# Patient Record
Sex: FEMALE | Race: BLACK OR AFRICAN AMERICAN | NOT HISPANIC OR LATINO | Employment: FULL TIME | URBAN - METROPOLITAN AREA
[De-identification: names, ages, dates, MRNs, and addresses within clinical notes are randomized per-mention and may not be internally consistent; named-entity substitution may affect disease eponyms.]

---

## 2017-02-22 DIAGNOSIS — Z00.00 ROUTINE GENERAL MEDICAL EXAMINATION AT A HEALTH CARE FACILITY: ICD-10-CM

## 2017-02-22 DIAGNOSIS — G43.909 MIGRAINE WITHOUT STATUS MIGRAINOSUS, NOT INTRACTABLE, UNSPECIFIED MIGRAINE TYPE: ICD-10-CM

## 2017-02-22 DIAGNOSIS — N94.6 MENSTRUAL CRAMPS: ICD-10-CM

## 2017-02-23 RX ORDER — NAPROXEN SODIUM 550 MG/1
TABLET ORAL
Qty: 30 TABLET | Refills: 1 | Status: SHIPPED | OUTPATIENT
Start: 2017-02-23 | End: 2017-06-30 | Stop reason: SDUPTHER

## 2017-06-30 DIAGNOSIS — Z00.00 ROUTINE GENERAL MEDICAL EXAMINATION AT A HEALTH CARE FACILITY: ICD-10-CM

## 2017-06-30 DIAGNOSIS — N94.6 MENSTRUAL CRAMPS: ICD-10-CM

## 2017-06-30 DIAGNOSIS — G43.909 MIGRAINE WITHOUT STATUS MIGRAINOSUS, NOT INTRACTABLE, UNSPECIFIED MIGRAINE TYPE: ICD-10-CM

## 2017-06-30 RX ORDER — NAPROXEN SODIUM 550 MG/1
TABLET ORAL
Qty: 30 TABLET | Refills: 1 | Status: SHIPPED | OUTPATIENT
Start: 2017-06-30 | End: 2018-02-15

## 2017-12-02 DIAGNOSIS — G43.909 MIGRAINE WITHOUT STATUS MIGRAINOSUS, NOT INTRACTABLE, UNSPECIFIED MIGRAINE TYPE: ICD-10-CM

## 2017-12-02 DIAGNOSIS — N94.6 MENSTRUAL CRAMPS: ICD-10-CM

## 2017-12-02 DIAGNOSIS — Z00.00 ROUTINE GENERAL MEDICAL EXAMINATION AT A HEALTH CARE FACILITY: ICD-10-CM

## 2017-12-04 RX ORDER — RIZATRIPTAN BENZOATE 10 MG/1
TABLET ORAL
Qty: 10 TABLET | Refills: 3 | OUTPATIENT
Start: 2017-12-04

## 2018-02-12 ENCOUNTER — OFFICE VISIT (OUTPATIENT)
Dept: INTERNAL MEDICINE | Facility: CLINIC | Age: 28
End: 2018-02-12
Payer: COMMERCIAL

## 2018-02-12 VITALS
OXYGEN SATURATION: 97 % | HEART RATE: 72 BPM | HEIGHT: 66 IN | BODY MASS INDEX: 24.13 KG/M2 | WEIGHT: 150.13 LBS | SYSTOLIC BLOOD PRESSURE: 110 MMHG | DIASTOLIC BLOOD PRESSURE: 86 MMHG

## 2018-02-12 DIAGNOSIS — G43.909 MIGRAINE WITHOUT STATUS MIGRAINOSUS, NOT INTRACTABLE, UNSPECIFIED MIGRAINE TYPE: Primary | ICD-10-CM

## 2018-02-12 DIAGNOSIS — L70.9 ACNE, UNSPECIFIED ACNE TYPE: ICD-10-CM

## 2018-02-12 DIAGNOSIS — F43.9 STRESS: ICD-10-CM

## 2018-02-12 PROCEDURE — 3008F BODY MASS INDEX DOCD: CPT | Mod: S$GLB,,, | Performed by: INTERNAL MEDICINE

## 2018-02-12 PROCEDURE — 99214 OFFICE O/P EST MOD 30 MIN: CPT | Mod: S$GLB,,, | Performed by: INTERNAL MEDICINE

## 2018-02-12 PROCEDURE — 99999 PR PBB SHADOW E&M-EST. PATIENT-LVL IV: CPT | Mod: PBBFAC,,, | Performed by: INTERNAL MEDICINE

## 2018-02-12 RX ORDER — NAPROXEN SODIUM 550 MG/1
TABLET ORAL
Qty: 60 TABLET | Refills: 1 | Status: SHIPPED | OUTPATIENT
Start: 2018-02-12 | End: 2018-04-02 | Stop reason: SDUPTHER

## 2018-02-12 RX ORDER — RIZATRIPTAN BENZOATE 10 MG/1
TABLET ORAL
Qty: 10 TABLET | Refills: 4 | Status: SHIPPED | OUTPATIENT
Start: 2018-02-12 | End: 2018-04-02 | Stop reason: SDUPTHER

## 2018-02-12 RX ORDER — TOPIRAMATE 25 MG/1
25 TABLET ORAL NIGHTLY
Qty: 30 TABLET | Refills: 1 | Status: SHIPPED | OUTPATIENT
Start: 2018-02-12 | End: 2018-04-02 | Stop reason: SDUPTHER

## 2018-02-12 NOTE — PATIENT INSTRUCTIONS
Topiramate tablets  What is this medicine?  TOPIRAMATE (toe PYRE a mate) is used to treat seizures in adults or children with epilepsy. It is also used for the prevention of migraine headaches.  How should I use this medicine?  Take this medicine by mouth with a glass of water. Follow the directions on the prescription label. Do not crush or chew. You may take this medicine with meals. Take your medicine at regular intervals. Do not take it more often than directed.  Talk to your pediatrician regarding the use of this medicine in children. Special care may be needed. While this drug may be prescribed for children as young as 2 years of age for selected conditions, precautions do apply.  What side effects may I notice from receiving this medicine?  Side effects that you should report to your doctor or health care professional as soon as possible:  · allergic reactions like skin rash, itching or hives, swelling of the face, lips, or tongue  · decreased sweating and/or rise in body temperature  · depression  · difficulty breathing, fast or irregular breathing patterns  · difficulty speaking  · difficulty walking or controlling muscle movements  · hearing impairment  · redness, blistering, peeling or loosening of the skin, including inside the mouth  · tingling, pain or numbness in the hands or feet  · unusual bleeding or bruising  · unusually weak or tired  · worsening of mood, thoughts or actions of suicide or dying  Side effects that usually do not require medical attention (report to your doctor or health care professional if they continue or are bothersome):  · altered taste  · back pain, joint or muscle aches and pains  · diarrhea, or constipation  · headache  · loss of appetite  · nausea  · stomach upset, indigestion  · tremors  What may interact with this medicine?  Do not take this medicine with any of the following medications:  · probenecid  This medicine may also interact with the following  medications:  · acetazolamide  · alcohol  · amitriptyline  · aspirin and aspirin-like medicines  · birth control pills  · certain medicines for depression  · certain medicines for seizures  · certain medicines that treat or prevent blood clots like warfarin, enoxaparin, dalteparin, apixaban, dabigatran, and rivaroxaban  · digoxin  · hydrochlorothiazide  · lithium  · medicines for pain, sleep, or muscle relaxation  · metformin  · methazolamide  · NSAIDS, medicines for pain and inflammation, like ibuprofen or naproxen  · pioglitazone  · risperidone  What if I miss a dose?  If you miss a dose, take it as soon as you can. If your next dose is to be taken in less than 6 hours, then do not take the missed dose. Take the next dose at your regular time. Do not take double or extra doses.  Where should I keep my medicine?  Keep out of the reach of children.  Store at room temperature between 15 and 30 degrees C (59 and 86 degrees F) in a tightly closed container. Protect from moisture. Throw away any unused medicine after the expiration date.  What should I tell my health care provider before I take this medicine?  They need to know if you have any of these conditions:  · bleeding disorders  · cirrhosis of the liver or liver disease  · diarrhea  · glaucoma  · kidney stones or kidney disease  · low blood counts, like low white cell, platelet, or red cell counts  · lung disease like asthma, obstructive pulmonary disease, emphysema  · metabolic acidosis  · on a ketogenic diet  · schedule for surgery or a procedure  · suicidal thoughts, plans, or attempt; a previous suicide attempt by you or a family member  · an unusual or allergic reaction to topiramate, other medicines, foods, dyes, or preservatives  · pregnant or trying to get pregnant  · breast-feeding  What should I watch for while using this medicine?  Visit your doctor or health care professional for regular checks on your progress. Do not stop taking this medicine  suddenly. This increases the risk of seizures if you are using this medicine to control epilepsy. Wear a medical identification bracelet or chain to say you have epilepsy or seizures, and carry a card that lists all your medicines.  This medicine can decrease sweating and increase your body temperature. Watch for signs of  sweating or fever, especially in children. Avoid extreme heat, hot baths, and saunas. Be careful about exercising, especially in hot weather. Contact your health care provider right away if you notice a fever or decrease in sweating.  You should drink plenty of fluids while taking this medicine. If you have had kidney stones in the past, this will help to reduce your chances of forming kidney stones.  If you have stomach pain, with nausea or vomiting and yellowing of your eyes or skin, call your doctor immediately.  You may get drowsy, dizzy, or have blurred vision. Do not drive, use machinery, or do anything that needs mental alertness until you know how this medicine affects you. To reduce dizziness, do not sit or stand up quickly, especially if you are an older patient. Alcohol can increase drowsiness and dizziness. Avoid alcoholic drinks.  If you notice blurred vision, eye pain, or other eye problems, seek medical attention at once for an eye exam.  The use of this medicine may increase the chance of suicidal thoughts or actions. Pay special attention to how you are responding while on this medicine. Any worsening of mood, or thoughts of suicide or dying should be reported to your health care professional right away.  This medicine may increase the chance of developing metabolic acidosis. If left untreated, this can cause kidney stones, bone disease, or slowed growth in children. Symptoms include breathing fast, fatigue, loss of appetite, irregular heartbeat, or loss of consciousness. Call your doctor immediately if you experience any of these side effects. Also, tell your doctor about  any surgery you plan on having while taking this medicine since this may increase your risk for metabolic acidosis.  Birth control pills may not work properly while you are taking this medicine. Talk to your doctor about using an extra method of birth control.  Women who become pregnant while using this medicine may enroll in the [place] Antiepileptic Drug Pregnancy Registry by calling 1-868.201.7108. This registry collects information about the safety of antiepileptic drug use during pregnancy.  NOTE:This sheet is a summary. It may not cover all possible information. If you have questions about this medicine, talk to your doctor, pharmacist, or health care provider. Copyright© 2017 [employer]      Use over the counter benzoyl peroxide for treatment of acne. Start at low dose such as 1 or 2% and then increase as needed.

## 2018-02-12 NOTE — PROGRESS NOTES
"Subjective:       Patient ID: Rosalina Saavedra is a 28 y.o. female.    Chief Complaint: Headache    HPI Mrs. Saavedra is a 28-year-old female with migraines who presents with a chief complaint headache.  She is a former patient of Dr. Trujillo.  She states that she was diagnosed with migraines in childhood.  She has previously been treated with naproxen and Maxalt.  She is now out of Maxalt.  She states that when she had both of these medications they would "mostly work".  They made her headaches more tolerable but did not make headaches dissipate.  She states that her headaches are triggered by stress.  Recently she has noted that she has longer stretches with headache and increased pain as opposed to when she had these headaches in childhood.  Her headaches are located in both the back and front of the head.  She describes them as sharp in character.  When they're at their worst, they are 10 out of 10 in severity.  Currently her headache is 3 out of 10 in severity.  She states that the light makes her headaches worse.  Headaches are associated with nausea and sometimes blurry vision.  She has never been treated with a maintenance for prophylactic-type medicine for migraine.    Patient also complains today of acne and would like a referral to a dermatologist.  She has had some acne which has led to scarring.  She is using over-the-counter acne creams with no relief.    Patient would also like to discuss stress management.  She both works and goes to school and feels that she has been stressed recently.    Review of Systems   Constitutional: Positive for fever (101 fever this monday-wednesday).   HENT: Positive for congestion.    Eyes: Positive for photophobia and visual disturbance.   Respiratory: Positive for cough. Negative for wheezing.    Cardiovascular: Negative for chest pain.   Gastrointestinal: Positive for nausea.   Genitourinary: Negative for dysuria and hematuria.   Neurological: Positive for " headaches.       Objective:      Physical Exam   Constitutional: She is oriented to person, place, and time. She appears well-developed and well-nourished. No distress.   HENT:   Head: Normocephalic and atraumatic.   Right Ear: External ear normal.   Left Ear: External ear normal.   Eyes: Conjunctivae and EOM are normal. Right eye exhibits no discharge. Left eye exhibits no discharge. No scleral icterus.   Cardiovascular: Normal rate, regular rhythm, normal heart sounds and intact distal pulses.  Exam reveals no gallop and no friction rub.    No murmur heard.  Pulmonary/Chest: Effort normal and breath sounds normal. No respiratory distress. She has no wheezes. She has no rales.   Neurological: She is alert and oriented to person, place, and time. She displays normal reflexes. No cranial nerve deficit. She exhibits normal muscle tone. Coordination normal.   Skin: Skin is warm and dry. She is not diaphoretic.   Psychiatric: She has a normal mood and affect. Her behavior is normal. Judgment and thought content normal.   Vitals reviewed.      Assessment:       1. Migraine without status migrainosus, not intractable, unspecified migraine type    2. Acne, unspecified acne type    3. Stress        Plan:     #1 migraine  Starting Topamax at lowest dose today.  She will take Topamax 25 mg every afternoon.  Can titrate up if needed.  Counseled on side effects such as sedation.  Continue naproxen and Maxalt for acute headaches.  Stressed the importance of hydration.  RTC one month    #2 acne  I have placed a dermatology referral as the patient has requested.  I have also instructed the patient to begin treating herself with over-the-counter benzoyl peroxide.  I advised her to find creams that contain benzoyl peroxide at a low dose such as 1 or 2%.  She can slowly increase to benzoyl peroxide as high as 10% over-the-counter if this is providing her with relief of her acne.    #3 stress  Stress was mentioned at the end of her  visit today.  As we did not have much time to discuss this she will return in 1 month, and we will follow-up on this.  In the meantime I advised her to exercise and talk to someone regarding her stress.  This someone could be a friend, close family member, or a counselor.    RTC one month for follow up of migraines and stress

## 2018-02-15 ENCOUNTER — INITIAL CONSULT (OUTPATIENT)
Dept: DERMATOLOGY | Facility: CLINIC | Age: 28
End: 2018-02-15
Payer: COMMERCIAL

## 2018-02-15 DIAGNOSIS — L70.0 ACNE VULGARIS: Primary | ICD-10-CM

## 2018-02-15 DIAGNOSIS — L21.9 SEBORRHEIC DERMATITIS: ICD-10-CM

## 2018-02-15 PROCEDURE — 99999 PR PBB SHADOW E&M-EST. PATIENT-LVL III: CPT | Mod: PBBFAC,,, | Performed by: NURSE PRACTITIONER

## 2018-02-15 PROCEDURE — 99202 OFFICE O/P NEW SF 15 MIN: CPT | Mod: S$GLB,,, | Performed by: NURSE PRACTITIONER

## 2018-02-15 PROCEDURE — 3008F BODY MASS INDEX DOCD: CPT | Mod: S$GLB,,, | Performed by: NURSE PRACTITIONER

## 2018-02-15 RX ORDER — FLUOCINONIDE TOPICAL SOLUTION USP, 0.05% 0.5 MG/ML
SOLUTION TOPICAL
Qty: 60 ML | Refills: 3 | Status: SHIPPED | OUTPATIENT
Start: 2018-02-15 | End: 2019-04-01

## 2018-02-15 RX ORDER — ADAPALENE AND BENZOYL PEROXIDE 3; 25 MG/G; MG/G
GEL TOPICAL
Qty: 45 G | Refills: 3 | Status: SHIPPED | OUTPATIENT
Start: 2018-02-15 | End: 2021-02-01

## 2018-02-15 RX ORDER — KETOCONAZOLE 20 MG/ML
SHAMPOO, SUSPENSION TOPICAL
Qty: 120 ML | Refills: 5 | Status: SHIPPED | OUTPATIENT
Start: 2018-02-15 | End: 2021-02-01

## 2018-02-15 NOTE — PROGRESS NOTES
Subjective:       Patient ID:  Rosalina Saavedra is a 28 y.o. female who presents for   Chief Complaint   Patient presents with    Acne    Hair/Scalp Problem     This is a new patient here for evaluation of acne and scalp issues      Acne  - Initial  Affected locations: face, back and chest  Duration: 10 years  Signs / symptoms: tender  Timing: constant  Aggravated by: stress  Treatments tried: Was by dermatologist in the past and treated with topical prescription -unable to recall name. Washing face BID with Dial.   Improvement on treatment: mild    Hair/Scalp Problem  - Initial  Affected locations: scalp  Duration: 4 years  Signs / symptoms: itching and scaling  Timing: constant  Aggravated by: nothing  Treatments tried: Was treated with medicated shampoo in p(ast which helped- doesnt remember name.        Review of Systems   HENT: Negative for nosebleeds and headaches ( Hx of migraines).    Gastrointestinal: Negative for diarrhea and Sensitivity to oral antibiotics.   Genitourinary: Negative for irregular periods ( Not on OCP. Not sexually active).   Musculoskeletal: Negative for arthralgias.   Skin: Positive for activity-related sunscreen use. Negative for daily sunscreen use and recent sunburn.   Neurological: Negative for headaches ( Hx of migraines).   Psychiatric/Behavioral: Negative for depressed mood ( Hx of depression. Was seen in the past by psychiatrist. ).        Objective:    Physical Exam   Constitutional: She appears well-developed and well-nourished. No distress.   Neurological: She is alert and oriented to person, place, and time. She is not disoriented.   Psychiatric: She has a normal mood and affect.   Skin:   Areas Examined (abnormalities noted in diagram):   Scalp / Hair Palpated and Inspected  Head / Face Inspection Performed  Neck Inspection Performed  Chest / Axilla Inspection Performed  Back Inspection Performed                   Diagram Legend     Erythematous scaling  macule/papule c/w actinic keratosis       Vascular papule c/w angioma      Pigmented verrucoid papule/plaque c/w seborrheic keratosis      Yellow umbilicated papule c/w sebaceous hyperplasia      Irregularly shaped tan macule c/w lentigo     1-2 mm smooth white papules consistent with Milia      Movable subcutaneous cyst with punctum c/w epidermal inclusion cyst      Subcutaneous movable cyst c/w pilar cyst      Firm pink to brown papule c/w dermatofibroma      Pedunculated fleshy papule(s) c/w skin tag(s)      Evenly pigmented macule c/w junctional nevus     Mildly variegated pigmented, slightly irregular-bordered macule c/w mildly atypical nevus      Flesh colored to evenly pigmented papule c/w intradermal nevus       Pink pearly papule/plaque c/w basal cell carcinoma      Erythematous hyperkeratotic cursted plaque c/w SCC      Surgical scar with no sign of skin cancer recurrence      Open and closed comedones      Inflammatory papules and pustules      Verrucoid papule consistent consistent with wart     Erythematous eczematous patches and plaques     Dystrophic onycholytic nail with subungual debris c/w onychomycosis     Umbilicated papule    Erythematous-base heme-crusted tan verrucoid plaque consistent with inflamed seborrheic keratosis     Erythematous Silvery Scaling Plaque c/w Psoriasis     See annotation      Assessment / Plan:        Acne vulgaris  -     EPIDUO FORTE 0.3-2.5 % GlwP; AAA face qhs  Dispense: 45 g; Refill: 3  Recommend starting with pea size amount every other night with moisturizer until can tolerate every night.     Cont washing face BID- Recommend cetaphil oil free foam wash or cerave gentle cleanser         Discussed importance of not picking at lesions        Recommend Moisturizing qhs- Cerave PM        Encouraged sunscreen application every morning         Discussed with patient to avoid all face washes containing BP.     Seborrheic dermatitis  -     ketoconazole (NIZORAL) 2 % shampoo;  Wash hair with medicated shampoo at least 2x/week - let sit on scalp at least 5 minutes prior to rinsing  Dispense: 120 mL; Refill: 5  -     fluocinonide (LIDEX) 0.05 % external solution; AAA scalp qday - bid prn pruritus  Dispense: 60 mL; Refill: 3             Follow-up in about 2 months (around 4/15/2018).

## 2018-02-15 NOTE — PATIENT INSTRUCTIONS

## 2018-02-15 NOTE — LETTER
February 15, 2018      Jaki Murphy MD  [address] [facility] [address]           [facility]  [address]  [place] [address]  Phone: [phone]  Fax: [phone]          Patient: Rosalina Saavedra   MR Number: [id number]   YOB: 1990   Date of Visit: 2/15/2018       Dear Dr. Jaki Murphy:    Thank you for referring Rosalina Saavedra to me for evaluation. Attached you will find relevant portions of my assessment and plan of care.    If you have questions, please do not hesitate to call me. I look forward to following Rosalina Saavedra along with you.    Sincerely,    Britt Mitchell, TAMRA    Enclosure  CC:  No Recipients    If you would like to receive this communication electronically, please contact [email or web address] or [phone] to request more information on [facility] access.    For providers and/or their staff who would like to refer a patient to [facility], please contact us through our one-stop-shop provider referral line, [facility], at [phone].    If you feel you have received this communication in error or would no longer like to receive these types of communications, please e-mail [email or web address]

## 2018-03-01 ENCOUNTER — TELEPHONE (OUTPATIENT)
Dept: INTERNAL MEDICINE | Facility: CLINIC | Age: 28
End: 2018-03-01

## 2018-04-02 ENCOUNTER — OFFICE VISIT (OUTPATIENT)
Dept: INTERNAL MEDICINE | Facility: CLINIC | Age: 28
End: 2018-04-02
Payer: COMMERCIAL

## 2018-04-02 VITALS
HEIGHT: 66 IN | DIASTOLIC BLOOD PRESSURE: 64 MMHG | HEART RATE: 78 BPM | SYSTOLIC BLOOD PRESSURE: 128 MMHG | WEIGHT: 142.88 LBS | OXYGEN SATURATION: 98 % | BODY MASS INDEX: 22.96 KG/M2

## 2018-04-02 DIAGNOSIS — J06.9 UPPER RESPIRATORY TRACT INFECTION, UNSPECIFIED TYPE: ICD-10-CM

## 2018-04-02 DIAGNOSIS — F32.A DEPRESSION, UNSPECIFIED DEPRESSION TYPE: ICD-10-CM

## 2018-04-02 DIAGNOSIS — F41.9 ANXIETY: ICD-10-CM

## 2018-04-02 DIAGNOSIS — G43.909 MIGRAINE WITHOUT STATUS MIGRAINOSUS, NOT INTRACTABLE, UNSPECIFIED MIGRAINE TYPE: ICD-10-CM

## 2018-04-02 PROCEDURE — 99999 PR PBB SHADOW E&M-EST. PATIENT-LVL III: CPT | Mod: PBBFAC,,, | Performed by: INTERNAL MEDICINE

## 2018-04-02 PROCEDURE — 99214 OFFICE O/P EST MOD 30 MIN: CPT | Mod: S$GLB,,, | Performed by: INTERNAL MEDICINE

## 2018-04-02 RX ORDER — RIZATRIPTAN BENZOATE 10 MG/1
TABLET ORAL
Qty: 10 TABLET | Refills: 4 | Status: SHIPPED | OUTPATIENT
Start: 2018-04-02 | End: 2018-10-09 | Stop reason: SDUPTHER

## 2018-04-02 RX ORDER — NAPROXEN SODIUM 550 MG/1
TABLET ORAL
Qty: 60 TABLET | Refills: 1 | Status: SHIPPED | OUTPATIENT
Start: 2018-04-02 | End: 2018-10-09 | Stop reason: SDUPTHER

## 2018-04-02 RX ORDER — TOPIRAMATE 25 MG/1
25 TABLET ORAL NIGHTLY
Qty: 90 TABLET | Refills: 2 | Status: SHIPPED | OUTPATIENT
Start: 2018-04-02 | End: 2018-10-09

## 2018-04-02 RX ORDER — FLUTICASONE PROPIONATE 50 MCG
1 SPRAY, SUSPENSION (ML) NASAL DAILY
Qty: 16 G | Refills: 1 | Status: SHIPPED | OUTPATIENT
Start: 2018-04-02 | End: 2019-04-01

## 2018-04-02 NOTE — PROGRESS NOTES
Subjective:       Patient ID: Rosalina Saavedra is a 28 y.o. female.    Chief Complaint: Follow-up (one month f/u )    HPI Mrs. Saavedra is a 28-year-old female with migraine headaches who presents for follow-up of migraine headaches.  At our last visit she was started on Topamax 25 mg daily for migraine headache prevention.  Since that time, she states that her headaches are much improved.  She now has a headache maybe once or twice per week.  When they do occur, she takes naproxen or Maxalt and this will completely relieve her headache.  On occasion her headaches will be as severe as 7 out of 10 in severity.  But on average they are about 5 out of 10 in severity.  If she does not take medication to stop her headache, they would last for the entire day.  She is happy with the Topamax medication and would like to continue it for headache prevention at this time.  She denies any sedation with the Topamax.    She admits to feelings of anxiety and depression.  She denies any suicidal ideation.  She has been treated for anxiety and depression in the past with both medication and talk therapy.  She feels that the talk therapy was the most beneficial.  She feels that the medication caused side effects which then led her to taking more medication to get rid of the side effects.  She is not interested in starting any medication for treatment at this time.  She has an appointment to meet with a psychologist for talk therapy on May 1.    Finally she complains of cold symptoms today.  She is having symptoms such as nasal congestion, postnasal drip, sore throat, cough.    Review of Systems   HENT: Positive for congestion, postnasal drip and sore throat.    Respiratory: Positive for cough.    Neurological: Positive for headaches (improved).       Objective:      Physical Exam   Constitutional: She is oriented to person, place, and time. She appears well-developed and well-nourished. No distress.   HENT:   Head:  Normocephalic and atraumatic.   Cardiovascular: Normal rate, regular rhythm, normal heart sounds and intact distal pulses.  Exam reveals no gallop and no friction rub.    No murmur heard.  Pulmonary/Chest: Effort normal and breath sounds normal. No respiratory distress. She has no wheezes. She has no rales.   Neurological: She is alert and oriented to person, place, and time.   Skin: Skin is warm and dry. She is not diaphoretic.   Psychiatric: She has a normal mood and affect. Her behavior is normal. Judgment and thought content normal.   Vitals reviewed.      Assessment:       1. Migraine without status migrainosus, not intractable, unspecified migraine type    2. Upper respiratory tract infection, unspecified type    3. Anxiety    4. Depression, unspecified depression type        Plan:   #1 migraine headaches    stable, well controlled.  Continue current medications.  Discussed with patient that if she would like to stop Topamax in the future, it is recommended that we wean this medication down and then discontinue it.  Patient is aware of teratogenic effects associated with Topamax.  She will let me know if she should plan to get pregnant in the future.  At our next visit in 6 months, she will let me know if she would like to continue Topamax, or if we should work on weaning it down and then discontinuing it.    #2 URI  Prescribing Flonase nasal spray for treatment of nasal congestion, postnasal drip, and resultant cough.    #3 anxiety   patient scored a 9 on the JESUS 7 indicating mild anxiety.  She is not interested in starting medications for treatment at this time.  She will however let me know if she changes her mind.  She has talk therapy scheduled with a psychologist on May 1    #4 depression   patient scored a 10 on the pH Q9 indicating moderate depression. Denied suicidal ideation  She is not interested in starting medications for treatment at this time.  She will however let me know if she changes her  mind.  She has talk therapy scheduled with a psychologist on May 1    RTC 6 months and PRN

## 2018-04-02 NOTE — PATIENT INSTRUCTIONS
Topiramate tablets  What is this medicine?  TOPIRAMATE (toe PYRE a mate) is used to treat seizures in adults or children with epilepsy. It is also used for the prevention of migraine headaches.  How should I use this medicine?  Take this medicine by mouth with a glass of water. Follow the directions on the prescription label. Do not crush or chew. You may take this medicine with meals. Take your medicine at regular intervals. Do not take it more often than directed.  Talk to your pediatrician regarding the use of this medicine in children. Special care may be needed. While this drug may be prescribed for children as young as 2 years of age for selected conditions, precautions do apply.  What side effects may I notice from receiving this medicine?  Side effects that you should report to your doctor or health care professional as soon as possible:  · allergic reactions like skin rash, itching or hives, swelling of the face, lips, or tongue  · decreased sweating and/or rise in body temperature  · depression  · difficulty breathing, fast or irregular breathing patterns  · difficulty speaking  · difficulty walking or controlling muscle movements  · hearing impairment  · redness, blistering, peeling or loosening of the skin, including inside the mouth  · tingling, pain or numbness in the hands or feet  · unusual bleeding or bruising  · unusually weak or tired  · worsening of mood, thoughts or actions of suicide or dying  Side effects that usually do not require medical attention (report to your doctor or health care professional if they continue or are bothersome):  · altered taste  · back pain, joint or muscle aches and pains  · diarrhea, or constipation  · headache  · loss of appetite  · nausea  · stomach upset, indigestion  · tremors  What may interact with this medicine?  Do not take this medicine with any of the following medications:  · probenecid  This medicine may also interact with the following  medications:  · acetazolamide  · alcohol  · amitriptyline  · aspirin and aspirin-like medicines  · birth control pills  · certain medicines for depression  · certain medicines for seizures  · certain medicines that treat or prevent blood clots like warfarin, enoxaparin, dalteparin, apixaban, dabigatran, and rivaroxaban  · digoxin  · hydrochlorothiazide  · lithium  · medicines for pain, sleep, or muscle relaxation  · metformin  · methazolamide  · NSAIDS, medicines for pain and inflammation, like ibuprofen or naproxen  · pioglitazone  · risperidone  What if I miss a dose?  If you miss a dose, take it as soon as you can. If your next dose is to be taken in less than 6 hours, then do not take the missed dose. Take the next dose at your regular time. Do not take double or extra doses.  Where should I keep my medicine?  Keep out of the reach of children.  Store at room temperature between 15 and 30 degrees C (59 and 86 degrees F) in a tightly closed container. Protect from moisture. Throw away any unused medicine after the expiration date.  What should I tell my health care provider before I take this medicine?  They need to know if you have any of these conditions:  · bleeding disorders  · cirrhosis of the liver or liver disease  · diarrhea  · glaucoma  · kidney stones or kidney disease  · low blood counts, like low white cell, platelet, or red cell counts  · lung disease like asthma, obstructive pulmonary disease, emphysema  · metabolic acidosis  · on a ketogenic diet  · schedule for surgery or a procedure  · suicidal thoughts, plans, or attempt; a previous suicide attempt by you or a family member  · an unusual or allergic reaction to topiramate, other medicines, foods, dyes, or preservatives  · pregnant or trying to get pregnant  · breast-feeding  What should I watch for while using this medicine?  Visit your doctor or health care professional for regular checks on your progress. Do not stop taking this medicine  suddenly. This increases the risk of seizures if you are using this medicine to control epilepsy. Wear a medical identification bracelet or chain to say you have epilepsy or seizures, and carry a card that lists all your medicines.  This medicine can decrease sweating and increase your body temperature. Watch for signs of  sweating or fever, especially in children. Avoid extreme heat, hot baths, and saunas. Be careful about exercising, especially in hot weather. Contact your health care provider right away if you notice a fever or decrease in sweating.  You should drink plenty of fluids while taking this medicine. If you have had kidney stones in the past, this will help to reduce your chances of forming kidney stones.  If you have stomach pain, with nausea or vomiting and yellowing of your eyes or skin, call your doctor immediately.  You may get drowsy, dizzy, or have blurred vision. Do not drive, use machinery, or do anything that needs mental alertness until you know how this medicine affects you. To reduce dizziness, do not sit or stand up quickly, especially if you are an older patient. Alcohol can increase drowsiness and dizziness. Avoid alcoholic drinks.  If you notice blurred vision, eye pain, or other eye problems, seek medical attention at once for an eye exam.  The use of this medicine may increase the chance of suicidal thoughts or actions. Pay special attention to how you are responding while on this medicine. Any worsening of mood, or thoughts of suicide or dying should be reported to your health care professional right away.  This medicine may increase the chance of developing metabolic acidosis. If left untreated, this can cause kidney stones, bone disease, or slowed growth in children. Symptoms include breathing fast, fatigue, loss of appetite, irregular heartbeat, or loss of consciousness. Call your doctor immediately if you experience any of these side effects. Also, tell your doctor about  any surgery you plan on having while taking this medicine since this may increase your risk for metabolic acidosis.  Birth control pills may not work properly while you are taking this medicine. Talk to your doctor about using an extra method of birth control.  Women who become pregnant while using this medicine may enroll in the [place] Antiepileptic Drug Pregnancy Registry by calling 1-749.940.2061. This registry collects information about the safety of antiepileptic drug use during pregnancy.  NOTE:This sheet is a summary. It may not cover all possible information. If you have questions about this medicine, talk to your doctor, pharmacist, or health care provider. Copyright© 2017 [employer]        Naproxen and naproxen sodium oral immediate-release tablets  What is this medicine?  NAPROXEN (na PROX en) is a non-steroidal anti-inflammatory drug (NSAID). It is used to reduce swelling and to treat pain. This medicine may be used for dental pain, headache, or painful monthly periods. It is also used for painful joint and muscular problems such as arthritis, tendinitis, bursitis, and gout.  How should I use this medicine?  Take this medicine by mouth with a glass of water. Follow the directions on the prescription label. Take it with food if your stomach gets upset. Try to not lie down for at least 10 minutes after you take it. Take your medicine at regular intervals. Do not take your medicine more often than directed. Long-term, continuous use may increase the risk of heart attack or stroke.  A special MedGuide will be given to you by the pharmacist with each prescription and refill. Be sure to read this information carefully each time.  Talk to your pediatrician regarding the use of this medicine in children. Special care may be needed.  What side effects may I notice from receiving this medicine?  Side effects that you should report to your doctor or health care professional as soon as possible:  · black  or bloody stools, blood in the urine or vomit  · blurred vision  · chest pain  · difficulty breathing or wheezing  · nausea or vomiting  · severe stomach pain  · skin rash, skin redness, blistering or peeling skin, hives, or itching  · slurred speech or weakness on one side of the body  · swelling of eyelids, throat, lips  · unexplained weight gain or swelling  · unusually weak or tired  · yellowing of eyes or skin  Side effects that usually do not require medical attention (report to your doctor or health care professional if they continue or are bothersome):  · constipation  · headache  · heartburn  What may interact with this medicine?  · alcohol  · aspirin  · cidofovir  · diuretics  · lithium  · methotrexate  · other drugs for inflammation like ketorolac or prednisone  · pemetrexed  · probenecid  · warfarin  What if I miss a dose?  If you miss a dose, take it as soon as you can. If it is almost time for your next dose, take only that dose. Do not take double or extra doses.  Where should I keep my medicine?  Keep out of the reach of children.  Store at room temperature between 15 and 30 degrees C (59 and 86 degrees F). Keep container tightly closed. Throw away any unused medicine after the expiration date.  What should I tell my health care provider before I take this medicine?  They need to know if you have any of these conditions:  · asthma  · cigarette smoker  · drink more than 3 alcohol containing drinks a day  · heart disease or circulation problems such as heart failure or leg edema (fluid retention)  · high blood pressure  · kidney disease  · liver disease  · stomach bleeding or ulcers  · an unusual or allergic reaction to naproxen, aspirin, other NSAIDs, other medicines, foods, dyes, or preservatives  · pregnant or trying to get pregnant  · breast-feeding  What should I watch for while using this medicine?  Tell your doctor or health care professional if your pain does not get better. Talk to your doctor  before taking another medicine for pain. Do not treat yourself.  This medicine does not prevent heart attack or stroke. In fact, this medicine may increase the chance of a heart attack or stroke. The chance may increase with longer use of this medicine and in people who have heart disease. If you take aspirin to prevent heart attack or stroke, talk with your doctor or health care professional.  Do not take other medicines that contain aspirin, ibuprofen, or naproxen with this medicine. Side effects such as stomach upset, nausea, or ulcers may be more likely to occur. Many medicines available without a prescription should not be taken with this medicine.  This medicine can cause ulcers and bleeding in the stomach and intestines at any time during treatment. Do not smoke cigarettes or drink alcohol. These increase irritation to your stomach and can make it more susceptible to damage from this medicine. Ulcers and bleeding can happen without warning symptoms and can cause death.  You may get drowsy or dizzy. Do not drive, use machinery, or do anything that needs mental alertness until you know how this medicine affects you. Do not stand or sit up quickly, especially if you are an older patient. This reduces the risk of dizzy or fainting spells.  This medicine can cause you to bleed more easily. Try to avoid damage to your teeth and gums when you brush or floss your teeth.  NOTE:This sheet is a summary. It may not cover all possible information. If you have questions about this medicine, talk to your doctor, pharmacist, or health care provider. Copyright© 2017 [employer]        Rizatriptan tablets  What is this medicine?  RIZATRIPTAN (rye za TRIP tan) is used to treat migraines with or without aura. An aura is a strange feeling or visual disturbance that warns you of an attack. It is not used to prevent migraines.  How should I use this medicine?  This medicine is taken by mouth with a glass of water. Follow the  directions on the prescription label. This medicine is taken at the first symptoms of a migraine. It is not for everyday use. If your migraine headache returns after one dose, you can take another dose as directed. You must leave at least 2 hours between doses, and do not take more than 30 mg total in 24 hours. If there is no improvement at all after the first dose, do not take a second dose without talking to your doctor or health care professional. Do not take your medicine more often than directed.  Talk to your pediatrician regarding the use of this medicine in children. While this drug may be prescribed for children as young as 6 years for selected conditions, precautions do apply.  What side effects may I notice from receiving this medicine?  Side effects that you should report to your doctor or health care professional as soon as possible:  · allergic reactions like skin rash, itching or hives, swelling of the face, lips, or tongue  · fast, slow, or irregular heart beat  · increased or decreased blood pressure  · seizures  · severe stomach pain and cramping, bloody diarrhea  · signs and symptoms of a blood clot such as breathing problems; changes in vision; chest pain; severe, sudden headache; pain, swelling, warmth in the leg; trouble speaking; sudden numbness or weakness of the face, arm or leg  · tingling, pain, or numbness in the face, hands, or feet  Side effects that usually do not require medical attention (report to your doctor or health care professional if they continue or are bothersome):  · drowsiness  · dry mouth  · feeling warm, flushing, or redness of the face  · headache  · muscle cramps, pain  · nausea, vomiting  · unusually weak or tired  What may interact with this medicine?  Do not take this medicine with any of the following medicines:  · amphetamine, dextroamphetamine or cocaine  · dihydroergotamine, ergotamine, ergoloid mesylates, methysergide, or ergot-type medication - do not take  within 24 hours of taking rizatriptan  · feverfew  · MAOIs like Carbex, Eldepryl, Marplan, Nardil, and Parnate - do not take rizatriptan within 2 weeks of stopping MAOI therapy.  · other migraine medicines like almotriptan, eletriptan, naratriptan, sumatriptan, zolmitriptan - do not take within 24 hours of taking rizatriptan  · tryptophan  This medicine may also interact with the following medications:  · medicines for mental depression, anxiety or mood problems  · propranolol  What if I miss a dose?  This does not apply; this medicine is not for regular use.  Where should I keep my medicine?  Keep out of the reach of children.  Store at room temperature between 15 and 30 degrees C (59 and 86 degrees F). Keep container tightly closed. Throw away any unused medicine after the expiration date.  What should I tell my health care provider before I take this medicine?  They need to know if you have any of these conditions:  · bowel disease or colitis  · diabetes  · family history of heart disease  · fast or irregular heart beat  · heart or blood vessel disease, angina (chest pain), or previous heart attack  · high blood pressure  · high cholesterol  · history of stroke, transient ischemic attacks (TIAs or mini-strokes), or intracranial bleeding  · kidney or liver disease  · overweight  · poor circulation  · postmenopausal or surgical removal of uterus and ovaries  · Raynaud's disease  · seizure disorder  · an unusual or allergic reaction to rizatriptan, other medicines, foods, dyes, or preservatives  · pregnant or trying to get pregnant  · breast-feeding  What should I watch for while using this medicine?  Only take this medicine for a migraine headache. Take it if you get warning symptoms or at the start of a migraine attack. It is not for regular use to prevent migraine attacks.  You may get drowsy or dizzy. Do not drive, use machinery, or do anything that needs mental alertness until you know how this medicine affects  you. To reduce dizzy or fainting spells, do not sit or stand up quickly, especially if you are an older patient. Alcohol can increase drowsiness, dizziness and flushing. Avoid alcoholic drinks.  Smoking cigarettes may increase the risk of heart-related side effects from using this medicine.  If you take migraine medicines for 10 or more days a month, your migraines may get worse. Keep a diary of headache days and medicine use. Contact your healthcare professional if your migraine attacks occur more frequently.  NOTE:This sheet is a summary. It may not cover all possible information. If you have questions about this medicine, talk to your doctor, pharmacist, or health care provider. Copyright© 2017 [employer]

## 2018-04-10 DIAGNOSIS — G43.909 MIGRAINE WITHOUT STATUS MIGRAINOSUS, NOT INTRACTABLE, UNSPECIFIED MIGRAINE TYPE: ICD-10-CM

## 2018-04-10 RX ORDER — TOPIRAMATE 25 MG/1
25 TABLET ORAL NIGHTLY
Qty: 30 TABLET | Refills: 5 | Status: SHIPPED | OUTPATIENT
Start: 2018-04-10 | End: 2018-04-17 | Stop reason: SDUPTHER

## 2018-04-17 ENCOUNTER — OFFICE VISIT (OUTPATIENT)
Dept: DERMATOLOGY | Facility: CLINIC | Age: 28
End: 2018-04-17
Payer: COMMERCIAL

## 2018-04-17 DIAGNOSIS — L21.9 SEBORRHEIC DERMATITIS: ICD-10-CM

## 2018-04-17 DIAGNOSIS — L70.0 ACNE VULGARIS: Primary | ICD-10-CM

## 2018-04-17 PROCEDURE — 99213 OFFICE O/P EST LOW 20 MIN: CPT | Mod: S$GLB,,, | Performed by: NURSE PRACTITIONER

## 2018-04-17 PROCEDURE — 99999 PR PBB SHADOW E&M-EST. PATIENT-LVL III: CPT | Mod: PBBFAC,,, | Performed by: NURSE PRACTITIONER

## 2018-04-17 RX ORDER — KETOCONAZOLE 2 G/100G
AEROSOL, FOAM TOPICAL
Qty: 100 G | Refills: 3 | Status: SHIPPED | OUTPATIENT
Start: 2018-04-17 | End: 2019-04-01

## 2018-04-17 NOTE — PROGRESS NOTES
"  Subjective:       Patient ID:  Rosalina Saavedra is a 28 y.o. female who presents for   Chief Complaint   Patient presents with    Acne     follow up    Hair/Scalp Problem     follow up     Acne  - Follow-up  Symptom course: improving (Last seen 2/15/18)  Currently using: Epi Duo Forte qod, washing face BID with cetaphil or cerave.  Affected locations: face  Signs / symptoms: asymptomatic    Seborrheic Dermatitis  - Follow-up  Symptom course: improving (Last seen 2/15/18)  Currently using: Keto shampoo 1x/wk- unable to use more 2/2 "i can not wash my hair more then once a week because it will mess it up".  Also using Lidex soln BID prn itching. Reports only having to use it several days out of the month.  Affected locations: scalp  Signs / symptoms: itching and scaling        Review of Systems   HENT: Negative for nosebleeds and headaches ( Hx of migraines).    Gastrointestinal: Negative for diarrhea and Sensitivity to oral antibiotics.   Genitourinary: Negative for irregular periods ( Not on OCP. ).   Musculoskeletal: Negative for arthralgias.   Skin: Positive for itching ( Mild to scalp), dry skin ( scalp) and activity-related sunscreen use. Negative for daily sunscreen use and recent sunburn.   Neurological: Negative for headaches ( Hx of migraines).   Psychiatric/Behavioral: Negative for depressed mood ( Hx of depression. Was seen in the past by psychiatrist. ).        Objective:    Physical Exam   Constitutional: She appears well-developed and well-nourished. No distress.   Neurological: She is alert and oriented to person, place, and time. She is not disoriented.   Psychiatric: She has a normal mood and affect.   Skin:   Areas Examined (abnormalities noted in diagram):   Scalp / Hair Palpated and Inspected  Head / Face Inspection Performed  Neck Inspection Performed  Chest / Axilla Inspection Performed  Back Inspection Performed                   Diagram Legend     Erythematous scaling macule/papule " c/w actinic keratosis       Vascular papule c/w angioma      Pigmented verrucoid papule/plaque c/w seborrheic keratosis      Yellow umbilicated papule c/w sebaceous hyperplasia      Irregularly shaped tan macule c/w lentigo     1-2 mm smooth white papules consistent with Milia      Movable subcutaneous cyst with punctum c/w epidermal inclusion cyst      Subcutaneous movable cyst c/w pilar cyst      Firm pink to brown papule c/w dermatofibroma      Pedunculated fleshy papule(s) c/w skin tag(s)      Evenly pigmented macule c/w junctional nevus     Mildly variegated pigmented, slightly irregular-bordered macule c/w mildly atypical nevus      Flesh colored to evenly pigmented papule c/w intradermal nevus       Pink pearly papule/plaque c/w basal cell carcinoma      Erythematous hyperkeratotic cursted plaque c/w SCC      Surgical scar with no sign of skin cancer recurrence      Open and closed comedones      Inflammatory papules and pustules      Verrucoid papule consistent consistent with wart     Erythematous eczematous patches and plaques     Dystrophic onycholytic nail with subungual debris c/w onychomycosis     Umbilicated papule    Erythematous-base heme-crusted tan verrucoid plaque consistent with inflamed seborrheic keratosis     Erythematous Silvery Scaling Plaque c/w Psoriasis     See annotation      Assessment / Plan:        Acne vulgaris  Cont Epiduo forte - increase to qhs  Cont washing face BID- once run out of cerave face wash, begin [place] green tea scrub and alternate with cetaphil oil control face wash  Cont Moisturizing daily  Cont daily sun protection     To her knowledge, patient is not currently pregnant. Patient understands that she must discontinue all acne medications if she becomes pregnant.    Seborrheic dermatitis  -     ketoconazole (EXTINA) 2 % Foam; AAA scalp BID x 1 month  Dispense: 100 g; Refill: 3  Cont Lidex soln BID PRN itching, flaking  Cont Keto shampoo once a week.             Follow-up in about 4 weeks (around 5/15/2018).

## 2018-05-01 ENCOUNTER — OFFICE VISIT (OUTPATIENT)
Dept: PSYCHIATRY | Facility: CLINIC | Age: 28
End: 2018-05-01
Payer: COMMERCIAL

## 2018-05-01 VITALS
BODY MASS INDEX: 22.69 KG/M2 | DIASTOLIC BLOOD PRESSURE: 68 MMHG | WEIGHT: 141.19 LBS | HEIGHT: 66 IN | HEART RATE: 75 BPM | SYSTOLIC BLOOD PRESSURE: 110 MMHG

## 2018-05-01 DIAGNOSIS — F33.41 RECURRENT MAJOR DEPRESSIVE DISORDER, IN PARTIAL REMISSION: Primary | ICD-10-CM

## 2018-05-01 DIAGNOSIS — F41.9 ANXIETY: ICD-10-CM

## 2018-05-01 DIAGNOSIS — F40.10 SOCIAL ANXIETY DISORDER: ICD-10-CM

## 2018-05-01 PROCEDURE — 99999 PR PBB SHADOW E&M-EST. PATIENT-LVL III: CPT | Mod: PBBFAC,,, | Performed by: STUDENT IN AN ORGANIZED HEALTH CARE EDUCATION/TRAINING PROGRAM

## 2018-05-01 PROCEDURE — 90792 PSYCH DIAG EVAL W/MED SRVCS: CPT | Mod: S$GLB,,, | Performed by: PSYCHIATRY & NEUROLOGY

## 2018-05-01 RX ORDER — FLUOXETINE HYDROCHLORIDE 20 MG/1
20 CAPSULE ORAL DAILY
Qty: 30 CAPSULE | Refills: 1 | Status: SHIPPED | OUTPATIENT
Start: 2018-05-01 | End: 2018-05-30 | Stop reason: SDUPTHER
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
## 2018-05-02 PROBLEM — F41.9 ANXIETY: Status: ACTIVE | Noted: 2018-05-02

## 2018-05-02 PROBLEM — F40.10 SOCIAL ANXIETY DISORDER: Status: ACTIVE | Noted: 2018-05-02

## 2018-05-02 PROBLEM — F33.41 RECURRENT MAJOR DEPRESSIVE DISORDER, IN PARTIAL REMISSION: Status: ACTIVE | Noted: 2018-05-02

## 2018-05-08 ENCOUNTER — PATIENT MESSAGE (OUTPATIENT)
Dept: DERMATOLOGY | Facility: CLINIC | Age: 28
End: 2018-05-08

## 2018-05-22 ENCOUNTER — TELEPHONE (OUTPATIENT)
Dept: DERMATOLOGY | Facility: CLINIC | Age: 28
End: 2018-05-22

## 2018-05-22 NOTE — TELEPHONE ENCOUNTER
----- Message from Jaspal Singh MA sent at 5/22/2018  3:16 PM CDT -----  Contact: Pt  Pt  Called and would like to reschedule her appt .    Pt can be reached at [phone].      Thanks

## 2018-05-30 ENCOUNTER — OFFICE VISIT (OUTPATIENT)
Dept: PSYCHIATRY | Facility: CLINIC | Age: 28
End: 2018-05-30
Payer: COMMERCIAL

## 2018-05-30 VITALS
HEIGHT: 66 IN | BODY MASS INDEX: 21.79 KG/M2 | DIASTOLIC BLOOD PRESSURE: 69 MMHG | HEART RATE: 79 BPM | SYSTOLIC BLOOD PRESSURE: 110 MMHG | WEIGHT: 135.56 LBS

## 2018-05-30 DIAGNOSIS — F40.10 SOCIAL ANXIETY DISORDER: ICD-10-CM

## 2018-05-30 DIAGNOSIS — F33.41 RECURRENT MAJOR DEPRESSIVE DISORDER, IN PARTIAL REMISSION: Primary | ICD-10-CM

## 2018-05-30 DIAGNOSIS — F41.9 ANXIETY: ICD-10-CM

## 2018-05-30 PROCEDURE — 99213 OFFICE O/P EST LOW 20 MIN: CPT | Mod: S$GLB,,, | Performed by: STUDENT IN AN ORGANIZED HEALTH CARE EDUCATION/TRAINING PROGRAM

## 2018-05-30 PROCEDURE — 99999 PR PBB SHADOW E&M-EST. PATIENT-LVL III: CPT | Mod: PBBFAC,,, | Performed by: STUDENT IN AN ORGANIZED HEALTH CARE EDUCATION/TRAINING PROGRAM

## 2018-05-30 PROCEDURE — 3008F BODY MASS INDEX DOCD: CPT | Mod: CPTII,S$GLB,, | Performed by: STUDENT IN AN ORGANIZED HEALTH CARE EDUCATION/TRAINING PROGRAM

## 2018-05-30 RX ORDER — FLUOXETINE HYDROCHLORIDE 20 MG/1
40 CAPSULE ORAL DAILY
Qty: 60 CAPSULE | Refills: 3 | Status: SHIPPED | OUTPATIENT
Start: 2018-05-30 | End: 2019-03-01

## 2018-05-30 NOTE — PROGRESS NOTES
"Outpatient Psychiatry Follow-Up Visit (MD/NP)    5/30/2018    Clinical Status of Patient:  Outpatient (Ambulatory)    Chief Complaint:  Rosalina Saavedra is a 28 y.o. female who presents today for follow-up of depression and anxiety.  Met with patient.      Pt was seen for initial evaluation ~1 mo ago (5/1/18).  At that time, we discussed possibly starting prozac 20mg daily (although she wasn't sure if she wanted to start an antidepressant) and using benadryl or melatonin PRN insomnia.  Also discussed getting established with a therapist.    Current Meds:  Prozac 20mg daily for depression/anxiety    Interval History and Content of Current Session:  Interim Events/Subjective Report/Content of Current Session:  TODAY,   Pt reported she began taking prozac (was unclear if she'd want to start med 1 mo ago), has been taking 20mg daily without any issues.  No AEs.  Thinks she's feeling slightly better on the medication.  Was on med a week or so before gradually feeling some improvement.  Noted her mood is a little better (7/10 w/10 best) and her sleep naturally improved a little w/the prozac from 2-3 hrs/night to 5-6 hrs tonight.  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  Also, she's not had any severe depression episodes since last appt.  Likes that she's feeling a little better, but still "not great focus."  Again, denied sxs of ADHD and thinks her focus issues are related to the depression/anxiety.  She rated her current anxiety as 6/10 (w/10 highest), which is about the same as last month.  However, she feels since starting the prozac she's had less episodes of severe anxiety (less 9-10/10).  Also feeling a little less hopeless.  Her appetite is lower with the prozac (discussed this is a common AEs).  Pt in agreement w/plan to increase prozac to 40mg daily w/goal of getting anxiety to 2-3/10.  Full psych ROS below, no " SI/HI/AVH.    Completed school year, so no longer working.  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  Not taking statistics until next spring, can't fit in her schedule this summer.        Review of Systems     Psychiatric Review Of Systems - Is patient experiencing or having changes in:  sleep: Better, was 2-3 hrs, but with prozac went to 5-6 hrs, now out of work getting 7-8 hrs/night; has not taken benadryl or melatonin  Mood:  Better, 7/10 (w/10 best ever); no depression episodes since last appt, still anxiety  appetite: yes, low on prozac  weight: yes, a little down  energy/anergy: fine  interest/pleasure/anhedonia: no change  somatic symptoms: better, less headaches, thinks still has teeth grinding but hard to know  anxiety/panic: yes, 6/10 (w/10 highest ever); but less episodes of severe anxiety  guilty/hopelessness: no, better  concentration: yes, still an issue  S.I.B.s/risky behavior: no  Irritability: no  Racing thoughts: better  Impulsive behaviors: no  Paranoia:no  AVH:no  SI/HI:  No    Past Medical, Family and Social History: The patient's past medical, family and social history have been reviewed and updated as appropriate within the electronic medical record - see encounter notes.    Single, no children, living with parents currently.  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  Hx of tx for depression/anxiety in college when she was going through injury stressors in basketball.  No hx of abuse.  No drugs/ETOH/cigs.  Fam hx + for Mom and maternal gmo - depression.    Home Meds: topamax 25mg daily for migraine (just started this past month), maxalt PRN migraine, flonase (1 time thing), zofran (doesn't take, was for nausea w/migraines)    Compliance: yes    Side effects: None    Risk Parameters:  Patient reports no suicidal ideation  Patient  "reports no homicidal ideation  Patient reports no self-injurious behavior  Patient reports no violent behavior    Exam (detailed: at least 9 elements; comprehensive: all 15 elements)   Constitutional  Vitals:  Most recent vital signs, dated greater than 90 days prior to this appointment, were reviewed.   Vitals:    05/30/18 0840   BP: 110/69   Pulse: 79   Weight: 61.5 kg (135 lb 9.3 oz)   Height: 5' 6" (1.676 m)        General:  unremarkable, age appropriate, well nourished     Musculoskeletal  Muscle Strength/Tone:  no tremor, no tic   Gait & Station:  non-ataxic     Psychiatric  Speech:  no latency; no press   Mood & Affect:  better  congruent and appropriate   Thought Process:  normal and logical   Associations:  intact   Thought Content:  normal, no suicidality, no homicidality, delusions, or paranoia   Insight:  intact   Judgement: behavior is adequate to circumstances   Orientation:  grossly intact   Memory: intact for content of interview   Language: grossly intact   Attention Span & Concentration:  able to focus   Fund of Knowledge:  intact and appropriate to age and level of education     Assessment and Diagnosis   Status/Progress: Based on the examination today, the patient's problem(s) is/are resolving.  New problems have not been presented today.   Co-morbidities, Diagnostic uncertainty and Lack of compliance are not complicating management of the primary condition.  There are no active rule-out diagnoses for this patient at this time.     General Impression:       ICD-10-CM ICD-9-CM   1. Recurrent major depressive disorder, in partial remission F33.41 296.35   2. Social anxiety disorder F40.10 300.23   3. Anxiety F41.9 300.00       Intervention/Counseling/Treatment Plan     Meds:  - Increase Prozac 20mg -> 40mg daily  - Can use benadryl or melatonin PRN insomnia    Therapy:  - Looking into therapy options on [email or web address].  Said she called to schedule here and they aren't accepting new " patients.      No plans of pregnancy.      Risks, benefits, side effects and alternative treatments discussed with patient. Patient agrees with the current plan as documented.  Encouraged Patient to keep future appointments.  Take medications as prescribed and abstain from substance abuse.  Pt to present to ED for thoughts to harm herself or others      Return to Clinic: 2-3 months or earlier if needed      Discussed resident transition.          Marivel Garcia MD  Psychiatry PGY-3  [phone]

## 2018-10-09 ENCOUNTER — OFFICE VISIT (OUTPATIENT)
Dept: INTERNAL MEDICINE | Facility: CLINIC | Age: 28
End: 2018-10-09
Payer: COMMERCIAL

## 2018-10-09 VITALS
SYSTOLIC BLOOD PRESSURE: 118 MMHG | TEMPERATURE: 98 F | OXYGEN SATURATION: 99 % | HEART RATE: 67 BPM | WEIGHT: 135.56 LBS | BODY MASS INDEX: 21.79 KG/M2 | DIASTOLIC BLOOD PRESSURE: 64 MMHG | HEIGHT: 66 IN

## 2018-10-09 DIAGNOSIS — G43.109 MIGRAINE WITH AURA AND WITHOUT STATUS MIGRAINOSUS, NOT INTRACTABLE: Primary | ICD-10-CM

## 2018-10-09 PROCEDURE — 3008F BODY MASS INDEX DOCD: CPT | Mod: CPTII,S$GLB,, | Performed by: INTERNAL MEDICINE

## 2018-10-09 PROCEDURE — 99213 OFFICE O/P EST LOW 20 MIN: CPT | Mod: S$GLB,,, | Performed by: INTERNAL MEDICINE

## 2018-10-09 PROCEDURE — 99999 PR PBB SHADOW E&M-EST. PATIENT-LVL III: CPT | Mod: PBBFAC,,, | Performed by: INTERNAL MEDICINE

## 2018-10-09 RX ORDER — RIZATRIPTAN BENZOATE 10 MG/1
TABLET ORAL
Qty: 10 TABLET | Refills: 4 | Status: SHIPPED | OUTPATIENT
Start: 2018-10-09 | End: 2019-12-02 | Stop reason: SDUPTHER

## 2018-10-09 RX ORDER — TOPIRAMATE 50 MG/1
50 TABLET, FILM COATED ORAL NIGHTLY
Qty: 30 TABLET | Refills: 4 | Status: SHIPPED | OUTPATIENT
Start: 2018-10-09 | End: 2018-12-31

## 2018-10-09 RX ORDER — NAPROXEN SODIUM 550 MG/1
TABLET ORAL
Qty: 60 TABLET | Refills: 1 | Status: SHIPPED | OUTPATIENT
Start: 2018-10-09 | End: 2019-11-06 | Stop reason: SDUPTHER

## 2018-10-09 NOTE — PROGRESS NOTES
Subjective:       Patient ID: Rosalina Saavedra is a 28 y.o. female.    Chief Complaint: Follow-up    HPI Mrs. Saavedra is a 28 year old female who presents for follow-up of migraine headaches.  Patient states that she still occasionally has headaches, although they are up much less frequent now that she has been taking Topamax.  She has a headache about once every 2 weeks.  When she experiences a headache, she is usually out for the rest of the day.  She does note that the naproxen and Maxalt are helpful in alleviating her pain.  When she is having headache, she has some sensitivity to light and sound.  She also has associated nausea.  In addition, she will see us spots during the beginning of the headache.    Review of Systems   Gastrointestinal: Positive for nausea.   Neurological: Positive for headaches.       Objective:      Physical Exam   Constitutional: She is oriented to person, place, and time. She appears well-developed and well-nourished. No distress.   HENT:   Head: Normocephalic and atraumatic.   Eyes: Conjunctivae and EOM are normal.   Neurological: She is alert and oriented to person, place, and time.   Skin: Skin is warm and dry. She is not diaphoretic.   Psychiatric: She has a normal mood and affect. Her behavior is normal. Judgment and thought content normal.   Vitals reviewed.      Assessment:       1. Migraine with aura and without status migrainosus, not intractable        Plan:     1. Migraine with aura and without status migrainosus  Increasing patient's dose of Topamax to 50 mg daily in attempt to completely alleviate headaches.  If the patient experiences any side effects from this increase, she will let me know, and then we will reduce her dose back to 25 mg daily  Continue Maxalt and naproxen as needed for acute pain relief    Return to clinic in 2 months for follow-up of migraines.

## 2018-11-02 ENCOUNTER — OFFICE VISIT (OUTPATIENT)
Dept: URGENT CARE | Facility: CLINIC | Age: 28
End: 2018-11-02
Payer: COMMERCIAL

## 2018-11-02 VITALS
DIASTOLIC BLOOD PRESSURE: 72 MMHG | HEART RATE: 71 BPM | RESPIRATION RATE: 18 BRPM | OXYGEN SATURATION: 98 % | TEMPERATURE: 98 F | HEIGHT: 66 IN | WEIGHT: 130 LBS | SYSTOLIC BLOOD PRESSURE: 110 MMHG | BODY MASS INDEX: 20.89 KG/M2

## 2018-11-02 DIAGNOSIS — R05.9 COUGH: ICD-10-CM

## 2018-11-02 DIAGNOSIS — J32.9 SINUSITIS, UNSPECIFIED CHRONICITY, UNSPECIFIED LOCATION: Primary | ICD-10-CM

## 2018-11-02 PROCEDURE — 3008F BODY MASS INDEX DOCD: CPT | Mod: CPTII,S$GLB,, | Performed by: NURSE PRACTITIONER

## 2018-11-02 PROCEDURE — 99214 OFFICE O/P EST MOD 30 MIN: CPT | Mod: 25,S$GLB,, | Performed by: NURSE PRACTITIONER

## 2018-11-02 PROCEDURE — 96372 THER/PROPH/DIAG INJ SC/IM: CPT | Mod: S$GLB,,, | Performed by: NURSE PRACTITIONER

## 2018-11-02 RX ORDER — DEXAMETHASONE SODIUM PHOSPHATE 100 MG/10ML
6 INJECTION INTRAMUSCULAR; INTRAVENOUS ONCE
Status: COMPLETED | OUTPATIENT
Start: 2018-11-02 | End: 2018-11-02

## 2018-11-02 RX ORDER — AZITHROMYCIN 250 MG/1
TABLET, FILM COATED ORAL
Qty: 6 TABLET | Refills: 0 | Status: SHIPPED | OUTPATIENT
Start: 2018-11-02 | End: 2018-11-02 | Stop reason: SDUPTHER

## 2018-11-02 RX ORDER — BENZONATATE 100 MG/1
100 CAPSULE ORAL EVERY 6 HOURS PRN
Qty: 30 CAPSULE | Refills: 1 | Status: SHIPPED | OUTPATIENT
Start: 2018-11-02 | End: 2018-12-31

## 2018-11-02 RX ORDER — AZITHROMYCIN 250 MG/1
TABLET, FILM COATED ORAL
Qty: 6 TABLET | Refills: 0 | Status: SHIPPED | OUTPATIENT
Start: 2018-11-02 | End: 2018-12-31

## 2018-11-02 RX ORDER — BENZONATATE 100 MG/1
100 CAPSULE ORAL EVERY 6 HOURS PRN
Qty: 30 CAPSULE | Refills: 1 | Status: SHIPPED | OUTPATIENT
Start: 2018-11-02 | End: 2018-11-02 | Stop reason: SDUPTHER

## 2018-11-02 RX ADMIN — DEXAMETHASONE SODIUM PHOSPHATE 6 MG: 100 INJECTION INTRAMUSCULAR; INTRAVENOUS at 05:11

## 2018-11-02 NOTE — PATIENT INSTRUCTIONS
"Please follow up with your Primary care provider within 2-5 days if your signs and symptoms have not resolved or worsen.  The usual course of cold symptoms are 10-14 days.     If your condition worsens or fails to improve we recommend that you receive another evaluation at the emergency room immediately or contact your primary medical clinic to discuss your concerns.     You must understand that you have received an Urgent Care treatment only and that you may be released before all of your medical problems are known or treated.   You, the patient, will arrange for follow up care as instructed.     Tylenol or Ibuprofen can also be used as directed for pain/fever unless you have an allergy to them or medical condition such as stomach ulcers, kidney or liver disease or blood thinners etc for which you should not be taking these type of medications.     Take over the counter cough medication as directed as needed for cough.  You should avoid medications with pseudoephedrine or phenylephrine (any medication with "D") if you have high blood pressure as this can cause an elevation in your blood pressure. Instead consider Corcidin HBP as needed to prevent an elevated blood pressure.     Natural remedies of symptoms (as needed) include humidification, saline nasal sprays, and/or steamy showers.  Increase fluids, warm tea with honey, cough drops as needed.  You may also use salt water gargles for sore throat.    IF you received a steroid shot today - As discussed, this can elevate your blood pressure, elevate your blood sugar, water weight gain, nervous energy, redness to the face and dimpling of the skin at the injection site.     You have been given an antibiotic to treat your condition today.  Please complete the antibiotic as directed on the bottle.     As with any antibiotics, use probiotics and/or high culture yogurt about 2 hours apart from the antibiotic and about 1 week after the antibiotic to replace the gut garfield " lost with antibiotic use.      If you are female and on BCP use additional methods to prevent pregnancy while on antibiotics and for one cycle after.         Sinusitis (Antibiotic Treatment)    The sinuses are air-filled spaces within the bones of the face. They connect to the inside of the nose. Sinusitis is an inflammation of the tissue lining the sinus cavity. Sinus inflammation can occur during a cold. It can also be due to allergies to pollens and other particles in the air. Sinusitis can cause symptoms of sinus congestion and fullness. A sinus infection causes fever, headache and facial pain. There is often green or yellow drainage from the nose or into the back of the throat (post-nasal drip). You have been given antibiotics to treat this condition.  Home care:  · Take the full course of antibiotics as instructed. Do not stop taking them, even if you feel better.  · Drink plenty of water, hot tea, and other liquids. This may help thin mucus. It also may promote sinus drainage.  · Heat may help soothe painful areas of the face. Use a towel soaked in hot water. Or,  the shower and direct the hot spray onto your face. Using a vaporizer along with a menthol rub at night may also help.   · An expectorant containing guaifenesin may help thin the mucus and promote drainage from the sinuses.  · Over-the-counter decongestants may be used unless a similar medicine was prescribed. Nasal sprays work the fastest. Use one that contains phenylephrine or oxymetazoline. First blow the nose gently. Then use the spray. Do not use these medicines more often than directed on the label or symptoms may get worse. You may also use tablets containing pseudoephedrine. Avoid products that combine ingredients, because side effects may be increased. Read labels. You can also ask the pharmacist for help. (NOTE: Persons with high blood pressure should not use decongestants. They can raise blood  pressure.)  · Over-the-counter antihistamines may help if allergies contributed to your sinusitis.    · Do not use nasal rinses or irrigation during an acute sinus infection, unless told to by your health care provider. Rinsing may spread the infection to other sinuses.  · Use acetaminophen or ibuprofen to control pain, unless another pain medicine was prescribed. (If you have chronic liver or kidney disease or ever had a stomach ulcer, talk with your doctor before using these medicines. Aspirin should never be used in anyone under 18 years of age who is ill with a fever. It may cause severe liver damage.)  · Don't smoke. This can worsen symptoms.  Follow-up care  Follow up with your healthcare provider or our staff if you are not improving within the next week.  When to seek medical advice  Call your healthcare provider if any of these occur:  · Facial pain or headache becoming more severe  · Stiff neck  · Unusual drowsiness or confusion  · Swelling of the forehead or eyelids  · Vision problems, including blurred or double vision  · Fever of 100.4ºF (38ºC) or higher, or as directed by your healthcare provider  · Seizure  · Breathing problems  · Symptoms not resolving within 10 days  Date Last Reviewed: 4/13/2015  © 9071-5853 [employer]. [address] [place] [address], [place] [address]. All rights reserved. This information is not intended as a substitute for professional medical care. Always follow your healthcare professional's instructions.

## 2018-11-02 NOTE — PROGRESS NOTES
"Subjective:       Patient ID: Rosalina Saavedra is a 28 y.o. female.    Vitals:  height is 5' 6" (1.676 m) and weight is 59 kg (130 lb). Her oral temperature is 98.2 °F (36.8 °C). Her blood pressure is 110/72 and her pulse is 71. Her respiration is 18 and oxygen saturation is 98%.     Chief Complaint: Cough and Sore Throat    Pt c/o cough, sore throat, and headache , ~ 2days      Cough   This is a new problem. The current episode started in the past 7 days. The problem has been gradually worsening. The cough is non-productive. Associated symptoms include headaches (x2days) and a sore throat. Pertinent negatives include no eye redness. The symptoms are aggravated by other. She has tried nothing for the symptoms.   Sore Throat    Associated symptoms include headaches (x2days). Pertinent negatives include no congestion, coughing (x1wk) or hoarse voice.     Review of Systems   Constitution: Negative for malaise/fatigue.   HENT: Positive for sore throat. Negative for congestion and hoarse voice.    Eyes: Negative for discharge and redness.   Cardiovascular: Negative for dyspnea on exertion and leg swelling.   Respiratory: Negative for cough (x1wk).    Neurological: Positive for headaches (x2days).       Objective:      Physical Exam   Constitutional: She is oriented to person, place, and time. She appears well-developed and well-nourished. She is cooperative.  Non-toxic appearance. She does not appear ill. No distress.   HENT:   Head: Normocephalic and atraumatic.   Right Ear: Hearing, tympanic membrane, external ear and ear canal normal.   Left Ear: Hearing, tympanic membrane, external ear and ear canal normal.   Nose: Mucosal edema and rhinorrhea present. No nasal deformity. No epistaxis. Right sinus exhibits maxillary sinus tenderness. Right sinus exhibits no frontal sinus tenderness. Left sinus exhibits maxillary sinus tenderness. Left sinus exhibits no frontal sinus tenderness.   Mouth/Throat: Uvula is " midline, oropharynx is clear and moist and mucous membranes are normal. No trismus in the jaw. Normal dentition. No uvula swelling. No posterior oropharyngeal erythema.   Eyes: Conjunctivae and lids are normal. No scleral icterus.   Sclera clear bilat   Neck: Trachea normal, full passive range of motion without pain and phonation normal. Neck supple.   Cardiovascular: Normal rate, regular rhythm, normal heart sounds, intact distal pulses and normal pulses.   Pulmonary/Chest: Effort normal and breath sounds normal. No respiratory distress.   Abdominal: Soft. Normal appearance and bowel sounds are normal. She exhibits no distension. There is no tenderness.   Musculoskeletal: Normal range of motion. She exhibits no edema or deformity.   Neurological: She is alert and oriented to person, place, and time. She exhibits normal muscle tone. Coordination normal.   Skin: Skin is warm, dry and intact. She is not diaphoretic. No pallor.   Psychiatric: She has a normal mood and affect. Her speech is normal and behavior is normal. Judgment and thought content normal. Cognition and memory are normal.   Nursing note and vitals reviewed.      Assessment:       1. Sinusitis, unspecified chronicity, unspecified location    2. Cough        Plan:         Sinusitis, unspecified chronicity, unspecified location  -     Discontinue: azithromycin (ZITHROMAX Z-MIK) 250 MG tablet; Take 2 tablets (500 mg) on  Day 1,  followed by 1 tablet (250 mg) once daily on Days 2 through 5.  Dispense: 6 tablet; Refill: 0  -     dexamethasone injection 6 mg  -     azithromycin (ZITHROMAX Z-MIK) 250 MG tablet; Take 2 tablets (500 mg) on  Day 1,  followed by 1 tablet (250 mg) once daily on Days 2 through 5.  Dispense: 6 tablet; Refill: 0    Cough  -     Discontinue: benzonatate (TESSALON PERLES) 100 MG capsule; Take 1 capsule (100 mg total) by mouth every 6 (six) hours as needed for Cough.  Dispense: 30 capsule; Refill: 1  -     benzonatate (TESSALON PERLES)  "100 MG capsule; Take 1 capsule (100 mg total) by mouth every 6 (six) hours as needed for Cough.  Dispense: 30 capsule; Refill: 1      Patient Instructions   Please follow up with your Primary care provider within 2-5 days if your signs and symptoms have not resolved or worsen.  The usual course of cold symptoms are 10-14 days.     If your condition worsens or fails to improve we recommend that you receive another evaluation at the emergency room immediately or contact your primary medical clinic to discuss your concerns.     You must understand that you have received an Urgent Care treatment only and that you may be released before all of your medical problems are known or treated.   You, the patient, will arrange for follow up care as instructed.     Tylenol or Ibuprofen can also be used as directed for pain/fever unless you have an allergy to them or medical condition such as stomach ulcers, kidney or liver disease or blood thinners etc for which you should not be taking these type of medications.     Take over the counter cough medication as directed as needed for cough.  You should avoid medications with pseudoephedrine or phenylephrine (any medication with "D") if you have high blood pressure as this can cause an elevation in your blood pressure. Instead consider Corcidin HBP as needed to prevent an elevated blood pressure.     Natural remedies of symptoms (as needed) include humidification, saline nasal sprays, and/or steamy showers.  Increase fluids, warm tea with honey, cough drops as needed.  You may also use salt water gargles for sore throat.    IF you received a steroid shot today - As discussed, this can elevate your blood pressure, elevate your blood sugar, water weight gain, nervous energy, redness to the face and dimpling of the skin at the injection site.     You have been given an antibiotic to treat your condition today.  Please complete the antibiotic as directed on the bottle.     As with any " antibiotics, use probiotics and/or high culture yogurt about 2 hours apart from the antibiotic and about 1 week after the antibiotic to replace the gut garfield lost with antibiotic use.      If you are female and on BCP use additional methods to prevent pregnancy while on antibiotics and for one cycle after.         Sinusitis (Antibiotic Treatment)    The sinuses are air-filled spaces within the bones of the face. They connect to the inside of the nose. Sinusitis is an inflammation of the tissue lining the sinus cavity. Sinus inflammation can occur during a cold. It can also be due to allergies to pollens and other particles in the air. Sinusitis can cause symptoms of sinus congestion and fullness. A sinus infection causes fever, headache and facial pain. There is often green or yellow drainage from the nose or into the back of the throat (post-nasal drip). You have been given antibiotics to treat this condition.  Home care:  · Take the full course of antibiotics as instructed. Do not stop taking them, even if you feel better.  · Drink plenty of water, hot tea, and other liquids. This may help thin mucus. It also may promote sinus drainage.  · Heat may help soothe painful areas of the face. Use a towel soaked in hot water. Or,  the shower and direct the hot spray onto your face. Using a vaporizer along with a menthol rub at night may also help.   · An expectorant containing guaifenesin may help thin the mucus and promote drainage from the sinuses.  · Over-the-counter decongestants may be used unless a similar medicine was prescribed. Nasal sprays work the fastest. Use one that contains phenylephrine or oxymetazoline. First blow the nose gently. Then use the spray. Do not use these medicines more often than directed on the label or symptoms may get worse. You may also use tablets containing pseudoephedrine. Avoid products that combine ingredients, because side effects may be increased. Read labels. You can also  ask the pharmacist for help. (NOTE: Persons with high blood pressure should not use decongestants. They can raise blood pressure.)  · Over-the-counter antihistamines may help if allergies contributed to your sinusitis.    · Do not use nasal rinses or irrigation during an acute sinus infection, unless told to by your health care provider. Rinsing may spread the infection to other sinuses.  · Use acetaminophen or ibuprofen to control pain, unless another pain medicine was prescribed. (If you have chronic liver or kidney disease or ever had a stomach ulcer, talk with your doctor before using these medicines. Aspirin should never be used in anyone under 18 years of age who is ill with a fever. It may cause severe liver damage.)  · Don't smoke. This can worsen symptoms.  Follow-up care  Follow up with your healthcare provider or our staff if you are not improving within the next week.  When to seek medical advice  Call your healthcare provider if any of these occur:  · Facial pain or headache becoming more severe  · Stiff neck  · Unusual drowsiness or confusion  · Swelling of the forehead or eyelids  · Vision problems, including blurred or double vision  · Fever of 100.4ºF (38ºC) or higher, or as directed by your healthcare provider  · Seizure  · Breathing problems  · Symptoms not resolving within 10 days  Date Last Reviewed: 4/13/2015  © 6529-7191 [employer]. [address] [place] [address], [place] [address]. All rights reserved. This information is not intended as a substitute for professional medical care. Always follow your healthcare professional's instructions.

## 2018-11-05 ENCOUNTER — TELEPHONE (OUTPATIENT)
Dept: URGENT CARE | Facility: CLINIC | Age: 28
End: 2018-11-05

## 2018-12-31 ENCOUNTER — OFFICE VISIT (OUTPATIENT)
Dept: INTERNAL MEDICINE | Facility: CLINIC | Age: 28
End: 2018-12-31
Payer: COMMERCIAL

## 2018-12-31 ENCOUNTER — LAB VISIT (OUTPATIENT)
Dept: LAB | Facility: HOSPITAL | Age: 28
End: 2018-12-31
Attending: INTERNAL MEDICINE
Payer: COMMERCIAL

## 2018-12-31 VITALS
DIASTOLIC BLOOD PRESSURE: 74 MMHG | OXYGEN SATURATION: 97 % | HEIGHT: 66 IN | BODY MASS INDEX: 22.85 KG/M2 | HEART RATE: 60 BPM | WEIGHT: 142.19 LBS | SYSTOLIC BLOOD PRESSURE: 108 MMHG

## 2018-12-31 DIAGNOSIS — G43.009 MIGRAINE WITHOUT AURA AND WITHOUT STATUS MIGRAINOSUS, NOT INTRACTABLE: ICD-10-CM

## 2018-12-31 DIAGNOSIS — G43.009 MIGRAINE WITHOUT AURA AND WITHOUT STATUS MIGRAINOSUS, NOT INTRACTABLE: Primary | ICD-10-CM

## 2018-12-31 LAB
ALBUMIN SERPL BCP-MCNC: 3.7 G/DL
ALP SERPL-CCNC: 72 U/L
ALT SERPL W/O P-5'-P-CCNC: 10 U/L
ANION GAP SERPL CALC-SCNC: 7 MMOL/L
AST SERPL-CCNC: 15 U/L
BASOPHILS # BLD AUTO: 0.06 K/UL
BASOPHILS NFR BLD: 1.3 %
BILIRUB SERPL-MCNC: 0.7 MG/DL
BUN SERPL-MCNC: 9 MG/DL
CALCIUM SERPL-MCNC: 9.3 MG/DL
CHLORIDE SERPL-SCNC: 111 MMOL/L
CO2 SERPL-SCNC: 21 MMOL/L
CREAT SERPL-MCNC: 0.8 MG/DL
DIFFERENTIAL METHOD: ABNORMAL
EOSINOPHIL # BLD AUTO: 0.1 K/UL
EOSINOPHIL NFR BLD: 1.7 %
ERYTHROCYTE [DISTWIDTH] IN BLOOD BY AUTOMATED COUNT: 12.8 %
EST. GFR  (AFRICAN AMERICAN): >60 ML/MIN/1.73 M^2
EST. GFR  (NON AFRICAN AMERICAN): >60 ML/MIN/1.73 M^2
GLUCOSE SERPL-MCNC: 93 MG/DL
HCT VFR BLD AUTO: 40.4 %
HGB BLD-MCNC: 13 G/DL
IMM GRANULOCYTES # BLD AUTO: 0 K/UL
IMM GRANULOCYTES NFR BLD AUTO: 0 %
LYMPHOCYTES # BLD AUTO: 2.7 K/UL
LYMPHOCYTES NFR BLD: 55.6 %
MCH RBC QN AUTO: 30.4 PG
MCHC RBC AUTO-ENTMCNC: 32.2 G/DL
MCV RBC AUTO: 94 FL
MONOCYTES # BLD AUTO: 0.5 K/UL
MONOCYTES NFR BLD: 11.1 %
NEUTROPHILS # BLD AUTO: 1.5 K/UL
NEUTROPHILS NFR BLD: 30.3 %
NRBC BLD-RTO: 0 /100 WBC
PLATELET # BLD AUTO: 311 K/UL
PMV BLD AUTO: 10.2 FL
POTASSIUM SERPL-SCNC: 4.2 MMOL/L
PROT SERPL-MCNC: 7.3 G/DL
RBC # BLD AUTO: 4.28 M/UL
SODIUM SERPL-SCNC: 139 MMOL/L
WBC # BLD AUTO: 4.77 K/UL

## 2018-12-31 PROCEDURE — 36415 COLL VENOUS BLD VENIPUNCTURE: CPT | Mod: PO

## 2018-12-31 PROCEDURE — 99214 OFFICE O/P EST MOD 30 MIN: CPT | Mod: S$GLB,,, | Performed by: INTERNAL MEDICINE

## 2018-12-31 PROCEDURE — 80053 COMPREHEN METABOLIC PANEL: CPT

## 2018-12-31 PROCEDURE — 85025 COMPLETE CBC W/AUTO DIFF WBC: CPT

## 2018-12-31 PROCEDURE — 3008F BODY MASS INDEX DOCD: CPT | Mod: CPTII,S$GLB,, | Performed by: INTERNAL MEDICINE

## 2018-12-31 PROCEDURE — 99999 PR PBB SHADOW E&M-EST. PATIENT-LVL III: CPT | Mod: PBBFAC,,, | Performed by: INTERNAL MEDICINE

## 2018-12-31 RX ORDER — TOPIRAMATE 50 MG/1
50 TABLET, FILM COATED ORAL 2 TIMES DAILY
Qty: 60 TABLET | Refills: 5 | Status: SHIPPED | OUTPATIENT
Start: 2018-12-31 | End: 2019-12-31

## 2018-12-31 NOTE — PROGRESS NOTES
Subjective:       Patient ID: Rosalina Saavedra is a 28 y.o. female.    Chief Complaint: Headache    HPI Mrs. Saavedra is a 28-year-old female with migraine headaches who presents for chief complaint of migraine headaches.  Patient states that she had been having daily persistent headaches for years.  She had a normal MRI in 2011.  She has seen Neurology in the past.  She has not seen Neurology in a few years.  Before starting Topamax, she had severe headaches approximately every other day.  After starting Topamax, she did not have headaches very often.  She would have headaches maybe once or twice weekly.  However last week she had 4 days in a row with headache.  The headaches lasted the whole day and were constant.  Headaches were not associated with any nausea, vomiting, or aura.  However patient states that headaches were extremely painful and associated with decreased appetite, photophobia, and phonophobia.  She reports that these headaches are different from her prior ones in that they lasted so long.  Maxalt did help some to relieve her headaches. She is having very mild pain now. At their worst, headaches are 10/10 in severity.     Review of Systems   Constitutional: Positive for appetite change.   Eyes: Positive for photophobia.   Gastrointestinal: Negative for nausea and vomiting.   Neurological: Positive for headaches.       Objective:      Physical Exam   Constitutional: She is oriented to person, place, and time. She appears well-developed and well-nourished. No distress.   HENT:   Head: Normocephalic and atraumatic.   Eyes: Conjunctivae and EOM are normal. Pupils are equal, round, and reactive to light. Right eye exhibits no discharge. Left eye exhibits no discharge. No scleral icterus.   Neurological: She is alert and oriented to person, place, and time. She displays normal reflexes. No cranial nerve deficit. She exhibits normal muscle tone. Coordination normal.   Skin: Skin is warm and dry.  She is not diaphoretic.   Psychiatric: She has a normal mood and affect. Her behavior is normal. Judgment and thought content normal.   Vitals reviewed.      Assessment:       1. Migraine without aura and without status migrainosus, not intractable        Plan:     1.  Migraine without aura  Normal neuro exam  CMP, CBC today  Increasing Topamax to 50 mg p.o. b.i.d.  Continue Maxalt and naproxen for breakthrough headache  Referral placed to Neurology    RTC 6 months and PRN

## 2019-01-09 ENCOUNTER — TELEPHONE (OUTPATIENT)
Dept: NEUROLOGY | Facility: CLINIC | Age: 29
End: 2019-01-09

## 2019-01-09 NOTE — TELEPHONE ENCOUNTER
I called patient to reschedule her appointment with Dr. Armenta for 01/11/2019 at 4:00. There was no answer and I left a message for a return call.

## 2019-01-10 ENCOUNTER — TELEPHONE (OUTPATIENT)
Dept: NEUROLOGY | Facility: CLINIC | Age: 29
End: 2019-01-10

## 2019-01-10 NOTE — TELEPHONE ENCOUNTER
I called patient again to reschedule her appointment with Dr. Armenta, He will be out. There was no answer and I left another message for a return call

## 2019-01-10 NOTE — TELEPHONE ENCOUNTER
I called patient again to reschedule appointment with Dr. Armenta, There was no answer again and I left a message for a return call.

## 2019-01-10 NOTE — TELEPHONE ENCOUNTER
I called patient again to try and reschedule her appointment with Dr. Armenta being he will not be in the office 01/11/2019 afternoon. There was no answer again and I left a message for a return call.

## 2019-01-11 ENCOUNTER — TELEPHONE (OUTPATIENT)
Dept: NEUROLOGY | Facility: CLINIC | Age: 29
End: 2019-01-11

## 2019-01-11 NOTE — TELEPHONE ENCOUNTER
I called patient again to try and reschedule her appointment with Dr. Armenta for this afternoon. There was no answer and I left a message for a return call

## 2019-01-23 ENCOUNTER — TELEPHONE (OUTPATIENT)
Dept: PAIN MEDICINE | Facility: CLINIC | Age: 29
End: 2019-01-23

## 2019-01-23 NOTE — TELEPHONE ENCOUNTER
I called patient to reschedule her appointment with Dr. Armenta for 02/04/209. There was no answer and I left a message to return call.

## 2019-01-25 ENCOUNTER — TELEPHONE (OUTPATIENT)
Dept: NEUROLOGY | Facility: CLINIC | Age: 29
End: 2019-01-25

## 2019-01-25 NOTE — TELEPHONE ENCOUNTER
Called patient to reschedule her appt. With Dr. Armenta, There was no answer and I left a message for a return call

## 2019-01-30 ENCOUNTER — TELEPHONE (OUTPATIENT)
Dept: NEUROLOGY | Facility: CLINIC | Age: 29
End: 2019-01-30

## 2019-03-01 ENCOUNTER — OFFICE VISIT (OUTPATIENT)
Dept: PSYCHIATRY | Facility: CLINIC | Age: 29
End: 2019-03-01
Payer: COMMERCIAL

## 2019-03-01 VITALS
DIASTOLIC BLOOD PRESSURE: 59 MMHG | SYSTOLIC BLOOD PRESSURE: 112 MMHG | WEIGHT: 145.75 LBS | BODY MASS INDEX: 23.42 KG/M2 | HEIGHT: 66 IN | HEART RATE: 85 BPM

## 2019-03-01 DIAGNOSIS — F90.0 ATTENTION DEFICIT HYPERACTIVITY DISORDER (ADHD), PREDOMINANTLY INATTENTIVE TYPE: ICD-10-CM

## 2019-03-01 DIAGNOSIS — F33.41 RECURRENT MAJOR DEPRESSIVE DISORDER, IN PARTIAL REMISSION: Primary | ICD-10-CM

## 2019-03-01 PROCEDURE — 3008F BODY MASS INDEX DOCD: CPT | Mod: CPTII,S$GLB,, | Performed by: PSYCHIATRY & NEUROLOGY

## 2019-03-01 PROCEDURE — 99999 PR PBB SHADOW E&M-EST. PATIENT-LVL II: CPT | Mod: PBBFAC,,, | Performed by: PSYCHIATRY & NEUROLOGY

## 2019-03-01 PROCEDURE — 3008F PR BODY MASS INDEX (BMI) DOCUMENTED: ICD-10-PCS | Mod: CPTII,S$GLB,, | Performed by: PSYCHIATRY & NEUROLOGY

## 2019-03-01 PROCEDURE — 99214 OFFICE O/P EST MOD 30 MIN: CPT | Mod: S$GLB,,, | Performed by: PSYCHIATRY & NEUROLOGY

## 2019-03-01 PROCEDURE — 99999 PR PBB SHADOW E&M-EST. PATIENT-LVL II: ICD-10-PCS | Mod: PBBFAC,,, | Performed by: PSYCHIATRY & NEUROLOGY

## 2019-03-01 PROCEDURE — 99214 PR OFFICE/OUTPT VISIT, EST, LEVL IV, 30-39 MIN: ICD-10-PCS | Mod: S$GLB,,, | Performed by: PSYCHIATRY & NEUROLOGY

## 2019-03-01 RX ORDER — LISDEXAMFETAMINE DIMESYLATE CAPSULES 20 MG/1
20 CAPSULE ORAL EVERY MORNING
Qty: 30 CAPSULE | Refills: 0 | Status: SHIPPED | OUTPATIENT
Start: 2019-03-01 | End: 2019-10-07

## 2019-03-01 RX ORDER — FLUOXETINE HYDROCHLORIDE 40 MG/1
40 CAPSULE ORAL DAILY
Qty: 30 CAPSULE | Refills: 5 | Status: SHIPPED | OUTPATIENT
Start: 2019-03-01 | End: 2020-12-22

## 2019-03-01 NOTE — PROGRESS NOTES
Outpatient Psychiatry Follow-Up Visit (MD/NP)    3/1/2019    Clinical Status of Patient:  Outpatient (Ambulatory)    Chief Complaint:  Rosalina Saavedra is a 29 y.o. female who presents today for follow-up of depression and anxiety.  Met with patient.      Interval History and Content of Current Session:  Interim Events/Subjective Report/Content of Current Session: Patient reports that she first started seeing mental health professionals when she was in 4th grade. She does not recall who made the diagnosis but she was getting headaches frequently at school. When she turned 19 she was in college at [employer] and she started seeing a psychologist for therapy. She states that at that time she was having issues with her knee and also under a lot of stress with her . She had a lot of other life stressors at this time and she needed treatment. She was seeing the therapist twice weekly for an hour. She did this for two months and then stopped treatment. She felt like she had gotten better with the treatment and she was able to cope better. She recalls being placed on medication for insomnia, anxiety, and depression. She may have been on Lexapro and Cymbalta but she cannot recall. She recalls having a depressed mood, poor appetite or increased appetite, difficulty sleeping, poor energy, hopelessness, guilt, and some passive thoughts of not wanting to live. She denies any episodes of panic attacks. She states that she has always been a worrier. She has had issues with social anxiety and also worrying about how well she does in school or her job.     She denies any episodes of lisa or psychosis. She states that she did well in school and she never had any behavior issues. She always had good grades. She would have chores to do at home that she would not do either because she did not want to do it or because she forgot.     She reports that she started seeing Dr. Garcia in May 2018 because she was  "having trouble focusing and feeling depressed. She was working at [employer] and she was in a very stressful environment. She was not eating and sleeping. She was feeling worthless, her energy was low, and she was having thoughts about not living. Her anxiety was very high because there were a group of parents that were harassing her and she eventually she had to switch her job. She was started initially started on Prozac 20 mg PO daily and then had the dose increased to 40 mg PO daily. She states that she was very apprehensive about starting the medication. When she got the dose to 40 she started feeling better. She noticed that she was better focused in school and she was also sleeping better at night.     She recently started working at [employer] in 2019. She states that things have been much better at her new job. She states that school has been difficult because she is worried about her grades. She received a "C" last semester and if she gets another "C" she could be removed from the program. She states that she has been doing well so far this semester but she has some assignments that she has not completed yet. She states that she has not been doing her work because she feels overwhelmed and has trouble focusing. She recently started taking Topirimate in 2018 for mirgraine headaches. She has not noticed any changes in her focus since then. Her grandmother also  in October and that also may have affected her focus. She has been eating and sleeping well. She is sleeping roughly 5 hours a night of broken sleep. She denies any physical complaints at this time. She denies any SI/HI/AVH.     Psychotherapy:  · Target symptoms: lack of focus  · Why chosen therapy is appropriate versus another modality: relevant to diagnosis  · Outcome monitoring methods: self-report  · Therapeutic intervention type: supportive psychotherapy  · Topics discussed/themes: work stress, " "symptom recognition  · The patient's response to the intervention is accepting. The patient's progress toward treatment goals is good.   · Duration of intervention: 12 minutes.    Review of Systems   · PSYCHIATRIC: Pertinant items are noted in the narrative.    Past Medical, Family and Social History: The patient's past medical, family and social history have been reviewed and updated as appropriate within the electronic medical record - see encounter notes.    Compliance: yes    Side effects: None    Risk Parameters:  Patient reports no suicidal ideation  Patient reports no homicidal ideation  Patient reports no self-injurious behavior  Patient reports no violent behavior    Exam (detailed: at least 9 elements; comprehensive: all 15 elements)   Constitutional  Vitals:  Most recent vital signs, dated less than 90 days prior to this appointment, were reviewed.   Vitals:    03/01/19 1513   BP: (!) 112/59   Pulse: 85   Weight: 66.1 kg (145 lb 11.6 oz)   Height: 5' 6" (1.676 m)        General:  unremarkable, age appropriate     Musculoskeletal  Muscle Strength/Tone:  no spasicity, no rigidity   Gait & Station:  non-ataxic     Psychiatric  Speech:  no latency; no press   Mood & Affect:  depressed  congruent and appropriate   Thought Process:  normal and logical   Associations:  intact   Thought Content:  normal, no suicidality, no homicidality, delusions, or paranoia   Insight:  intact   Judgement: behavior is adequate to circumstances   Orientation:  grossly intact   Memory: intact for content of interview   Language: grossly intact   Attention Span & Concentration:  distracted   Fund of Knowledge:  intact and appropriate to age and level of education     Assessment and Diagnosis   Status/Progress: Based on the examination today, the patient's problem(s) is/are inadequately controlled.  New problems have been presented today.   Co-morbidities, Diagnostic uncertainty and Lack of compliance are complicating management of " the primary condition.      General Impression:      ICD-10-CM ICD-9-CM   1. Recurrent major depressive disorder, in partial remission F33.41 296.35   2. Attention deficit hyperactivity disorder (ADHD), predominantly inattentive type F90.0 314.00       Intervention/Counseling/Treatment Plan   · Medication Management: Continue current medications.   · Continue Prozac 40 mg PO daily  · Initiate Vyvanse 20 mg PO daily   Labs: reviewed most recent  The treatment plan and follow up plan were reviewed with the patient.  Discussed with patient informed consent, risks vs. benefits, alternative treatments, side effect profile and the inherent unpredictability of individual responses to these treatments. The patient expresses understanding of the above and displays the capacity to agree with this current plan and had no other questions.  Encouraged Patient to keep future appointments.   Take medications as prescribed and abstain from substance abuse.   In the event of an emergency patient was advised to go to the emergency room.      Return to Clinic: 1 month

## 2019-04-01 ENCOUNTER — OFFICE VISIT (OUTPATIENT)
Dept: NEUROLOGY | Facility: CLINIC | Age: 29
End: 2019-04-01
Payer: COMMERCIAL

## 2019-04-01 VITALS
HEIGHT: 66 IN | DIASTOLIC BLOOD PRESSURE: 73 MMHG | WEIGHT: 137.13 LBS | HEART RATE: 74 BPM | BODY MASS INDEX: 22.04 KG/M2 | SYSTOLIC BLOOD PRESSURE: 115 MMHG

## 2019-04-01 DIAGNOSIS — F32.A ANXIETY AND DEPRESSION: ICD-10-CM

## 2019-04-01 DIAGNOSIS — R51.9 CHRONIC NONINTRACTABLE HEADACHE, UNSPECIFIED HEADACHE TYPE: Primary | ICD-10-CM

## 2019-04-01 DIAGNOSIS — F41.9 ANXIETY AND DEPRESSION: ICD-10-CM

## 2019-04-01 DIAGNOSIS — F90.0 ATTENTION DEFICIT HYPERACTIVITY DISORDER (ADHD), PREDOMINANTLY INATTENTIVE TYPE: ICD-10-CM

## 2019-04-01 DIAGNOSIS — G89.29 CHRONIC NONINTRACTABLE HEADACHE, UNSPECIFIED HEADACHE TYPE: Primary | ICD-10-CM

## 2019-04-01 PROCEDURE — 99999 PR PBB SHADOW E&M-EST. PATIENT-LVL III: CPT | Mod: PBBFAC,,, | Performed by: PSYCHIATRY & NEUROLOGY

## 2019-04-01 PROCEDURE — 99203 PR OFFICE/OUTPT VISIT, NEW, LEVL III, 30-44 MIN: ICD-10-PCS | Mod: S$GLB,,, | Performed by: PSYCHIATRY & NEUROLOGY

## 2019-04-01 PROCEDURE — 99203 OFFICE O/P NEW LOW 30 MIN: CPT | Mod: S$GLB,,, | Performed by: PSYCHIATRY & NEUROLOGY

## 2019-04-01 PROCEDURE — 99999 PR PBB SHADOW E&M-EST. PATIENT-LVL III: ICD-10-PCS | Mod: PBBFAC,,, | Performed by: PSYCHIATRY & NEUROLOGY

## 2019-04-01 PROCEDURE — 3008F PR BODY MASS INDEX (BMI) DOCUMENTED: ICD-10-PCS | Mod: CPTII,S$GLB,, | Performed by: PSYCHIATRY & NEUROLOGY

## 2019-04-01 PROCEDURE — 3008F BODY MASS INDEX DOCD: CPT | Mod: CPTII,S$GLB,, | Performed by: PSYCHIATRY & NEUROLOGY

## 2019-04-01 RX ORDER — RIBOFLAVIN (VITAMIN B2) 100 MG
100 TABLET ORAL 2 TIMES DAILY
Qty: 30 TABLET | Refills: 3 | Status: SHIPPED | OUTPATIENT
Start: 2019-04-01 | End: 2019-10-07

## 2019-04-01 RX ORDER — LANOLIN ALCOHOL/MO/W.PET/CERES
400 CREAM (GRAM) TOPICAL DAILY
Qty: 30 TABLET | Refills: 3 | Status: SHIPPED | OUTPATIENT
Start: 2019-04-01 | End: 2019-10-07

## 2019-04-01 NOTE — LETTER
April 2, 2019      Jaki Murphy MD  [address] [facility]  [place] [address]           [facility]  [address] [place] [address]  [place] [address]  Phone: [phone]  Fax: [phone]          Patient: Rosalina Saavedra   MR Number: [id number]   YOB: 1990   Date of Visit: 4/1/2019       Dear Dr. Jaki Murphy:    Thank you for referring Rosalina Saavedra to me for evaluation. Attached you will find relevant portions of my assessment and plan of care.    If you have questions, please do not hesitate to call me. I look forward to following Rosalina Saavedra along with you.    Sincerely,    Bob Armenta MD    Enclosure  CC:  No Recipients    If you would like to receive this communication electronically, please contact [email or web address] or [phone] to request more information on [facility] access.    For providers and/or their staff who would like to refer a patient to [facility], please contact us through our one-stop-shop provider referral line, [facility], at [phone].    If you feel you have received this communication in error or would no longer like to receive these types of communications, please e-mail [email or web address]

## 2019-04-02 NOTE — PROGRESS NOTES
Neurology Clinic Visit  Primary Care Provider: Jaki Murphy  Referring Provider: Jaki Murphy  Date of Visit:   04/01/2019  Reason for Visit:  Headaches     chief complaint:   Chief Complaint   Patient presents with    Migraine       History of present illness  Rosalina Saavedra is a 29 y.o. right handed female I have been asked to consult for evaluation of headaches.  The patient reports headaches for since she was [age] years old.  She reports the headaches are sometimes bifrontal other times unilateral but can also involve her entire head.  There is associated nausea, rare vomiting, light sensitivity and noise sensitivity.  Headaches last from 7-8 hours to 2 days.  They occur 2-3 days out of the week.  She is currently taking Topamax which was recently increased to 50 mg b.i.d. in December 2018.  Since starting Topamax she has noted a decrease in her headaches that were previously daily.  She has noticed trouble with focus but she does carry a diagnosis of ADHD.  She has recently started back on her Vyvanse by psychiatry.  She has not notice worsening focus since starting topamax. She also reports a history of anxiety and depression for which she sees Psychiatry.  She reports she sleeps 2-4 hours per night.  She drinks 1.5  16 oz bottles of water per day.  She takes Maxalt p.r.n. for abortive therapy which helps.  She also reports she has been feeling fatigued lately.    Patient Active Problem List    Diagnosis Date Noted    Anxiety 05/02/2018    Social anxiety disorder 05/02/2018    Recurrent major depressive disorder, in partial remission 05/02/2018    Migraine headache 09/03/2013    Menstrual cramps 09/03/2013     Past Medical History:   Diagnosis Date    Anxiety     Depression     Migraine      Past Surgical History:   Procedure Laterality Date    KNEE ARTHROSCOPY      TIBIA FRACTURE SURGERY       Family History   Problem Relation Age of Onset    Diabetes Mother     Diabetes Maternal  Grandmother     Hypertension Maternal Grandmother          Current Outpatient Medications   Medication Sig    EPIDUO FORTE 0.3-2.5 % GlwP AAA face qhs    ketoconazole (NIZORAL) 2 % shampoo Wash hair with medicated shampoo at least 2x/week - let sit on scalp at least 5 minutes prior to rinsing    lisdexamfetamine (VYVANSE) 20 MG capsule Take 1 capsule (20 mg total) by mouth every morning.    naproxen sodium (ANAPROX) 550 MG tablet TAKE 1 TABLET TWICE A DAY AS NEEDED FOR MIGRAINE HEADACHE    rizatriptan (MAXALT) 10 MG tablet TAKE 1 TABLET BY MOUTH AS NEEDED FOR MIGRAINE. MAY REPEAT IN 2 HOURS. MAX 3 TABLETS PER DAY    topiramate (TOPAMAX) 50 MG tablet Take 1 tablet (50 mg total) by mouth 2 (two) times daily.    FLUoxetine 40 MG capsule Take 1 capsule (40 mg total) by mouth once daily.    magnesium oxide (MAG-OX) 400 mg (241.3 mg magnesium) tablet Take 1 tablet (400 mg total) by mouth once daily.    riboflavin, vitamin B2, (VITAMIN B-2) 100 mg Tab tablet Take 1 tablet (100 mg total) by mouth 2 (two) times daily.     No current facility-administered medications for this visit.        Review of patient's allergies indicates:   Allergen Reactions    Codeine Itching    Morphine Itching     Social History     Socioeconomic History    Marital status: Single     Spouse name: Not on file    Number of children: Not on file    Years of education: Not on file    Highest education level: Not on file   Occupational History    Not on file   Social Needs    Financial resource strain: Not on file    Food insecurity:     Worry: Not on file     Inability: Not on file    Transportation needs:     Medical: Not on file     Non-medical: Not on file   Tobacco Use    Smoking status: Never Smoker    Smokeless tobacco: Never Used   Substance and Sexual Activity    Alcohol use: No    Drug use: No    Sexual activity: Never   Lifestyle    Physical activity:     Days per week: Not on file     Minutes per session: Not on  file    Stress: Not on file   Relationships    Social connections:     Talks on phone: Not on file     Gets together: Not on file     Attends Yazidi service: Not on file     Active member of club or organization: Not on file     Attends meetings of clubs or organizations: Not on file     Relationship status: Not on file    Intimate partner violence:     Fear of current or ex partner: Not on file     Emotionally abused: Not on file     Physically abused: Not on file     Forced sexual activity: Not on file   Other Topics Concern    Are you pregnant or think you may be? No    Breast-feeding No    Patient feels they ought to cut down on drinking/drug use Not Asked    Patient annoyed by others criticizing their drinking/drug use Not Asked    Patient has felt bad or guilty about drinking/drug use Not Asked    Patient has had a drink/used drugs as an eye opener in the AM Not Asked   Social History Narrative    Patient was born and raised in [place]. She was raised by her biological parents. She has one older sister. She denies any early trauma. She has been Bachelors degree from [employer] in [employer]. She has her Masters in Secondary Education and English from [employer]. She is currently working on her PhD in curriculum instruction at [employer] in [place]. 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 She is currently single and does not have any children. She is living with her parents in [facility]. She denies any legal history. She does not use tobacco or alcohol. She denies any illicit drug use. She denies any rehab or detox history for substance abuse.        Review of Systems    Constitutional: negative  Eyes: negative  Ears, nose, mouth, throat, and face: negative  Respiratory: negative  Cardiovascular: negative  Gastrointestinal: negative  Genitourinary:negative  Integument/breast: negative  Hematologic/lymphatic: negative  Musculoskeletal:negative  Neurological:  "negative  Behavioral/Psych: negative  Endocrine: negative  Allergic/Immunologic: negative  Objective:  Vital signs in last 24 hours:    Vitals:    04/01/19 1635   BP: 115/73   Pulse: 74   Weight: 62.2 kg (137 lb 2 oz)   Height: 5' 6" (1.676 m)     General: no acute distress, well nourished, well-groomed  CVS: RRR, no murmur, gallops or rubs  Respiratory: Clear to ausculation  Extremities: no edema    Neurological Examination:    HIGHER INTEGRATIVE FUNCTIONS:  -Normal attention span and concentration; immediately responds to questions and commands  -Oriented to time, place and person  -Recent and remote memory intact  -Language normal (no aphasia or dysarthria)  -Normal fund of knowledge    CN:  -PERRLA, visual fields full, unable to visualize optic discs due to small pupils on fundus exam  -EOMI with normal saccades and smooth pursuit  -Facial sensation intact bilaterally  -Facial strength/movement intact bilaterally  -Hearing intact to voice  -Palate elevates symmetrically  -Normal shoulder shrug and head turn  -Tongue protrudes midline    MOTOR: (left/right graded 1-5)  -UE: 5/5 deltoids; 5/5 biceps, triceps; 5/5 wrist flexors, extensors; 5/5 interosseous; 5/5   -LEs: 5/5 hip flexion, extension; 5/5 knee flexion, extension; 5/5 ankle flexion, extension  -Tone: normal  -No pronator drift, no orbiting    SENSORY:  -Light touch, temperature sensation intact bilaterally    REFLEXES:  -2+ upper and lower bilaterally  -Flexor plantar reflex bilaterally  -No clonus    COORDINATION:  -FNF, HKS intact bilaterally    GAIT:  -Normal casual gait       Assessment/Plan:  1. Chronic headaches with features suggestive of migraines; improve since starting topamax.    We discussed increasing Topamax or trying another preventative medication but she is hesitant at the time.  She prefers to try a medication that would be less sedating. We will try magnesium oxide and riboflavin.   Continue maxalt for abortive therapy.  We " discussed the importance of adequate sleep and hydration.  Continue topamax 50mg bid    2. ADHD, per psychiatry  - on vyvase    3. Anxiety and depression, per psychiatry  - on prozac    Plan:    She will follow-up in 6-8 weeks for a follow-up      I discussed the assessment and plan with the patient and answered the questions that she had.      Patient note was created using Dragon Dictation.  Any errors in syntax or even information may not have been identified and edited on initial review prior to signing this note.

## 2019-04-05 ENCOUNTER — OFFICE VISIT (OUTPATIENT)
Dept: PSYCHIATRY | Facility: CLINIC | Age: 29
End: 2019-04-05
Payer: COMMERCIAL

## 2019-04-05 VITALS
SYSTOLIC BLOOD PRESSURE: 108 MMHG | WEIGHT: 139.88 LBS | HEIGHT: 66 IN | DIASTOLIC BLOOD PRESSURE: 57 MMHG | HEART RATE: 76 BPM | BODY MASS INDEX: 22.48 KG/M2

## 2019-04-05 DIAGNOSIS — F33.41 RECURRENT MAJOR DEPRESSIVE DISORDER, IN PARTIAL REMISSION: ICD-10-CM

## 2019-04-05 DIAGNOSIS — F90.0 ATTENTION DEFICIT HYPERACTIVITY DISORDER (ADHD), PREDOMINANTLY INATTENTIVE TYPE: Primary | ICD-10-CM

## 2019-04-05 DIAGNOSIS — F40.10 SOCIAL ANXIETY DISORDER: ICD-10-CM

## 2019-04-05 PROCEDURE — 99211 PR OFFICE/OUTPT VISIT, EST, LEVL I: ICD-10-PCS | Mod: S$GLB,,, | Performed by: PSYCHIATRY & NEUROLOGY

## 2019-04-05 PROCEDURE — 99211 OFF/OP EST MAY X REQ PHY/QHP: CPT | Mod: S$GLB,,, | Performed by: PSYCHIATRY & NEUROLOGY

## 2019-04-05 PROCEDURE — 99999 PR PBB SHADOW E&M-EST. PATIENT-LVL II: CPT | Mod: PBBFAC,,, | Performed by: PSYCHIATRY & NEUROLOGY

## 2019-04-05 PROCEDURE — 99999 PR PBB SHADOW E&M-EST. PATIENT-LVL II: ICD-10-PCS | Mod: PBBFAC,,, | Performed by: PSYCHIATRY & NEUROLOGY

## 2019-04-05 RX ORDER — METHYLPHENIDATE HYDROCHLORIDE 10 MG/1
10 TABLET ORAL 2 TIMES DAILY WITH MEALS
Qty: 60 TABLET | Refills: 0 | Status: SHIPPED | OUTPATIENT
Start: 2019-04-05 | End: 2020-12-22

## 2019-04-05 NOTE — PROGRESS NOTES
Outpatient Psychiatry Follow-Up Visit (MD/NP)    4/5/2019    Clinical Status of Patient:  Outpatient (Ambulatory)    Chief Complaint:  Rosalina Saavedra is a 29 y.o. female who presents today for follow-up of depression and attention problems.  Met with patient.      Interval History and Content of Current Session:  Interim Events/Subjective Report/Content of Current Session: Patient seen in clinic this afternoon. She states that she did not think that the Vyvanse was helpful and the medication made her tired. She continues to have issues with focusing on her school work. She has had a few assignments that she had to turn in recently and one of them she had to ask the professor for an extension on because of how exhausted she was. She states that she has been doing fine from a mood standpoint. She feels like her parents don't understand where she is coming from, from a mental health standpoint. She states that her appetite has been good and she has been sleeping more but not as much as she would like. She is currently sleeping 4-5 hours a night which is up from 3-4 hours. She reports headaches and nausea at times that are not related to her medications. She has been compliant with Prozac but not with Vyvanse. She denies any side effects from her Prozac. Her main concern at this time is trying to get her school work done and she is worried that she is going to get kicked out of her program.      Psychotherapy:  · Target symptoms: depression, distractability  · Why chosen therapy is appropriate versus another modality: relevant to diagnosis, patient responds to this modality, evidence based practice  · Outcome monitoring methods: self-report  · Therapeutic intervention type: supportive psychotherapy  · Topics discussed/themes: work stress  · The patient's response to the intervention is guarded. The patient's progress toward treatment goals is good.   · Duration of intervention: 10 minutes.    Review of Systems  "  · PSYCHIATRIC: Pertinant items are noted in the narrative.    Past Medical, Family and Social History: The patient's past medical, family and social history have been reviewed and updated as appropriate within the electronic medical record - see encounter notes.    Compliance: yes    Side effects: Fatigue    Risk Parameters:  Patient reports no suicidal ideation  Patient reports no homicidal ideation  Patient reports no self-injurious behavior  Patient reports no violent behavior    Exam (detailed: at least 9 elements; comprehensive: all 15 elements)   Constitutional  Vitals:  Most recent vital signs, dated less than 90 days prior to this appointment, were reviewed.   Vitals:    04/05/19 1555   BP: (!) 108/57   Pulse: 76   Weight: 63.5 kg (139 lb 14.1 oz)   Height: 5' 6" (1.676 m)        General:  unremarkable, age appropriate     Musculoskeletal  Muscle Strength/Tone:  no spasicity, no rigidity   Gait & Station:  non-ataxic     Psychiatric  Speech:  no latency; no press   Mood & Affect:  depressed  congruent and appropriate   Thought Process:  normal and logical   Associations:  intact   Thought Content:  normal, no suicidality, no homicidality, delusions, or paranoia   Insight:  intact   Judgement: behavior is adequate to circumstances   Orientation:  grossly intact   Memory: intact for content of interview   Language: grossly intact   Attention Span & Concentration:  able to focus   Fund of Knowledge:  intact and appropriate to age and level of education     Assessment and Diagnosis   Status/Progress: Based on the examination today, the patient's problem(s) is/are adequately but not ideally controlled.  New problems have not been presented today.   Co-morbidities, Diagnostic uncertainty and Lack of compliance are not complicating management of the primary condition.  There are no active rule-out diagnoses for this patient at this time.     General Impression:       ICD-10-CM ICD-9-CM   1. Attention deficit " hyperactivity disorder (ADHD), predominantly inattentive type F90.0 314.00   2. Recurrent major depressive disorder, in partial remission F33.41 296.35   3. Social anxiety disorder F40.10 300.23       Intervention/Counseling/Treatment Plan   · Medication Management: Continue current medications.   · Continue Prozac 40 mg PO daily  · Discontinue Vyvanse 20 mg PO daily  · Initiate Ritalin 10 mg PO BID   · Labs: reviewed most recent  · The treatment plan and follow up plan were reviewed with the patient.  · Discussed with patient informed consent, risks vs. benefits, alternative treatments, side effect profile and the inherent unpredictability of individual responses to these treatments. The patient expresses understanding of the above and displays the capacity to agree with this current plan and had no other questions.  · Encouraged Patient to keep future appointments.   · Take medications as prescribed and abstain from substance abuse.   · In the event of an emergency patient was advised to go to the emergency room.        Return to Clinic: 1 month

## 2019-05-29 NOTE — PROGRESS NOTES
This encounter was reviewed by me and case was discussed with the resident physician. I agree with the assessment and  treatment plan as stated.    Stella Boyce MD

## 2019-10-07 ENCOUNTER — HOSPITAL ENCOUNTER (EMERGENCY)
Facility: HOSPITAL | Age: 29
Discharge: HOME | End: 2019-10-07
Attending: EMERGENCY MEDICINE
Payer: COMMERCIAL

## 2019-10-07 VITALS
HEART RATE: 64 BPM | TEMPERATURE: 99 F | BODY MASS INDEX: 22.5 KG/M2 | OXYGEN SATURATION: 100 % | SYSTOLIC BLOOD PRESSURE: 111 MMHG | HEIGHT: 66 IN | DIASTOLIC BLOOD PRESSURE: 55 MMHG | RESPIRATION RATE: 18 BRPM | WEIGHT: 140 LBS

## 2019-10-07 DIAGNOSIS — G43.809 OTHER MIGRAINE WITHOUT STATUS MIGRAINOSUS, NOT INTRACTABLE: Primary | ICD-10-CM

## 2019-10-07 LAB
B-HCG UR QL: NEGATIVE
CTP QC/QA: YES

## 2019-10-07 PROCEDURE — 99284 EMERGENCY DEPT VISIT MOD MDM: CPT | Mod: 25

## 2019-10-07 PROCEDURE — 96374 THER/PROPH/DIAG INJ IV PUSH: CPT

## 2019-10-07 PROCEDURE — 81025 URINE PREGNANCY TEST: CPT | Performed by: EMERGENCY MEDICINE

## 2019-10-07 PROCEDURE — 63600175 PHARM REV CODE 636 W HCPCS: Performed by: EMERGENCY MEDICINE

## 2019-10-07 PROCEDURE — 96372 THER/PROPH/DIAG INJ SC/IM: CPT | Mod: 59

## 2019-10-07 PROCEDURE — 96375 TX/PRO/DX INJ NEW DRUG ADDON: CPT

## 2019-10-07 RX ORDER — PROCHLORPERAZINE EDISYLATE 5 MG/ML
10 INJECTION INTRAMUSCULAR; INTRAVENOUS
Status: COMPLETED | OUTPATIENT
Start: 2019-10-07 | End: 2019-10-07

## 2019-10-07 RX ORDER — DIPHENHYDRAMINE HYDROCHLORIDE 50 MG/ML
25 INJECTION INTRAMUSCULAR; INTRAVENOUS
Status: COMPLETED | OUTPATIENT
Start: 2019-10-07 | End: 2019-10-07

## 2019-10-07 RX ORDER — SUMATRIPTAN 6 MG/.5ML
6 INJECTION, SOLUTION SUBCUTANEOUS
Status: COMPLETED | OUTPATIENT
Start: 2019-10-07 | End: 2019-10-07

## 2019-10-07 RX ORDER — KETOROLAC TROMETHAMINE 30 MG/ML
15 INJECTION, SOLUTION INTRAMUSCULAR; INTRAVENOUS
Status: COMPLETED | OUTPATIENT
Start: 2019-10-07 | End: 2019-10-07

## 2019-10-07 RX ADMIN — DIPHENHYDRAMINE HYDROCHLORIDE 25 MG: 50 INJECTION, SOLUTION INTRAMUSCULAR; INTRAVENOUS at 10:10

## 2019-10-07 RX ADMIN — PROCHLORPERAZINE EDISYLATE 10 MG: 5 INJECTION INTRAMUSCULAR; INTRAVENOUS at 10:10

## 2019-10-07 RX ADMIN — KETOROLAC TROMETHAMINE 15 MG: 30 INJECTION, SOLUTION INTRAMUSCULAR at 10:10

## 2019-10-07 RX ADMIN — SUMATRIPTAN 6 MG: 6 INJECTION, SOLUTION SUBCUTANEOUS at 08:10

## 2019-10-08 NOTE — ED NOTES
APPEARANCE: Alert, oriented and in no acute distress.  CARDIAC: Normal rate and rhythm, no murmur heard.   PERIPHERAL VASCULAR: peripheral pulses present. Normal cap refill. No edema. Warm to touch.    RESPIRATORY:Normal rate and effort, breath sounds clear bilaterally throughout chest. Respirations are equal and unlabored no obvious signs of distress.  GASTRO: soft, bowel sounds normal, no tenderness, no abdominal distention.  MUSC: Full ROM. No bony tenderness or soft tissue tenderness. No obvious deformity.  SKIN: Skin is warm and dry, normal skin turgor, mucous membranes moist.  NEURO: 5/5 strength major flexors/extensors bilaterally. Sensory intact to light touch bilaterally. [place] coma scale: eyes open spontaneously-4, oriented & converses-5, obeys commands-6. No neurological abnormalities.   MENTAL STATUS: awake, alert and aware of environment.  EYE: PERRL, both eyes: pupils brisk and reactive to light. Normal size.  ENT: EARS: no obvious drainage. NOSE: no active bleeding.   Present to ED with a Migraine since yesterday report had one last week. Report this one is the worse one ever.

## 2019-10-08 NOTE — ED PROVIDER NOTES
Encounter Date: 10/7/2019    SCRIBE #1 NOTE: I, Lionel Guerrero, am scribing for, and in the presence of,  . I have scribed the entire note.     I, Dr. Merle Diaz MD, personally performed the services described in this documentation. All medical record entries made by the scribe were at my direction and in my presence.  I have reviewed the chart and agree that the record reflects my personal performance and is accurate and complete. Merle Diaz MD.    History     Chief Complaint   Patient presents with    Headache     c/o headache since yesterday afternoon, severe since 4 am. + N/V, photophobia. States has h/o headaches but not this bad. No fever reported. Presents awake, alert, oriented. Gait steady.      CHIEF COMPLAINT: Patient presents with: Headache      HISTORY OF PRESENT ILLNESS: Rosalina Saavedra who is a 29 y.o. with a known history of migraines who presents to the emergency department today with complaint of a headache described as constant pressure and stabbing for 1 day. She took 2 doses of Maxalt 10mg and 2 Topamax 50mg without relief. She has established care with a Neurologist. She denies injury or trauma. She denies appetite change or an increase of caffeine. She denies other drug intake. She denies other complaints at this time including weakness/numbness, fever, chills, cough, vomiting, or other complaints.     Headache history:    New-onset   no  Neurological findings   no  Sudden onset or worst and onset   no  Fever immunocompromise no  Elderly   no  Chronic and progressive   no  Jaw claudication, muscle aches, temporal artery tenderness no  Symptoms present in others at home  no  Chronic headache medication withdrawal   no  Pregnancy or recent postpartum   no  Clotting disorder no  Trauma   no  Ocular pain   no  Recent cervical manipulation with facial pain   no  Dizziness   no      ALLERGIES REVIEWED  MEDICATIONS REVIEWED  PMH/PSH/SOC/FH REVIEWED     The history is provided by  the patient.    Nursing/Ancillary staff note reviewed.      The history is provided by the patient.     Review of patient's allergies indicates:   Allergen Reactions    Codeine Itching    Morphine Itching     Past Medical History:   Diagnosis Date    Anxiety     Depression     Migraine     Migraine headache      Past Surgical History:   Procedure Laterality Date    KNEE ARTHROSCOPY      TIBIA FRACTURE SURGERY       Family History   Problem Relation Age of Onset    Diabetes Mother     Diabetes Maternal Grandmother     Hypertension Maternal Grandmother      Social History     Tobacco Use    Smoking status: Never Smoker    Smokeless tobacco: Never Used   Substance Use Topics    Alcohol use: No    Drug use: No     Review of Systems   Constitutional: Negative for activity change, appetite change, chills, diaphoresis and fever.   HENT: Negative for congestion, drooling, ear pain, mouth sores, rhinorrhea, sinus pain, sore throat and trouble swallowing.    Eyes: Negative for pain and discharge.   Respiratory: Negative for cough, chest tightness, shortness of breath, wheezing and stridor.    Cardiovascular: Negative for chest pain, palpitations and leg swelling.   Gastrointestinal: Positive for nausea. Negative for abdominal distention, abdominal pain, blood in stool, constipation, diarrhea and vomiting.   Genitourinary: Negative for difficulty urinating, dysuria, flank pain, frequency, hematuria and urgency.   Musculoskeletal: Negative for arthralgias, back pain and myalgias.   Skin: Negative for pallor, rash and wound.   Neurological: Positive for headaches. Negative for dizziness, syncope, weakness, light-headedness and numbness.   All other systems reviewed and are negative.      Physical Exam     Initial Vitals [10/07/19 1821]   BP Pulse Resp Temp SpO2   111/71 69 16 98.1 °F (36.7 °C) 98 %      MAP       --         Physical Exam    Nursing note and vitals reviewed.  Constitutional: She appears  well-developed and well-nourished.   HENT:   Head: Normocephalic and atraumatic.   Right Ear: External ear normal.   Left Ear: External ear normal.   Nose: Nose normal.   Mouth/Throat: Oropharynx is clear and moist.   Eyes: Conjunctivae and EOM are normal. Pupils are equal, round, and reactive to light. No scleral icterus.   Neck: Normal range of motion. Neck supple. No JVD present.   Cardiovascular: Normal rate, regular rhythm, normal heart sounds and intact distal pulses. Exam reveals no gallop and no friction rub.    No murmur heard.  Pulmonary/Chest: Breath sounds normal. No stridor. No respiratory distress. She has no wheezes. She exhibits no tenderness.   Abdominal: Soft. Bowel sounds are normal. She exhibits no distension and no mass. There is no tenderness. There is no rebound and no guarding.   Musculoskeletal: Normal range of motion. She exhibits no edema or tenderness.   Back is nontender to palpation.    Neurological: She is alert and oriented to person, place, and time. She has normal strength. No cranial nerve deficit.   Skin: Skin is warm and dry. Capillary refill takes less than 2 seconds. No rash noted. No pallor.   Psychiatric: She has a normal mood and affect. Thought content normal.         ED Course   Procedures  Labs Reviewed   POCT URINE PREGNANCY          Imaging Results    None          Medical Decision Making:   History:   Old Medical Records: I decided to obtain old medical records.  Initial Assessment:   Rosalina Saavedra presents to the ED today with headache. Pt has a history of migraines. No red flags on history or exam. No indication of need for imaging studies at this time. I will treat their pain and reassess.     Differential Diagnosis:   Migraine headache, cluster headache, tension headache eye strain, and infectious causes such as meningitis, pharyngitis and sinusitis, other dangerous causes such as subarachnoid hemorrhage, tumor    ED Management:  2140 Pt has no relief with  sumatriptan, will give toradol, compazine and benadryl   2240 patient is feeling much improved following Toradol, Compazine and Benadryl.  She reports feeling back to her baseline and ready to go home.    Rosalina Saavedra presents to the ED today with c/o headache. The pt has no red flags on HPI or PE. The symptoms have improved here in the ED. There is no need for hospitalization at this time. I believe the patient has a migraine headache based upon the history and physical exam and pt course in the ED.  The patient's headaches are similar to their prior headaches for which they have been diagnosed as migraines in the past.  They have no focal weakness, numbness, meningismus, other focal neurologic deficit, history of trauma, fevers, elevated blood pressure to suggest meningitis, subarachnoid hemorrhage, TIA, stroke, mass, or hypertensive urgency.  I will treat the patient supportively. Pt to call for follow up with PCP or referred physician in 2-3 days. Pt is to return to the ED immediately for any new or worsening symptoms, including but not limited to: fever, chills, worsening pain, nausea/vomiting, weakness/fatigue, or for any other concerns.  Pt had time for ask questions to which they were addressed. Pt expressed understanding.  After taking into careful account the historical factors and physical exam findings of the patient's presentation today, in conjunction with the empirical and objective data obtained on ED workup, no acute emergent medical condition requiring admission has been identified. The patient appears to be low risk for an emergent medical condition and I feel it is safe and appropriate at this time for the patient to be discharged to follow-up as detailed in their discharge instructions for reevaluation and possible continued outpatient workup and management. I have discussed the specifics of the workup with the patient and the patient has verbalized understanding of the details of the  workup, the diagnosis, the treatment plan, and the need for outpatient follow-up.  Although the patient has no emergent etiology today this does not preclude the development of an emergent condition so in addition, I have advised the patient that they can return to the ED and/or activate EMS at any time with worsening of their symptoms, change of their symptoms, or with any other medical complaint.  The patient remained comfortable and stable during their visit in the ED.  Discharge and follow-up instructions discussed with the patient who expressed understanding and willingness to comply with my recommendations.                            ED Course as of Oct 08 2130   Mon Oct 07, 2019   9632 Patient's headache has improved, ready to go home    [KM]      ED Course User Index  [KM] Lionel Guerrero     Clinical Impression:       ICD-10-CM ICD-9-CM   1. Other migraine without status migrainosus, not intractable G43.809 346.80           Disposition:   Disposition: Discharged  Condition: Stable                        Merle Diaz MD  10/08/19 2130

## 2019-10-08 NOTE — ED TRIAGE NOTES
Present to ED with complaint of a Migraine since yesterday. Report had one last week, but this is the worse one ever.

## 2019-10-09 ENCOUNTER — TELEPHONE (OUTPATIENT)
Dept: INTERNAL MEDICINE | Facility: CLINIC | Age: 29
End: 2019-10-09

## 2019-10-09 NOTE — TELEPHONE ENCOUNTER
----- Message from Beth Dallas sent at 10/9/2019 12:53 PM CDT -----  Contact: KANIKA SÁNCHEZ [[id number]]  [phone]    Patient was treated in the ER on 10/7 for a Cluster Migraine and advised to see her PCP. She is scheduled for an appointment in November, but wants to be seen sooner.

## 2019-10-10 ENCOUNTER — TELEPHONE (OUTPATIENT)
Dept: NEUROLOGY | Facility: CLINIC | Age: 29
End: 2019-10-10

## 2019-10-10 NOTE — TELEPHONE ENCOUNTER
----- Message from Bambi Figueroa sent at 10/9/2019  7:11 PM CDT -----  Contact: Pt   Pt is requesting a call back in regards to rescheduling her appt to a sooner date for severe migraines.       Pt can be contacted at [phone]

## 2019-10-15 ENCOUNTER — PATIENT OUTREACH (OUTPATIENT)
Dept: ADMINISTRATIVE | Facility: OTHER | Age: 29
End: 2019-10-15

## 2019-10-16 ENCOUNTER — OFFICE VISIT (OUTPATIENT)
Dept: NEUROLOGY | Facility: CLINIC | Age: 29
End: 2019-10-16
Payer: COMMERCIAL

## 2019-10-16 VITALS
HEART RATE: 66 BPM | DIASTOLIC BLOOD PRESSURE: 74 MMHG | HEIGHT: 66 IN | SYSTOLIC BLOOD PRESSURE: 109 MMHG | WEIGHT: 155 LBS | BODY MASS INDEX: 24.91 KG/M2

## 2019-10-16 DIAGNOSIS — F33.41 RECURRENT MAJOR DEPRESSIVE DISORDER, IN PARTIAL REMISSION: ICD-10-CM

## 2019-10-16 DIAGNOSIS — F41.9 ANXIETY: ICD-10-CM

## 2019-10-16 PROCEDURE — 3008F BODY MASS INDEX DOCD: CPT | Mod: CPTII,S$GLB,, | Performed by: PHYSICIAN ASSISTANT

## 2019-10-16 PROCEDURE — 99999 PR PBB SHADOW E&M-EST. PATIENT-LVL III: ICD-10-PCS | Mod: PBBFAC,,, | Performed by: PHYSICIAN ASSISTANT

## 2019-10-16 PROCEDURE — 99214 PR OFFICE/OUTPT VISIT, EST, LEVL IV, 30-39 MIN: ICD-10-PCS | Mod: S$GLB,,, | Performed by: PHYSICIAN ASSISTANT

## 2019-10-16 PROCEDURE — 99214 OFFICE O/P EST MOD 30 MIN: CPT | Mod: S$GLB,,, | Performed by: PHYSICIAN ASSISTANT

## 2019-10-16 PROCEDURE — 3008F PR BODY MASS INDEX (BMI) DOCUMENTED: ICD-10-PCS | Mod: CPTII,S$GLB,, | Performed by: PHYSICIAN ASSISTANT

## 2019-10-16 PROCEDURE — 99999 PR PBB SHADOW E&M-EST. PATIENT-LVL III: CPT | Mod: PBBFAC,,, | Performed by: PHYSICIAN ASSISTANT

## 2019-10-16 RX ORDER — TOPIRAMATE 25 MG/1
75 TABLET ORAL DAILY
Qty: 90 TABLET | Refills: 6 | Status: SHIPPED | OUTPATIENT
Start: 2019-11-01 | End: 2020-01-17

## 2019-10-16 RX ORDER — TOPIRAMATE 25 MG/1
25 TABLET ORAL NIGHTLY
Qty: 30 TABLET | Refills: 0 | Status: SHIPPED | OUTPATIENT
Start: 2019-10-16 | End: 2020-01-17

## 2019-10-16 RX ORDER — PROMETHAZINE HYDROCHLORIDE 12.5 MG/1
12.5 TABLET ORAL 4 TIMES DAILY
Qty: 20 TABLET | Refills: 6 | Status: SHIPPED | OUTPATIENT
Start: 2019-10-16 | End: 2020-01-17

## 2019-10-16 NOTE — PROGRESS NOTES
New Patient     SUBJECTIVE:  Patient ID: Rosalina Saavedra   MRN: [id number]  Referred By: Aaareferral Self  Chief Complaint: Headache      History of Present Illness:   29 y.o. female with a PMHx of migraine, anxiety, depression, who presents to clinic alone for evaluation of headaches.   PMHx negative for TBI, Meningitis, Aneurysms, Smoking, Kidney Stones, asthma, GI bleed, osteoporosis, CAD/MI, CVA/TIA, DM, infection, fever, cancer, pregnancy   Family Hx negative for aneurysms, brain tumors. Positive for mother and maternal grandmother with migraines.     Patient seen in ED on 10/7 for her HA. Was given toradol, compazine, and benadryl which she found mild relief. Went to Urgent Care 10/11, was given 3 injections patient unsure of what. Was given script for fioricet.     Headache History:  Onset - 12yo  Previous Hx of HA - saw a neurologist previously many years ago, unsure of name, unsure of meds tried, were occurring once every 3 weeks, have gradually increased and over the last year, now over a majority of every month.   Location/Radiation - L > R unilateral retroorbital, frontal, occipital  Quality - pressure, stabbing  Duration - 2 days - 9 days  Intensity (range) - 2-10/10  Frequency - 25/30 ha days per month, 0/30 are debilitating  Triggers - stress, chocolate cake, cereal bar  Aggravating Factors - sound, light, smell  Alleviating Factors - laying down, closing her eyes  Recent Changes - more stress  Prodrome/Aura - no  HA today? - 1/10  Status Migrainosus history - yes   Time of day of most headaches- anytime   Sleep - no snoring, doesn't wake feeling refreshed, resolution of headache with sleep    Associated symptoms with the headache: photophobia, phonophobia, hyperosmia, nausea, worsened with exertion, dizziness, fatigue    Treatments Tried   topamax (taken x 1 year) - doesn't help  maxalt (taken x 6 years) - somewhat helps  Fluoxetine  fioricet  Compazine  naproxen    Social History  Alcohol -  "denies  Smoke - denies  Recreational Drug Use- denies    Note from ED 10/7/19:  Rosalina Saavedra who is a 29 y.o. with a known history of migraines who presents to the emergency department today with complaint of a headache described as constant pressure and stabbing for 1 day. She took 2 doses of Maxalt 10mg and 2 Topamax 50mg without relief. She has established care with a Neurologist. She denies injury or trauma. She denies appetite change or an increase of caffeine. She denies other drug intake. She denies other complaints at this time including weakness/numbness, fever, chills, cough, vomiting, or other complaints.     Current Medications:    naproxen sodium (ANAPROX) 550 MG tablet, TAKE 1 TABLET TWICE A DAY AS NEEDED FOR MIGRAINE HEADACHE, Disp: 60 tablet, Rfl: 1    rizatriptan (MAXALT) 10 MG tablet, TAKE 1 TABLET BY MOUTH AS NEEDED FOR MIGRAINE. MAY REPEAT IN 2 HOURS. MAX 3 TABLETS PER DAY, Disp: 10 tablet, Rfl: 4    topiramate (TOPAMAX) 50 MG tablet, Take 1 tablet (50 mg total) by mouth 2 (two) times daily., Disp: 60 tablet, Rfl: 5    EPIDUO FORTE 0.3-2.5 % GlwP, AAA face qhs, Disp: 45 g, Rfl: 3    FLUoxetine 40 MG capsule, Take 1 capsule (40 mg total) by mouth once daily., Disp: 30 capsule, Rfl: 5    ketoconazole (NIZORAL) 2 % shampoo, Wash hair with medicated shampoo at least 2x/week - let sit on scalp at least 5 minutes prior to rinsing, Disp: 120 mL, Rfl: 5    methylphenidate HCl (RITALIN) 10 MG tablet, Take 1 tablet (10 mg total) by mouth 2 (two) times daily with meals., Disp: 60 tablet, Rfl: 0    Review of Systems - as per HPI, otherwise a balanced 10 systems review is negative.    OBJECTIVE:  Vitals:  /74   Pulse 66   Ht 5' 6" (1.676 m)   Wt 70.3 kg (154 lb 15.7 oz)   LMP 09/30/2019   BMI 25.01 kg/m²     Physical Exam   Constitutional: she appears well-developed and well-nourished. she is well groomed. NAD  HENT:    Head: Normocephalic and atraumatic, oral and nasal mucosa intact.  " Frontalis was NTTP, temporalis was NTTP   Eyes: Conjunctivae and EOM are normal. Pupils are equal, round, and reactive to light   Neck: Neck supple. Occiput TTP on Left and trapezius NTTP traps tight  Musculoskeletal: Normal range of motion. No joint stiffness. No vertebral point tenderness.  Skin: Skin is warm and dry.  Psychiatric: Normal mood and affect.     Neuro exam:    Mental status:  The patient is alert and oriented to person, place and time.  Language is intact and fluent  Remote and recent memory are intact  Normal attention and concentration  Mood is stable    Cranial Nerves:  Pupils are equal and reactive to light.    Extraocular movements are intact and without nystagmus.    Visual fields are full to confrontation testing.   Facial movement is symmetric.  Facial sensation is intact.    Hearing is intact to finger rub   Uvula in midline. Tongue in midline without fasciculation.   FROM of neck in all (6) directions without pain  Shoulder shrug symmetrical.    Coordination:     Finger to nose - normal and symmetric bilaterally   Heel to shin test - normal and symmetric bilaterally     Motor:  Normal muscle bulk and symmetry. No fasciculations were noted.   Tremor not apparent   Pronator drift not apparent.    strength was strong and symmetric  Finger extension strength was strong and symmetric  RUE:appropriate against gravity and medium force as tested 5/5  LUE: appropriate against gravity and medium force as tested 5/5  RLE:appropriate against gravity and medium force as tested 5/5              LLE: appropriate against gravity and medium force as tested 5/5    Reflexes:  Right Brachioradialis 2+  Left Brachioradialis 2+  Right Biceps 2+  Left Biceps 2+  Right Patellar2+  Left Patellar 2+                          Asher was negative    Sensory:  RUE  intact light touch  LUE intact light touch  RLE intact light touch  LLE intact light touch    Gait:   Romberg - negative  Normal gait  Tandem, Heel, and  Toe Walk - able to perform without difficulty    Review of Data:   Notes from ED reviewed   Labs:  Admission on 10/07/2019, Discharged on 10/07/2019   Component Date Value Ref Range Status    POC Preg Test, Ur 10/07/2019 Negative  Negative Final     Acceptable 10/07/2019 Yes   Final     Imaging:  Results for orders placed or performed during the hospital encounter of 04/01/11   MRI Brain W WO Contrast    Narrative    DATE OF EXAM: Apr 1 2011      KMR   0007  -  MRI BRAIN W AND WO CONTRAST:     [id number]     CLINICAL HISTORY:   NEW DAILY PERSISTANT HEADACHES     ICD 9 CODE(S):   ()     CPT 4 CODE(S)/MODIFIER(S):   ()     RESULTS:  Procedure: MRI the brain with and without contrast.     Technique: Sagittal and axial T1, axial T2, axial FLAIR, axial gradient,   axial diffusion, and axial, sagittal, and coronal postcontrast T1 images   of the whole brain.        Clinical Indication:  21-year-old female with daily new persistent   headaches     Comparison: None     Findings: The brain  parenchyma is normal in signal and contour. There   are no abnormal areas of parenchymal signal or enhancement. There are no   areas are restricted diffusion to suggest acute infarction. The   ventricles are normal in size and configuration without evidence for   hydrocephalus. There are no abnormal areas of the gradient susceptibility   to suggest parenchymal hemorrhage. No abnormal intra or extra axial fluid   collections. The major intracranial flow-voids are present.  The major intracranial T2 flow-voids are present.           IMPRESSION:   Normal MRI brain.           : HELEN  Transcribe Date/Time: Apr 1 2011  1:31P  Dictated by : NOEMI BELLO DO  Report reviewed by:   Read On: Apr 1 2011  1:29P     Images were reviewed, findings were verified and document was   electronically  SIGNED BY: NOEMI BELLO DO On: Apr 1 2011  1:31P        Note: I have independently reviewed any/all imaging/labs/tests and  agree with the report (s) as documented.  Any discrepancies will be as noted/demarcated by free text.  DULCE CANELA 10/16/2019    ASSESSMENT:  1. Chronic migraine    2. Anxiety    3. Recurrent major depressive disorder, in partial remission          PLAN:  - Discussed symptoms appear to be consistent with chronic migraines, discussed treatment options and patient agreed with the following plan:  - preventative - increase topamax to 75 mg (put in 25mg to take with her 50mg bc she just picked up the 50)(put in for 75mg tabs starting next month)  - abortive - naproxen and maxalt  - nausea - phenergan  - anxiety and depression - continue fluoxetine, mgmt per pcp  - risks, benefits, and potential side effects of topamax discussed   - alternative treatment options offered   - importance of healthy diet, regular exercise and sleep hygiene in the treatment of headaches    - Start tracking headaches via Migraine [employer] ronald on phone   - RTC in 6 weeks     Orders Placed This Encounter    promethazine (PHENERGAN) 12.5 MG Tab    topiramate (TOPAMAX) 25 MG tablet    topiramate (TOPAMAX) 25 MG tablet       I have discussed realistic goals of care with patient at length as well as medication options, and need for lifestyle adjustment. I have explained that treatment will take time. We have agreed that the goal will be to reduce frequency/intensity/quantity of HA, not to be completely HA free. I have explained my non narcotic policy regarding headache treatment.    Patient agreeable to work on lifestyle adjustments.    Discussed potential for teratogenicity with treatment, patient understands if her family planning status should change she will contact office immediately and we will safely adjust medications as needed.     Questions and concerns were sought and answered to the patient's stated verbal satisfaction.  The patient verbalizes understanding and agreement with the above stated treatment plan.     CC: Jaki L Swindler,  JONATAN BurgosC  [facility]  [phone]    Dr. Dorsey was available during today's encounter.

## 2019-10-16 NOTE — PATIENT INSTRUCTIONS
Supplements for Migraine:  1. Magnesium Oxide - 400mg by mouth daily  2. Riboflavin (Vitamin B2) - 400mg by mouth daily  3. Coenzyme Q10 - 200mg tablet by mouth daily    - Please download Migraine [employer] ronald on phone and begin tracking your headaches

## 2019-11-06 DIAGNOSIS — G43.109 MIGRAINE WITH AURA AND WITHOUT STATUS MIGRAINOSUS, NOT INTRACTABLE: ICD-10-CM

## 2019-11-06 RX ORDER — NAPROXEN SODIUM 550 MG/1
TABLET ORAL
Qty: 60 TABLET | Refills: 1 | Status: SHIPPED | OUTPATIENT
Start: 2019-11-06 | End: 2020-01-17

## 2019-11-10 RX ORDER — BUTALBITAL, ACETAMINOPHEN AND CAFFEINE 300; 40; 50 MG/1; MG/1; MG/1
CAPSULE ORAL
Qty: 9 CAPSULE | Refills: 0 | Status: SHIPPED | OUTPATIENT
Start: 2019-11-10 | End: 2021-01-11

## 2019-12-02 ENCOUNTER — PATIENT MESSAGE (OUTPATIENT)
Dept: INTERNAL MEDICINE | Facility: CLINIC | Age: 29
End: 2019-12-02

## 2019-12-02 DIAGNOSIS — G43.109 MIGRAINE WITH AURA AND WITHOUT STATUS MIGRAINOSUS, NOT INTRACTABLE: ICD-10-CM

## 2019-12-02 RX ORDER — RIZATRIPTAN BENZOATE 10 MG/1
TABLET ORAL
Qty: 9 TABLET | Refills: 5 | Status: SHIPPED | OUTPATIENT
Start: 2019-12-02 | End: 2020-01-17

## 2020-01-09 DIAGNOSIS — G43.009 MIGRAINE WITHOUT AURA AND WITHOUT STATUS MIGRAINOSUS, NOT INTRACTABLE: ICD-10-CM

## 2020-01-09 RX ORDER — TOPIRAMATE 50 MG/1
TABLET, FILM COATED ORAL
Qty: 60 TABLET | Refills: 5 | OUTPATIENT
Start: 2020-01-09

## 2020-01-16 NOTE — PROGRESS NOTES
Established Patient   SUBJECTIVE:  Patient ID: Rosalina Saavedra   Chief Complaint: Migraine    History of Present Illness:  Rosalina Saavedra is a 30 y.o. female with a PMHx of migraine, anxiety, depression who presents to clinic with mother for follow-up of headaches.     Recommendations made at last Office Visit on 10/16/19:  - Discussed symptoms appear to be consistent with chronic migraines, discussed treatment options and patient agreed with the following plan:  - preventative - increase topamax to 75 mg (put in 25mg to take with her 50mg bc she just picked up the 50)(put in for 75mg tabs starting next month)  - abortive - naproxen and maxalt  - nausea - phenergan  - anxiety and depression - continue fluoxetine, mgmt per pcp  - risks, benefits, and potential side effects of topamax discussed   - alternative treatment options offered   - importance of healthy diet, regular exercise and sleep hygiene in the treatment of headaches    - Start tracking headaches via Migraine Abdifatah ronald on phone   - RTC in 6 weeks     01/17/2020 - Interval History:  Last visit pt presented w/ migraines, increased her topamax to 75mg, started naproxen, maxalt, and phenergan.   Today, she reports an initial improvement however in the last 2 weeks she has experienced worseneing in the frequency. She has been stressed more due to upcoming dissertation. She is having Ohara's 3-4/7 days in the last 2 weeks, prior to that they were 1-2 times per week. Severity is 2-5/10. Duration is 3 hours to 9 hours. When she has a HA, she takes maxalt and naproxen which would resolve the HA, only had to repeat the maxalt occasionally. Phenergan helps greatly with the nausea. She reports she is taking 100mg of topamax.   Mother has many questions regarding patient's diagnosis and treatment plan, she is very concerned about her daughter's headaches and that she is suffering from so much pain. All questions were answered to verbal satisfaction of patient and  "mother. In agreement with plan.   Plan: will increase topamax to 125mg daily. Continue naproxen and maxalt. And phenergan. F/u 3 mo.      Treatments Tried:  topamax (taken x 1 year) - doesn't help  maxalt (taken x 6 years) - somewhat helps  Fluoxetine  fioricet  Compazine  naproxen    Current Medications:    Current Outpatient Medications:     butalbital-acetaminophen-caff -40 mg Cap, TAKE 1 CAPSULE 3 TIMES A DAY AS NEEDED, Disp: 9 capsule, Rfl: 0    methylphenidate HCl (RITALIN) 10 MG tablet, Take 1 tablet (10 mg total) by mouth 2 (two) times daily with meals., Disp: 60 tablet, Rfl: 0    naproxen sodium (ANAPROX) 550 MG tablet, Take 1 tablet (550 mg total) by mouth 2 (two) times daily as needed (for headache)., Disp: 60 tablet, Rfl: 3    promethazine (PHENERGAN) 12.5 MG Tab, Take 1 tablet (12.5 mg total) by mouth 4 (four) times daily., Disp: 20 tablet, Rfl: 6    rizatriptan (MAXALT) 10 MG tablet, TAKE 1 TABLET BY MOUTH AS NEEDED FOR MIGRAINE. MAY REPEAT IN 2 HOURS. 3 TABLETS MAX PER DAY, Disp: 9 tablet, Rfl: 5    EPIDUO FORTE 0.3-2.5 % GlwP, AAA face qhs (Patient not taking: Reported on 1/17/2020), Disp: 45 g, Rfl: 3    FLUoxetine 40 MG capsule, Take 1 capsule (40 mg total) by mouth once daily., Disp: 30 capsule, Rfl: 5    ketoconazole (NIZORAL) 2 % shampoo, Wash hair with medicated shampoo at least 2x/week - let sit on scalp at least 5 minutes prior to rinsing (Patient not taking: Reported on 1/17/2020), Disp: 120 mL, Rfl: 5    topiramate (TOPAMAX) 100 MG tablet, Take 1 tablet (100 mg total) by mouth once daily., Disp: 30 tablet, Rfl: 3    topiramate (TOPAMAX) 25 MG tablet, Take 1 tablet (25 mg total) by mouth once daily., Disp: 30 tablet, Rfl: 3    Review of Systems - as per HPI, otherwise a balanced 10 systems review is negative.    OBJECTIVE:  Vitals:  /67   Pulse (!) 59   Ht 5' 6" (1.676 m)   Wt 59 kg (130 lb)   BMI 20.98 kg/m²      Physical Exam:  Constitutional: she appears " well-developed and well-nourished. she is well groomed. NAD   HENT:    Head: Normocephalic and atraumatic  Eyes: Conjunctivae and EOM are normal  Musculoskeletal: Normal range of motion. No joint stiffness.   Skin: Skin is warm and dry.  Psychiatric: Mood and affect are normal    Neuro: Patient is alert and oriented to person, place, and time. Language is intact and fluent. Speech is clear and fluent. Recent and remote memory are intact.  Normal attention and concentration.  Facial movement is symmetric. Moves all 4 extremities against gravity. Gait and station normal.  Cranial Nerves II through XII without focal deficit.     Review of Data:   Notes from neuro reviewed   Labs:  No visits with results within 3 Month(s) from this visit.   Latest known visit with results is:   Admission on 10/07/2019, Discharged on 10/07/2019   Component Date Value Ref Range Status    POC Preg Test, Ur 10/07/2019 Negative  Negative Final     Acceptable 10/07/2019 Yes   Final     Imaging:  Results for orders placed or performed during the hospital encounter of 04/01/11   MRI Brain W WO Contrast    Narrative    DATE OF EXAM: Apr 1 2011      KMR   0007  -  MRI BRAIN W AND WO CONTRAST:     [id number]     CLINICAL HISTORY:   NEW DAILY PERSISTANT HEADACHES     ICD 9 CODE(S):   ()     CPT 4 CODE(S)/MODIFIER(S):   ()     RESULTS:  Procedure: MRI the brain with and without contrast.     Technique: Sagittal and axial T1, axial T2, axial FLAIR, axial gradient,   axial diffusion, and axial, sagittal, and coronal postcontrast T1 images   of the whole brain.        Clinical Indication:  21-year-old female with daily new persistent   headaches     Comparison: None     Findings: The brain  parenchyma is normal in signal and contour. There   are no abnormal areas of parenchymal signal or enhancement. There are no   areas are restricted diffusion to suggest acute infarction. The   ventricles are normal in size and configuration without  evidence for   hydrocephalus. There are no abnormal areas of the gradient susceptibility   to suggest parenchymal hemorrhage. No abnormal intra or extra axial fluid   collections. The major intracranial flow-voids are present.  The major intracranial T2 flow-voids are present.           IMPRESSION:   Normal MRI brain.           : HELEN  Transcribe Date/Time: Apr 1 2011  1:31P  Dictated by : NOEMI BELLO DO  Report reviewed by: Read On: Apr 1 2011  1:29P     Images were reviewed, findings were verified and document was   electronically  SIGNED BY: NOEMI BELLO DO On: Apr 1 2011  1:31P        Note: I have independently reviewed any/all imaging/labs/tests and agree with the report (s) as documented.  Any discrepancies will be as noted/demarcated by free text.  DULCE CANELA 1/17/2020    ASSESSMENT:  1. Chronic migraine    2. Migraine with aura and without status migrainosus, not intractable        PLAN:  - Discussed symptoms appear to be consistent with chronic migraines, discussed treatment options and patient agreed with the following plan:  - preventative - increase topamax 125mg  - abortive - continue naproxen and maxalt  - nausea - continue phenergan  - anxiety and depression - continue fluoxetine, mgmt per pcp  - Continue tracking headaches   - Discussed goals of therapy are to decrease the frequency, intensity, and duration of headaches  - RTC in 3 mo     Orders Placed This Encounter    topiramate (TOPAMAX) 100 MG tablet    topiramate (TOPAMAX) 25 MG tablet    rizatriptan (MAXALT) 10 MG tablet    promethazine (PHENERGAN) 12.5 MG Tab    naproxen sodium (ANAPROX) 550 MG tablet       Discussed potential for teratogenicity with treatment, patient understands if her family planning status should change she will contact office immediately and we will safely adjust medications as needed.     Questions and concerns were sought and answered to the patient's stated verbal satisfaction.  The patient  verbalizes understanding and agreement with the above stated treatment plan.     CC: MD Echo Bennett PA-C  [facility]   [phone]    Dr. Dorsey was available during today's encounter.

## 2020-01-17 ENCOUNTER — OFFICE VISIT (OUTPATIENT)
Dept: NEUROLOGY | Facility: CLINIC | Age: 30
End: 2020-01-17
Payer: COMMERCIAL

## 2020-01-17 VITALS
WEIGHT: 130 LBS | HEART RATE: 59 BPM | DIASTOLIC BLOOD PRESSURE: 67 MMHG | SYSTOLIC BLOOD PRESSURE: 103 MMHG | BODY MASS INDEX: 20.89 KG/M2 | HEIGHT: 66 IN

## 2020-01-17 DIAGNOSIS — G43.109 MIGRAINE WITH AURA AND WITHOUT STATUS MIGRAINOSUS, NOT INTRACTABLE: ICD-10-CM

## 2020-01-17 PROCEDURE — 99999 PR PBB SHADOW E&M-EST. PATIENT-LVL III: ICD-10-PCS | Mod: PBBFAC,,, | Performed by: PHYSICIAN ASSISTANT

## 2020-01-17 PROCEDURE — 99214 OFFICE O/P EST MOD 30 MIN: CPT | Mod: S$GLB,,, | Performed by: PHYSICIAN ASSISTANT

## 2020-01-17 PROCEDURE — 99214 PR OFFICE/OUTPT VISIT, EST, LEVL IV, 30-39 MIN: ICD-10-PCS | Mod: S$GLB,,, | Performed by: PHYSICIAN ASSISTANT

## 2020-01-17 PROCEDURE — 3008F BODY MASS INDEX DOCD: CPT | Mod: CPTII,S$GLB,, | Performed by: PHYSICIAN ASSISTANT

## 2020-01-17 PROCEDURE — 99999 PR PBB SHADOW E&M-EST. PATIENT-LVL III: CPT | Mod: PBBFAC,,, | Performed by: PHYSICIAN ASSISTANT

## 2020-01-17 PROCEDURE — 3008F PR BODY MASS INDEX (BMI) DOCUMENTED: ICD-10-PCS | Mod: CPTII,S$GLB,, | Performed by: PHYSICIAN ASSISTANT

## 2020-01-17 RX ORDER — TOPIRAMATE 25 MG/1
25 TABLET ORAL DAILY
Qty: 30 TABLET | Refills: 3 | Status: SHIPPED | OUTPATIENT
Start: 2020-01-17 | End: 2020-09-01

## 2020-01-17 RX ORDER — PROMETHAZINE HYDROCHLORIDE 12.5 MG/1
12.5 TABLET ORAL 4 TIMES DAILY
Qty: 20 TABLET | Refills: 6 | Status: SHIPPED | OUTPATIENT
Start: 2020-01-17 | End: 2021-01-11 | Stop reason: SDUPTHER

## 2020-01-17 RX ORDER — TOPIRAMATE 100 MG/1
100 TABLET, FILM COATED ORAL DAILY
Qty: 30 TABLET | Refills: 3 | Status: SHIPPED | OUTPATIENT
Start: 2020-01-17 | End: 2021-01-11

## 2020-01-17 RX ORDER — NAPROXEN SODIUM 550 MG/1
550 TABLET ORAL 2 TIMES DAILY PRN
Qty: 60 TABLET | Refills: 3 | Status: SHIPPED | OUTPATIENT
Start: 2020-01-17 | End: 2021-01-07

## 2020-01-17 RX ORDER — RIZATRIPTAN BENZOATE 10 MG/1
TABLET ORAL
Qty: 9 TABLET | Refills: 5 | Status: SHIPPED | OUTPATIENT
Start: 2020-01-17 | End: 2020-12-16

## 2020-06-02 ENCOUNTER — PATIENT OUTREACH (OUTPATIENT)
Dept: ADMINISTRATIVE | Facility: HOSPITAL | Age: 30
End: 2020-06-02

## 2020-07-07 ENCOUNTER — TELEPHONE (OUTPATIENT)
Dept: OBSTETRICS AND GYNECOLOGY | Facility: CLINIC | Age: 30
End: 2020-07-07

## 2020-07-07 NOTE — TELEPHONE ENCOUNTER
Called spoke to pt to let her know that dr mccullough will not be here today, and was wanting to reschedule her for a day and time that works for her. She said 7/15/20 @ 11am would work best for her. So she is scheduled for that day and time. She voiced understanding.

## 2020-10-05 ENCOUNTER — PATIENT MESSAGE (OUTPATIENT)
Dept: ADMINISTRATIVE | Facility: HOSPITAL | Age: 30
End: 2020-10-05

## 2020-12-22 ENCOUNTER — PATIENT MESSAGE (OUTPATIENT)
Dept: PSYCHIATRY | Facility: CLINIC | Age: 30
End: 2020-12-22

## 2020-12-22 ENCOUNTER — OFFICE VISIT (OUTPATIENT)
Dept: PSYCHIATRY | Facility: CLINIC | Age: 30
End: 2020-12-22
Payer: COMMERCIAL

## 2020-12-22 ENCOUNTER — PATIENT MESSAGE (OUTPATIENT)
Dept: INTERNAL MEDICINE | Facility: CLINIC | Age: 30
End: 2020-12-22

## 2020-12-22 DIAGNOSIS — F33.1 MODERATE EPISODE OF RECURRENT MAJOR DEPRESSIVE DISORDER: Primary | ICD-10-CM

## 2020-12-22 DIAGNOSIS — F41.1 GAD (GENERALIZED ANXIETY DISORDER): ICD-10-CM

## 2020-12-22 DIAGNOSIS — F40.10 SOCIAL ANXIETY DISORDER: ICD-10-CM

## 2020-12-22 PROCEDURE — 90792 PR PSYCHIATRIC DIAGNOSTIC EVALUATION W/MEDICAL SERVICES: ICD-10-PCS | Mod: 95,,, | Performed by: INTERNAL MEDICINE

## 2020-12-22 PROCEDURE — 90792 PSYCH DIAG EVAL W/MED SRVCS: CPT | Mod: 95,,, | Performed by: INTERNAL MEDICINE

## 2020-12-22 RX ORDER — FLUOXETINE HYDROCHLORIDE 20 MG/1
20 CAPSULE ORAL DAILY
Qty: 30 CAPSULE | Refills: 3 | Status: SHIPPED | OUTPATIENT
Start: 2020-12-22 | End: 2021-01-11 | Stop reason: SDUPTHER

## 2020-12-22 NOTE — PROGRESS NOTES
OUTPATIENT PSYCHIATRY INITIAL VISIT    ENCOUNTER DATE:  12/22/2020  SITE:  [facility]  REFFERAL SOURCE:  Self, Aaareferral  LENGTH OF SESSION:  35 minutes    The patient location is:  [place]  The chief complaint leading to consultation is:  Anxiety, depression    Visit type:  audiovisual    Face to Face time with patient:  35 minutes  45 minutes of total time spent on the encounter, which includes face to face time and non-face to face time preparing to see the patient (eg, review of tests), Obtaining and/or reviewing separately obtained history, Documenting clinical information in the electronic or other health record, Independently interpreting results (not separately reported) and communicating results to the patient/family/caregiver, or Care coordination (not separately reported).     Each patient to whom he or she provides medical services by telemedicine is:  (1) informed of the relationship between the physician and patient and the respective role of any other health care provider with respect to management of the patient; and (2) notified that he or she may decline to receive medical services by telemedicine and may withdraw from such care at any time.    CHIEF COMPLAINT:   Depression and Anxiety      HISTORY OF PRESENTING ILLNESS:  Rosalina Saavedra is a 30 y.o. female with history of MDD, JESUS, and Social Anxiety Disorder who presents for initial assessment.    History as told by patient:  Says that just recently she has been frustrated with her job and overwhelmed with school.  Previously has been on medication but has not been on it for years.  Prefers not to take medication but has gotten to the point where she feels she needs it.  Prescribed Ritalin but never took it.  Took a medication for a few years in college and in grad school for awhile.  Once in professional life, got off of it.  Saw a psychiatrist.  Feels exhausted, tired, will go to sleep at 7pm and wake up at  7am.  Hard to focus on work.  Working on dissertation right now, just finished some coursework.  Wasn't sleeping much during that time.  When depressed, does not feel like doing anything and feels really down.  Has not had SI - last was a long time ago when under school and work pressure.  Anxiety is heart racing, racing thoughts, dreading going to work, dreading getting up and being around the people who cause those feelings.  Colleagues at work can cause these feelings.  Almost a fear of failure.  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  Worrier  And mind goes to wort case scenarios.  May have had panic attacks in the past - overwhelming, hard to breath.  Denies OCD symptoms.  Reports social anxiety.  Uncomfortable talking to other people.  Sometimes worries about what people think about her (usually not strangers).  Recently sleeping a lot, other times can't sleep.  No history of trauma.  No symptoms of lisa in the past.  First struggled with depression or anxiety in 4-5th grade.  Does not remember when she started medication - maybe high school, definitely college.  Triggers are social, expectations of herself.  High expectations for herself education wise.  Does not have any friends.  Has not always been this way.  Had friends in college but moved colleges so lost those friends.  Went to [place], then [place], then [employer].  Grad school in [place].  Would like to have some friends.  1-2 people.  1 is opportunity and other is interests with other people don't always have in common.  Was doing [employer] until COVID.  Avoids situations because her anxiety.  Does not like talking on the phone.      Medication side effects:  No  Medication compliance:  Yes    PSYCHIATRIC REVIEW OF SYSTEMS:  Trouble with sleep:  Hypersomnia currently  Appetite changes:  Denies  Weight changes:  Denies  Lack of energy:  Yes  Anhedonia:   Yes  Somatic symptoms:  Denies  Libido:  Not discussed  Anxiety/panic:  Yes  Guilty/hopeless:  Denies  Self-injurious behavior/risky behavior:  Denies  Any drugs:  Denies  Alcohol:  Denies    MEDICAL REVIEW OF SYSTEMS:  Complete review of systems performed covering Constitutional, Eyes, ENT/Mouth, Cardiovascular, Respiratory, Gastrointestinal, Genitourinary, Musculoskeletal, Skin, Neurologic, Endocrine, and Allergy/Immune.  All systems negative except for that discussed in HPI.    PAST PSYCHIATRIC HISTORY:  Previous Psychiatric Diagnoses:  Depression, anxiety  Previous Psychiatric Hospitalizations:  Denies   Previous SI/HI:  Yes, maybe 4 years ago, no plan  Previous Suicide Attempts:  Denies   Previous Medication Trials:  Ritalin for concentration, Prozac (helpful), Lexapro (?)  Psychiatric Care (current & past):  Yes  History of Psychotherapy:  Yes, a long time ago (helpful)  History of Violence:  Denies    SUBSTANCE ABUSE HISTORY:  Tobacco:  Denies  Alcohol:  Denies  Illicit Substances:  Denies    MEDICAL HISTORY:  Past Medical History:   Diagnosis Date    Anxiety     Depression     Migraine     Migraine headache        NEUROLOGIC HISTORY:  Seizures:  Denies   Head trauma:  Concussion    SOCIAL HISTORY:  History of Physical/Sexual Abuse:  Denies  Education:  Masters in teaching, getting JOEY in curriculum instruction    Employment:  [employer] in [employer] [place] [employer]  Relationship Status/Sexual Orientation:  Denies   Children:  Denies   Mosque:  Yes  Access to Gun:  Denies   Legal History:  Denies    FAMILY HISTORY:  Psychiatric:  Mother and maternal grandmother with depression (mother was on Zoloft at one point)       H/o suicide:  Denies    MEDICATIONS:    Current Outpatient Medications:     butalbital-acetaminophen-caff -40 mg Cap, TAKE 1 CAPSULE 3 TIMES A DAY AS NEEDED, Disp: 9 capsule, Rfl: 0    EPIDUO FORTE 0.3-2.5 % GlwP, AAA face qhs (Patient not taking: Reported on 1/17/2020), Disp: 45 g, Rfl:  "3    FLUoxetine 20 MG capsule, Take 1 capsule (20 mg total) by mouth once daily., Disp: 30 capsule, Rfl: 3    ketoconazole (NIZORAL) 2 % shampoo, Wash hair with medicated shampoo at least 2x/week - let sit on scalp at least 5 minutes prior to rinsing (Patient not taking: Reported on 1/17/2020), Disp: 120 mL, Rfl: 5    naproxen sodium (ANAPROX) 550 MG tablet, Take 1 tablet (550 mg total) by mouth 2 (two) times daily as needed (for headache)., Disp: 60 tablet, Rfl: 3    promethazine (PHENERGAN) 12.5 MG Tab, Take 1 tablet (12.5 mg total) by mouth 4 (four) times daily., Disp: 20 tablet, Rfl: 6    rizatriptan (MAXALT) 10 MG tablet, TAKE 1 TABLET BY MOUTH AS NEEDED FOR MIGRAINE. MAY REPEAT IN 2 HOURS. 3 TABLETS MAX PER DAY, Disp: 9 tablet, Rfl: 0    topiramate (TOPAMAX) 100 MG tablet, Take 1 tablet (100 mg total) by mouth once daily., Disp: 30 tablet, Rfl: 3    topiramate (TOPAMAX) 25 MG tablet, TAKE 1 TABLET BY MOUTH EVERY DAY, Disp: 30 tablet, Rfl: 3    ALLERGIES:  Review of patient's allergies indicates:   Allergen Reactions    Codeine Itching    Morphine Itching       PSYCHIATRIC EXAM:  There were no vitals filed for this visit.  Appearance:  Well groomed, appearing healthy and of stated age  Behavior:  Cooperative, pleasant, no psychomotor agitation or retardation  Speech:  Normal rate, rhythm, prosody, and volume  Mood:  "Not great"  Affect:  Constricted  Thought Process:  Linear, logical, goal directed  Thought Content:  Negative for suicidal ideation, homicidal ideation, delusions or hallucinations.  Associations:  Intact  Memory:  Grossly Intact  Level of Consciousness/Orientation:  Grossly intact  Fund of Knowledge:  Good  Attention:  Good  Language:  Fluent, able to name abstract and concrete objects  Insight:  Good  Judgment:  Intact  Psychomotor signs:  No involuntary movements or tremor  Gait:  Normal    RELEVANT LABS/STUDIES:  Lab Results   Component Value Date    WBC 4.77 12/31/2018    HGB 13.0 " 12/31/2018    HCT 40.4 12/31/2018    MCV 94 12/31/2018     12/31/2018     BMP  Lab Results   Component Value Date     12/31/2018    K 4.2 12/31/2018     (H) 12/31/2018    CO2 21 (L) 12/31/2018    BUN 9 12/31/2018    CREATININE 0.8 12/31/2018    CALCIUM 9.3 12/31/2018    ANIONGAP 7 (L) 12/31/2018    ESTGFRAFRICA >60.0 12/31/2018    EGFRNONAA >60.0 12/31/2018     Lab Results   Component Value Date    ALT 10 12/31/2018    AST 15 12/31/2018    ALKPHOS 72 12/31/2018    BILITOT 0.7 12/31/2018     Lab Results   Component Value Date    TSH 1.303 09/03/2013     No results found for: LABA1C, HGBA1C    IMPRESSION:    Rosalina Saavedra is a 30 y.o. female with history of MDD, JESUS, and Social Anxiety Disorder who presents for initial assessment.    Status/Progress:  Based on the examination today, the patient's problem(s) is/are inadequately controlled.  New problems have been presented today.    Risk Parameters:  Patient reports no suicidal ideation  Patient reports no homicidal ideation  Patient reports no self-injurious behavior  Patient reports no violent behavior    DIAGNOSES:    ICD-10-CM ICD-9-CM   1. Moderate episode of recurrent major depressive disorder  F33.1 296.32   2. JESUS (generalized anxiety disorder)  F41.1 300.02   3. Social anxiety disorder  F40.10 300.23     PLAN:  · Will restart Prozac 20mg daily since it was helpful in the past.  Low threshold to increase to 40mg daily (Psychiatry notes suggest greatest benefit from this dose).    · Discussed with patient informed consent, risks versus benefits, alternative treatments, side effect profile and the inherent unpredictability of individual responses to these treatments.  The patient expresses understanding of the above and displays the capacity to agree with this current plan.  · She is interested in starting individual psychotherapy.  She will call to schedule.    RETURN TO CLINIC:  Follow up in about 4 weeks (around 1/19/2021).

## 2021-01-07 ENCOUNTER — HOSPITAL ENCOUNTER (OUTPATIENT)
Dept: RADIOLOGY | Facility: HOSPITAL | Age: 31
Discharge: HOME | End: 2021-01-07
Attending: INTERNAL MEDICINE
Payer: COMMERCIAL

## 2021-01-07 ENCOUNTER — OFFICE VISIT (OUTPATIENT)
Dept: INTERNAL MEDICINE | Facility: CLINIC | Age: 31
End: 2021-01-07
Payer: COMMERCIAL

## 2021-01-07 VITALS
SYSTOLIC BLOOD PRESSURE: 120 MMHG | HEART RATE: 72 BPM | BODY MASS INDEX: 25.58 KG/M2 | TEMPERATURE: 97 F | DIASTOLIC BLOOD PRESSURE: 88 MMHG | WEIGHT: 158.5 LBS | OXYGEN SATURATION: 99 %

## 2021-01-07 DIAGNOSIS — S39.012A BACK STRAIN, INITIAL ENCOUNTER: Primary | ICD-10-CM

## 2021-01-07 DIAGNOSIS — M54.9 DORSALGIA, UNSPECIFIED: ICD-10-CM

## 2021-01-07 PROCEDURE — 1125F AMNT PAIN NOTED PAIN PRSNT: CPT | Mod: S$GLB,,, | Performed by: INTERNAL MEDICINE

## 2021-01-07 PROCEDURE — 99999 PR PBB SHADOW E&M-EST. PATIENT-LVL IV: CPT | Mod: PBBFAC,,, | Performed by: INTERNAL MEDICINE

## 2021-01-07 PROCEDURE — 99214 OFFICE O/P EST MOD 30 MIN: CPT | Mod: S$GLB,,, | Performed by: INTERNAL MEDICINE

## 2021-01-07 PROCEDURE — 72040 X-RAY EXAM NECK SPINE 2-3 VW: CPT | Mod: 26,,, | Performed by: RADIOLOGY

## 2021-01-07 PROCEDURE — 72070 X-RAY EXAM THORAC SPINE 2VWS: CPT | Mod: 26,,, | Performed by: RADIOLOGY

## 2021-01-07 PROCEDURE — 72040 XR CERVICAL SPINE AP LATERAL: ICD-10-PCS | Mod: 26,,, | Performed by: RADIOLOGY

## 2021-01-07 PROCEDURE — 72100 X-RAY EXAM L-S SPINE 2/3 VWS: CPT | Mod: 26,,, | Performed by: RADIOLOGY

## 2021-01-07 PROCEDURE — 72100 X-RAY EXAM L-S SPINE 2/3 VWS: CPT | Mod: TC,FY,PO

## 2021-01-07 PROCEDURE — 3008F BODY MASS INDEX DOCD: CPT | Mod: CPTII,S$GLB,, | Performed by: INTERNAL MEDICINE

## 2021-01-07 PROCEDURE — 72040 X-RAY EXAM NECK SPINE 2-3 VW: CPT | Mod: TC,FY,PO

## 2021-01-07 PROCEDURE — 72100 XR LUMBAR SPINE AP AND LATERAL: ICD-10-PCS | Mod: 26,,, | Performed by: RADIOLOGY

## 2021-01-07 PROCEDURE — 72070 XR THORACIC SPINE AP LATERAL: ICD-10-PCS | Mod: 26,,, | Performed by: RADIOLOGY

## 2021-01-07 PROCEDURE — 99999 PR PBB SHADOW E&M-EST. PATIENT-LVL IV: ICD-10-PCS | Mod: PBBFAC,,, | Performed by: INTERNAL MEDICINE

## 2021-01-07 PROCEDURE — 1125F PR PAIN SEVERITY QUANTIFIED, PAIN PRESENT: ICD-10-PCS | Mod: S$GLB,,, | Performed by: INTERNAL MEDICINE

## 2021-01-07 PROCEDURE — 72070 X-RAY EXAM THORAC SPINE 2VWS: CPT | Mod: TC,FY,PO

## 2021-01-07 PROCEDURE — 99214 PR OFFICE/OUTPT VISIT, EST, LEVL IV, 30-39 MIN: ICD-10-PCS | Mod: S$GLB,,, | Performed by: INTERNAL MEDICINE

## 2021-01-07 PROCEDURE — 3008F PR BODY MASS INDEX (BMI) DOCUMENTED: ICD-10-PCS | Mod: CPTII,S$GLB,, | Performed by: INTERNAL MEDICINE

## 2021-01-07 RX ORDER — MELOXICAM 15 MG/1
15 TABLET ORAL DAILY PRN
Qty: 30 TABLET | Refills: 0 | Status: SHIPPED | OUTPATIENT
Start: 2021-01-07 | End: 2021-05-31 | Stop reason: SDUPTHER

## 2021-01-11 ENCOUNTER — OFFICE VISIT (OUTPATIENT)
Dept: NEUROLOGY | Facility: CLINIC | Age: 31
End: 2021-01-11
Payer: COMMERCIAL

## 2021-01-11 DIAGNOSIS — F33.1 MODERATE EPISODE OF RECURRENT MAJOR DEPRESSIVE DISORDER: ICD-10-CM

## 2021-01-11 DIAGNOSIS — G43.109 MIGRAINE WITH AURA AND WITHOUT STATUS MIGRAINOSUS, NOT INTRACTABLE: ICD-10-CM

## 2021-01-11 DIAGNOSIS — F41.1 GAD (GENERALIZED ANXIETY DISORDER): ICD-10-CM

## 2021-01-11 PROCEDURE — 99214 PR OFFICE/OUTPT VISIT, EST, LEVL IV, 30-39 MIN: ICD-10-PCS | Mod: 95,,, | Performed by: PHYSICIAN ASSISTANT

## 2021-01-11 PROCEDURE — 99214 OFFICE O/P EST MOD 30 MIN: CPT | Mod: 95,,, | Performed by: PHYSICIAN ASSISTANT

## 2021-01-11 RX ORDER — PROMETHAZINE HYDROCHLORIDE 12.5 MG/1
12.5 TABLET ORAL 4 TIMES DAILY
Qty: 20 TABLET | Refills: 3 | Status: SHIPPED | OUTPATIENT
Start: 2021-01-11 | End: 2021-10-11 | Stop reason: SDUPTHER

## 2021-01-11 RX ORDER — TOPIRAMATE 50 MG/1
50 TABLET, FILM COATED ORAL DAILY
Qty: 30 TABLET | Refills: 2 | Status: SHIPPED | OUTPATIENT
Start: 2021-01-11 | End: 2021-01-11

## 2021-01-11 RX ORDER — FLUOXETINE HYDROCHLORIDE 40 MG/1
40 CAPSULE ORAL DAILY
Qty: 30 CAPSULE | Refills: 3 | Status: SHIPPED | OUTPATIENT
Start: 2021-01-11 | End: 2021-03-08 | Stop reason: SDUPTHER

## 2021-01-11 RX ORDER — TOPIRAMATE 50 MG/1
150 TABLET, FILM COATED ORAL DAILY
Qty: 90 TABLET | Refills: 2 | Status: SHIPPED | OUTPATIENT
Start: 2021-01-11 | End: 2021-04-22

## 2021-01-11 RX ORDER — RIZATRIPTAN BENZOATE 10 MG/1
TABLET ORAL
Qty: 9 TABLET | Refills: 3 | Status: SHIPPED | OUTPATIENT
Start: 2021-01-11 | End: 2021-06-07 | Stop reason: SDUPTHER

## 2021-01-27 PROBLEM — Z78.9 IMPAIRED MOBILITY AND ACTIVITIES OF DAILY LIVING: Status: ACTIVE | Noted: 2021-01-27

## 2021-01-27 PROBLEM — Z74.09 IMPAIRED MOBILITY AND ACTIVITIES OF DAILY LIVING: Status: ACTIVE | Noted: 2021-01-27

## 2021-02-01 ENCOUNTER — OFFICE VISIT (OUTPATIENT)
Dept: PSYCHIATRY | Facility: CLINIC | Age: 31
End: 2021-02-01
Payer: COMMERCIAL

## 2021-02-01 DIAGNOSIS — F40.10 SOCIAL ANXIETY DISORDER: ICD-10-CM

## 2021-02-01 DIAGNOSIS — F41.1 GAD (GENERALIZED ANXIETY DISORDER): ICD-10-CM

## 2021-02-01 DIAGNOSIS — G47.10 HYPERSOMNIA: ICD-10-CM

## 2021-02-01 DIAGNOSIS — F33.1 MODERATE EPISODE OF RECURRENT MAJOR DEPRESSIVE DISORDER: Primary | ICD-10-CM

## 2021-02-01 PROBLEM — F33.41 RECURRENT MAJOR DEPRESSIVE DISORDER, IN PARTIAL REMISSION: Status: RESOLVED | Noted: 2018-05-02 | Resolved: 2021-02-01

## 2021-02-01 PROCEDURE — 99214 PR OFFICE/OUTPT VISIT, EST, LEVL IV, 30-39 MIN: ICD-10-PCS | Mod: 95,,, | Performed by: INTERNAL MEDICINE

## 2021-02-01 PROCEDURE — 99214 OFFICE O/P EST MOD 30 MIN: CPT | Mod: 95,,, | Performed by: INTERNAL MEDICINE

## 2021-02-01 RX ORDER — BUPROPION HYDROCHLORIDE 150 MG/1
150 TABLET ORAL DAILY
Qty: 30 TABLET | Refills: 3 | Status: SHIPPED | OUTPATIENT
Start: 2021-02-01 | End: 2021-03-16

## 2021-02-22 ENCOUNTER — PATIENT MESSAGE (OUTPATIENT)
Dept: NEUROLOGY | Facility: CLINIC | Age: 31
End: 2021-02-22

## 2021-02-22 ENCOUNTER — OFFICE VISIT (OUTPATIENT)
Dept: NEUROLOGY | Facility: CLINIC | Age: 31
End: 2021-02-22
Payer: COMMERCIAL

## 2021-02-22 DIAGNOSIS — G43.109 MIGRAINE WITH AURA AND WITHOUT STATUS MIGRAINOSUS, NOT INTRACTABLE: Primary | ICD-10-CM

## 2021-02-22 PROCEDURE — 99214 OFFICE O/P EST MOD 30 MIN: CPT | Mod: 95,,, | Performed by: PHYSICIAN ASSISTANT

## 2021-02-22 PROCEDURE — 99214 PR OFFICE/OUTPT VISIT, EST, LEVL IV, 30-39 MIN: ICD-10-PCS | Mod: 95,,, | Performed by: PHYSICIAN ASSISTANT

## 2021-02-26 ENCOUNTER — IMMUNIZATION (OUTPATIENT)
Dept: FAMILY MEDICINE | Facility: CLINIC | Age: 31
End: 2021-02-26
Payer: COMMERCIAL

## 2021-02-26 DIAGNOSIS — Z23 NEED FOR VACCINATION: Primary | ICD-10-CM

## 2021-02-26 PROCEDURE — 91300 COVID-19, MRNA, LNP-S, PF, 30 MCG/0.3 ML DOSE VACCINE: CPT | Mod: PBBFAC | Performed by: FAMILY MEDICINE

## 2021-03-06 ENCOUNTER — PATIENT MESSAGE (OUTPATIENT)
Dept: NEUROLOGY | Facility: CLINIC | Age: 31
End: 2021-03-06

## 2021-03-08 ENCOUNTER — OFFICE VISIT (OUTPATIENT)
Dept: PSYCHIATRY | Facility: CLINIC | Age: 31
End: 2021-03-08
Payer: COMMERCIAL

## 2021-03-08 DIAGNOSIS — F33.1 MODERATE EPISODE OF RECURRENT MAJOR DEPRESSIVE DISORDER: Primary | ICD-10-CM

## 2021-03-08 DIAGNOSIS — F41.1 GAD (GENERALIZED ANXIETY DISORDER): ICD-10-CM

## 2021-03-08 DIAGNOSIS — R53.83 FATIGUE, UNSPECIFIED TYPE: ICD-10-CM

## 2021-03-08 PROCEDURE — 99214 OFFICE O/P EST MOD 30 MIN: CPT | Mod: 95,,, | Performed by: INTERNAL MEDICINE

## 2021-03-08 PROCEDURE — 99214 PR OFFICE/OUTPT VISIT, EST, LEVL IV, 30-39 MIN: ICD-10-PCS | Mod: 95,,, | Performed by: INTERNAL MEDICINE

## 2021-03-08 RX ORDER — FLUOXETINE HYDROCHLORIDE 40 MG/1
40 CAPSULE ORAL DAILY
Qty: 30 CAPSULE | Refills: 3 | Status: SHIPPED | OUTPATIENT
Start: 2021-03-08 | End: 2021-05-27 | Stop reason: SDUPTHER

## 2021-03-08 RX ORDER — FLUOXETINE HYDROCHLORIDE 20 MG/1
20 CAPSULE ORAL DAILY
Qty: 30 CAPSULE | Refills: 3 | Status: SHIPPED | OUTPATIENT
Start: 2021-03-08 | End: 2021-05-27

## 2021-03-09 ENCOUNTER — OFFICE VISIT (OUTPATIENT)
Dept: PSYCHIATRY | Facility: CLINIC | Age: 31
End: 2021-03-09
Payer: COMMERCIAL

## 2021-03-09 DIAGNOSIS — F41.1 GAD (GENERALIZED ANXIETY DISORDER): Primary | ICD-10-CM

## 2021-03-09 DIAGNOSIS — F33.1 MDD (MAJOR DEPRESSIVE DISORDER), RECURRENT EPISODE, MODERATE: ICD-10-CM

## 2021-03-09 PROCEDURE — 90791 PSYCH DIAGNOSTIC EVALUATION: CPT | Mod: S$GLB,,, | Performed by: PSYCHOLOGIST

## 2021-03-09 PROCEDURE — 90791 PR PSYCHIATRIC DIAGNOSTIC EVALUATION: ICD-10-PCS | Mod: S$GLB,,, | Performed by: PSYCHOLOGIST

## 2021-03-19 ENCOUNTER — IMMUNIZATION (OUTPATIENT)
Dept: FAMILY MEDICINE | Facility: CLINIC | Age: 31
End: 2021-03-19
Payer: COMMERCIAL

## 2021-03-19 DIAGNOSIS — Z23 NEED FOR VACCINATION: Primary | ICD-10-CM

## 2021-03-19 PROCEDURE — 0002A COVID-19, MRNA, LNP-S, PF, 30 MCG/0.3 ML DOSE VACCINE: CPT | Mod: PBBFAC | Performed by: FAMILY MEDICINE

## 2021-03-19 PROCEDURE — 91300 COVID-19, MRNA, LNP-S, PF, 30 MCG/0.3 ML DOSE VACCINE: CPT | Mod: PBBFAC | Performed by: FAMILY MEDICINE

## 2021-03-26 ENCOUNTER — LAB VISIT (OUTPATIENT)
Dept: LAB | Facility: HOSPITAL | Age: 31
End: 2021-03-26
Attending: INTERNAL MEDICINE
Payer: COMMERCIAL

## 2021-03-26 DIAGNOSIS — R53.83 FATIGUE, UNSPECIFIED TYPE: ICD-10-CM

## 2021-03-26 DIAGNOSIS — F41.1 GAD (GENERALIZED ANXIETY DISORDER): ICD-10-CM

## 2021-03-26 LAB
BASOPHILS # BLD AUTO: 0.06 K/UL (ref 0–0.2)
BASOPHILS NFR BLD: 1.1 % (ref 0–1.9)
DIFFERENTIAL METHOD: ABNORMAL
EOSINOPHIL # BLD AUTO: 0.1 K/UL (ref 0–0.5)
EOSINOPHIL NFR BLD: 1.6 % (ref 0–8)
ERYTHROCYTE [DISTWIDTH] IN BLOOD BY AUTOMATED COUNT: 14 % (ref 11.5–14.5)
ESTIMATED AVG GLUCOSE: 97 MG/DL (ref 68–131)
HBA1C MFR BLD: 5 % (ref 4–5.6)
HCT VFR BLD AUTO: 33.8 % (ref 37–48.5)
HGB BLD-MCNC: 11.1 G/DL (ref 12–16)
IMM GRANULOCYTES # BLD AUTO: 0.01 K/UL (ref 0–0.04)
IMM GRANULOCYTES NFR BLD AUTO: 0.2 % (ref 0–0.5)
LYMPHOCYTES # BLD AUTO: 2.3 K/UL (ref 1–4.8)
LYMPHOCYTES NFR BLD: 41.3 % (ref 18–48)
MCH RBC QN AUTO: 30.2 PG (ref 27–31)
MCHC RBC AUTO-ENTMCNC: 32.8 G/DL (ref 32–36)
MCV RBC AUTO: 92 FL (ref 82–98)
MONOCYTES # BLD AUTO: 0.6 K/UL (ref 0.3–1)
MONOCYTES NFR BLD: 9.9 % (ref 4–15)
NEUTROPHILS # BLD AUTO: 2.5 K/UL (ref 1.8–7.7)
NEUTROPHILS NFR BLD: 45.9 % (ref 38–73)
NRBC BLD-RTO: 0 /100 WBC
PLATELET # BLD AUTO: 302 K/UL (ref 150–350)
PMV BLD AUTO: 9.9 FL (ref 9.2–12.9)
RBC # BLD AUTO: 3.67 M/UL (ref 4–5.4)
WBC # BLD AUTO: 5.54 K/UL (ref 3.9–12.7)

## 2021-03-26 PROCEDURE — 82607 VITAMIN B-12: CPT | Performed by: INTERNAL MEDICINE

## 2021-03-26 PROCEDURE — 85025 COMPLETE CBC W/AUTO DIFF WBC: CPT | Performed by: INTERNAL MEDICINE

## 2021-03-26 PROCEDURE — 80053 COMPREHEN METABOLIC PANEL: CPT | Performed by: INTERNAL MEDICINE

## 2021-03-26 PROCEDURE — 84443 ASSAY THYROID STIM HORMONE: CPT | Performed by: INTERNAL MEDICINE

## 2021-03-26 PROCEDURE — 83036 HEMOGLOBIN GLYCOSYLATED A1C: CPT | Performed by: INTERNAL MEDICINE

## 2021-03-26 PROCEDURE — 36415 COLL VENOUS BLD VENIPUNCTURE: CPT | Mod: PO | Performed by: INTERNAL MEDICINE

## 2021-03-27 LAB
ALBUMIN SERPL BCP-MCNC: 3.7 G/DL (ref 3.5–5.2)
ALP SERPL-CCNC: 55 U/L (ref 55–135)
ALT SERPL W/O P-5'-P-CCNC: 14 U/L (ref 10–44)
ANION GAP SERPL CALC-SCNC: 6 MMOL/L (ref 8–16)
AST SERPL-CCNC: 17 U/L (ref 10–40)
BILIRUB SERPL-MCNC: 0.4 MG/DL (ref 0.1–1)
BUN SERPL-MCNC: 16 MG/DL (ref 6–20)
CALCIUM SERPL-MCNC: 8.9 MG/DL (ref 8.7–10.5)
CHLORIDE SERPL-SCNC: 115 MMOL/L (ref 95–110)
CO2 SERPL-SCNC: 19 MMOL/L (ref 23–29)
CREAT SERPL-MCNC: 0.8 MG/DL (ref 0.5–1.4)
EST. GFR  (AFRICAN AMERICAN): >60 ML/MIN/1.73 M^2
EST. GFR  (NON AFRICAN AMERICAN): >60 ML/MIN/1.73 M^2
GLUCOSE SERPL-MCNC: 79 MG/DL (ref 70–110)
POTASSIUM SERPL-SCNC: 4.1 MMOL/L (ref 3.5–5.1)
PROT SERPL-MCNC: 7 G/DL (ref 6–8.4)
SODIUM SERPL-SCNC: 140 MMOL/L (ref 136–145)
TSH SERPL DL<=0.005 MIU/L-ACNC: 0.52 UIU/ML (ref 0.4–4)
VIT B12 SERPL-MCNC: 404 PG/ML (ref 210–950)

## 2021-04-05 ENCOUNTER — PATIENT MESSAGE (OUTPATIENT)
Dept: ADMINISTRATIVE | Facility: HOSPITAL | Age: 31
End: 2021-04-05

## 2021-04-08 ENCOUNTER — OFFICE VISIT (OUTPATIENT)
Dept: PSYCHIATRY | Facility: CLINIC | Age: 31
End: 2021-04-08
Payer: COMMERCIAL

## 2021-04-08 DIAGNOSIS — F33.1 MDD (MAJOR DEPRESSIVE DISORDER), RECURRENT EPISODE, MODERATE: ICD-10-CM

## 2021-04-08 DIAGNOSIS — F41.1 GAD (GENERALIZED ANXIETY DISORDER): Primary | ICD-10-CM

## 2021-04-08 PROCEDURE — 90834 PSYTX W PT 45 MINUTES: CPT | Mod: 95,,, | Performed by: PSYCHOLOGIST

## 2021-04-08 PROCEDURE — 90834 PR PSYCHOTHERAPY W/PATIENT, 45 MIN: ICD-10-PCS | Mod: 95,,, | Performed by: PSYCHOLOGIST

## 2021-04-09 ENCOUNTER — OFFICE VISIT (OUTPATIENT)
Dept: PSYCHIATRY | Facility: CLINIC | Age: 31
End: 2021-04-09
Payer: COMMERCIAL

## 2021-04-09 DIAGNOSIS — F41.1 GAD (GENERALIZED ANXIETY DISORDER): Primary | ICD-10-CM

## 2021-04-09 DIAGNOSIS — R53.83 FATIGUE, UNSPECIFIED TYPE: ICD-10-CM

## 2021-04-09 DIAGNOSIS — F33.0 MILD EPISODE OF RECURRENT MAJOR DEPRESSIVE DISORDER: ICD-10-CM

## 2021-04-09 DIAGNOSIS — G47.10 HYPERSOMNIA: ICD-10-CM

## 2021-04-09 DIAGNOSIS — F40.10 SOCIAL ANXIETY DISORDER: ICD-10-CM

## 2021-04-09 PROCEDURE — 99214 PR OFFICE/OUTPT VISIT, EST, LEVL IV, 30-39 MIN: ICD-10-PCS | Mod: 95,,, | Performed by: INTERNAL MEDICINE

## 2021-04-09 PROCEDURE — 99214 OFFICE O/P EST MOD 30 MIN: CPT | Mod: 95,,, | Performed by: INTERNAL MEDICINE

## 2021-04-19 ENCOUNTER — TELEPHONE (OUTPATIENT)
Dept: NEUROLOGY | Facility: CLINIC | Age: 31
End: 2021-04-19

## 2021-04-19 PROBLEM — F33.0 MILD EPISODE OF RECURRENT MAJOR DEPRESSIVE DISORDER: Status: ACTIVE | Noted: 2020-12-22

## 2021-04-19 PROBLEM — R53.83 FATIGUE: Status: ACTIVE | Noted: 2021-04-19

## 2021-04-27 ENCOUNTER — OFFICE VISIT (OUTPATIENT)
Dept: PSYCHIATRY | Facility: CLINIC | Age: 31
End: 2021-04-27
Payer: COMMERCIAL

## 2021-04-27 DIAGNOSIS — F41.1 GAD (GENERALIZED ANXIETY DISORDER): Primary | ICD-10-CM

## 2021-04-27 DIAGNOSIS — F33.1 MDD (MAJOR DEPRESSIVE DISORDER), RECURRENT EPISODE, MODERATE: ICD-10-CM

## 2021-04-27 PROCEDURE — 90834 PSYTX W PT 45 MINUTES: CPT | Mod: S$GLB,,, | Performed by: PSYCHOLOGIST

## 2021-04-27 PROCEDURE — 90834 PR PSYCHOTHERAPY W/PATIENT, 45 MIN: ICD-10-PCS | Mod: S$GLB,,, | Performed by: PSYCHOLOGIST

## 2021-05-27 ENCOUNTER — OFFICE VISIT (OUTPATIENT)
Dept: PSYCHIATRY | Facility: CLINIC | Age: 31
End: 2021-05-27
Payer: COMMERCIAL

## 2021-05-27 DIAGNOSIS — F40.10 SOCIAL ANXIETY DISORDER: ICD-10-CM

## 2021-05-27 DIAGNOSIS — R53.83 FATIGUE, UNSPECIFIED TYPE: ICD-10-CM

## 2021-05-27 DIAGNOSIS — F33.0 MILD EPISODE OF RECURRENT MAJOR DEPRESSIVE DISORDER: Primary | ICD-10-CM

## 2021-05-27 DIAGNOSIS — F41.1 GAD (GENERALIZED ANXIETY DISORDER): ICD-10-CM

## 2021-05-27 PROCEDURE — 99214 PR OFFICE/OUTPT VISIT, EST, LEVL IV, 30-39 MIN: ICD-10-PCS | Mod: 95,,, | Performed by: INTERNAL MEDICINE

## 2021-05-27 PROCEDURE — 99214 OFFICE O/P EST MOD 30 MIN: CPT | Mod: 95,,, | Performed by: INTERNAL MEDICINE

## 2021-05-27 RX ORDER — FLUOXETINE HYDROCHLORIDE 40 MG/1
80 CAPSULE ORAL DAILY
Qty: 60 CAPSULE | Refills: 5 | Status: SHIPPED | OUTPATIENT
Start: 2021-05-27 | End: 2022-01-17

## 2021-05-31 ENCOUNTER — LAB VISIT (OUTPATIENT)
Dept: LAB | Facility: HOSPITAL | Age: 31
End: 2021-05-31
Attending: INTERNAL MEDICINE
Payer: COMMERCIAL

## 2021-05-31 ENCOUNTER — OFFICE VISIT (OUTPATIENT)
Dept: INTERNAL MEDICINE | Facility: CLINIC | Age: 31
End: 2021-05-31
Payer: COMMERCIAL

## 2021-05-31 VITALS
SYSTOLIC BLOOD PRESSURE: 102 MMHG | BODY MASS INDEX: 23.7 KG/M2 | OXYGEN SATURATION: 99 % | HEIGHT: 66 IN | HEART RATE: 71 BPM | DIASTOLIC BLOOD PRESSURE: 62 MMHG | WEIGHT: 147.5 LBS

## 2021-05-31 DIAGNOSIS — S39.012A BACK STRAIN, INITIAL ENCOUNTER: ICD-10-CM

## 2021-05-31 DIAGNOSIS — R53.83 FATIGUE, UNSPECIFIED TYPE: Primary | ICD-10-CM

## 2021-05-31 DIAGNOSIS — R53.83 FATIGUE, UNSPECIFIED TYPE: ICD-10-CM

## 2021-05-31 PROCEDURE — 99999 PR PBB SHADOW E&M-EST. PATIENT-LVL III: CPT | Mod: PBBFAC,,, | Performed by: INTERNAL MEDICINE

## 2021-05-31 PROCEDURE — 99214 OFFICE O/P EST MOD 30 MIN: CPT | Mod: S$GLB,,, | Performed by: INTERNAL MEDICINE

## 2021-05-31 PROCEDURE — 3008F PR BODY MASS INDEX (BMI) DOCUMENTED: ICD-10-PCS | Mod: CPTII,S$GLB,, | Performed by: INTERNAL MEDICINE

## 2021-05-31 PROCEDURE — 99999 PR PBB SHADOW E&M-EST. PATIENT-LVL III: ICD-10-PCS | Mod: PBBFAC,,, | Performed by: INTERNAL MEDICINE

## 2021-05-31 PROCEDURE — 1126F AMNT PAIN NOTED NONE PRSNT: CPT | Mod: S$GLB,,, | Performed by: INTERNAL MEDICINE

## 2021-05-31 PROCEDURE — 36415 COLL VENOUS BLD VENIPUNCTURE: CPT | Mod: PO | Performed by: INTERNAL MEDICINE

## 2021-05-31 PROCEDURE — 99214 PR OFFICE/OUTPT VISIT, EST, LEVL IV, 30-39 MIN: ICD-10-PCS | Mod: S$GLB,,, | Performed by: INTERNAL MEDICINE

## 2021-05-31 PROCEDURE — 1126F PR PAIN SEVERITY QUANTIFIED, NO PAIN PRESENT: ICD-10-PCS | Mod: S$GLB,,, | Performed by: INTERNAL MEDICINE

## 2021-05-31 PROCEDURE — 83540 ASSAY OF IRON: CPT | Performed by: INTERNAL MEDICINE

## 2021-05-31 PROCEDURE — 3008F BODY MASS INDEX DOCD: CPT | Mod: CPTII,S$GLB,, | Performed by: INTERNAL MEDICINE

## 2021-05-31 RX ORDER — MELOXICAM 15 MG/1
15 TABLET ORAL DAILY PRN
Qty: 30 TABLET | Refills: 1 | Status: SHIPPED | OUTPATIENT
Start: 2021-05-31 | End: 2021-10-11

## 2021-06-01 ENCOUNTER — OFFICE VISIT (OUTPATIENT)
Dept: PSYCHIATRY | Facility: CLINIC | Age: 31
End: 2021-06-01
Payer: COMMERCIAL

## 2021-06-01 DIAGNOSIS — F41.1 GAD (GENERALIZED ANXIETY DISORDER): Primary | ICD-10-CM

## 2021-06-01 DIAGNOSIS — F33.1 MDD (MAJOR DEPRESSIVE DISORDER), RECURRENT EPISODE, MODERATE: ICD-10-CM

## 2021-06-01 LAB
IRON SERPL-MCNC: 63 UG/DL (ref 30–160)
SATURATED IRON: 17 % (ref 20–50)
TOTAL IRON BINDING CAPACITY: 369 UG/DL (ref 250–450)
TRANSFERRIN SERPL-MCNC: 249 MG/DL (ref 200–375)

## 2021-06-01 PROCEDURE — 90834 PSYTX W PT 45 MINUTES: CPT | Mod: 95,,, | Performed by: PSYCHOLOGIST

## 2021-06-01 PROCEDURE — 90834 PR PSYCHOTHERAPY W/PATIENT, 45 MIN: ICD-10-PCS | Mod: 95,,, | Performed by: PSYCHOLOGIST

## 2021-06-07 ENCOUNTER — OFFICE VISIT (OUTPATIENT)
Dept: NEUROLOGY | Facility: CLINIC | Age: 31
End: 2021-06-07
Payer: COMMERCIAL

## 2021-06-07 ENCOUNTER — PATIENT MESSAGE (OUTPATIENT)
Dept: NEUROLOGY | Facility: CLINIC | Age: 31
End: 2021-06-07

## 2021-06-07 ENCOUNTER — TELEPHONE (OUTPATIENT)
Dept: NEUROLOGY | Facility: CLINIC | Age: 31
End: 2021-06-07

## 2021-06-07 DIAGNOSIS — G43.109 MIGRAINE WITH AURA AND WITHOUT STATUS MIGRAINOSUS, NOT INTRACTABLE: ICD-10-CM

## 2021-06-07 DIAGNOSIS — G43.109 MIGRAINE WITH AURA AND WITHOUT STATUS MIGRAINOSUS, NOT INTRACTABLE: Primary | ICD-10-CM

## 2021-06-07 PROCEDURE — 99215 PR OFFICE/OUTPT VISIT, EST, LEVL V, 40-54 MIN: ICD-10-PCS | Mod: 95,,, | Performed by: PHYSICIAN ASSISTANT

## 2021-06-07 PROCEDURE — 99215 OFFICE O/P EST HI 40 MIN: CPT | Mod: 95,,, | Performed by: PHYSICIAN ASSISTANT

## 2021-06-07 RX ORDER — TOPIRAMATE 100 MG/1
100 CAPSULE, EXTENDED RELEASE ORAL NIGHTLY
Qty: 30 CAPSULE | Refills: 2 | Status: SHIPPED | OUTPATIENT
Start: 2021-06-07 | End: 2021-08-25

## 2021-06-07 RX ORDER — RIZATRIPTAN BENZOATE 10 MG/1
TABLET ORAL
Qty: 9 TABLET | Refills: 5 | Status: SHIPPED | OUTPATIENT
Start: 2021-06-07 | End: 2021-10-11 | Stop reason: SDUPTHER

## 2021-06-14 ENCOUNTER — OFFICE VISIT (OUTPATIENT)
Dept: PSYCHIATRY | Facility: CLINIC | Age: 31
End: 2021-06-14
Payer: COMMERCIAL

## 2021-06-14 DIAGNOSIS — F41.1 GAD (GENERALIZED ANXIETY DISORDER): Primary | ICD-10-CM

## 2021-06-14 DIAGNOSIS — F33.1 MDD (MAJOR DEPRESSIVE DISORDER), RECURRENT EPISODE, MODERATE: ICD-10-CM

## 2021-06-14 PROCEDURE — 90834 PSYTX W PT 45 MINUTES: CPT | Mod: 95,,, | Performed by: PSYCHOLOGIST

## 2021-06-14 PROCEDURE — 90834 PR PSYCHOTHERAPY W/PATIENT, 45 MIN: ICD-10-PCS | Mod: 95,,, | Performed by: PSYCHOLOGIST

## 2021-06-29 ENCOUNTER — OFFICE VISIT (OUTPATIENT)
Dept: PSYCHIATRY | Facility: CLINIC | Age: 31
End: 2021-06-29
Payer: COMMERCIAL

## 2021-06-29 DIAGNOSIS — F33.0 MILD EPISODE OF RECURRENT MAJOR DEPRESSIVE DISORDER: Primary | ICD-10-CM

## 2021-06-29 DIAGNOSIS — G47.10 HYPERSOMNIA: ICD-10-CM

## 2021-06-29 DIAGNOSIS — R53.83 FATIGUE, UNSPECIFIED TYPE: ICD-10-CM

## 2021-06-29 DIAGNOSIS — F40.10 SOCIAL ANXIETY DISORDER: ICD-10-CM

## 2021-06-29 DIAGNOSIS — F41.1 GAD (GENERALIZED ANXIETY DISORDER): ICD-10-CM

## 2021-06-29 PROCEDURE — 99214 PR OFFICE/OUTPT VISIT, EST, LEVL IV, 30-39 MIN: ICD-10-PCS | Mod: 95,,, | Performed by: INTERNAL MEDICINE

## 2021-06-29 PROCEDURE — 99214 OFFICE O/P EST MOD 30 MIN: CPT | Mod: 95,,, | Performed by: INTERNAL MEDICINE

## 2021-06-30 RX ORDER — BUPROPION HYDROCHLORIDE 300 MG/1
300 TABLET ORAL DAILY
Qty: 30 TABLET | Refills: 5 | Status: SHIPPED | OUTPATIENT
Start: 2021-06-30 | End: 2021-12-15

## 2021-07-06 ENCOUNTER — PATIENT MESSAGE (OUTPATIENT)
Dept: ADMINISTRATIVE | Facility: HOSPITAL | Age: 31
End: 2021-07-06

## 2021-08-27 ENCOUNTER — CLINICAL SUPPORT (OUTPATIENT)
Dept: OTHER | Facility: CLINIC | Age: 31
End: 2021-08-27
Payer: COMMERCIAL

## 2021-08-27 DIAGNOSIS — Z00.8 ENCOUNTER FOR OTHER GENERAL EXAMINATION: ICD-10-CM

## 2021-08-28 VITALS — HEIGHT: 66 IN | BODY MASS INDEX: 23.81 KG/M2

## 2021-08-28 LAB
HDLC SERPL-MCNC: 57 MG/DL
POC CHOLESTEROL, LDL (DOCK): 177 MG/DL
POC CHOLESTEROL, TOTAL: 247 MG/DL
POC GLUCOSE, FASTING: 82 MG/DL (ref 60–110)
TRIGL SERPL-MCNC: 61 MG/DL

## 2021-09-03 ENCOUNTER — PATIENT MESSAGE (OUTPATIENT)
Dept: NEUROLOGY | Facility: CLINIC | Age: 31
End: 2021-09-03

## 2021-10-04 ENCOUNTER — PATIENT MESSAGE (OUTPATIENT)
Dept: ADMINISTRATIVE | Facility: HOSPITAL | Age: 31
End: 2021-10-04

## 2021-10-11 ENCOUNTER — OFFICE VISIT (OUTPATIENT)
Dept: FAMILY MEDICINE | Facility: CLINIC | Age: 31
End: 2021-10-11
Payer: COMMERCIAL

## 2021-10-11 ENCOUNTER — PATIENT OUTREACH (OUTPATIENT)
Dept: ADMINISTRATIVE | Facility: OTHER | Age: 31
End: 2021-10-11

## 2021-10-11 VITALS
WEIGHT: 162.69 LBS | OXYGEN SATURATION: 97 % | SYSTOLIC BLOOD PRESSURE: 106 MMHG | BODY MASS INDEX: 26.14 KG/M2 | DIASTOLIC BLOOD PRESSURE: 80 MMHG | HEIGHT: 66 IN | HEART RATE: 85 BPM

## 2021-10-11 DIAGNOSIS — G44.209 TENSION HEADACHE: ICD-10-CM

## 2021-10-11 DIAGNOSIS — G43.109 MIGRAINE WITH AURA AND WITHOUT STATUS MIGRAINOSUS, NOT INTRACTABLE: ICD-10-CM

## 2021-10-11 DIAGNOSIS — G43.501 PERSISTENT MIGRAINE AURA WITHOUT CEREBRAL INFARCTION AND WITH STATUS MIGRAINOSUS, NOT INTRACTABLE: Primary | ICD-10-CM

## 2021-10-11 PROCEDURE — 99213 PR OFFICE/OUTPT VISIT, EST, LEVL III, 20-29 MIN: ICD-10-PCS | Mod: 25,S$GLB,, | Performed by: FAMILY MEDICINE

## 2021-10-11 PROCEDURE — 3044F PR MOST RECENT HEMOGLOBIN A1C LEVEL <7.0%: ICD-10-PCS | Mod: CPTII,S$GLB,, | Performed by: FAMILY MEDICINE

## 2021-10-11 PROCEDURE — 3079F DIAST BP 80-89 MM HG: CPT | Mod: CPTII,S$GLB,, | Performed by: FAMILY MEDICINE

## 2021-10-11 PROCEDURE — 3079F PR MOST RECENT DIASTOLIC BLOOD PRESSURE 80-89 MM HG: ICD-10-PCS | Mod: CPTII,S$GLB,, | Performed by: FAMILY MEDICINE

## 2021-10-11 PROCEDURE — 99213 OFFICE O/P EST LOW 20 MIN: CPT | Mod: 25,S$GLB,, | Performed by: FAMILY MEDICINE

## 2021-10-11 PROCEDURE — 3074F PR MOST RECENT SYSTOLIC BLOOD PRESSURE < 130 MM HG: ICD-10-PCS | Mod: CPTII,S$GLB,, | Performed by: FAMILY MEDICINE

## 2021-10-11 PROCEDURE — 99999 PR PBB SHADOW E&M-EST. PATIENT-LVL III: CPT | Mod: PBBFAC,,, | Performed by: FAMILY MEDICINE

## 2021-10-11 PROCEDURE — 96372 THER/PROPH/DIAG INJ SC/IM: CPT | Mod: S$GLB,,, | Performed by: FAMILY MEDICINE

## 2021-10-11 PROCEDURE — 3008F PR BODY MASS INDEX (BMI) DOCUMENTED: ICD-10-PCS | Mod: CPTII,S$GLB,, | Performed by: FAMILY MEDICINE

## 2021-10-11 PROCEDURE — 3008F BODY MASS INDEX DOCD: CPT | Mod: CPTII,S$GLB,, | Performed by: FAMILY MEDICINE

## 2021-10-11 PROCEDURE — 3074F SYST BP LT 130 MM HG: CPT | Mod: CPTII,S$GLB,, | Performed by: FAMILY MEDICINE

## 2021-10-11 PROCEDURE — 99999 PR PBB SHADOW E&M-EST. PATIENT-LVL III: ICD-10-PCS | Mod: PBBFAC,,, | Performed by: FAMILY MEDICINE

## 2021-10-11 PROCEDURE — 3044F HG A1C LEVEL LT 7.0%: CPT | Mod: CPTII,S$GLB,, | Performed by: FAMILY MEDICINE

## 2021-10-11 PROCEDURE — 96372 PR INJECTION,THERAP/PROPH/DIAG2ST, IM OR SUBCUT: ICD-10-PCS | Mod: S$GLB,,, | Performed by: FAMILY MEDICINE

## 2021-10-11 RX ORDER — PROMETHAZINE HYDROCHLORIDE 12.5 MG/1
12.5 TABLET ORAL EVERY 6 HOURS PRN
Qty: 20 TABLET | Refills: 3 | Status: SHIPPED | OUTPATIENT
Start: 2021-10-11 | End: 2022-02-10

## 2021-10-11 RX ORDER — NAPROXEN SODIUM 550 MG/1
TABLET ORAL
COMMUNITY
Start: 2021-09-12 | End: 2021-10-11

## 2021-10-11 RX ORDER — KETOROLAC TROMETHAMINE 30 MG/ML
60 INJECTION, SOLUTION INTRAMUSCULAR; INTRAVENOUS
Status: COMPLETED | OUTPATIENT
Start: 2021-10-11 | End: 2021-10-11

## 2021-10-11 RX ORDER — RIZATRIPTAN BENZOATE 10 MG/1
TABLET ORAL
Qty: 9 TABLET | Refills: 5 | Status: SHIPPED | OUTPATIENT
Start: 2021-10-11 | End: 2021-11-24

## 2021-10-11 RX ORDER — TIZANIDINE 2 MG/1
4 TABLET ORAL EVERY 6 HOURS PRN
Qty: 40 TABLET | Refills: 0 | Status: SHIPPED | OUTPATIENT
Start: 2021-10-11 | End: 2021-10-21

## 2021-10-11 RX ORDER — BUTALBITAL, ACETAMINOPHEN AND CAFFEINE 50; 325; 40 MG/1; MG/1; MG/1
1 TABLET ORAL EVERY 6 HOURS PRN
Qty: 30 TABLET | Refills: 0 | Status: SHIPPED | OUTPATIENT
Start: 2021-10-11 | End: 2021-11-24

## 2021-10-11 RX ADMIN — KETOROLAC TROMETHAMINE 60 MG: 30 INJECTION, SOLUTION INTRAMUSCULAR; INTRAVENOUS at 01:10

## 2021-10-12 ENCOUNTER — HOSPITAL ENCOUNTER (EMERGENCY)
Facility: HOSPITAL | Age: 31
Discharge: HOME | End: 2021-10-12
Attending: EMERGENCY MEDICINE
Payer: COMMERCIAL

## 2021-10-12 ENCOUNTER — TELEPHONE (OUTPATIENT)
Dept: FAMILY MEDICINE | Facility: CLINIC | Age: 31
End: 2021-10-12

## 2021-10-12 VITALS
HEIGHT: 66 IN | OXYGEN SATURATION: 100 % | BODY MASS INDEX: 25.71 KG/M2 | TEMPERATURE: 98 F | RESPIRATION RATE: 20 BRPM | DIASTOLIC BLOOD PRESSURE: 58 MMHG | WEIGHT: 160 LBS | HEART RATE: 78 BPM | SYSTOLIC BLOOD PRESSURE: 103 MMHG

## 2021-10-12 DIAGNOSIS — G43.809 OTHER MIGRAINE WITHOUT STATUS MIGRAINOSUS, NOT INTRACTABLE: Primary | ICD-10-CM

## 2021-10-12 LAB
ALBUMIN SERPL BCP-MCNC: 4.1 G/DL (ref 3.5–5.2)
ALP SERPL-CCNC: 56 U/L (ref 55–135)
ALT SERPL W/O P-5'-P-CCNC: 17 U/L (ref 10–44)
ANION GAP SERPL CALC-SCNC: 9 MMOL/L (ref 8–16)
AST SERPL-CCNC: 20 U/L (ref 10–40)
B-HCG UR QL: NEGATIVE
BASOPHILS # BLD AUTO: 0.06 K/UL (ref 0–0.2)
BASOPHILS NFR BLD: 1.5 % (ref 0–1.9)
BILIRUB SERPL-MCNC: 0.3 MG/DL (ref 0.1–1)
BUN SERPL-MCNC: 18 MG/DL (ref 6–20)
CALCIUM SERPL-MCNC: 8.9 MG/DL (ref 8.7–10.5)
CHLORIDE SERPL-SCNC: 110 MMOL/L (ref 95–110)
CO2 SERPL-SCNC: 20 MMOL/L (ref 23–29)
CREAT SERPL-MCNC: 0.9 MG/DL (ref 0.5–1.4)
CTP QC/QA: YES
DIFFERENTIAL METHOD: ABNORMAL
EOSINOPHIL # BLD AUTO: 0.1 K/UL (ref 0–0.5)
EOSINOPHIL NFR BLD: 3.5 % (ref 0–8)
ERYTHROCYTE [DISTWIDTH] IN BLOOD BY AUTOMATED COUNT: 13 % (ref 11.5–14.5)
EST. GFR  (AFRICAN AMERICAN): >60 ML/MIN/1.73 M^2
EST. GFR  (NON AFRICAN AMERICAN): >60 ML/MIN/1.73 M^2
GLUCOSE SERPL-MCNC: 98 MG/DL (ref 70–110)
HCT VFR BLD AUTO: 36.5 % (ref 37–48.5)
HGB BLD-MCNC: 12.3 G/DL (ref 12–16)
IMM GRANULOCYTES # BLD AUTO: 0.01 K/UL (ref 0–0.04)
IMM GRANULOCYTES NFR BLD AUTO: 0.2 % (ref 0–0.5)
LYMPHOCYTES # BLD AUTO: 2 K/UL (ref 1–4.8)
LYMPHOCYTES NFR BLD: 49.5 % (ref 18–48)
MCH RBC QN AUTO: 31.4 PG (ref 27–31)
MCHC RBC AUTO-ENTMCNC: 33.7 G/DL (ref 32–36)
MCV RBC AUTO: 93 FL (ref 82–98)
MONOCYTES # BLD AUTO: 0.4 K/UL (ref 0.3–1)
MONOCYTES NFR BLD: 10.2 % (ref 4–15)
NEUTROPHILS # BLD AUTO: 1.4 K/UL (ref 1.8–7.7)
NEUTROPHILS NFR BLD: 35.1 % (ref 38–73)
NRBC BLD-RTO: 0 /100 WBC
PLATELET # BLD AUTO: 305 K/UL (ref 150–450)
PMV BLD AUTO: 9.2 FL (ref 9.2–12.9)
POCT GLUCOSE: 98 MG/DL (ref 70–110)
POTASSIUM SERPL-SCNC: 4.3 MMOL/L (ref 3.5–5.1)
PROT SERPL-MCNC: 7.2 G/DL (ref 6–8.4)
RBC # BLD AUTO: 3.92 M/UL (ref 4–5.4)
SODIUM SERPL-SCNC: 139 MMOL/L (ref 136–145)
WBC # BLD AUTO: 4.02 K/UL (ref 3.9–12.7)

## 2021-10-12 PROCEDURE — 80053 COMPREHEN METABOLIC PANEL: CPT | Performed by: NURSE PRACTITIONER

## 2021-10-12 PROCEDURE — 99285 EMERGENCY DEPT VISIT HI MDM: CPT | Mod: 25

## 2021-10-12 PROCEDURE — 81025 URINE PREGNANCY TEST: CPT | Performed by: NURSE PRACTITIONER

## 2021-10-12 PROCEDURE — 96376 TX/PRO/DX INJ SAME DRUG ADON: CPT

## 2021-10-12 PROCEDURE — 93005 ELECTROCARDIOGRAM TRACING: CPT

## 2021-10-12 PROCEDURE — 93010 EKG 12-LEAD: ICD-10-PCS | Mod: ,,, | Performed by: INTERNAL MEDICINE

## 2021-10-12 PROCEDURE — 63600175 PHARM REV CODE 636 W HCPCS: Performed by: EMERGENCY MEDICINE

## 2021-10-12 PROCEDURE — 93010 ELECTROCARDIOGRAM REPORT: CPT | Mod: ,,, | Performed by: INTERNAL MEDICINE

## 2021-10-12 PROCEDURE — 96365 THER/PROPH/DIAG IV INF INIT: CPT

## 2021-10-12 PROCEDURE — 82962 GLUCOSE BLOOD TEST: CPT

## 2021-10-12 PROCEDURE — 96366 THER/PROPH/DIAG IV INF ADDON: CPT

## 2021-10-12 PROCEDURE — 96361 HYDRATE IV INFUSION ADD-ON: CPT

## 2021-10-12 PROCEDURE — 25000003 PHARM REV CODE 250: Performed by: EMERGENCY MEDICINE

## 2021-10-12 PROCEDURE — 85025 COMPLETE CBC W/AUTO DIFF WBC: CPT | Performed by: NURSE PRACTITIONER

## 2021-10-12 PROCEDURE — 96375 TX/PRO/DX INJ NEW DRUG ADDON: CPT

## 2021-10-12 RX ORDER — HALOPERIDOL 5 MG/ML
2.5 INJECTION INTRAMUSCULAR
Status: COMPLETED | OUTPATIENT
Start: 2021-10-12 | End: 2021-10-12

## 2021-10-12 RX ORDER — PROCHLORPERAZINE EDISYLATE 5 MG/ML
5 INJECTION INTRAMUSCULAR; INTRAVENOUS ONCE
Status: COMPLETED | OUTPATIENT
Start: 2021-10-12 | End: 2021-10-12

## 2021-10-12 RX ORDER — MAGNESIUM SULFATE HEPTAHYDRATE 40 MG/ML
2 INJECTION, SOLUTION INTRAVENOUS
Status: COMPLETED | OUTPATIENT
Start: 2021-10-12 | End: 2021-10-12

## 2021-10-12 RX ORDER — DIPHENHYDRAMINE HYDROCHLORIDE 50 MG/ML
25 INJECTION INTRAMUSCULAR; INTRAVENOUS
Status: COMPLETED | OUTPATIENT
Start: 2021-10-12 | End: 2021-10-12

## 2021-10-12 RX ORDER — DIPHENHYDRAMINE HCL 25 MG
25 CAPSULE ORAL EVERY 6 HOURS PRN
Qty: 20 CAPSULE | Refills: 0 | Status: SHIPPED | OUTPATIENT
Start: 2021-10-12 | End: 2021-11-24

## 2021-10-12 RX ORDER — PROCHLORPERAZINE MALEATE 10 MG
10 TABLET ORAL EVERY 6 HOURS PRN
Qty: 15 TABLET | Refills: 0 | Status: SHIPPED | OUTPATIENT
Start: 2021-10-12 | End: 2021-11-24

## 2021-10-12 RX ORDER — KETOROLAC TROMETHAMINE 30 MG/ML
30 INJECTION, SOLUTION INTRAMUSCULAR; INTRAVENOUS
Status: COMPLETED | OUTPATIENT
Start: 2021-10-12 | End: 2021-10-12

## 2021-10-12 RX ORDER — METOCLOPRAMIDE HYDROCHLORIDE 5 MG/ML
10 INJECTION INTRAMUSCULAR; INTRAVENOUS
Status: COMPLETED | OUTPATIENT
Start: 2021-10-12 | End: 2021-10-12

## 2021-10-12 RX ADMIN — PROCHLORPERAZINE EDISYLATE 5 MG: 5 INJECTION INTRAMUSCULAR; INTRAVENOUS at 06:10

## 2021-10-12 RX ADMIN — DIPHENHYDRAMINE HYDROCHLORIDE 25 MG: 50 INJECTION, SOLUTION INTRAMUSCULAR; INTRAVENOUS at 02:10

## 2021-10-12 RX ADMIN — SODIUM CHLORIDE 1000 ML: 0.9 INJECTION, SOLUTION INTRAVENOUS at 02:10

## 2021-10-12 RX ADMIN — METOCLOPRAMIDE 10 MG: 5 INJECTION, SOLUTION INTRAMUSCULAR; INTRAVENOUS at 02:10

## 2021-10-12 RX ADMIN — MAGNESIUM SULFATE IN WATER 2 G: 40 INJECTION, SOLUTION INTRAVENOUS at 03:10

## 2021-10-12 RX ADMIN — KETOROLAC TROMETHAMINE 30 MG: 30 INJECTION, SOLUTION INTRAMUSCULAR at 02:10

## 2021-10-12 RX ADMIN — HALOPERIDOL LACTATE 2.5 MG: 5 INJECTION, SOLUTION INTRAMUSCULAR at 03:10

## 2021-10-12 RX ADMIN — DIPHENHYDRAMINE HYDROCHLORIDE 25 MG: 50 INJECTION INTRAMUSCULAR; INTRAVENOUS at 06:10

## 2021-10-15 ENCOUNTER — TELEPHONE (OUTPATIENT)
Dept: INTERNAL MEDICINE | Facility: CLINIC | Age: 31
End: 2021-10-15

## 2021-10-15 ENCOUNTER — OFFICE VISIT (OUTPATIENT)
Dept: NEUROLOGY | Facility: CLINIC | Age: 31
End: 2021-10-15
Payer: COMMERCIAL

## 2021-10-15 VITALS
WEIGHT: 162.81 LBS | HEIGHT: 66 IN | BODY MASS INDEX: 26.17 KG/M2 | SYSTOLIC BLOOD PRESSURE: 95 MMHG | HEART RATE: 75 BPM | DIASTOLIC BLOOD PRESSURE: 70 MMHG

## 2021-10-15 DIAGNOSIS — G43.809 OTHER MIGRAINE WITHOUT STATUS MIGRAINOSUS, NOT INTRACTABLE: ICD-10-CM

## 2021-10-15 DIAGNOSIS — G43.E09 CHRONIC MIGRAINE WITH AURA: Primary | ICD-10-CM

## 2021-10-15 DIAGNOSIS — G44.40 MEDICATION OVERUSE HEADACHE: ICD-10-CM

## 2021-10-15 PROCEDURE — 3078F DIAST BP <80 MM HG: CPT | Mod: CPTII,S$GLB,, | Performed by: PSYCHIATRY & NEUROLOGY

## 2021-10-15 PROCEDURE — 99999 PR PBB SHADOW E&M-EST. PATIENT-LVL III: ICD-10-PCS | Mod: PBBFAC,,, | Performed by: PSYCHIATRY & NEUROLOGY

## 2021-10-15 PROCEDURE — 1159F PR MEDICATION LIST DOCUMENTED IN MEDICAL RECORD: ICD-10-PCS | Mod: CPTII,S$GLB,, | Performed by: PSYCHIATRY & NEUROLOGY

## 2021-10-15 PROCEDURE — 3008F PR BODY MASS INDEX (BMI) DOCUMENTED: ICD-10-PCS | Mod: CPTII,S$GLB,, | Performed by: PSYCHIATRY & NEUROLOGY

## 2021-10-15 PROCEDURE — 99214 PR OFFICE/OUTPT VISIT, EST, LEVL IV, 30-39 MIN: ICD-10-PCS | Mod: S$GLB,,, | Performed by: PSYCHIATRY & NEUROLOGY

## 2021-10-15 PROCEDURE — 3044F HG A1C LEVEL LT 7.0%: CPT | Mod: CPTII,S$GLB,, | Performed by: PSYCHIATRY & NEUROLOGY

## 2021-10-15 PROCEDURE — 3078F PR MOST RECENT DIASTOLIC BLOOD PRESSURE < 80 MM HG: ICD-10-PCS | Mod: CPTII,S$GLB,, | Performed by: PSYCHIATRY & NEUROLOGY

## 2021-10-15 PROCEDURE — 3074F SYST BP LT 130 MM HG: CPT | Mod: CPTII,S$GLB,, | Performed by: PSYCHIATRY & NEUROLOGY

## 2021-10-15 PROCEDURE — 1160F RVW MEDS BY RX/DR IN RCRD: CPT | Mod: CPTII,S$GLB,, | Performed by: PSYCHIATRY & NEUROLOGY

## 2021-10-15 PROCEDURE — 1160F PR REVIEW ALL MEDS BY PRESCRIBER/CLIN PHARMACIST DOCUMENTED: ICD-10-PCS | Mod: CPTII,S$GLB,, | Performed by: PSYCHIATRY & NEUROLOGY

## 2021-10-15 PROCEDURE — 1159F MED LIST DOCD IN RCRD: CPT | Mod: CPTII,S$GLB,, | Performed by: PSYCHIATRY & NEUROLOGY

## 2021-10-15 PROCEDURE — 99214 OFFICE O/P EST MOD 30 MIN: CPT | Mod: S$GLB,,, | Performed by: PSYCHIATRY & NEUROLOGY

## 2021-10-15 PROCEDURE — 3008F BODY MASS INDEX DOCD: CPT | Mod: CPTII,S$GLB,, | Performed by: PSYCHIATRY & NEUROLOGY

## 2021-10-15 PROCEDURE — 3044F PR MOST RECENT HEMOGLOBIN A1C LEVEL <7.0%: ICD-10-PCS | Mod: CPTII,S$GLB,, | Performed by: PSYCHIATRY & NEUROLOGY

## 2021-10-15 PROCEDURE — 99999 PR PBB SHADOW E&M-EST. PATIENT-LVL III: CPT | Mod: PBBFAC,,, | Performed by: PSYCHIATRY & NEUROLOGY

## 2021-10-15 PROCEDURE — 3074F PR MOST RECENT SYSTOLIC BLOOD PRESSURE < 130 MM HG: ICD-10-PCS | Mod: CPTII,S$GLB,, | Performed by: PSYCHIATRY & NEUROLOGY

## 2021-10-15 RX ORDER — GABAPENTIN 300 MG/1
300 CAPSULE ORAL 3 TIMES DAILY
Qty: 90 CAPSULE | Refills: 5 | Status: SHIPPED | OUTPATIENT
Start: 2021-10-15 | End: 2021-12-28

## 2021-10-18 ENCOUNTER — OFFICE VISIT (OUTPATIENT)
Dept: NEUROLOGY | Facility: CLINIC | Age: 31
End: 2021-10-18
Payer: COMMERCIAL

## 2021-10-18 ENCOUNTER — PATIENT MESSAGE (OUTPATIENT)
Dept: NEUROLOGY | Facility: CLINIC | Age: 31
End: 2021-10-18

## 2021-10-18 DIAGNOSIS — G43.119 INTRACTABLE MIGRAINE WITH AURA WITHOUT STATUS MIGRAINOSUS: Primary | ICD-10-CM

## 2021-10-18 PROCEDURE — 3044F HG A1C LEVEL LT 7.0%: CPT | Mod: CPTII,95,, | Performed by: PHYSICIAN ASSISTANT

## 2021-10-18 PROCEDURE — 1159F PR MEDICATION LIST DOCUMENTED IN MEDICAL RECORD: ICD-10-PCS | Mod: CPTII,95,, | Performed by: PHYSICIAN ASSISTANT

## 2021-10-18 PROCEDURE — 99215 OFFICE O/P EST HI 40 MIN: CPT | Mod: 95,,, | Performed by: PHYSICIAN ASSISTANT

## 2021-10-18 PROCEDURE — 1159F MED LIST DOCD IN RCRD: CPT | Mod: CPTII,95,, | Performed by: PHYSICIAN ASSISTANT

## 2021-10-18 PROCEDURE — 1160F RVW MEDS BY RX/DR IN RCRD: CPT | Mod: CPTII,95,, | Performed by: PHYSICIAN ASSISTANT

## 2021-10-18 PROCEDURE — 1160F PR REVIEW ALL MEDS BY PRESCRIBER/CLIN PHARMACIST DOCUMENTED: ICD-10-PCS | Mod: CPTII,95,, | Performed by: PHYSICIAN ASSISTANT

## 2021-10-18 PROCEDURE — 99215 PR OFFICE/OUTPT VISIT, EST, LEVL V, 40-54 MIN: ICD-10-PCS | Mod: 95,,, | Performed by: PHYSICIAN ASSISTANT

## 2021-10-18 PROCEDURE — 3044F PR MOST RECENT HEMOGLOBIN A1C LEVEL <7.0%: ICD-10-PCS | Mod: CPTII,95,, | Performed by: PHYSICIAN ASSISTANT

## 2021-10-19 ENCOUNTER — OFFICE VISIT (OUTPATIENT)
Dept: FAMILY MEDICINE | Facility: CLINIC | Age: 31
End: 2021-10-19
Payer: COMMERCIAL

## 2021-10-19 VITALS
HEIGHT: 66 IN | DIASTOLIC BLOOD PRESSURE: 72 MMHG | OXYGEN SATURATION: 98 % | SYSTOLIC BLOOD PRESSURE: 112 MMHG | BODY MASS INDEX: 27 KG/M2 | WEIGHT: 168 LBS | HEART RATE: 96 BPM

## 2021-10-19 DIAGNOSIS — G43.E09 CHRONIC MIGRAINE WITH AURA: ICD-10-CM

## 2021-10-19 DIAGNOSIS — G44.40 MEDICATION OVERUSE HEADACHE: ICD-10-CM

## 2021-10-19 DIAGNOSIS — F33.1 MODERATE EPISODE OF RECURRENT MAJOR DEPRESSIVE DISORDER: ICD-10-CM

## 2021-10-19 DIAGNOSIS — Z76.89 ENCOUNTER TO ESTABLISH CARE: Primary | ICD-10-CM

## 2021-10-19 DIAGNOSIS — F45.8 BRUXISM (TEETH GRINDING): ICD-10-CM

## 2021-10-19 DIAGNOSIS — G89.29 CHRONIC UPPER BACK PAIN: ICD-10-CM

## 2021-10-19 DIAGNOSIS — M54.9 CHRONIC UPPER BACK PAIN: ICD-10-CM

## 2021-10-19 PROCEDURE — 99999 PR PBB SHADOW E&M-EST. PATIENT-LVL V: ICD-10-PCS | Mod: PBBFAC,,, | Performed by: FAMILY MEDICINE

## 2021-10-19 PROCEDURE — 3078F DIAST BP <80 MM HG: CPT | Mod: CPTII,S$GLB,, | Performed by: FAMILY MEDICINE

## 2021-10-19 PROCEDURE — 3074F PR MOST RECENT SYSTOLIC BLOOD PRESSURE < 130 MM HG: ICD-10-PCS | Mod: CPTII,S$GLB,, | Performed by: FAMILY MEDICINE

## 2021-10-19 PROCEDURE — 3008F PR BODY MASS INDEX (BMI) DOCUMENTED: ICD-10-PCS | Mod: CPTII,S$GLB,, | Performed by: FAMILY MEDICINE

## 2021-10-19 PROCEDURE — 3044F HG A1C LEVEL LT 7.0%: CPT | Mod: CPTII,S$GLB,, | Performed by: FAMILY MEDICINE

## 2021-10-19 PROCEDURE — 99214 PR OFFICE/OUTPT VISIT, EST, LEVL IV, 30-39 MIN: ICD-10-PCS | Mod: S$GLB,,, | Performed by: FAMILY MEDICINE

## 2021-10-19 PROCEDURE — 3044F PR MOST RECENT HEMOGLOBIN A1C LEVEL <7.0%: ICD-10-PCS | Mod: CPTII,S$GLB,, | Performed by: FAMILY MEDICINE

## 2021-10-19 PROCEDURE — 99999 PR PBB SHADOW E&M-EST. PATIENT-LVL V: CPT | Mod: PBBFAC,,, | Performed by: FAMILY MEDICINE

## 2021-10-19 PROCEDURE — 1159F MED LIST DOCD IN RCRD: CPT | Mod: CPTII,S$GLB,, | Performed by: FAMILY MEDICINE

## 2021-10-19 PROCEDURE — 3008F BODY MASS INDEX DOCD: CPT | Mod: CPTII,S$GLB,, | Performed by: FAMILY MEDICINE

## 2021-10-19 PROCEDURE — 3078F PR MOST RECENT DIASTOLIC BLOOD PRESSURE < 80 MM HG: ICD-10-PCS | Mod: CPTII,S$GLB,, | Performed by: FAMILY MEDICINE

## 2021-10-19 PROCEDURE — 3074F SYST BP LT 130 MM HG: CPT | Mod: CPTII,S$GLB,, | Performed by: FAMILY MEDICINE

## 2021-10-19 PROCEDURE — 1160F RVW MEDS BY RX/DR IN RCRD: CPT | Mod: CPTII,S$GLB,, | Performed by: FAMILY MEDICINE

## 2021-10-19 PROCEDURE — 99214 OFFICE O/P EST MOD 30 MIN: CPT | Mod: S$GLB,,, | Performed by: FAMILY MEDICINE

## 2021-10-19 PROCEDURE — 1159F PR MEDICATION LIST DOCUMENTED IN MEDICAL RECORD: ICD-10-PCS | Mod: CPTII,S$GLB,, | Performed by: FAMILY MEDICINE

## 2021-10-19 PROCEDURE — 1160F PR REVIEW ALL MEDS BY PRESCRIBER/CLIN PHARMACIST DOCUMENTED: ICD-10-PCS | Mod: CPTII,S$GLB,, | Performed by: FAMILY MEDICINE

## 2021-10-20 ENCOUNTER — PATIENT MESSAGE (OUTPATIENT)
Dept: FAMILY MEDICINE | Facility: CLINIC | Age: 31
End: 2021-10-20
Payer: COMMERCIAL

## 2021-10-21 ENCOUNTER — CLINICAL SUPPORT (OUTPATIENT)
Dept: REHABILITATION | Facility: HOSPITAL | Age: 31
End: 2021-10-21
Payer: COMMERCIAL

## 2021-10-21 ENCOUNTER — PATIENT MESSAGE (OUTPATIENT)
Dept: PSYCHIATRY | Facility: CLINIC | Age: 31
End: 2021-10-21

## 2021-10-21 DIAGNOSIS — M54.9 CHRONIC UPPER BACK PAIN: ICD-10-CM

## 2021-10-21 DIAGNOSIS — R29.898 DECREASED RANGE OF MOTION OF NECK: ICD-10-CM

## 2021-10-21 DIAGNOSIS — R29.898 WEAKNESS OF BOTH UPPER EXTREMITIES: ICD-10-CM

## 2021-10-21 DIAGNOSIS — G89.29 CHRONIC UPPER BACK PAIN: ICD-10-CM

## 2021-10-21 DIAGNOSIS — R29.3 POOR POSTURE: ICD-10-CM

## 2021-10-21 PROCEDURE — 97162 PT EVAL MOD COMPLEX 30 MIN: CPT | Mod: PO

## 2021-10-22 ENCOUNTER — PATIENT MESSAGE (OUTPATIENT)
Dept: NEUROLOGY | Facility: CLINIC | Age: 31
End: 2021-10-22
Payer: COMMERCIAL

## 2021-10-22 ENCOUNTER — TELEPHONE (OUTPATIENT)
Dept: NEUROLOGY | Facility: CLINIC | Age: 31
End: 2021-10-22

## 2021-10-22 DIAGNOSIS — R42 DIZZINESS AND GIDDINESS: ICD-10-CM

## 2021-10-22 DIAGNOSIS — R51.9 NONINTRACTABLE HEADACHE, UNSPECIFIED CHRONICITY PATTERN, UNSPECIFIED HEADACHE TYPE: ICD-10-CM

## 2021-10-23 ENCOUNTER — HOSPITAL ENCOUNTER (EMERGENCY)
Facility: HOSPITAL | Age: 31
Discharge: HOME | End: 2021-10-23
Attending: EMERGENCY MEDICINE
Payer: COMMERCIAL

## 2021-10-23 VITALS
BODY MASS INDEX: 27.32 KG/M2 | HEIGHT: 66 IN | TEMPERATURE: 99 F | HEART RATE: 68 BPM | OXYGEN SATURATION: 95 % | SYSTOLIC BLOOD PRESSURE: 122 MMHG | RESPIRATION RATE: 17 BRPM | WEIGHT: 170 LBS | DIASTOLIC BLOOD PRESSURE: 89 MMHG

## 2021-10-23 DIAGNOSIS — G43.101 MIGRAINE WITH AURA AND WITH STATUS MIGRAINOSUS, NOT INTRACTABLE: Primary | ICD-10-CM

## 2021-10-23 DIAGNOSIS — R40.20 LOSS OF CONSCIOUSNESS: ICD-10-CM

## 2021-10-23 LAB
ALBUMIN SERPL BCP-MCNC: 3.7 G/DL (ref 3.5–5.2)
ALP SERPL-CCNC: 60 U/L (ref 55–135)
ALT SERPL W/O P-5'-P-CCNC: 69 U/L (ref 10–44)
AMPHET+METHAMPHET UR QL: NEGATIVE
ANION GAP SERPL CALC-SCNC: 6 MMOL/L (ref 8–16)
AST SERPL-CCNC: 58 U/L (ref 10–40)
B-HCG UR QL: NEGATIVE
BARBITURATES UR QL SCN>200 NG/ML: NEGATIVE
BASOPHILS # BLD AUTO: 0.05 K/UL (ref 0–0.2)
BASOPHILS NFR BLD: 1 % (ref 0–1.9)
BENZODIAZ UR QL SCN>200 NG/ML: NEGATIVE
BILIRUB SERPL-MCNC: 0.2 MG/DL (ref 0.1–1)
BILIRUB UR QL STRIP: NEGATIVE
BUN SERPL-MCNC: 11 MG/DL (ref 6–20)
BZE UR QL SCN: NEGATIVE
CALCIUM SERPL-MCNC: 8.9 MG/DL (ref 8.7–10.5)
CANNABINOIDS UR QL SCN: NEGATIVE
CHLORIDE SERPL-SCNC: 112 MMOL/L (ref 95–110)
CK SERPL-CCNC: 63 U/L (ref 20–180)
CLARITY UR: CLEAR
CO2 SERPL-SCNC: 21 MMOL/L (ref 23–29)
COLOR UR: COLORLESS
CREAT SERPL-MCNC: 0.9 MG/DL (ref 0.5–1.4)
CREAT UR-MCNC: 58.1 MG/DL (ref 15–325)
CTP QC/QA: YES
DIFFERENTIAL METHOD: ABNORMAL
EOSINOPHIL # BLD AUTO: 0.3 K/UL (ref 0–0.5)
EOSINOPHIL NFR BLD: 5.3 % (ref 0–8)
ERYTHROCYTE [DISTWIDTH] IN BLOOD BY AUTOMATED COUNT: 13.2 % (ref 11.5–14.5)
EST. GFR  (AFRICAN AMERICAN): >60 ML/MIN/1.73 M^2
EST. GFR  (NON AFRICAN AMERICAN): >60 ML/MIN/1.73 M^2
GLUCOSE SERPL-MCNC: 97 MG/DL (ref 70–110)
GLUCOSE UR QL STRIP: NEGATIVE
HCT VFR BLD AUTO: 36.1 % (ref 37–48.5)
HGB BLD-MCNC: 11.8 G/DL (ref 12–16)
HGB UR QL STRIP: NEGATIVE
IMM GRANULOCYTES # BLD AUTO: 0.02 K/UL (ref 0–0.04)
IMM GRANULOCYTES NFR BLD AUTO: 0.4 % (ref 0–0.5)
KETONES UR QL STRIP: NEGATIVE
LEUKOCYTE ESTERASE UR QL STRIP: NEGATIVE
LYMPHOCYTES # BLD AUTO: 1.1 K/UL (ref 1–4.8)
LYMPHOCYTES NFR BLD: 22 % (ref 18–48)
MAGNESIUM SERPL-MCNC: 2 MG/DL (ref 1.6–2.6)
MCH RBC QN AUTO: 31.2 PG (ref 27–31)
MCHC RBC AUTO-ENTMCNC: 32.7 G/DL (ref 32–36)
MCV RBC AUTO: 96 FL (ref 82–98)
METHADONE UR QL SCN>300 NG/ML: NEGATIVE
MONOCYTES # BLD AUTO: 0.6 K/UL (ref 0.3–1)
MONOCYTES NFR BLD: 12.6 % (ref 4–15)
NEUTROPHILS # BLD AUTO: 3 K/UL (ref 1.8–7.7)
NEUTROPHILS NFR BLD: 58.7 % (ref 38–73)
NITRITE UR QL STRIP: NEGATIVE
NRBC BLD-RTO: 0 /100 WBC
OPIATES UR QL SCN: NEGATIVE
PCP UR QL SCN>25 NG/ML: NEGATIVE
PH UR STRIP: 5 [PH] (ref 5–8)
PLATELET # BLD AUTO: 255 K/UL (ref 150–450)
PMV BLD AUTO: 10.1 FL (ref 9.2–12.9)
POCT GLUCOSE: 111 MG/DL (ref 70–110)
POTASSIUM SERPL-SCNC: 4.3 MMOL/L (ref 3.5–5.1)
PROT SERPL-MCNC: 7.3 G/DL (ref 6–8.4)
PROT UR QL STRIP: NEGATIVE
RBC # BLD AUTO: 3.78 M/UL (ref 4–5.4)
SODIUM SERPL-SCNC: 139 MMOL/L (ref 136–145)
SP GR UR STRIP: 1.01 (ref 1–1.03)
TOXICOLOGY INFORMATION: NORMAL
TROPONIN I SERPL DL<=0.01 NG/ML-MCNC: <0.006 NG/ML (ref 0–0.03)
URN SPEC COLLECT METH UR: ABNORMAL
UROBILINOGEN UR STRIP-ACNC: NEGATIVE EU/DL
WBC # BLD AUTO: 5.09 K/UL (ref 3.9–12.7)

## 2021-10-23 PROCEDURE — 96374 THER/PROPH/DIAG INJ IV PUSH: CPT

## 2021-10-23 PROCEDURE — 93010 EKG 12-LEAD: ICD-10-PCS | Mod: ,,, | Performed by: INTERNAL MEDICINE

## 2021-10-23 PROCEDURE — 80053 COMPREHEN METABOLIC PANEL: CPT | Performed by: EMERGENCY MEDICINE

## 2021-10-23 PROCEDURE — 83735 ASSAY OF MAGNESIUM: CPT | Performed by: EMERGENCY MEDICINE

## 2021-10-23 PROCEDURE — 84484 ASSAY OF TROPONIN QUANT: CPT | Performed by: EMERGENCY MEDICINE

## 2021-10-23 PROCEDURE — 80307 DRUG TEST PRSMV CHEM ANLYZR: CPT | Performed by: EMERGENCY MEDICINE

## 2021-10-23 PROCEDURE — 82962 GLUCOSE BLOOD TEST: CPT

## 2021-10-23 PROCEDURE — 85025 COMPLETE CBC W/AUTO DIFF WBC: CPT | Performed by: EMERGENCY MEDICINE

## 2021-10-23 PROCEDURE — 99285 EMERGENCY DEPT VISIT HI MDM: CPT | Mod: 25

## 2021-10-23 PROCEDURE — 96375 TX/PRO/DX INJ NEW DRUG ADDON: CPT

## 2021-10-23 PROCEDURE — 81003 URINALYSIS AUTO W/O SCOPE: CPT | Mod: 59 | Performed by: EMERGENCY MEDICINE

## 2021-10-23 PROCEDURE — 93005 ELECTROCARDIOGRAM TRACING: CPT

## 2021-10-23 PROCEDURE — 93010 ELECTROCARDIOGRAM REPORT: CPT | Mod: ,,, | Performed by: INTERNAL MEDICINE

## 2021-10-23 PROCEDURE — 96361 HYDRATE IV INFUSION ADD-ON: CPT

## 2021-10-23 PROCEDURE — 81025 URINE PREGNANCY TEST: CPT | Performed by: EMERGENCY MEDICINE

## 2021-10-23 PROCEDURE — 63600175 PHARM REV CODE 636 W HCPCS: Performed by: EMERGENCY MEDICINE

## 2021-10-23 PROCEDURE — 25000003 PHARM REV CODE 250: Performed by: EMERGENCY MEDICINE

## 2021-10-23 PROCEDURE — P9612 CATHETERIZE FOR URINE SPEC: HCPCS

## 2021-10-23 PROCEDURE — 82550 ASSAY OF CK (CPK): CPT | Performed by: EMERGENCY MEDICINE

## 2021-10-23 RX ORDER — KETOROLAC TROMETHAMINE 30 MG/ML
15 INJECTION, SOLUTION INTRAMUSCULAR; INTRAVENOUS
Status: COMPLETED | OUTPATIENT
Start: 2021-10-23 | End: 2021-10-23

## 2021-10-23 RX ORDER — BUTALBITAL, ACETAMINOPHEN AND CAFFEINE 50; 325; 40 MG/1; MG/1; MG/1
1 TABLET ORAL
Status: COMPLETED | OUTPATIENT
Start: 2021-10-23 | End: 2021-10-23

## 2021-10-23 RX ORDER — DEXAMETHASONE SODIUM PHOSPHATE 4 MG/ML
8 INJECTION, SOLUTION INTRA-ARTICULAR; INTRALESIONAL; INTRAMUSCULAR; INTRAVENOUS; SOFT TISSUE
Status: COMPLETED | OUTPATIENT
Start: 2021-10-23 | End: 2021-10-23

## 2021-10-23 RX ORDER — HALOPERIDOL 5 MG/ML
5 INJECTION INTRAMUSCULAR
Status: COMPLETED | OUTPATIENT
Start: 2021-10-23 | End: 2021-10-23

## 2021-10-23 RX ORDER — KETOROLAC TROMETHAMINE 10 MG/1
10 TABLET, FILM COATED ORAL EVERY 6 HOURS
Qty: 20 TABLET | Refills: 0 | Status: SHIPPED | OUTPATIENT
Start: 2021-10-23 | End: 2021-10-28

## 2021-10-23 RX ADMIN — SODIUM CHLORIDE 1000 ML: 0.9 INJECTION, SOLUTION INTRAVENOUS at 01:10

## 2021-10-23 RX ADMIN — HALOPERIDOL LACTATE 5 MG: 5 INJECTION, SOLUTION INTRAMUSCULAR at 04:10

## 2021-10-23 RX ADMIN — BUTALBITAL, ACETAMINOPHEN, AND CAFFEINE 1 TABLET: 50; 325; 40 TABLET ORAL at 05:10

## 2021-10-23 RX ADMIN — KETOROLAC TROMETHAMINE 15 MG: 30 INJECTION, SOLUTION INTRAMUSCULAR at 04:10

## 2021-10-23 RX ADMIN — DEXAMETHASONE SODIUM PHOSPHATE 8 MG: 4 INJECTION, SOLUTION INTRA-ARTICULAR; INTRALESIONAL; INTRAMUSCULAR; INTRAVENOUS; SOFT TISSUE at 05:10

## 2021-10-25 ENCOUNTER — PATIENT MESSAGE (OUTPATIENT)
Dept: FAMILY MEDICINE | Facility: CLINIC | Age: 31
End: 2021-10-25
Payer: COMMERCIAL

## 2021-10-27 PROBLEM — R29.898 WEAKNESS OF BOTH UPPER EXTREMITIES: Status: ACTIVE | Noted: 2021-10-27

## 2021-10-27 PROBLEM — R29.898 DECREASED RANGE OF MOTION OF NECK: Status: ACTIVE | Noted: 2021-10-27

## 2021-10-27 PROBLEM — R29.3 POOR POSTURE: Status: ACTIVE | Noted: 2021-10-27

## 2021-10-28 ENCOUNTER — HOSPITAL ENCOUNTER (OUTPATIENT)
Dept: RADIOLOGY | Facility: HOSPITAL | Age: 31
Discharge: HOME | End: 2021-10-28
Attending: PHYSICIAN ASSISTANT
Payer: COMMERCIAL

## 2021-10-28 DIAGNOSIS — R42 DIZZINESS AND GIDDINESS: ICD-10-CM

## 2021-10-28 DIAGNOSIS — R51.9 NONINTRACTABLE HEADACHE, UNSPECIFIED CHRONICITY PATTERN, UNSPECIFIED HEADACHE TYPE: ICD-10-CM

## 2021-10-28 PROBLEM — G43.001 MIGRAINE WITHOUT AURA AND WITH STATUS MIGRAINOSUS, NOT INTRACTABLE: Status: ACTIVE | Noted: 2021-10-28

## 2021-10-28 PROCEDURE — 70551 MRI BRAIN STEM W/O DYE: CPT | Mod: TC

## 2021-10-28 PROCEDURE — 70551 MRI BRAIN WITHOUT CONTRAST: ICD-10-PCS | Mod: 26,,, | Performed by: RADIOLOGY

## 2021-10-28 PROCEDURE — 70551 MRI BRAIN STEM W/O DYE: CPT | Mod: 26,,, | Performed by: RADIOLOGY

## 2021-10-29 ENCOUNTER — PATIENT MESSAGE (OUTPATIENT)
Dept: NEUROLOGY | Facility: CLINIC | Age: 31
End: 2021-10-29
Payer: COMMERCIAL

## 2021-10-29 ENCOUNTER — IMMUNIZATION (OUTPATIENT)
Dept: FAMILY MEDICINE | Facility: CLINIC | Age: 31
End: 2021-10-29
Payer: COMMERCIAL

## 2021-10-29 ENCOUNTER — TELEPHONE (OUTPATIENT)
Dept: NEUROLOGY | Facility: CLINIC | Age: 31
End: 2021-10-29
Payer: COMMERCIAL

## 2021-10-29 DIAGNOSIS — Z23 NEED FOR VACCINATION: Primary | ICD-10-CM

## 2021-10-29 DIAGNOSIS — G43.111 INTRACTABLE MIGRAINE WITH AURA WITH STATUS MIGRAINOSUS: Primary | ICD-10-CM

## 2021-10-29 PROCEDURE — 0003A COVID-19, MRNA, LNP-S, PF, 30 MCG/0.3 ML DOSE VACCINE: CPT | Mod: PBBFAC | Performed by: INTERNAL MEDICINE

## 2021-10-29 PROCEDURE — 91300 COVID-19, MRNA, LNP-S, PF, 30 MCG/0.3 ML DOSE VACCINE: CPT | Mod: PBBFAC | Performed by: INTERNAL MEDICINE

## 2021-10-29 RX ORDER — PREDNISONE 20 MG/1
TABLET ORAL
Qty: 30 TABLET | Refills: 0 | Status: SHIPPED | OUTPATIENT
Start: 2021-10-29 | End: 2021-11-13

## 2021-11-01 ENCOUNTER — CLINICAL SUPPORT (OUTPATIENT)
Dept: REHABILITATION | Facility: HOSPITAL | Age: 31
End: 2021-11-01
Payer: COMMERCIAL

## 2021-11-01 DIAGNOSIS — R29.898 WEAKNESS OF BOTH UPPER EXTREMITIES: ICD-10-CM

## 2021-11-01 DIAGNOSIS — R29.898 DECREASED RANGE OF MOTION OF NECK: ICD-10-CM

## 2021-11-01 DIAGNOSIS — R29.3 POOR POSTURE: ICD-10-CM

## 2021-11-01 PROCEDURE — 97110 THERAPEUTIC EXERCISES: CPT | Mod: PO,CQ,96

## 2021-11-02 ENCOUNTER — TELEPHONE (OUTPATIENT)
Dept: PHARMACY | Facility: CLINIC | Age: 31
End: 2021-11-02
Payer: COMMERCIAL

## 2021-11-08 ENCOUNTER — OFFICE VISIT (OUTPATIENT)
Dept: PSYCHIATRY | Facility: CLINIC | Age: 31
End: 2021-11-08
Payer: COMMERCIAL

## 2021-11-08 ENCOUNTER — PATIENT MESSAGE (OUTPATIENT)
Dept: PSYCHIATRY | Facility: CLINIC | Age: 31
End: 2021-11-08
Payer: COMMERCIAL

## 2021-11-08 DIAGNOSIS — F33.0 MDD (MAJOR DEPRESSIVE DISORDER), RECURRENT EPISODE, MILD: ICD-10-CM

## 2021-11-08 DIAGNOSIS — G43.E09 CHRONIC MIGRAINE WITH AURA: Primary | ICD-10-CM

## 2021-11-08 DIAGNOSIS — F41.1 GAD (GENERALIZED ANXIETY DISORDER): ICD-10-CM

## 2021-11-08 DIAGNOSIS — F40.10 SOCIAL ANXIETY DISORDER: ICD-10-CM

## 2021-11-08 PROCEDURE — 1160F PR REVIEW ALL MEDS BY PRESCRIBER/CLIN PHARMACIST DOCUMENTED: ICD-10-PCS | Mod: CPTII,95,, | Performed by: INTERNAL MEDICINE

## 2021-11-08 PROCEDURE — 99214 OFFICE O/P EST MOD 30 MIN: CPT | Mod: 95,,, | Performed by: INTERNAL MEDICINE

## 2021-11-08 PROCEDURE — 1159F MED LIST DOCD IN RCRD: CPT | Mod: CPTII,95,, | Performed by: INTERNAL MEDICINE

## 2021-11-08 PROCEDURE — 99214 PR OFFICE/OUTPT VISIT, EST, LEVL IV, 30-39 MIN: ICD-10-PCS | Mod: 95,,, | Performed by: INTERNAL MEDICINE

## 2021-11-08 PROCEDURE — 3044F HG A1C LEVEL LT 7.0%: CPT | Mod: CPTII,95,, | Performed by: INTERNAL MEDICINE

## 2021-11-08 PROCEDURE — 3044F PR MOST RECENT HEMOGLOBIN A1C LEVEL <7.0%: ICD-10-PCS | Mod: CPTII,95,, | Performed by: INTERNAL MEDICINE

## 2021-11-08 PROCEDURE — 1159F PR MEDICATION LIST DOCUMENTED IN MEDICAL RECORD: ICD-10-PCS | Mod: CPTII,95,, | Performed by: INTERNAL MEDICINE

## 2021-11-08 PROCEDURE — 1160F RVW MEDS BY RX/DR IN RCRD: CPT | Mod: CPTII,95,, | Performed by: INTERNAL MEDICINE

## 2021-11-13 PROBLEM — F33.0 MDD (MAJOR DEPRESSIVE DISORDER), RECURRENT EPISODE, MILD: Status: ACTIVE | Noted: 2021-11-13

## 2021-11-13 PROBLEM — F33.1 MODERATE EPISODE OF RECURRENT MAJOR DEPRESSIVE DISORDER: Status: RESOLVED | Noted: 2020-12-22 | Resolved: 2021-11-13

## 2021-11-19 ENCOUNTER — PATIENT OUTREACH (OUTPATIENT)
Dept: ADMINISTRATIVE | Facility: OTHER | Age: 31
End: 2021-11-19
Payer: COMMERCIAL

## 2021-11-24 ENCOUNTER — HOSPITAL ENCOUNTER (OUTPATIENT)
Dept: RADIOLOGY | Facility: HOSPITAL | Age: 31
Discharge: HOME | End: 2021-11-24
Attending: PSYCHIATRY & NEUROLOGY
Payer: COMMERCIAL

## 2021-11-24 ENCOUNTER — OFFICE VISIT (OUTPATIENT)
Dept: PSYCHIATRY | Facility: CLINIC | Age: 31
End: 2021-11-24
Payer: COMMERCIAL

## 2021-11-24 DIAGNOSIS — F41.1 GAD (GENERALIZED ANXIETY DISORDER): ICD-10-CM

## 2021-11-24 DIAGNOSIS — H33.123: ICD-10-CM

## 2021-11-24 DIAGNOSIS — R40.4 TRANSIENT ALTERATION OF AWARENESS: ICD-10-CM

## 2021-11-24 DIAGNOSIS — R55 SYNCOPE AND COLLAPSE: ICD-10-CM

## 2021-11-24 DIAGNOSIS — H54.7 UNSPECIFIED VISUAL LOSS: ICD-10-CM

## 2021-11-24 DIAGNOSIS — R41.82 ALTERED MENTAL STATUS: ICD-10-CM

## 2021-11-24 DIAGNOSIS — R42 DIZZINESS AND GIDDINESS: ICD-10-CM

## 2021-11-24 DIAGNOSIS — H93.19 TINNITUS, UNSPECIFIED EAR: ICD-10-CM

## 2021-11-24 DIAGNOSIS — G43.919 INTRACTABLE MIGRAINE: ICD-10-CM

## 2021-11-24 DIAGNOSIS — G43.109 CLASSICAL MIGRAINE: ICD-10-CM

## 2021-11-24 DIAGNOSIS — R41.3 POOR MEMORY: ICD-10-CM

## 2021-11-24 DIAGNOSIS — G44.85 STABBING HEADACHE: ICD-10-CM

## 2021-11-24 DIAGNOSIS — G43.711 CHRONIC MIGRAINE WITHOUT AURA, WITH INTRACTABLE MIGRAINE, SO STATED, WITH STATUS MIGRAINOSUS: ICD-10-CM

## 2021-11-24 DIAGNOSIS — R41.3 MEMORY LOSS: ICD-10-CM

## 2021-11-24 DIAGNOSIS — F33.0 MDD (MAJOR DEPRESSIVE DISORDER), RECURRENT EPISODE, MILD: Primary | ICD-10-CM

## 2021-11-24 DIAGNOSIS — G43.001 MIGRAINE WITHOUT AURA WITH STATUS MIGRAINOSUS: ICD-10-CM

## 2021-11-24 DIAGNOSIS — G43.001 MIGRAINE WITHOUT AURA AND WITH STATUS MIGRAINOSUS, NOT INTRACTABLE: ICD-10-CM

## 2021-11-24 DIAGNOSIS — G25.3 MYOCLONUS: ICD-10-CM

## 2021-11-24 DIAGNOSIS — F40.10 SOCIAL ANXIETY DISORDER: ICD-10-CM

## 2021-11-24 PROCEDURE — 70544 MR ANGIOGRAPHY HEAD W/O DYE: CPT | Mod: TC

## 2021-11-24 PROCEDURE — 99214 OFFICE O/P EST MOD 30 MIN: CPT | Mod: 95,,, | Performed by: INTERNAL MEDICINE

## 2021-11-24 PROCEDURE — 70553 MRI BRAIN STEM W/O & W/DYE: CPT | Mod: TC

## 2021-11-24 PROCEDURE — 70544 MRA BRAIN WITHOUT CONTRAST: ICD-10-PCS | Mod: 26,59,, | Performed by: RADIOLOGY

## 2021-11-24 PROCEDURE — 25500020 PHARM REV CODE 255: Performed by: PSYCHIATRY & NEUROLOGY

## 2021-11-24 PROCEDURE — 70553 MRI BRAIN STEM W/O & W/DYE: CPT | Mod: 26,,, | Performed by: RADIOLOGY

## 2021-11-24 PROCEDURE — 70553 MRI BRAIN W WO CONTRAST: ICD-10-PCS | Mod: 26,,, | Performed by: RADIOLOGY

## 2021-11-24 PROCEDURE — 99214 PR OFFICE/OUTPT VISIT, EST, LEVL IV, 30-39 MIN: ICD-10-PCS | Mod: 95,,, | Performed by: INTERNAL MEDICINE

## 2021-11-24 PROCEDURE — A9585 GADOBUTROL INJECTION: HCPCS | Performed by: PSYCHIATRY & NEUROLOGY

## 2021-11-24 PROCEDURE — 70544 MR ANGIOGRAPHY HEAD W/O DYE: CPT | Mod: 26,59,, | Performed by: RADIOLOGY

## 2021-11-24 RX ORDER — MEMANTINE HYDROCHLORIDE 5 MG/1
10 TABLET ORAL EVERY MORNING
COMMUNITY
End: 2021-12-28

## 2021-11-24 RX ORDER — UBROGEPANT 100 MG/1
100 TABLET ORAL 2 TIMES DAILY PRN
COMMUNITY
Start: 2021-11-12 | End: 2022-02-11

## 2021-11-24 RX ORDER — GADOBUTROL 604.72 MG/ML
8 INJECTION INTRAVENOUS
Status: COMPLETED | OUTPATIENT
Start: 2021-11-24 | End: 2021-11-24

## 2021-11-24 RX ORDER — LAMOTRIGINE 25 MG/1
25 TABLET ORAL 2 TIMES DAILY
COMMUNITY
Start: 2021-11-09 | End: 2022-01-19

## 2021-11-24 RX ADMIN — GADOBUTROL 8 ML: 604.72 INJECTION INTRAVENOUS at 04:11

## 2021-12-08 ENCOUNTER — PATIENT MESSAGE (OUTPATIENT)
Dept: PSYCHIATRY | Facility: CLINIC | Age: 31
End: 2021-12-08
Payer: COMMERCIAL

## 2021-12-15 ENCOUNTER — OFFICE VISIT (OUTPATIENT)
Dept: PSYCHIATRY | Facility: CLINIC | Age: 31
End: 2021-12-15
Payer: COMMERCIAL

## 2021-12-15 DIAGNOSIS — F33.0 MDD (MAJOR DEPRESSIVE DISORDER), RECURRENT EPISODE, MILD: Primary | ICD-10-CM

## 2021-12-15 DIAGNOSIS — F41.1 GAD (GENERALIZED ANXIETY DISORDER): ICD-10-CM

## 2021-12-15 DIAGNOSIS — G43.E09 CHRONIC MIGRAINE WITH AURA: ICD-10-CM

## 2021-12-15 DIAGNOSIS — R56.9 SEIZURE: ICD-10-CM

## 2021-12-15 DIAGNOSIS — F40.10 SOCIAL ANXIETY DISORDER: ICD-10-CM

## 2021-12-15 PROCEDURE — 1159F MED LIST DOCD IN RCRD: CPT | Mod: CPTII,95,, | Performed by: INTERNAL MEDICINE

## 2021-12-15 PROCEDURE — 1160F RVW MEDS BY RX/DR IN RCRD: CPT | Mod: CPTII,95,, | Performed by: INTERNAL MEDICINE

## 2021-12-15 PROCEDURE — 3044F PR MOST RECENT HEMOGLOBIN A1C LEVEL <7.0%: ICD-10-PCS | Mod: CPTII,95,, | Performed by: INTERNAL MEDICINE

## 2021-12-15 PROCEDURE — 3044F HG A1C LEVEL LT 7.0%: CPT | Mod: CPTII,95,, | Performed by: INTERNAL MEDICINE

## 2021-12-15 PROCEDURE — 99214 OFFICE O/P EST MOD 30 MIN: CPT | Mod: 95,,, | Performed by: INTERNAL MEDICINE

## 2021-12-15 PROCEDURE — 99214 PR OFFICE/OUTPT VISIT, EST, LEVL IV, 30-39 MIN: ICD-10-PCS | Mod: 95,,, | Performed by: INTERNAL MEDICINE

## 2021-12-15 PROCEDURE — 1159F PR MEDICATION LIST DOCUMENTED IN MEDICAL RECORD: ICD-10-PCS | Mod: CPTII,95,, | Performed by: INTERNAL MEDICINE

## 2021-12-15 PROCEDURE — 1160F PR REVIEW ALL MEDS BY PRESCRIBER/CLIN PHARMACIST DOCUMENTED: ICD-10-PCS | Mod: CPTII,95,, | Performed by: INTERNAL MEDICINE

## 2021-12-21 ENCOUNTER — LAB VISIT (OUTPATIENT)
Dept: LAB | Facility: HOSPITAL | Age: 31
End: 2021-12-21
Attending: PSYCHIATRY & NEUROLOGY
Payer: COMMERCIAL

## 2021-12-21 DIAGNOSIS — H93.19 SUBJECTIVE TINNITUS: ICD-10-CM

## 2021-12-21 DIAGNOSIS — G43.919 INTRACTABLE MIGRAINE: ICD-10-CM

## 2021-12-21 DIAGNOSIS — R55 SYNCOPE AND COLLAPSE: ICD-10-CM

## 2021-12-21 DIAGNOSIS — G43.109 CLASSICAL MIGRAINE: ICD-10-CM

## 2021-12-21 DIAGNOSIS — G44.85 STABBING HEADACHE: ICD-10-CM

## 2021-12-21 DIAGNOSIS — G43.839 INTRACTABLE MENSTRUAL MIGRAINE: ICD-10-CM

## 2021-12-21 DIAGNOSIS — G43.711 CHRONIC MIGRAINE WITHOUT AURA, WITH INTRACTABLE MIGRAINE, SO STATED, WITH STATUS MIGRAINOSUS: Primary | ICD-10-CM

## 2021-12-21 DIAGNOSIS — F41.8 CASTRATION COMPLEX: ICD-10-CM

## 2021-12-21 DIAGNOSIS — G25.3 MYOCLONUS: ICD-10-CM

## 2021-12-21 DIAGNOSIS — R41.82 ALTERED MENTAL STATUS: ICD-10-CM

## 2021-12-21 DIAGNOSIS — R41.3 MEMORY LOSS: ICD-10-CM

## 2021-12-21 DIAGNOSIS — G40.209 COMPLEX PARTIAL SEIZURES WITH CONSCIOUSNESS IMPAIRED: ICD-10-CM

## 2021-12-21 DIAGNOSIS — R53.83 FATIGUE: ICD-10-CM

## 2021-12-21 DIAGNOSIS — H53.123 TRANSIENT VISUAL LOSS OF BOTH EYES: ICD-10-CM

## 2021-12-21 DIAGNOSIS — R40.4 TRANSIENT ALTERATION OF AWARENESS: ICD-10-CM

## 2021-12-21 DIAGNOSIS — H54.7 UNSPECIFIED VISUAL LOSS: ICD-10-CM

## 2021-12-21 DIAGNOSIS — G47.9 REPETITIVE INTRUSIONS OF SLEEP: ICD-10-CM

## 2021-12-21 LAB
ANION GAP SERPL CALC-SCNC: 6 MMOL/L (ref 8–16)
BUN SERPL-MCNC: 11 MG/DL (ref 6–20)
CALCIUM SERPL-MCNC: 9 MG/DL (ref 8.7–10.5)
CHLORIDE SERPL-SCNC: 113 MMOL/L (ref 95–110)
CK SERPL-CCNC: 59 U/L (ref 20–180)
CO2 SERPL-SCNC: 24 MMOL/L (ref 23–29)
CREAT SERPL-MCNC: 1 MG/DL (ref 0.5–1.4)
EST. GFR  (AFRICAN AMERICAN): >60 ML/MIN/1.73 M^2
EST. GFR  (NON AFRICAN AMERICAN): >60 ML/MIN/1.73 M^2
GLUCOSE SERPL-MCNC: 92 MG/DL (ref 70–110)
MAGNESIUM SERPL-MCNC: 2.1 MG/DL (ref 1.6–2.6)
POTASSIUM SERPL-SCNC: 3.8 MMOL/L (ref 3.5–5.1)
SODIUM SERPL-SCNC: 143 MMOL/L (ref 136–145)

## 2021-12-21 PROCEDURE — 36415 COLL VENOUS BLD VENIPUNCTURE: CPT | Performed by: PSYCHIATRY & NEUROLOGY

## 2021-12-21 PROCEDURE — 84146 ASSAY OF PROLACTIN: CPT | Performed by: PSYCHIATRY & NEUROLOGY

## 2021-12-21 PROCEDURE — 82550 ASSAY OF CK (CPK): CPT | Performed by: PSYCHIATRY & NEUROLOGY

## 2021-12-21 PROCEDURE — 86255 FLUORESCENT ANTIBODY SCREEN: CPT | Performed by: PSYCHIATRY & NEUROLOGY

## 2021-12-21 PROCEDURE — 80048 BASIC METABOLIC PNL TOTAL CA: CPT | Performed by: PSYCHIATRY & NEUROLOGY

## 2021-12-21 PROCEDURE — 83735 ASSAY OF MAGNESIUM: CPT | Performed by: PSYCHIATRY & NEUROLOGY

## 2021-12-22 LAB — PROLACTIN SERPL IA-MCNC: 19.9 NG/ML (ref 5.2–26.5)

## 2021-12-23 ENCOUNTER — PATIENT OUTREACH (OUTPATIENT)
Dept: ADMINISTRATIVE | Facility: OTHER | Age: 31
End: 2021-12-23
Payer: COMMERCIAL

## 2021-12-28 ENCOUNTER — OFFICE VISIT (OUTPATIENT)
Dept: OBSTETRICS AND GYNECOLOGY | Facility: CLINIC | Age: 31
End: 2021-12-28
Payer: COMMERCIAL

## 2021-12-28 VITALS
SYSTOLIC BLOOD PRESSURE: 122 MMHG | WEIGHT: 175.06 LBS | DIASTOLIC BLOOD PRESSURE: 76 MMHG | BODY MASS INDEX: 28.25 KG/M2

## 2021-12-28 DIAGNOSIS — Z12.4 SCREENING FOR CERVICAL CANCER: ICD-10-CM

## 2021-12-28 DIAGNOSIS — Z01.419 WELL WOMAN EXAM WITH ROUTINE GYNECOLOGICAL EXAM: Primary | ICD-10-CM

## 2021-12-28 DIAGNOSIS — N89.8 VAGINAL DISCHARGE: ICD-10-CM

## 2021-12-28 LAB
AMPA-R AB CBA, SERUM: NEGATIVE
AMPHIPHYSIN AB TITR SER: NEGATIVE TITER
CASPR2-IGG CBA: NEGATIVE
CV2 IGG TITR SER: NEGATIVE TITER
DPPX IGG SERPL QL IF: NEGATIVE
GABA-B-R AB CBA, SERUM: NEGATIVE
GAD65 AB SER-SCNC: 0 NMOL/L
GFAP IFA, SERUM: NEGATIVE
GLIAL NUC TYPE 1 AB TITR SER: NEGATIVE TITER
HU1 AB TITR SER: NEGATIVE TITER
HU2 AB TITR SER IF: NEGATIVE TITER
HU3 AB TITR SER: NEGATIVE TITER
IGLON5 IFA, S: NEGATIVE
IMMUNOLOGIST REVIEW: NORMAL
LGI1-IGG CBA: NEGATIVE
MGLUR1 AB IFA, SERUM: NEGATIVE
NIF IFA, S: NEGATIVE
NMDA-R AB CBA, SERUM: NEGATIVE
PAVAL REFLEX TEST ADDED: NORMAL
PCA-1 AB TITR SER: NEGATIVE TITER
PCA-2 AB TITR SER: NEGATIVE TITER
PCA-TR AB TITR SER: NEGATIVE TITER

## 2021-12-28 PROCEDURE — 99385 PR PREVENTIVE VISIT,NEW,18-39: ICD-10-PCS | Mod: S$GLB,,, | Performed by: OBSTETRICS & GYNECOLOGY

## 2021-12-28 PROCEDURE — 3044F PR MOST RECENT HEMOGLOBIN A1C LEVEL <7.0%: ICD-10-PCS | Mod: CPTII,S$GLB,, | Performed by: OBSTETRICS & GYNECOLOGY

## 2021-12-28 PROCEDURE — 1160F RVW MEDS BY RX/DR IN RCRD: CPT | Mod: CPTII,S$GLB,, | Performed by: OBSTETRICS & GYNECOLOGY

## 2021-12-28 PROCEDURE — 3074F SYST BP LT 130 MM HG: CPT | Mod: CPTII,S$GLB,, | Performed by: OBSTETRICS & GYNECOLOGY

## 2021-12-28 PROCEDURE — 1159F PR MEDICATION LIST DOCUMENTED IN MEDICAL RECORD: ICD-10-PCS | Mod: CPTII,S$GLB,, | Performed by: OBSTETRICS & GYNECOLOGY

## 2021-12-28 PROCEDURE — 3074F PR MOST RECENT SYSTOLIC BLOOD PRESSURE < 130 MM HG: ICD-10-PCS | Mod: CPTII,S$GLB,, | Performed by: OBSTETRICS & GYNECOLOGY

## 2021-12-28 PROCEDURE — 99385 PREV VISIT NEW AGE 18-39: CPT | Mod: S$GLB,,, | Performed by: OBSTETRICS & GYNECOLOGY

## 2021-12-28 PROCEDURE — 3078F PR MOST RECENT DIASTOLIC BLOOD PRESSURE < 80 MM HG: ICD-10-PCS | Mod: CPTII,S$GLB,, | Performed by: OBSTETRICS & GYNECOLOGY

## 2021-12-28 PROCEDURE — 3008F BODY MASS INDEX DOCD: CPT | Mod: CPTII,S$GLB,, | Performed by: OBSTETRICS & GYNECOLOGY

## 2021-12-28 PROCEDURE — 88175 CYTOPATH C/V AUTO FLUID REDO: CPT | Performed by: OBSTETRICS & GYNECOLOGY

## 2021-12-28 PROCEDURE — 87481 CANDIDA DNA AMP PROBE: CPT | Mod: 59 | Performed by: OBSTETRICS & GYNECOLOGY

## 2021-12-28 PROCEDURE — 3044F HG A1C LEVEL LT 7.0%: CPT | Mod: CPTII,S$GLB,, | Performed by: OBSTETRICS & GYNECOLOGY

## 2021-12-28 PROCEDURE — 99999 PR PBB SHADOW E&M-EST. PATIENT-LVL III: CPT | Mod: PBBFAC,,, | Performed by: OBSTETRICS & GYNECOLOGY

## 2021-12-28 PROCEDURE — 3078F DIAST BP <80 MM HG: CPT | Mod: CPTII,S$GLB,, | Performed by: OBSTETRICS & GYNECOLOGY

## 2021-12-28 PROCEDURE — 3008F PR BODY MASS INDEX (BMI) DOCUMENTED: ICD-10-PCS | Mod: CPTII,S$GLB,, | Performed by: OBSTETRICS & GYNECOLOGY

## 2021-12-28 PROCEDURE — 99999 PR PBB SHADOW E&M-EST. PATIENT-LVL III: ICD-10-PCS | Mod: PBBFAC,,, | Performed by: OBSTETRICS & GYNECOLOGY

## 2021-12-28 PROCEDURE — 1160F PR REVIEW ALL MEDS BY PRESCRIBER/CLIN PHARMACIST DOCUMENTED: ICD-10-PCS | Mod: CPTII,S$GLB,, | Performed by: OBSTETRICS & GYNECOLOGY

## 2021-12-28 PROCEDURE — 87624 HPV HI-RISK TYP POOLED RSLT: CPT | Performed by: OBSTETRICS & GYNECOLOGY

## 2021-12-28 PROCEDURE — 1159F MED LIST DOCD IN RCRD: CPT | Mod: CPTII,S$GLB,, | Performed by: OBSTETRICS & GYNECOLOGY

## 2021-12-28 RX ORDER — FLUCONAZOLE 150 MG/1
TABLET ORAL
Qty: 2 TABLET | Refills: 1 | Status: SHIPPED | OUTPATIENT
Start: 2021-12-28 | End: 2022-02-10

## 2021-12-29 ENCOUNTER — PATIENT MESSAGE (OUTPATIENT)
Dept: PSYCHIATRY | Facility: CLINIC | Age: 31
End: 2021-12-29
Payer: COMMERCIAL

## 2022-01-04 LAB
BACTERIAL VAGINOSIS DNA: NEGATIVE
CANDIDA GLABRATA DNA: NEGATIVE
CANDIDA KRUSEI DNA: NEGATIVE
CANDIDA RRNA VAG QL PROBE: NEGATIVE
FINAL PATHOLOGIC DIAGNOSIS: NORMAL
Lab: NORMAL
T VAGINALIS RRNA GENITAL QL PROBE: NEGATIVE

## 2022-01-12 ENCOUNTER — OFFICE VISIT (OUTPATIENT)
Dept: PSYCHIATRY | Facility: CLINIC | Age: 32
End: 2022-01-12
Payer: COMMERCIAL

## 2022-01-12 ENCOUNTER — PATIENT MESSAGE (OUTPATIENT)
Dept: PSYCHIATRY | Facility: CLINIC | Age: 32
End: 2022-01-12
Payer: COMMERCIAL

## 2022-01-12 DIAGNOSIS — F40.10 SOCIAL ANXIETY DISORDER: ICD-10-CM

## 2022-01-12 DIAGNOSIS — F32.2 MDD (MAJOR DEPRESSIVE DISORDER), SEVERE: ICD-10-CM

## 2022-01-12 DIAGNOSIS — G43.E09 CHRONIC MIGRAINE WITH AURA: ICD-10-CM

## 2022-01-12 DIAGNOSIS — F32.2 MDD (MAJOR DEPRESSIVE DISORDER), SEVERE: Primary | ICD-10-CM

## 2022-01-12 DIAGNOSIS — R56.9 SEIZURE-LIKE ACTIVITY: ICD-10-CM

## 2022-01-12 DIAGNOSIS — F41.1 GAD (GENERALIZED ANXIETY DISORDER): ICD-10-CM

## 2022-01-12 DIAGNOSIS — F41.1 GAD (GENERALIZED ANXIETY DISORDER): Primary | ICD-10-CM

## 2022-01-12 LAB
HPV HR 12 DNA SPEC QL NAA+PROBE: NEGATIVE
HPV16 AG SPEC QL: NEGATIVE
HPV18 DNA SPEC QL NAA+PROBE: NEGATIVE

## 2022-01-12 PROCEDURE — 99214 PR OFFICE/OUTPT VISIT, EST, LEVL IV, 30-39 MIN: ICD-10-PCS | Mod: 95,,, | Performed by: INTERNAL MEDICINE

## 2022-01-12 PROCEDURE — 1159F PR MEDICATION LIST DOCUMENTED IN MEDICAL RECORD: ICD-10-PCS | Mod: CPTII,95,, | Performed by: INTERNAL MEDICINE

## 2022-01-12 PROCEDURE — 1160F RVW MEDS BY RX/DR IN RCRD: CPT | Mod: CPTII,95,, | Performed by: INTERNAL MEDICINE

## 2022-01-12 PROCEDURE — 1160F PR REVIEW ALL MEDS BY PRESCRIBER/CLIN PHARMACIST DOCUMENTED: ICD-10-PCS | Mod: CPTII,95,, | Performed by: INTERNAL MEDICINE

## 2022-01-12 PROCEDURE — 90833 PSYTX W PT W E/M 30 MIN: CPT | Mod: 95,,, | Performed by: INTERNAL MEDICINE

## 2022-01-12 PROCEDURE — 90833 PR PSYCHOTHERAPY W/PATIENT W/E&M, 30 MIN (ADD ON): ICD-10-PCS | Mod: 95,,, | Performed by: INTERNAL MEDICINE

## 2022-01-12 PROCEDURE — 99214 OFFICE O/P EST MOD 30 MIN: CPT | Mod: 95,,, | Performed by: INTERNAL MEDICINE

## 2022-01-12 PROCEDURE — 1159F MED LIST DOCD IN RCRD: CPT | Mod: CPTII,95,, | Performed by: INTERNAL MEDICINE
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
## 2022-01-14 PROBLEM — R56.9 SEIZURE-LIKE ACTIVITY: Status: ACTIVE | Noted: 2022-01-14

## 2022-01-14 PROBLEM — F32.2 MDD (MAJOR DEPRESSIVE DISORDER), SEVERE: Status: ACTIVE | Noted: 2021-11-13

## 2022-01-17 RX ORDER — VENLAFAXINE HYDROCHLORIDE 37.5 MG/1
37.5 CAPSULE, EXTENDED RELEASE ORAL DAILY
Qty: 30 CAPSULE | Refills: 0 | Status: SHIPPED | OUTPATIENT
Start: 2022-01-17 | End: 2022-01-27 | Stop reason: SDUPTHER

## 2022-01-18 ENCOUNTER — PATIENT MESSAGE (OUTPATIENT)
Dept: PSYCHIATRY | Facility: CLINIC | Age: 32
End: 2022-01-18
Payer: COMMERCIAL

## 2022-01-19 ENCOUNTER — OFFICE VISIT (OUTPATIENT)
Dept: PSYCHIATRY | Facility: CLINIC | Age: 32
End: 2022-01-19
Payer: COMMERCIAL

## 2022-01-19 DIAGNOSIS — R56.9 SEIZURE-LIKE ACTIVITY: ICD-10-CM

## 2022-01-19 DIAGNOSIS — F40.10 SOCIAL ANXIETY DISORDER: ICD-10-CM

## 2022-01-19 DIAGNOSIS — G43.E09 CHRONIC MIGRAINE WITH AURA: ICD-10-CM

## 2022-01-19 DIAGNOSIS — F41.1 GAD (GENERALIZED ANXIETY DISORDER): ICD-10-CM

## 2022-01-19 DIAGNOSIS — F33.1 MDD (MAJOR DEPRESSIVE DISORDER), RECURRENT EPISODE, MODERATE: Primary | ICD-10-CM

## 2022-01-19 PROCEDURE — 90833 PSYTX W PT W E/M 30 MIN: CPT | Mod: 95,,, | Performed by: INTERNAL MEDICINE

## 2022-01-19 PROCEDURE — 99214 PR OFFICE/OUTPT VISIT, EST, LEVL IV, 30-39 MIN: ICD-10-PCS | Mod: 95,,, | Performed by: INTERNAL MEDICINE

## 2022-01-19 PROCEDURE — 1159F PR MEDICATION LIST DOCUMENTED IN MEDICAL RECORD: ICD-10-PCS | Mod: CPTII,95,, | Performed by: INTERNAL MEDICINE

## 2022-01-19 PROCEDURE — 1159F MED LIST DOCD IN RCRD: CPT | Mod: CPTII,95,, | Performed by: INTERNAL MEDICINE

## 2022-01-19 PROCEDURE — 99214 OFFICE O/P EST MOD 30 MIN: CPT | Mod: 95,,, | Performed by: INTERNAL MEDICINE

## 2022-01-19 PROCEDURE — 1160F PR REVIEW ALL MEDS BY PRESCRIBER/CLIN PHARMACIST DOCUMENTED: ICD-10-PCS | Mod: CPTII,95,, | Performed by: INTERNAL MEDICINE

## 2022-01-19 PROCEDURE — 1160F RVW MEDS BY RX/DR IN RCRD: CPT | Mod: CPTII,95,, | Performed by: INTERNAL MEDICINE

## 2022-01-19 PROCEDURE — 90833 PR PSYCHOTHERAPY W/PATIENT W/E&M, 30 MIN (ADD ON): ICD-10-PCS | Mod: 95,,, | Performed by: INTERNAL MEDICINE

## 2022-01-19 RX ORDER — LAMOTRIGINE 150 MG/1
225 TABLET ORAL 2 TIMES DAILY
COMMUNITY
End: 2022-02-23

## 2022-01-19 RX ORDER — LACOSAMIDE 150 MG/1
150 TABLET ORAL 2 TIMES DAILY
COMMUNITY
End: 2022-02-23

## 2022-01-19 NOTE — PROGRESS NOTES
OUTPATIENT PSYCHIATRY RETURN VISIT    ENCOUNTER DATE:  1/23/2022  SITE:  [facility], [facility]  LENGTH OF SESSION:  25 minutes    The patient location is:  [place]  The chief complaint leading to consultation is:  Mood    Visit type:  audiovisual    Face to Face time with patient:  25 minutes  35 minutes of total time spent on the encounter, which includes face to face time and non-face to face time preparing to see the patient (eg, review of tests), Obtaining and/or reviewing separately obtained history, Documenting clinical information in the electronic or other health record, Independently interpreting results (not separately reported) and communicating results to the patient/family/caregiver, or Care coordination (not separately reported).     Each patient to whom he or she provides medical services by telemedicine is:  (1) informed of the relationship between the physician and patient and the respective role of any other health care provider with respect to management of the patient; and (2) notified that he or she may decline to receive medical services by telemedicine and may withdraw from such care at any time.    CHIEF COMPLAINT:  Depression and Anxiety      HISTORY OF PRESENTING ILLNESS:  Rosalina Saavedra is a 32 y.o. female with history of MDD, JESUS, and Social Anxiety Disorder who presents for follow up appointment.      Plan at last appointment on 1/12/2022:  · Patient would like to transition Prozac to Effexor with the hope that this will help both mood and migraines.  Cross-taper provided to Effexor 150mg daily.    · Discussed recommendation for BMU and patient plans to call today.    · Spoke with patient's therapist who agrees with service dog as part of patient's treatment plan.  Will send patient letter of recommendation.  · Patient has had EEG for work up of possible seizures, results pending.   · Discussed with patient informed consent, risks versus benefits, alternative  treatments, side effect profile and the inherent unpredictability of individual responses to these treatments.  The patient expresses understanding of the above and displays the capacity to agree with this current plan.  · Continue individual psychotherapy with Apryl Carroll.    History as told by patient:  Says she has been eating a lot.  Has gained 50 pounds in the last 2-3 months.  Has not been as active.  Eating ice cream, pizza, hamburgers, fries.  Usually hungry when she eats except with sweets she will eat even when not hungry.  Weighs about 180, usually 135-140.  Off Prozac now and has been on Effexor for a few days.  No side effects.  Says mood has been ok - definitely better than it was.  Therapy with Apryl has been very helpful for her.  Looking forward to Daron and how he can help her.  Wrote a letter to her dad which she feels helped.  Parents have accepted the support animal.  More positive than negative.  Patient now planning to go with parents to see sister.  2/18 and 2/19.  Gets Daron 2/24.  Has not had any more suicidal thoughts.  Video EEG normal even during seizure-like episodes per patient, sees neurologist again 2/10.  Gets headaches 3-4 days out of the week, a few severe and a few terrible.  Feels family conflict contributes to mood the most.  Has had severe dreams - sometimes can't tell if they are real or a dream.  No common themes, but most of them negative.  Doesn't go to bed until 4-5am - sometimes gets up at 8am.  Other times sleeps until 12-1am.  Sometimes feels very anxious, hard to wind down.  Will watch TV, do things on her phone.  No support system outside of her family.  Has always been that way.    Psychotherapy:  · Target symptoms: depression, anxiety , family stress  · Why chosen therapy is appropriate versus another modality: relevant to diagnosis, patient responds to this modality  · Outcome monitoring methods: self-report, observation  · Therapeutic intervention type: supportive  psychotherapy  · Topics discussed/themes: relationships difficulties, stress related to medical comorbidities, building skills sets for symptom management, symptom recognition  · The patient's response to the intervention is accepting. The patient's progress toward treatment goals is fair.   · Duration of intervention: 17 minutes.    Medication side effects:  Denies  Medication compliance:  Yes    PSYCHIATRIC REVIEW OF SYSTEMS:  Trouble with sleep:  As above  Appetite changes:  Increased  Weight changes:  Gain  Lack of energy:  Yes but improving  Anhedonia:  Yes but improving  Somatic symptoms:  As above  Libido:  Not discussed  Anxiety/panic:  Yes as above  Guilty/hopeless:  Denies  Self-injurious behavior/risky behavior:  Denies  Any drugs:  Denies  Alcohol:  Denies     MEDICAL REVIEW OF SYSTEMS:  Complete review of systems performed covering Constitutional, Musculoskeletal, Neurologic.  All systems negative except for that covered in HPI.    PAST PSYCHIATRIC, MEDICAL, AND SOCIAL HISTORY REVIEWED  The patient's past medical, family and social history have been reviewed and updated as appropriate within the electronic medical record - see encounter notes.    MEDICATIONS:    Current Outpatient Medications:     fluconazole (DIFLUCAN) 150 MG Tab, Take one pill by mouth once and repeat dose in 3 days if symptoms persist, Disp: 2 tablet, Rfl: 1    galcanezumab-gnlm 120 mg/mL PnIj, Inject 120 mg into the skin every 28 days., Disp: 1 mL, Rfl: 6    galcanezumab-gnlm 120 mg/mL PnIj, Inject 240 mg into the skin every 28 days., Disp: 2 mL, Rfl: 0    lacosamide (VIMPAT) 150 mg Tab tablet, Take 150 mg by mouth 2 (two) times a day., Disp: , Rfl:     lamoTRIgine (LAMICTAL) 150 MG Tab, Take 225 mg by mouth 2 (two) times a day., Disp: , Rfl:     promethazine (PHENERGAN) 12.5 MG Tab, Take 1 tablet (12.5 mg total) by mouth every 6 (six) hours as needed (nausea)., Disp: 20 tablet, Rfl: 3    UBROGEPANT 100 mg tablet, Take 100 mg  "by mouth 2 (two) times daily as needed for Headaches., Disp: , Rfl:     venlafaxine (EFFEXOR-XR) 37.5 MG 24 hr capsule, Take 1 capsule (37.5 mg total) by mouth once daily., Disp: 30 capsule, Rfl: 0    ALLERGIES:  Review of patient's allergies indicates:   Allergen Reactions    Codeine Itching    Morphine Itching       PSYCHIATRIC EXAM:  There were no vitals filed for this visit.  Appearance:  Well groomed, appearing healthy and of stated age  Behavior:  Cooperative, pleasant, no psychomotor agitation or retardation  Speech:  Normal rate, rhythm, prosody, and volume  Mood:  "Ok"  Affect:  Constricted  Thought Process:  Linear, logical, goal directed  Thought Content:  Negative for suicidal ideation, homicidal ideation, delusions or hallucinations.  Associations:  Intact  Memory:  Grossly Intact  Level of Consciousness/Orientation:  Grossly intact  Fund of Knowledge:  Good  Attention:  Good  Language:  Fluent, able to name abstract and concrete objects  Insight:  Good  Judgment:  Intact  Psychomotor signs:  No abnormal movements of face  Gait:  Unable to assess via virtual visit    RELEVANT LABS/STUDIES:    Lab Results   Component Value Date    WBC 5.09 10/23/2021    HGB 11.8 (L) 10/23/2021    HCT 36.1 (L) 10/23/2021    MCV 96 10/23/2021     10/23/2021     BMP  Lab Results   Component Value Date     12/21/2021    K 3.8 12/21/2021     (H) 12/21/2021    CO2 24 12/21/2021    BUN 11 12/21/2021    CREATININE 1.0 12/21/2021    CALCIUM 9.0 12/21/2021    ANIONGAP 6 (L) 12/21/2021    ESTGFRAFRICA >60 12/21/2021    EGFRNONAA >60 12/21/2021     Lab Results   Component Value Date    ALT 69 (H) 10/23/2021    AST 58 (H) 10/23/2021    ALKPHOS 60 10/23/2021    BILITOT 0.2 10/23/2021     Lab Results   Component Value Date    TSH 0.524 03/26/2021     Lab Results   Component Value Date    HGBA1C 5.0 03/26/2021       IMPRESSION:    Rosalina Saavedra is a 32 y.o. female with history of MDD, JESUS, and Social Anxiety " Disorder who presents for follow up appointment.    Status/Progress:  Based on the examination today, the patient's problem(s) is/are inadequately controlled.  New problems have not been presented today.    Risk Parameters:  Patient reports no suicidal ideation  Patient reports no homicidal ideation  Patient reports no self-injurious behavior  Patient reports no violent behavior      DIAGNOSES:    ICD-10-CM ICD-9-CM   1. MDD (major depressive disorder), recurrent episode, moderate  F33.1 296.32   2. JESUS (generalized anxiety disorder)  F41.1 300.02   3. Social anxiety disorder  F40.10 300.23   4. Seizure-like activity  R56.9 780.39   5. Chronic migraine with aura  G43.109 346.00       PLAN:  · Slow increase of Effexor to 150mg daily as per titration schedule.    · Patient still has not yet heard back about BMU.  Will send message to BMU team.      · Discussed with patient informed consent, risks versus benefits, alternative treatments, side effect profile and the inherent unpredictability of individual responses to these treatments.  The patient expresses understanding of the above and displays the capacity to agree with this current plan.  · Continue individual psychotherapy with Apryl Carroll.    RETURN TO CLINIC:  Follow up in about 3 weeks (around 2/9/2022).

## 2022-01-23 PROBLEM — F33.1 MDD (MAJOR DEPRESSIVE DISORDER), RECURRENT EPISODE, MODERATE: Status: ACTIVE | Noted: 2021-11-13

## 2022-01-24 ENCOUNTER — PATIENT MESSAGE (OUTPATIENT)
Dept: PSYCHIATRY | Facility: CLINIC | Age: 32
End: 2022-01-24
Payer: COMMERCIAL

## 2022-01-25 ENCOUNTER — PATIENT MESSAGE (OUTPATIENT)
Dept: PSYCHIATRY | Facility: CLINIC | Age: 32
End: 2022-01-25
Payer: COMMERCIAL

## 2022-01-25 DIAGNOSIS — F51.05 INSOMNIA DUE TO OTHER MENTAL DISORDER: Primary | ICD-10-CM

## 2022-01-25 DIAGNOSIS — F99 INSOMNIA DUE TO OTHER MENTAL DISORDER: Primary | ICD-10-CM

## 2022-01-25 RX ORDER — TRAZODONE HYDROCHLORIDE 50 MG/1
50 TABLET ORAL NIGHTLY PRN
Qty: 30 TABLET | Refills: 0 | Status: SHIPPED | OUTPATIENT
Start: 2022-01-25 | End: 2022-02-10

## 2022-01-27 ENCOUNTER — PATIENT MESSAGE (OUTPATIENT)
Dept: PSYCHIATRY | Facility: CLINIC | Age: 32
End: 2022-01-27
Payer: COMMERCIAL

## 2022-01-27 DIAGNOSIS — F32.2 MDD (MAJOR DEPRESSIVE DISORDER), SEVERE: ICD-10-CM

## 2022-01-27 DIAGNOSIS — F41.1 GAD (GENERALIZED ANXIETY DISORDER): ICD-10-CM

## 2022-01-27 RX ORDER — VENLAFAXINE HYDROCHLORIDE 150 MG/1
150 CAPSULE, EXTENDED RELEASE ORAL DAILY
Qty: 30 CAPSULE | Refills: 11 | Status: SHIPPED | OUTPATIENT
Start: 2022-01-27 | End: 2022-02-15 | Stop reason: SDUPTHER

## 2022-02-09 ENCOUNTER — PATIENT OUTREACH (OUTPATIENT)
Dept: ADMINISTRATIVE | Facility: OTHER | Age: 32
End: 2022-02-09
Payer: COMMERCIAL

## 2022-02-09 NOTE — PROGRESS NOTES
Health Maintenance Due   Topic Date Due    Hepatitis C Screening  Never done    HIV Screening  Never done    Influenza Vaccine (1) 09/01/2021     Updates were requested from care everywhere.  Chart was reviewed for overdue Proactive [facility] Encounters (HERMELINDA) topics (CRS, Breast Cancer Screening, Eye exam)  Health Maintenance has been updated.  LINKS immunization registry triggered.  Immunizations were reconciled.

## 2022-02-10 ENCOUNTER — OFFICE VISIT (OUTPATIENT)
Dept: OBSTETRICS AND GYNECOLOGY | Facility: CLINIC | Age: 32
End: 2022-02-10
Payer: COMMERCIAL

## 2022-02-10 VITALS
BODY MASS INDEX: 29.32 KG/M2 | SYSTOLIC BLOOD PRESSURE: 110 MMHG | DIASTOLIC BLOOD PRESSURE: 66 MMHG | WEIGHT: 181.69 LBS

## 2022-02-10 DIAGNOSIS — N89.8 VAGINAL ODOR: Primary | ICD-10-CM

## 2022-02-10 DIAGNOSIS — R82.90 ABNORMAL URINE ODOR: ICD-10-CM

## 2022-02-10 PROCEDURE — 3074F PR MOST RECENT SYSTOLIC BLOOD PRESSURE < 130 MM HG: ICD-10-PCS | Mod: CPTII,S$GLB,, | Performed by: OBSTETRICS & GYNECOLOGY

## 2022-02-10 PROCEDURE — 99212 PR OFFICE/OUTPT VISIT, EST, LEVL II, 10-19 MIN: ICD-10-PCS | Mod: S$GLB,,, | Performed by: OBSTETRICS & GYNECOLOGY

## 2022-02-10 PROCEDURE — 3008F PR BODY MASS INDEX (BMI) DOCUMENTED: ICD-10-PCS | Mod: CPTII,S$GLB,, | Performed by: OBSTETRICS & GYNECOLOGY

## 2022-02-10 PROCEDURE — 99999 PR PBB SHADOW E&M-EST. PATIENT-LVL III: ICD-10-PCS | Mod: PBBFAC,,, | Performed by: OBSTETRICS & GYNECOLOGY

## 2022-02-10 PROCEDURE — 3078F PR MOST RECENT DIASTOLIC BLOOD PRESSURE < 80 MM HG: ICD-10-PCS | Mod: CPTII,S$GLB,, | Performed by: OBSTETRICS & GYNECOLOGY

## 2022-02-10 PROCEDURE — 1159F PR MEDICATION LIST DOCUMENTED IN MEDICAL RECORD: ICD-10-PCS | Mod: CPTII,S$GLB,, | Performed by: OBSTETRICS & GYNECOLOGY

## 2022-02-10 PROCEDURE — 3008F BODY MASS INDEX DOCD: CPT | Mod: CPTII,S$GLB,, | Performed by: OBSTETRICS & GYNECOLOGY

## 2022-02-10 PROCEDURE — 1160F PR REVIEW ALL MEDS BY PRESCRIBER/CLIN PHARMACIST DOCUMENTED: ICD-10-PCS | Mod: CPTII,S$GLB,, | Performed by: OBSTETRICS & GYNECOLOGY

## 2022-02-10 PROCEDURE — 1160F RVW MEDS BY RX/DR IN RCRD: CPT | Mod: CPTII,S$GLB,, | Performed by: OBSTETRICS & GYNECOLOGY

## 2022-02-10 PROCEDURE — 99212 OFFICE O/P EST SF 10 MIN: CPT | Mod: S$GLB,,, | Performed by: OBSTETRICS & GYNECOLOGY

## 2022-02-10 PROCEDURE — 87086 URINE CULTURE/COLONY COUNT: CPT | Performed by: OBSTETRICS & GYNECOLOGY

## 2022-02-10 PROCEDURE — 99999 PR PBB SHADOW E&M-EST. PATIENT-LVL III: CPT | Mod: PBBFAC,,, | Performed by: OBSTETRICS & GYNECOLOGY

## 2022-02-10 PROCEDURE — 3078F DIAST BP <80 MM HG: CPT | Mod: CPTII,S$GLB,, | Performed by: OBSTETRICS & GYNECOLOGY

## 2022-02-10 PROCEDURE — 87481 CANDIDA DNA AMP PROBE: CPT | Mod: 59 | Performed by: OBSTETRICS & GYNECOLOGY

## 2022-02-10 PROCEDURE — 3074F SYST BP LT 130 MM HG: CPT | Mod: CPTII,S$GLB,, | Performed by: OBSTETRICS & GYNECOLOGY

## 2022-02-10 PROCEDURE — 87801 DETECT AGNT MULT DNA AMPLI: CPT | Performed by: OBSTETRICS & GYNECOLOGY

## 2022-02-10 PROCEDURE — 1159F MED LIST DOCD IN RCRD: CPT | Mod: CPTII,S$GLB,, | Performed by: OBSTETRICS & GYNECOLOGY

## 2022-02-10 RX ORDER — METRONIDAZOLE 500 MG/1
500 TABLET ORAL EVERY 12 HOURS
Qty: 14 TABLET | Refills: 0 | Status: SHIPPED | OUTPATIENT
Start: 2022-02-10 | End: 2022-02-17

## 2022-02-10 NOTE — PROGRESS NOTES
GYNECOLOGY OFFICE NOTE    Reason for visit: vaginal and urine odor    HPI: Pt is a 32 y.o.  female  who presents for evaluation of vaginal odor and odor to urine x 1-2 weeks.     Past Medical History:   Diagnosis Date    Anxiety     Depression     Migraine     Migraine headache        Past Surgical History:   Procedure Laterality Date    KNEE ARTHROSCOPY      TIBIA FRACTURE SURGERY         Family History   Problem Relation Age of Onset    Diabetes Mother     Breast cancer Mother 61    Diabetes Maternal Grandmother     Hypertension Maternal Grandmother     Breast cancer Other 71        moms paternal grand mother       Social History     Tobacco Use    Smoking status: Never Smoker    Smokeless tobacco: Never Used   Substance Use Topics    Alcohol use: No    Drug use: No       OB History    Para Term  AB Living   0 0 0 0 0 0   SAB IAB Ectopic Multiple Live Births   0 0 0 0         GYN HISTORY  Age of Menarche:14  Age at first pregnancy:    Age at first live birth:   Age at Menopause:       Current Outpatient Medications   Medication Sig    galcanezumab-gnlm 120 mg/mL PnIj Inject 120 mg into the skin every 28 days.    lacosamide (VIMPAT) 150 mg Tab tablet Take 150 mg by mouth 2 (two) times a day.    lamoTRIgine (LAMICTAL) 150 MG Tab Take 225 mg by mouth 2 (two) times a day.    UBROGEPANT 100 mg tablet Take 100 mg by mouth 2 (two) times daily as needed for Headaches.    venlafaxine (EFFEXOR-XR) 150 MG Cp24 Take 1 capsule (150 mg total) by mouth once daily.    metroNIDAZOLE (FLAGYL) 500 MG tablet Take 1 tablet (500 mg total) by mouth every 12 (twelve) hours. Do not take with alcohol for 7 days     No current facility-administered medications for this visit.       Allergies: Codeine and Morphine     /66   Wt 82.4 kg (181 lb 10.5 oz)   LMP 2022 (Approximate)   BMI 29.32 kg/m²     ROS:  GENERAL: Denies fever or chills.   SKIN: Denies rash or lesions.   HEAD:  Denies head injury or headache.   CHEST: Denies chest pain or shortness of breath.   CARDIOVASCULAR: Denies palpitations or chest pain.   ABDOMEN: No constipation, diarrhea, nausea, vomiting or rectal bleeding.   URINARY: No dysuria, hematuria, or burning on urination.  REPRODUCTIVE: See HPI.   BREASTS: see HPI  NEUROLOGIC: Denies syncope or weakness.     Physical Exam:  GENERAL: alert, appears stated age and cooperative  NEUROLOGIC: orientated to person, place and time, normal mood and affect   CHEST: Normal respiratory effort  NECK: normal appearancenot examinedSKIN: no acne, hirsutism  BREAST EXAM: not examined  ABDOMEN: abdomen is soft without significant tenderness, masses  EXTERNAL GENITALIA:  normal general appearance  URETHRA: normal urethra, normal urethral meatus  VAGINA:  normal mucosa, no  lesions  CERVIX:  Normal      Diagnosis:  1. Vaginal odor    2. Abnormal urine odor        Plan:   1. F/u affirm- rx flagyl sent while awaiting results  2. F/u urine culture    Orders Placed This Encounter    Urine culture    Vaginosis Screen by DNA Probe    metroNIDAZOLE (FLAGYL) 500 MG tablet       Face to Face time with patient: 15 minutes of total time spent on the encounter, which includes face to face time and non-face to face time preparing to see the patient (eg, review of tests), Obtaining and/or reviewing separately obtained history, Documenting clinical information in the electronic or other health record, Independently interpreting results (not separately reported) and communicating results to the patient/family/caregiver, or Care coordination (not separately reported).         Nallely Browning MD  OB/GYN

## 2022-02-11 ENCOUNTER — OFFICE VISIT (OUTPATIENT)
Dept: PSYCHIATRY | Facility: CLINIC | Age: 32
End: 2022-02-11
Payer: COMMERCIAL

## 2022-02-11 DIAGNOSIS — F41.1 GAD (GENERALIZED ANXIETY DISORDER): ICD-10-CM

## 2022-02-11 DIAGNOSIS — F33.1 MDD (MAJOR DEPRESSIVE DISORDER), RECURRENT EPISODE, MODERATE: Primary | ICD-10-CM

## 2022-02-11 DIAGNOSIS — G43.E09 CHRONIC MIGRAINE WITH AURA: ICD-10-CM

## 2022-02-11 DIAGNOSIS — F40.10 SOCIAL ANXIETY DISORDER: ICD-10-CM

## 2022-02-11 DIAGNOSIS — F44.5 PSYCHOGENIC NONEPILEPTIC SEIZURE: ICD-10-CM

## 2022-02-11 LAB
BACTERIA UR CULT: NORMAL
BACTERIA UR CULT: NORMAL

## 2022-02-11 PROCEDURE — 1159F MED LIST DOCD IN RCRD: CPT | Mod: CPTII,95,, | Performed by: INTERNAL MEDICINE

## 2022-02-11 PROCEDURE — 1159F PR MEDICATION LIST DOCUMENTED IN MEDICAL RECORD: ICD-10-PCS | Mod: CPTII,95,, | Performed by: INTERNAL MEDICINE

## 2022-02-11 PROCEDURE — 1160F PR REVIEW ALL MEDS BY PRESCRIBER/CLIN PHARMACIST DOCUMENTED: ICD-10-PCS | Mod: CPTII,95,, | Performed by: INTERNAL MEDICINE

## 2022-02-11 PROCEDURE — 1160F RVW MEDS BY RX/DR IN RCRD: CPT | Mod: CPTII,95,, | Performed by: INTERNAL MEDICINE

## 2022-02-11 PROCEDURE — 99214 OFFICE O/P EST MOD 30 MIN: CPT | Mod: 95,,, | Performed by: INTERNAL MEDICINE

## 2022-02-11 PROCEDURE — 99214 PR OFFICE/OUTPT VISIT, EST, LEVL IV, 30-39 MIN: ICD-10-PCS | Mod: 95,,, | Performed by: INTERNAL MEDICINE

## 2022-02-11 NOTE — PROGRESS NOTES
OUTPATIENT PSYCHIATRY RETURN VISIT    ENCOUNTER DATE:  2/20/2022  SITE:  [facility], [facility]  LENGTH OF SESSION:  25 minutes    The patient location is:  [place]  The chief complaint leading to consultation is:  Mood    Visit type:  audiovisual    Face to Face time with patient:  25 minutes  35 minutes of total time spent on the encounter, which includes face to face time and non-face to face time preparing to see the patient (eg, review of tests), Obtaining and/or reviewing separately obtained history, Documenting clinical information in the electronic or other health record, Independently interpreting results (not separately reported) and communicating results to the patient/family/caregiver, or Care coordination (not separately reported).     Each patient to whom he or she provides medical services by telemedicine is:  (1) informed of the relationship between the physician and patient and the respective role of any other health care provider with respect to management of the patient; and (2) notified that he or she may decline to receive medical services by telemedicine and may withdraw from such care at any time.    CHIEF COMPLAINT:  Depression and Anxiety      HISTORY OF PRESENTING ILLNESS:  Rosalina Saavedra is a 32 y.o. female with history of MDD, JESUS, and Social Anxiety Disorder who presents for follow up appointment.      Plan at last appointment on 1/19/2022:  · Slow increase of Effexor to 150mg daily as per titration schedule.    · Patient still has not yet heard back about BMU.  Will send message to BMU team.      · Discussed with patient informed consent, risks versus benefits, alternative treatments, side effect profile and the inherent unpredictability of individual responses to these treatments.  The patient expresses understanding of the above and displays the capacity to agree with this current plan.  · Continue individual psychotherapy with Apryl Carroll.    2/8/22 portal  message:  Dear Dr. Worley,  I was going to tell you this at our appointment, but Apryl insisted I talk to you now. I'm still staying up between 12 hours to 24 hours. My hands are shaking so much that handwriting is illegible and often very often crooked. I'm seeing things a lot now. It usually happens more in places with not much light, but it can occasionally happen with light. I'm also having trouble recalling if something happened or not. I also can't  think of words or sentences when I'm in a conversation. It's like my mind goes blank. When I do actually  remember somerthing I have a hard time pronouncing it. Lately I have been very dizzy and uncoordinated. I have been tripping over things, running into them and falling.  Feels like really bad coordination.  Sometimes just dizziness, sometimes just bad coordination.      History as told by patient:  Says symptoms started about 2.5 weeks ago.  First noticed dizziness and tripping and falling.  Then not being able to remember anything - words or having a conversation with someone.  Spelling is really bad.  Handwriting is also terrible.  Saw Neurology yesterday - EEG showed no seizures.  She says Neurology is blaming dizziness and poor coordination on stress.  He increased dose of Vimpat about 3 weeks ago.  Headache medications stopped working.  Tripping is every day, falling every other day.  Says she is still seeing things too - either at night or other places where it is dark.  In parking lot, thought she saw a cat.  Sometimes its just blurriness.  Denies AH's.  Occasionally will hear loud banging.  Sometimes will be up for 24 hours and then will be extremely tired.  This has been going on for 2 weeks.  This happened twice.  Goes to bed 1-3am or even 5-6am.  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  Dad said he was tapping her and she didn't wake up - didn't try anything harder than tapping.   Getting total of about 12 hours of sleep per night.  Does some work around the house, sits down.  Then falls asleep again for 2 hour nap.  Takes Trazodone about 9-10pm but doesn't fall asleep.  Says it doesn't really work.  Watches TV.  Says her mood was good and felt Effexor was helping.  Mood a little worse now - stress, family stuff.  Opens up to mother.  Tried to open up to dad and he got defensive.  Takes Zanaflex 0-2 times per day - does not make her sleepy.  Does not help her headaches.  Takes for shoulders or back pain.  Some meds - Uvelvy, Cambia, and Trudessa - her Neurologist stopped.  Restarted Namenda again - started titration yesterday.       Medication side effects:  Denies  Medication compliance:  Yes    PSYCHIATRIC REVIEW OF SYSTEMS:  Trouble with sleep:  Increased as above  Appetite changes:  Increased  Weight changes:  Gain  Lack of energy:  Yes   Anhedonia:  Yes but improving  Somatic symptoms:  As above  Libido:  Not discussed  Anxiety/panic:  Yes mostly family stress  Guilty/hopeless:  Denies  Self-injurious behavior/risky behavior:  Denies  Any drugs:  Denies  Alcohol:  Denies     MEDICAL REVIEW OF SYSTEMS:  Complete review of systems performed covering Constitutional, Musculoskeletal, Neurologic.  All systems negative except for that covered in HPI.    PAST PSYCHIATRIC, MEDICAL, AND SOCIAL HISTORY REVIEWED  The patient's past medical, family and social history have been reviewed and updated as appropriate within the electronic medical record - see encounter notes.    MEDICATIONS:    Current Outpatient Medications:     eletriptan (RELPAX) 40 MG tablet, Take 1 tablet by mouth daily as needed for Headaches., Disp: , Rfl:     erenumab-aooe (AIMOVIG AUTOINJECTOR) 140 mg/mL autoinjector, Inject 140 mg into the skin every 30 days., Disp: , Rfl:     lacosamide (VIMPAT) 150 mg Tab tablet, Take 150 mg by mouth 2 (two) times a day., Disp: , Rfl:     lamoTRIgine (LAMICTAL) 150 MG Tab, Take 225 mg by  "mouth 2 (two) times a day., Disp: , Rfl:     methylPREDNISolone (MEDROL, MIK,) 4 mg tablet, Take 1 tablet by mouth once daily., Disp: , Rfl:     nitrofurantoin, macrocrystal-monohydrate, (MACROBID) 100 MG capsule, Take 1 capsule (100 mg total) by mouth 2 (two) times daily. for 7 days, Disp: 14 capsule, Rfl: 0    ondansetron (ZOFRAN-ODT) 4 MG TbDL, Take 1 tablet by mouth 3 (three) times daily as needed., Disp: , Rfl:     venlafaxine (EFFEXOR-XR) 150 MG Cp24, Take 1 capsule (150 mg total) by mouth once daily. Take with 75mg capsule to equal 225mg daily., Disp: 30 capsule, Rfl: 11    venlafaxine (EFFEXOR-XR) 75 MG 24 hr capsule, Take 1 capsule (75 mg total) by mouth once daily. Take with 150mg capsule to equal 225mg daily., Disp: 30 capsule, Rfl: 11    ALLERGIES:  Review of patient's allergies indicates:   Allergen Reactions    Codeine Itching    Morphine Itching       PSYCHIATRIC EXAM:  There were no vitals filed for this visit.  Appearance:  Well groomed, appearing healthy and of stated age  Behavior:  Cooperative, pleasant, no psychomotor agitation or retardation  Speech:  Normal rate, rhythm, prosody, and volume  Mood:  "It was good but now worse  Affect:  Constricted  Thought Process:  Linear, logical, goal directed  Thought Content:  Negative for suicidal ideation, homicidal ideation, delusions or hallucinations.  Associations:  Intact  Memory:  Grossly Intact  Level of Consciousness/Orientation:  Grossly intact  Fund of Knowledge:  Good  Attention:  Good  Language:  Fluent, able to name abstract and concrete objects  Insight:  Good  Judgment:  Intact  Psychomotor signs:  No abnormal movements of face  Gait:  Unable to assess via virtual visit    RELEVANT LABS/STUDIES:    Lab Results   Component Value Date    WBC 5.09 10/23/2021    HGB 11.8 (L) 10/23/2021    HCT 36.1 (L) 10/23/2021    MCV 96 10/23/2021     10/23/2021     BMP  Lab Results   Component Value Date     12/21/2021    K 3.8 " 12/21/2021     (H) 12/21/2021    CO2 24 12/21/2021    BUN 11 12/21/2021    CREATININE 1.0 12/21/2021    CALCIUM 9.0 12/21/2021    ANIONGAP 6 (L) 12/21/2021    ESTGFRAFRICA >60 12/21/2021    EGFRNONAA >60 12/21/2021     Lab Results   Component Value Date    ALT 69 (H) 10/23/2021    AST 58 (H) 10/23/2021    ALKPHOS 60 10/23/2021    BILITOT 0.2 10/23/2021     Lab Results   Component Value Date    TSH 0.524 03/26/2021     Lab Results   Component Value Date    HGBA1C 5.0 03/26/2021       IMPRESSION:    Rosalina Saavedra is a 32 y.o. female with history of MDD, JESUS, and Social Anxiety Disorder who presents for follow up appointment.    Status/Progress:  Based on the examination today, the patient's problem(s) is/are inadequately controlled.  New problems have not been presented today.    Risk Parameters:  Patient reports no suicidal ideation  Patient reports no homicidal ideation  Patient reports no self-injurious behavior  Patient reports no violent behavior      DIAGNOSES:    ICD-10-CM ICD-9-CM   1. MDD (major depressive disorder), recurrent episode, moderate  F33.1 296.32   2. JESUS (generalized anxiety disorder)  F41.1 300.02   3. Social anxiety disorder  F40.10 300.23   4. Psychogenic nonepileptic seizure  F44.5 300.11   5. Chronic migraine with aura  G43.109 346.00       PLAN:  · Discussed possible etiologies of her episodes of confusion, dizziness, and falling.  Neurologist believes this to be psychogenic.  I discussed my concern for metabolic causes versus medication side effects.  Encouraged her to schedule follow up appointment with PCP to rule out metabolic causes.  I will also call her Neurologist to discuss my concern for medication side effects.  Unclear if he has checked levels of her seizure medications.    · Continue Effexor 150mg daily for now.  Stop Trazodone since it isn't helping her fall asleep and she is currently sleeping too much.    · Patient states she [place]  [facility]  · Discussed with patient informed consent, risks versus benefits, alternative treatments, side effect profile and the inherent unpredictability of individual responses to these treatments.  The patient expresses understanding of the above and displays the capacity to agree with this current plan.  · Continue individual psychotherapy with Apryl Carroll.  I will speak with her again this week.  Encouraged patient to consider family therapy.    RETURN TO CLINIC:  Follow up in about 1 week (around 2/18/2022).

## 2022-02-15 ENCOUNTER — TELEPHONE (OUTPATIENT)
Dept: PSYCHIATRY | Facility: CLINIC | Age: 32
End: 2022-02-15
Payer: COMMERCIAL

## 2022-02-15 ENCOUNTER — OFFICE VISIT (OUTPATIENT)
Dept: PSYCHIATRY | Facility: CLINIC | Age: 32
End: 2022-02-15
Payer: COMMERCIAL

## 2022-02-15 DIAGNOSIS — F41.1 GAD (GENERALIZED ANXIETY DISORDER): ICD-10-CM

## 2022-02-15 DIAGNOSIS — F44.5 PSYCHOGENIC NONEPILEPTIC SEIZURE: ICD-10-CM

## 2022-02-15 DIAGNOSIS — F33.1 MDD (MAJOR DEPRESSIVE DISORDER), RECURRENT EPISODE, MODERATE: Primary | ICD-10-CM

## 2022-02-15 DIAGNOSIS — G43.E09 CHRONIC MIGRAINE WITH AURA: ICD-10-CM

## 2022-02-15 DIAGNOSIS — G47.10 HYPERSOMNIA: ICD-10-CM

## 2022-02-15 DIAGNOSIS — F40.10 SOCIAL ANXIETY DISORDER: ICD-10-CM

## 2022-02-15 LAB
BACTERIAL VAGINOSIS DNA: NEGATIVE
CANDIDA GLABRATA DNA: NEGATIVE
CANDIDA KRUSEI DNA: NEGATIVE
CANDIDA RRNA VAG QL PROBE: NEGATIVE
T VAGINALIS RRNA GENITAL QL PROBE: NEGATIVE

## 2022-02-15 PROCEDURE — 1160F PR REVIEW ALL MEDS BY PRESCRIBER/CLIN PHARMACIST DOCUMENTED: ICD-10-PCS | Mod: CPTII,95,, | Performed by: INTERNAL MEDICINE

## 2022-02-15 PROCEDURE — 1159F MED LIST DOCD IN RCRD: CPT | Mod: CPTII,95,, | Performed by: INTERNAL MEDICINE

## 2022-02-15 PROCEDURE — 99214 PR OFFICE/OUTPT VISIT, EST, LEVL IV, 30-39 MIN: ICD-10-PCS | Mod: 95,,, | Performed by: INTERNAL MEDICINE

## 2022-02-15 PROCEDURE — 99214 OFFICE O/P EST MOD 30 MIN: CPT | Mod: 95,,, | Performed by: INTERNAL MEDICINE

## 2022-02-15 PROCEDURE — 1159F PR MEDICATION LIST DOCUMENTED IN MEDICAL RECORD: ICD-10-PCS | Mod: CPTII,95,, | Performed by: INTERNAL MEDICINE

## 2022-02-15 PROCEDURE — 1160F RVW MEDS BY RX/DR IN RCRD: CPT | Mod: CPTII,95,, | Performed by: INTERNAL MEDICINE

## 2022-02-15 RX ORDER — ONDANSETRON 4 MG/1
1 TABLET, ORALLY DISINTEGRATING ORAL 3 TIMES DAILY PRN
COMMUNITY
End: 2022-02-23

## 2022-02-15 RX ORDER — VENLAFAXINE HYDROCHLORIDE 150 MG/1
150 CAPSULE, EXTENDED RELEASE ORAL DAILY
Qty: 30 CAPSULE | Refills: 11 | Status: SHIPPED | OUTPATIENT
Start: 2022-02-15 | End: 2023-03-19 | Stop reason: SDUPTHER

## 2022-02-15 RX ORDER — ELETRIPTAN HYDROBROMIDE 40 MG/1
1 TABLET, FILM COATED ORAL DAILY PRN
COMMUNITY
End: 2022-02-23

## 2022-02-15 RX ORDER — VENLAFAXINE HYDROCHLORIDE 75 MG/1
75 CAPSULE, EXTENDED RELEASE ORAL DAILY
Qty: 30 CAPSULE | Refills: 11 | Status: SHIPPED | OUTPATIENT
Start: 2022-02-15 | End: 2023-03-19 | Stop reason: SDUPTHER

## 2022-02-15 RX ORDER — METHYLPREDNISOLONE 4 MG/1
1 TABLET ORAL DAILY
COMMUNITY
End: 2022-02-23

## 2022-02-15 RX ORDER — ERENUMAB-AOOE 140 MG/ML
140 INJECTION, SOLUTION SUBCUTANEOUS
COMMUNITY
End: 2022-05-06 | Stop reason: SDUPTHER

## 2022-02-15 NOTE — PROGRESS NOTES
OUTPATIENT PSYCHIATRY RETURN VISIT    ENCOUNTER DATE:  2/20/2022  SITE:  [facility], [facility]  LENGTH OF SESSION:  23 minutes    The patient location is:  [place]  The chief complaint leading to consultation is:  Mood    Visit type:  audiovisual    Face to Face time with patient:  23 minutes  30 minutes of total time spent on the encounter, which includes face to face time and non-face to face time preparing to see the patient (eg, review of tests), Obtaining and/or reviewing separately obtained history, Documenting clinical information in the electronic or other health record, Independently interpreting results (not separately reported) and communicating results to the patient/family/caregiver, or Care coordination (not separately reported).     Each patient to whom he or she provides medical services by telemedicine is:  (1) informed of the relationship between the physician and patient and the respective role of any other health care provider with respect to management of the patient; and (2) notified that he or she may decline to receive medical services by telemedicine and may withdraw from such care at any time.    CHIEF COMPLAINT:  Depression and Anxiety      HISTORY OF PRESENTING ILLNESS:  Rosalina Saavedra is a 32 y.o. female with history of MDD, JESUS, and Social Anxiety Disorder who presents for follow up appointment.      Plan at last appointment on 2/11/2022:  · Slow increase of Effexor to 150mg daily as per titration schedule.    · Patient still has not yet heard back about BMU.  Will send message to BMU team.      · Discussed with patient informed consent, risks versus benefits, alternative treatments, side effect profile and the inherent unpredictability of individual responses to these treatments.  The patient expresses understanding of the above and displays the capacity to agree with this current plan.  · Continue individual psychotherapy with Apryl Carroll.    History as told  "by patient:  Says she is doing much worse.  Has had headache for 2 weeks.  Has not been able to get Relpax (eletriptan) from pharmacy due to needing PA.  On Medrol dose pack as well.  Still running into things, dizzy, and falling - a little better.  Was washing her hair, heard what sounded like back door opening or closing.  [place] mother say to get her a bottle of water but mother said she didn't say that.  Still having intense dreams that she can't tell if they are real or not - usually negative about her relationships with her family.  Saw a cat in a parking garage.  Denies hearing voices saying negative things.  Its only sounds.  If she sees things, usually "swirly stuff" - images she can't tell where it ends and begins.  Sleep the same.  Will stay up all night, sleep for a few hours, up, then sleep again.  Depression and anxiety slightly better.  More anxiety than depression.  Denies SI.    Medication side effects:  Denies  Medication compliance:  Yes    PSYCHIATRIC REVIEW OF SYSTEMS:  Trouble with sleep:  Hypersomnia  Appetite changes:  Increased  Weight changes:  Gain  Lack of energy:  Yes  Anhedonia:  Yes   Somatic symptoms:  As above  Libido:  Not discussed  Anxiety/panic:  Yes as above  Guilty/hopeless:  Denies  Self-injurious behavior/risky behavior:  Denies  Any drugs:  Denies  Alcohol:  Denies     MEDICAL REVIEW OF SYSTEMS:  Complete review of systems performed covering Constitutional, Musculoskeletal, Neurologic.  All systems negative except for that covered in HPI.    PAST PSYCHIATRIC, MEDICAL, AND SOCIAL HISTORY REVIEWED  The patient's past medical, family and social history have been reviewed and updated as appropriate within the electronic medical record - see encounter notes.    MEDICATIONS:    Current Outpatient Medications:     eletriptan (RELPAX) 40 MG tablet, Take 1 tablet by mouth daily as needed for Headaches., Disp: , Rfl:     erenumab-aooe (AIMOVIG AUTOINJECTOR) 140 mg/mL autoinjector, " "Inject 140 mg into the skin every 30 days., Disp: , Rfl:     lacosamide (VIMPAT) 150 mg Tab tablet, Take 150 mg by mouth 2 (two) times a day., Disp: , Rfl:     lamoTRIgine (LAMICTAL) 150 MG Tab, Take 225 mg by mouth 2 (two) times a day., Disp: , Rfl:     methylPREDNISolone (MEDROL, MIK,) 4 mg tablet, Take 1 tablet by mouth once daily., Disp: , Rfl:     nitrofurantoin, macrocrystal-monohydrate, (MACROBID) 100 MG capsule, Take 1 capsule (100 mg total) by mouth 2 (two) times daily. for 7 days, Disp: 14 capsule, Rfl: 0    ondansetron (ZOFRAN-ODT) 4 MG TbDL, Take 1 tablet by mouth 3 (three) times daily as needed., Disp: , Rfl:     venlafaxine (EFFEXOR-XR) 150 MG Cp24, Take 1 capsule (150 mg total) by mouth once daily. Take with 75mg capsule to equal 225mg daily., Disp: 30 capsule, Rfl: 11    venlafaxine (EFFEXOR-XR) 75 MG 24 hr capsule, Take 1 capsule (75 mg total) by mouth once daily. Take with 150mg capsule to equal 225mg daily., Disp: 30 capsule, Rfl: 11    ALLERGIES:  Review of patient's allergies indicates:   Allergen Reactions    Codeine Itching    Morphine Itching       PSYCHIATRIC EXAM:  There were no vitals filed for this visit.  Appearance:  Well groomed, appearing healthy and of stated age  Behavior:  Cooperative, pleasant, no psychomotor agitation or retardation  Speech:  Normal rate, rhythm, prosody, and volume  Mood:  "Worse"  Affect:  Constricted  Thought Process:  Linear, logical, goal directed  Thought Content:  Negative for suicidal ideation, homicidal ideation, delusions or hallucinations.  Associations:  Intact  Memory:  Grossly Intact  Level of Consciousness/Orientation:  Grossly intact  Fund of Knowledge:  Good  Attention:  Good  Language:  Fluent, able to name abstract and concrete objects  Insight:  Good  Judgment:  Intact  Psychomotor signs:  No abnormal movements of face  Gait:  Unable to assess via virtual visit    RELEVANT LABS/STUDIES:    Lab Results   Component Value Date    WBC " 5.09 10/23/2021    HGB 11.8 (L) 10/23/2021    HCT 36.1 (L) 10/23/2021    MCV 96 10/23/2021     10/23/2021     BMP  Lab Results   Component Value Date     12/21/2021    K 3.8 12/21/2021     (H) 12/21/2021    CO2 24 12/21/2021    BUN 11 12/21/2021    CREATININE 1.0 12/21/2021    CALCIUM 9.0 12/21/2021    ANIONGAP 6 (L) 12/21/2021    ESTGFRAFRICA >60 12/21/2021    EGFRNONAA >60 12/21/2021     Lab Results   Component Value Date    ALT 69 (H) 10/23/2021    AST 58 (H) 10/23/2021    ALKPHOS 60 10/23/2021    BILITOT 0.2 10/23/2021     Lab Results   Component Value Date    TSH 0.524 03/26/2021     Lab Results   Component Value Date    HGBA1C 5.0 03/26/2021       IMPRESSION:    Rosalina Saavedra is a 32 y.o. female with history of MDD, JESUS, and Social Anxiety Disorder who presents for follow up appointment.    Status/Progress:  Based on the examination today, the patient's problem(s) is/are inadequately controlled.  New problems have not been presented today.    Risk Parameters:  Patient reports no suicidal ideation  Patient reports no homicidal ideation  Patient reports no self-injurious behavior  Patient reports no violent behavior      DIAGNOSES:    ICD-10-CM ICD-9-CM   1. MDD (major depressive disorder), recurrent episode, moderate  F33.1 296.32   2. JESUS (generalized anxiety disorder)  F41.1 300.02   3. Social anxiety disorder  F40.10 300.23   4. Psychogenic nonepileptic seizure  F44.5 300.11   5. Hypersomnia  G47.10 780.54   6. Chronic migraine with aura  G43.109 346.00       PLAN:  · Spoke with Neurologist who will see her this week and get labs, including seizure medication levels.  Although recent EEG consistent with non-epileptic seizures, he reports she needs to stay on seizure medication in case she has both non-epileptic and epileptic seizures.   · Increase Effexor to 225mg daily to better target depression and anxiety.    · Consider addition of Abilify in the future if depression remains poorly  controlled.    · Discussed with patient informed consent, risks versus benefits, alternative treatments, side effect profile and the inherent unpredictability of individual responses to these treatments.  The patient expresses understanding of the above and displays the capacity to agree with this current plan.  · Continue individual psychotherapy with Apryl Carroll.    RETURN TO CLINIC:  Follow up in about 1 week (around 2/22/2022).

## 2022-02-15 NOTE — TELEPHONE ENCOUNTER
Spoke with patient's neurologist, Dr. Annalise Crain.  He reports 72 hour EEG was normal and so he does suspect nonepileptic seizures.  However, he said he can not rule out that she has both epileptic and non-epileptic seizures.  Therefore since seizures improved on current medications, he was planning to continue both Lamictal and Vimpat.  He is aware of patients recent symptoms (dizziness, falling, etc).  He plans to reach out to patient to schedule sooner appointment and also get labs, including Vimpat and Lamictal levels.  He also agrees with her seeing her PCP to rule out metabolic causes.

## 2022-02-16 ENCOUNTER — PATIENT MESSAGE (OUTPATIENT)
Dept: OBSTETRICS AND GYNECOLOGY | Facility: CLINIC | Age: 32
End: 2022-02-16
Payer: COMMERCIAL

## 2022-02-16 PROBLEM — F44.5 PSYCHOGENIC NONEPILEPTIC SEIZURE: Status: ACTIVE | Noted: 2022-01-14

## 2022-02-17 RX ORDER — NITROFURANTOIN 25; 75 MG/1; MG/1
100 CAPSULE ORAL 2 TIMES DAILY
Qty: 14 CAPSULE | Refills: 0 | Status: SHIPPED | OUTPATIENT
Start: 2022-02-17 | End: 2022-02-24

## 2022-02-17 NOTE — TELEPHONE ENCOUNTER
Lets try an antibiotic for a UTI and see if her symptoms improve. If not please let me know    Nallely Browning MD, FACOG  OB/GYN

## 2022-02-23 ENCOUNTER — OFFICE VISIT (OUTPATIENT)
Dept: PSYCHIATRY | Facility: CLINIC | Age: 32
End: 2022-02-23
Payer: COMMERCIAL

## 2022-02-23 DIAGNOSIS — G43.E09 CHRONIC MIGRAINE WITH AURA: ICD-10-CM

## 2022-02-23 DIAGNOSIS — F44.5 PSYCHOGENIC NONEPILEPTIC SEIZURE: ICD-10-CM

## 2022-02-23 DIAGNOSIS — F40.10 SOCIAL ANXIETY DISORDER: ICD-10-CM

## 2022-02-23 DIAGNOSIS — F41.1 GAD (GENERALIZED ANXIETY DISORDER): ICD-10-CM

## 2022-02-23 DIAGNOSIS — F33.1 MDD (MAJOR DEPRESSIVE DISORDER), RECURRENT EPISODE, MODERATE: Primary | ICD-10-CM

## 2022-02-23 PROCEDURE — 1160F RVW MEDS BY RX/DR IN RCRD: CPT | Mod: CPTII,95,, | Performed by: INTERNAL MEDICINE

## 2022-02-23 PROCEDURE — 1159F PR MEDICATION LIST DOCUMENTED IN MEDICAL RECORD: ICD-10-PCS | Mod: CPTII,95,, | Performed by: INTERNAL MEDICINE

## 2022-02-23 PROCEDURE — 99214 PR OFFICE/OUTPT VISIT, EST, LEVL IV, 30-39 MIN: ICD-10-PCS | Mod: 95,,, | Performed by: INTERNAL MEDICINE

## 2022-02-23 PROCEDURE — 1159F MED LIST DOCD IN RCRD: CPT | Mod: CPTII,95,, | Performed by: INTERNAL MEDICINE

## 2022-02-23 PROCEDURE — 1160F PR REVIEW ALL MEDS BY PRESCRIBER/CLIN PHARMACIST DOCUMENTED: ICD-10-PCS | Mod: CPTII,95,, | Performed by: INTERNAL MEDICINE

## 2022-02-23 PROCEDURE — 99214 OFFICE O/P EST MOD 30 MIN: CPT | Mod: 95,,, | Performed by: INTERNAL MEDICINE

## 2022-02-23 RX ORDER — ERGOCALCIFEROL 1.25 MG/1
50000 CAPSULE ORAL
COMMUNITY
End: 2022-07-12

## 2022-02-23 RX ORDER — LAMOTRIGINE 200 MG/1
200 TABLET ORAL 2 TIMES DAILY
COMMUNITY
End: 2022-05-06 | Stop reason: SDUPTHER

## 2022-02-23 RX ORDER — CEFUROXIME AXETIL 250 MG/1
6 TABLET ORAL DAILY PRN
COMMUNITY
End: 2022-05-06 | Stop reason: SDUPTHER

## 2022-02-23 NOTE — LETTER
2022    Rosalina Saavedra  [address]  [place] [address]             [facility] - Psych [address]  [address]  [facility] [address]  Phone: [phone]  Fax: [phone] To whom it may concern,    I am currently treating Rosalina Saavedra ( 1990) at [facility] for depression and anxiety.  I would recommend a service dog as part of her treatment plan.     If you have any questions or concerns, please don't hesitate to call.    Sincerely,        Stella Worley MD      no

## 2022-02-23 NOTE — PROGRESS NOTES
OUTPATIENT PSYCHIATRY RETURN VISIT    sENCOUNTER DATE:  2/27/2022  SITE:  [facility], [facility]  LENGTH OF SESSION:  20 minutes    The patient location is:  [place]  The chief complaint leading to consultation is:  Mood    Visit type:  audiovisual    Face to Face time with patient:  20 minutes  25 minutes of total time spent on the encounter, which includes face to face time and non-face to face time preparing to see the patient (eg, review of tests), Obtaining and/or reviewing separately obtained history, Documenting clinical information in the electronic or other health record, Independently interpreting results (not separately reported) and communicating results to the patient/family/caregiver, or Care coordination (not separately reported).     Each patient to whom he or she provides medical services by telemedicine is:  (1) informed of the relationship between the physician and patient and the respective role of any other health care provider with respect to management of the patient; and (2) notified that he or she may decline to receive medical services by telemedicine and may withdraw from such care at any time.    CHIEF COMPLAINT:  No chief complaint on file.      HISTORY OF PRESENTING ILLNESS:  Rosalina Saavedra is a 32 y.o. female with history of MDD, JESUS, and Social Anxiety Disorder who presents for follow up appointment.      Plan at last appointment on 2/11/2022:  · Slow increase of Effexor to 150mg daily as per titration schedule.    · Patient still has not yet heard back about BMU.  Will send message to BMU team.      · Discussed with patient informed consent, risks versus benefits, alternative treatments, side effect profile and the inherent unpredictability of individual responses to these treatments.  The patient expresses understanding of the above and displays the capacity to agree with this current plan.  · Continue individual psychotherapy with Apryl Carroll.    History as  "told by patient:  Can't tell yet if higher dose of Effexor is helping or not.  Definitely less depressed.  In general feels anxious all the time, also has triggers where heart rate goes up and is sweating.  Mention of her sister is a trigger.  Father fussing at her.  Parents not understanding what she is saying.  Even a sad story.  Not working currently.  Sleep schedule is off.  Today got up at 11:30am, went to sleep 5:30am.  Watches TV a lot.  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  Finished Chapter 1 but never got feedback from professor.  Hasn't reached out to him either.  Needs to start working on Chapter 2.  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  Enjoys the topic, but its the action of doing it and getting sources that triggers the anxiety.  Would like to go to sleep 12-1 and then wake up 11-noon.  Currently going to sleep 6-10am and then wakes up 1-2pm.  Getting Daron Saturday - she is excited.  Wants to get sleep schedule back on track for him.  Dad works all the time - [employer].  He is tired and takes it out on her.  "Why are you eating in the dark?"  "Why did you put so much food in your mouth?"  Neurologist wants to do Botox soon and a spinal tap.  Kike blood work but no results yet.  Believes she is having night terrors - negative and she can't get out of it.  Not falling or tripping as much.  But says she is twitching now and some small seizures - does not lose consciousness.  Says these have not been related to increasing Effexor.  Reducing Vimpat to once a day x 6 days (tomorrow last day) and then stop.  Has Zofran as needed but that doesn't work.  Relpax not helpful.  Interested in second opinion for headaches.  Denies SI recently.      Medication side effects:  Denies  Medication compliance:  Yes    PSYCHIATRIC REVIEW OF SYSTEMS:  Trouble with sleep:  As above  Appetite changes:  Increased  Weight changes:  " "Gain  Lack of energy:  Sometimes  Anhedonia:  Sometimes  Somatic symptoms:  As above  Libido:  Not discussed  Anxiety/panic:  Yes as above  Guilty/hopeless:  Denies  Self-injurious behavior/risky behavior:  Denies  Any drugs:  Denies  Alcohol:  Denies     MEDICAL REVIEW OF SYSTEMS:  Complete review of systems performed covering Constitutional, Musculoskeletal, Neurologic.  All systems negative except for that covered in HPI.    PAST PSYCHIATRIC, MEDICAL, AND SOCIAL HISTORY REVIEWED  The patient's past medical, family and social history have been reviewed and updated as appropriate within the electronic medical record - see encounter notes.    MEDICATIONS:    Current Outpatient Medications:     erenumab-aooe (AIMOVIG AUTOINJECTOR) 140 mg/mL autoinjector, Inject 140 mg into the skin every 30 days., Disp: , Rfl:     ergocalciferol (ERGOCALCIFEROL) 50,000 unit Cap, Take 50,000 Units by mouth every 30 days., Disp: , Rfl:     lamoTRIgine (LAMICTAL) 200 MG tablet, Take 200 mg by mouth 2 (two) times a day., Disp: , Rfl:     sumatriptan (IMITREX STATDOSE) 6 mg/0.5 mL kit, Inject 6 mg into the skin daily as needed for Headaches., Disp: , Rfl:     venlafaxine (EFFEXOR-XR) 150 MG Cp24, Take 1 capsule (150 mg total) by mouth once daily. Take with 75mg capsule to equal 225mg daily., Disp: 30 capsule, Rfl: 11    venlafaxine (EFFEXOR-XR) 75 MG 24 hr capsule, Take 1 capsule (75 mg total) by mouth once daily. Take with 150mg capsule to equal 225mg daily., Disp: 30 capsule, Rfl: 11    ALLERGIES:  Review of patient's allergies indicates:   Allergen Reactions    Codeine Itching    Morphine Itching       PSYCHIATRIC EXAM:  There were no vitals filed for this visit.  Appearance:  Well groomed, appearing healthy and of stated age  Behavior:  Cooperative, pleasant, no psychomotor agitation or retardation  Speech:  Normal rate, rhythm, prosody, and volume  Mood:  "Ok"  Affect:  Brighter than previous appointments, smiling at " times  Thought Process:  Linear, logical, goal directed  Thought Content:  Negative for suicidal ideation, homicidal ideation, delusions or hallucinations.  Associations:  Intact  Memory:  Grossly Intact  Level of Consciousness/Orientation:  Grossly intact  Fund of Knowledge:  Good  Attention:  Good  Language:  Fluent, able to name abstract and concrete objects  Insight:  Good  Judgment:  Intact  Psychomotor signs:  No abnormal movements of face  Gait:  Unable to assess via virtual visit    RELEVANT LABS/STUDIES:    Lab Results   Component Value Date    WBC 5.09 10/23/2021    HGB 11.8 (L) 10/23/2021    HCT 36.1 (L) 10/23/2021    MCV 96 10/23/2021     10/23/2021     BMP  Lab Results   Component Value Date     12/21/2021    K 3.8 12/21/2021     (H) 12/21/2021    CO2 24 12/21/2021    BUN 11 12/21/2021    CREATININE 1.0 12/21/2021    CALCIUM 9.0 12/21/2021    ANIONGAP 6 (L) 12/21/2021    ESTGFRAFRICA >60 12/21/2021    EGFRNONAA >60 12/21/2021     Lab Results   Component Value Date    ALT 69 (H) 10/23/2021    AST 58 (H) 10/23/2021    ALKPHOS 60 10/23/2021    BILITOT 0.2 10/23/2021     Lab Results   Component Value Date    TSH 0.524 03/26/2021     Lab Results   Component Value Date    HGBA1C 5.0 03/26/2021       IMPRESSION:    Rosalina Saavedra is a 32 y.o. female with history of MDD, JESUS, and Social Anxiety Disorder who presents for follow up appointment.    Status/Progress:  Based on the examination today, the patient's problem(s) is/are inadequately controlled.  New problems have not been presented today.    Risk Parameters:  Patient reports no suicidal ideation  Patient reports no homicidal ideation  Patient reports no self-injurious behavior  Patient reports no violent behavior      DIAGNOSES:    ICD-10-CM ICD-9-CM   1. MDD (major depressive disorder), recurrent episode, moderate  F33.1 296.32   2. JESUS (generalized anxiety disorder)  F41.1 300.02   3. Social anxiety disorder  F40.10 300.23   4.  Chronic migraine with aura  G43.109 346.00   5. Psychogenic nonepileptic seizure  F44.5 300.11       PLAN:  · Some improvement in affect today.  Will continue Effexor 225mg daily for now.  Consider addition of Abilify in the future if depression remains poorly controlled.    · Patient tapering and discontinuing some medications per Neurologist.  She also had recent labs drawn from Neurologist but results not back.  She is interested in second opinion for headaches - will send message to Dr. Jerez.    · Discussed with patient informed consent, risks versus benefits, alternative treatments, side effect profile and the inherent unpredictability of individual responses to these treatments.  The patient expresses understanding of the above and displays the capacity to agree with this current plan.  · Continue individual psychotherapy with Apryl Carroll.    RETURN TO CLINIC:  Follow up in about 2 weeks (around 3/9/2022).

## 2022-03-04 ENCOUNTER — OFFICE VISIT (OUTPATIENT)
Dept: PSYCHIATRY | Facility: CLINIC | Age: 32
End: 2022-03-04
Payer: COMMERCIAL

## 2022-03-04 DIAGNOSIS — G43.E09 CHRONIC MIGRAINE WITH AURA: ICD-10-CM

## 2022-03-04 DIAGNOSIS — F41.1 GAD (GENERALIZED ANXIETY DISORDER): ICD-10-CM

## 2022-03-04 DIAGNOSIS — F40.10 SOCIAL ANXIETY DISORDER: ICD-10-CM

## 2022-03-04 DIAGNOSIS — F33.0 MILD EPISODE OF RECURRENT MAJOR DEPRESSIVE DISORDER: Primary | ICD-10-CM

## 2022-03-04 PROCEDURE — 1160F PR REVIEW ALL MEDS BY PRESCRIBER/CLIN PHARMACIST DOCUMENTED: ICD-10-PCS | Mod: CPTII,95,, | Performed by: INTERNAL MEDICINE

## 2022-03-04 PROCEDURE — 1159F PR MEDICATION LIST DOCUMENTED IN MEDICAL RECORD: ICD-10-PCS | Mod: CPTII,95,, | Performed by: INTERNAL MEDICINE

## 2022-03-04 PROCEDURE — 99214 OFFICE O/P EST MOD 30 MIN: CPT | Mod: 95,,, | Performed by: INTERNAL MEDICINE

## 2022-03-04 PROCEDURE — 99214 PR OFFICE/OUTPT VISIT, EST, LEVL IV, 30-39 MIN: ICD-10-PCS | Mod: 95,,, | Performed by: INTERNAL MEDICINE

## 2022-03-04 PROCEDURE — 1160F RVW MEDS BY RX/DR IN RCRD: CPT | Mod: CPTII,95,, | Performed by: INTERNAL MEDICINE

## 2022-03-04 PROCEDURE — 1159F MED LIST DOCD IN RCRD: CPT | Mod: CPTII,95,, | Performed by: INTERNAL MEDICINE

## 2022-03-04 NOTE — PROGRESS NOTES
OUTPATIENT PSYCHIATRY RETURN VISIT    sENCOUNTER DATE:  3/13/2022  SITE:  [facility], [facility]  LENGTH OF SESSION:  25 minutes    The patient location is:  [place]  The chief complaint leading to consultation is:  Mood    Visit type:  audiovisual    Face to Face time with patient:  25 minutes  30 minutes of total time spent on the encounter, which includes face to face time and non-face to face time preparing to see the patient (eg, review of tests), Obtaining and/or reviewing separately obtained history, Documenting clinical information in the electronic or other health record, Independently interpreting results (not separately reported) and communicating results to the patient/family/caregiver, or Care coordination (not separately reported).     Each patient to whom he or she provides medical services by telemedicine is:  (1) informed of the relationship between the physician and patient and the respective role of any other health care provider with respect to management of the patient; and (2) notified that he or she may decline to receive medical services by telemedicine and may withdraw from such care at any time.    CHIEF COMPLAINT:  Mood      HISTORY OF PRESENTING ILLNESS:  Rosalina Saavedra is a 32 y.o. female with history of MDD, JESUS, and Social Anxiety Disorder who presents for follow up appointment.      Plan at last appointment on 2/23/2022:  · Some improvement in affect today.  Will continue Effexor 225mg daily for now.  Consider addition of Abilify in the future if depression remains poorly controlled.    · Patient tapering and discontinuing some medications per Neurologist.  She also had recent labs drawn from Neurologist but results not back.  She is interested in second opinion for headaches - will send message to Dr. Jerez.    · Discussed with patient informed consent, risks versus benefits, alternative treatments, side effect profile and the inherent unpredictability of  individual responses to these treatments.  The patient expresses understanding of the above and displays the capacity to agree with this current plan.  · Continue individual psychotherapy with Apryl Glen.    History as told by patient:  Headaches have gotten a lot worse.  9/10 most of the time.  Every day.  Balance and walking much better.  No dizziness.  Still has the night terrors - negative dreams, wakes up sweating, can't tell if they are real or not.  Kicked TV stand across the room.  Used to talk in her sleep a little.  Now talking in her sleep frequently.  Unsure if she is having seizures or not.  Had an episode today where her body tightened and then released - it hurt.  Awake the whole time.  This was unusual though.  Depression and anxiety are getting better though.  Has Daron which has been great.  Gets up early to let him use bathroom and feed breakfast - about 8-9am.  But then she takes a nap and he does too.  Plays with him and trains.  Then they go to bed about 10-11pm.  Was good to see her nephews.  Sees Neurologist on 3/7 and Botox on 3/10.  Has gained 50 pounds.  Twitching has improved.  Still has family stress she is addressing in therapy.  Wondering if she can drive again and plans to ask neurologist.      Medication side effects:  Denies  Medication compliance:  Yes    PSYCHIATRIC REVIEW OF SYSTEMS:  Trouble with sleep:  Improving  Appetite changes:  Increased  Weight changes:  Gain  Lack of energy:  Sometimes  Anhedonia:  Sometimes  Somatic symptoms:  As above  Libido:  Not discussed  Anxiety/panic:  Yes, mostly family stress  Guilty/hopeless:  Denies  Self-injurious behavior/risky behavior:  Denies  Any drugs:  Denies  Alcohol:  Denies     MEDICAL REVIEW OF SYSTEMS:  Complete review of systems performed covering Constitutional, Musculoskeletal, Neurologic.  All systems negative except for that covered in HPI.    PAST PSYCHIATRIC, MEDICAL, AND SOCIAL HISTORY REVIEWED  The patient's past  "medical, family and social history have been reviewed and updated as appropriate within the electronic medical record - see encounter notes.    MEDICATIONS:    Current Outpatient Medications:     erenumab-aooe (AIMOVIG AUTOINJECTOR) 140 mg/mL autoinjector, Inject 140 mg into the skin every 30 days., Disp: , Rfl:     ergocalciferol (ERGOCALCIFEROL) 50,000 unit Cap, Take 50,000 Units by mouth every 30 days., Disp: , Rfl:     lamoTRIgine (LAMICTAL) 200 MG tablet, Take 200 mg by mouth 2 (two) times a day., Disp: , Rfl:     sumatriptan (IMITREX STATDOSE) 6 mg/0.5 mL kit, Inject 6 mg into the skin daily as needed for Headaches., Disp: , Rfl:     venlafaxine (EFFEXOR-XR) 150 MG Cp24, Take 1 capsule (150 mg total) by mouth once daily. Take with 75mg capsule to equal 225mg daily., Disp: 30 capsule, Rfl: 11    venlafaxine (EFFEXOR-XR) 75 MG 24 hr capsule, Take 1 capsule (75 mg total) by mouth once daily. Take with 150mg capsule to equal 225mg daily., Disp: 30 capsule, Rfl: 11    ALLERGIES:  Review of patient's allergies indicates:   Allergen Reactions    Codeine Itching    Morphine Itching       PSYCHIATRIC EXAM:  There were no vitals filed for this visit.  Appearance:  Well groomed, appearing healthy and of stated age  Behavior:  Cooperative, pleasant, no psychomotor agitation or retardation  Speech:  Normal rate, rhythm, prosody, and volume  Mood:  "Ok"  Affect:  Euthymic  Thought Process:  Linear, logical, goal directed  Thought Content:  Negative for suicidal ideation, homicidal ideation, delusions or hallucinations.  Associations:  Intact  Memory:  Grossly Intact  Level of Consciousness/Orientation:  Grossly intact  Fund of Knowledge:  Good  Attention:  Good  Language:  Fluent, able to name abstract and concrete objects  Insight:  Good  Judgment:  Intact  Psychomotor signs:  No abnormal movements of face  Gait:  Unable to assess via virtual visit    RELEVANT LABS/STUDIES:    Lab Results   Component Value Date    " WBC 5.09 10/23/2021    HGB 11.8 (L) 10/23/2021    HCT 36.1 (L) 10/23/2021    MCV 96 10/23/2021     10/23/2021     BMP  Lab Results   Component Value Date     12/21/2021    K 3.8 12/21/2021     (H) 12/21/2021    CO2 24 12/21/2021    BUN 11 12/21/2021    CREATININE 1.0 12/21/2021    CALCIUM 9.0 12/21/2021    ANIONGAP 6 (L) 12/21/2021    ESTGFRAFRICA >60 12/21/2021    EGFRNONAA >60 12/21/2021     Lab Results   Component Value Date    ALT 69 (H) 10/23/2021    AST 58 (H) 10/23/2021    ALKPHOS 60 10/23/2021    BILITOT 0.2 10/23/2021     Lab Results   Component Value Date    TSH 0.524 03/26/2021     Lab Results   Component Value Date    HGBA1C 5.0 03/26/2021       IMPRESSION:    Rosalina Saavedra is a 32 y.o. female with history of MDD, JESUS, and Social Anxiety Disorder who presents for follow up appointment.    Status/Progress:  Based on the examination today, the patient's problem(s) is/are improving but still not well controlled.  New problems have not been presented today.    Risk Parameters:  Patient reports no suicidal ideation  Patient reports no homicidal ideation  Patient reports no self-injurious behavior  Patient reports no violent behavior        DIAGNOSES:    ICD-10-CM ICD-9-CM   1. Mild episode of recurrent major depressive disorder  F33.0 296.31   2. JESUS (generalized anxiety disorder)  F41.1 300.02   3. Social anxiety disorder  F40.10 300.23   4. Chronic migraine with aura  G43.109 346.00       PLAN:  · Mood is slowly improving.  Will continue Effexor 225mg daily for now.  Consider addition of Abilify in the future if depression remains poorly controlled.    · Will send message to Dr. Jerez to see if he can see this patient for headaches.    · Discussed with patient informed consent, risks versus benefits, alternative treatments, side effect profile and the inherent unpredictability of individual responses to these treatments.  The patient expresses understanding of the above and displays  the capacity to agree with this current plan.  · Continue individual psychotherapy with Apryl Carroll.    RETURN TO CLINIC:  Follow up in about 2 weeks (around 3/18/2022).

## 2022-03-13 PROBLEM — G47.10 HYPERSOMNIA: Status: RESOLVED | Noted: 2021-02-01 | Resolved: 2022-03-13

## 2022-03-18 ENCOUNTER — OFFICE VISIT (OUTPATIENT)
Dept: PSYCHIATRY | Facility: CLINIC | Age: 32
End: 2022-03-18
Payer: COMMERCIAL

## 2022-03-18 DIAGNOSIS — F41.1 GAD (GENERALIZED ANXIETY DISORDER): ICD-10-CM

## 2022-03-18 DIAGNOSIS — G47.00 INSOMNIA, UNSPECIFIED TYPE: ICD-10-CM

## 2022-03-18 DIAGNOSIS — R25.9 INVOLUNTARY MOVEMENTS: ICD-10-CM

## 2022-03-18 DIAGNOSIS — F44.5 PSYCHOGENIC NONEPILEPTIC SEIZURE: ICD-10-CM

## 2022-03-18 DIAGNOSIS — G43.001 MIGRAINE WITHOUT AURA AND WITH STATUS MIGRAINOSUS, NOT INTRACTABLE: ICD-10-CM

## 2022-03-18 DIAGNOSIS — F33.1 MODERATE EPISODE OF RECURRENT MAJOR DEPRESSIVE DISORDER: Primary | ICD-10-CM

## 2022-03-18 PROCEDURE — 1159F MED LIST DOCD IN RCRD: CPT | Mod: CPTII,95,, | Performed by: INTERNAL MEDICINE

## 2022-03-18 PROCEDURE — 1160F RVW MEDS BY RX/DR IN RCRD: CPT | Mod: CPTII,95,, | Performed by: INTERNAL MEDICINE

## 2022-03-18 PROCEDURE — 1159F PR MEDICATION LIST DOCUMENTED IN MEDICAL RECORD: ICD-10-PCS | Mod: CPTII,95,, | Performed by: INTERNAL MEDICINE

## 2022-03-18 PROCEDURE — 90833 PR PSYCHOTHERAPY W/PATIENT W/E&M, 30 MIN (ADD ON): ICD-10-PCS | Mod: 95,,, | Performed by: INTERNAL MEDICINE

## 2022-03-18 PROCEDURE — 99214 OFFICE O/P EST MOD 30 MIN: CPT | Mod: 95,,, | Performed by: INTERNAL MEDICINE

## 2022-03-18 PROCEDURE — 90833 PSYTX W PT W E/M 30 MIN: CPT | Mod: 95,,, | Performed by: INTERNAL MEDICINE

## 2022-03-18 PROCEDURE — 99214 PR OFFICE/OUTPT VISIT, EST, LEVL IV, 30-39 MIN: ICD-10-PCS | Mod: 95,,, | Performed by: INTERNAL MEDICINE

## 2022-03-18 PROCEDURE — 1160F PR REVIEW ALL MEDS BY PRESCRIBER/CLIN PHARMACIST DOCUMENTED: ICD-10-PCS | Mod: CPTII,95,, | Performed by: INTERNAL MEDICINE

## 2022-03-18 RX ORDER — OXCARBAZEPINE 150 MG/1
300 TABLET, FILM COATED ORAL 2 TIMES DAILY
COMMUNITY
End: 2022-07-29

## 2022-03-18 RX ORDER — CLONAZEPAM 0.5 MG/1
0.5 TABLET ORAL 2 TIMES DAILY
COMMUNITY
End: 2022-07-12

## 2022-03-18 RX ORDER — RIMEGEPANT SULFATE 75 MG/75MG
75 TABLET, ORALLY DISINTEGRATING ORAL EVERY OTHER DAY
COMMUNITY
End: 2022-07-12

## 2022-03-18 NOTE — PROGRESS NOTES
OUTPATIENT PSYCHIATRY RETURN VISIT    sENCOUNTER DATE:  3/20/2022  SITE:  [facility], [facility]  LENGTH OF SESSION:  30 minutes    The patient location is:  [place], not in healthcare facility  The chief complaint leading to consultation is:  Mood    Visit type:  audiovisual    Face to Face time with patient:  30 minutes  35 minutes of total time spent on the encounter, which includes face to face time and non-face to face time preparing to see the patient (eg, review of tests), Obtaining and/or reviewing separately obtained history, Documenting clinical information in the electronic or other health record, Independently interpreting results (not separately reported) and communicating results to the patient/family/caregiver, or Care coordination (not separately reported).     Each patient to whom he or she provides medical services by telemedicine is:  (1) informed of the relationship between the physician and patient and the respective role of any other health care provider with respect to management of the patient; and (2) notified that he or she may decline to receive medical services by telemedicine and may withdraw from such care at any time.    CHIEF COMPLAINT:  Anxiety, Depression, and Insomnia      HISTORY OF PRESENTING ILLNESS:  Rosalina Saavedra is a 32 y.o. female with history of MDD, JESUS, and Social Anxiety Disorder who presents for follow up appointment.      Plan at last appointment on 3/4/2022:  · Mood is slowly improving.  Will continue Effexor 225mg daily for now.  Consider addition of Abilify in the future if depression remains poorly controlled.    · Will send message to Dr. Jerez to see if he can see this patient for headaches.    · Discussed with patient informed consent, risks versus benefits, alternative treatments, side effect profile and the inherent unpredictability of individual responses to these treatments.  The patient expresses understanding of the above and displays  the capacity to agree with this current plan.  · Continue individual psychotherapy with Apryl Carroll.    History as told by patient:  Says she is ok.  Sleep has been off again.  Unsure what lead to this.  Staying up until 8am.  Sleeps 1-2 hours.  Gets a total of about 4 hours of sleep during the day.  Does think it could have happened when neurologist changed some medications.  Depression and anxiety unchanged since last appointment.  Continues to have anxiety.  Felt very anxious the other day - had Daron lay down on her for awhile and made her feel better.  Feels depression is coming back.  New medications started by her Neurologist last week (see list below).  Says Klonopin has not helped her anxiety.  Reports anxiety most of the day.  Feels heart racing, sweating sometimes, worried - but nothing she should worry about other than dissertation.  May be watching a show and feels heart rate increase.  Says she is tired all the time.  Does not want to do anything.  Deals with Daron because she has to - does enjoy this - but lately it feels more like chore.  Sometimes misses medication - maybe 2 out of 7 days.  Sometimes the 2 days are in a row.  Not taking Klonopin at consistent times.  Does not make her sleepy, does not feel anything taking it.  Not having seizures.  Reports random jerking of her limbs - she is completely aware this is happening.  Happens probably 6-8 times per day.  Happens more at night.  Can be arm, leg, head, anything.  Describes as a spastic jerk rather than tightening muscles.  Migraines are every day, much more severe 9/10, sometimes 12/10.  This will last all day.  One time had for whole week.  Neurologist decided not to do botox due to other neurologic symptoms.  Denies SI.  Taking break from therapy but planning to go back.      Medications from Neurologist (bold = started last week)  Aimovig monthly (previously on Emgality since October)  Sumatriptan injection as needed for headaches.     Lamictal 200mg BID  Nurtec every other day for 10 days   Trileptal 150mg BID for tremors  Klonopin 0.5mg BID    Psychotherapy:  · Target symptoms: recurrent depression, anxiety   · Why chosen therapy is appropriate versus another modality: relevant to diagnosis, patient responds to this modality  · Outcome monitoring methods: self-report, observation  · Therapeutic intervention type: supportive psychotherapy  · Topics discussed/themes: relationships difficulties, stress related to medical comorbidities, building skills sets for symptom management, symptom recognition  · The patient's response to the intervention is accepting. The patient's progress toward treatment goals is fair.   · Duration of intervention: 18 minutes.    Medication side effects:  Denies  Medication compliance:  Yes    PSYCHIATRIC REVIEW OF SYSTEMS:  Trouble with sleep:  Yes, as above  Appetite changes:  Denies  Weight changes:  Denies  Lack of energy:  Sometimes  Anhedonia:  Sometimes  Somatic symptoms:  As above  Libido:  Not discussed  Anxiety/panic:  Yes, mostly family stress  Guilty/hopeless:  Denies  Self-injurious behavior/risky behavior:  Denies  Any drugs:  Denies  Alcohol:  Denies     MEDICAL REVIEW OF SYSTEMS:  Complete review of systems performed covering Constitutional, Musculoskeletal, Neurologic.  All systems negative except for that covered in HPI.    PAST PSYCHIATRIC, MEDICAL, AND SOCIAL HISTORY REVIEWED  The patient's past medical, family and social history have been reviewed and updated as appropriate within the electronic medical record - see encounter notes.    MEDICATIONS:    Current Outpatient Medications:     clonazePAM (KLONOPIN) 0.5 MG tablet, Take 0.5 mg by mouth 2 (two) times a day., Disp: , Rfl:     erenumab-aooe (AIMOVIG AUTOINJECTOR) 140 mg/mL autoinjector, Inject 140 mg into the skin every 30 days., Disp: , Rfl:     ergocalciferol (ERGOCALCIFEROL) 50,000 unit Cap, Take 50,000 Units by mouth every 30 days.,  "Disp: , Rfl:     lamoTRIgine (LAMICTAL) 200 MG tablet, Take 200 mg by mouth 2 (two) times a day., Disp: , Rfl:     OXcarbazepine (TRILEPTAL) 150 MG Tab, Take 150 mg by mouth 2 (two) times a day., Disp: , Rfl:     rimegepant (NURTEC) 75 mg odt, Place 75 mg under the tongue every other day. For 10 days, Disp: , Rfl:     sumatriptan (IMITREX STATDOSE) 6 mg/0.5 mL kit, Inject 6 mg into the skin daily as needed for Headaches., Disp: , Rfl:     venlafaxine (EFFEXOR-XR) 150 MG Cp24, Take 1 capsule (150 mg total) by mouth once daily. Take with 75mg capsule to equal 225mg daily., Disp: 30 capsule, Rfl: 11    venlafaxine (EFFEXOR-XR) 75 MG 24 hr capsule, Take 1 capsule (75 mg total) by mouth once daily. Take with 150mg capsule to equal 225mg daily., Disp: 30 capsule, Rfl: 11    ALLERGIES:  Review of patient's allergies indicates:   Allergen Reactions    Codeine Itching    Morphine Itching       PSYCHIATRIC EXAM:  There were no vitals filed for this visit.  Appearance:  Well groomed, appearing healthy and of stated age  Behavior:  Cooperative, pleasant, no psychomotor agitation or retardation  Speech:  Normal rate, rhythm, prosody, and volume  Mood:  "About the same"  Affect:  Euthymic  Thought Process:  Linear, logical, goal directed  Thought Content:  Negative for suicidal ideation, homicidal ideation, delusions or hallucinations.  Associations:  Intact  Memory:  Grossly Intact  Level of Consciousness/Orientation:  Grossly intact  Fund of Knowledge:  Good  Attention:  Good  Language:  Fluent, able to name abstract and concrete objects  Insight:  Good  Judgment:  Intact  Psychomotor signs:  No abnormal movements of face  Gait:  Unable to assess via virtual visit    RELEVANT LABS/STUDIES:    Lab Results   Component Value Date    WBC 5.09 10/23/2021    HGB 11.8 (L) 10/23/2021    HCT 36.1 (L) 10/23/2021    MCV 96 10/23/2021     10/23/2021     BMP  Lab Results   Component Value Date     12/21/2021    K 3.8 " 12/21/2021     (H) 12/21/2021    CO2 24 12/21/2021    BUN 11 12/21/2021    CREATININE 1.0 12/21/2021    CALCIUM 9.0 12/21/2021    ANIONGAP 6 (L) 12/21/2021    ESTGFRAFRICA >60 12/21/2021    EGFRNONAA >60 12/21/2021     Lab Results   Component Value Date    ALT 69 (H) 10/23/2021    AST 58 (H) 10/23/2021    ALKPHOS 60 10/23/2021    BILITOT 0.2 10/23/2021     Lab Results   Component Value Date    TSH 0.524 03/26/2021     Lab Results   Component Value Date    HGBA1C 5.0 03/26/2021       IMPRESSION:    Rosalina Saavedra is a 32 y.o. female with history of MDD, JESUS, and Social Anxiety Disorder who presents for follow up appointment.    Status/Progress:  Based on the examination today, the patient's problem(s) is/are inadequately controlled.  New problems have not been presented today.    Risk Parameters:  Patient reports no suicidal ideation  Patient reports no homicidal ideation  Patient reports no self-injurious behavior  Patient reports no violent behavior      DIAGNOSES:    ICD-10-CM ICD-9-CM   1. Moderate episode of recurrent major depressive disorder  F33.1 296.32   2. JESUS (generalized anxiety disorder)  F41.1 300.02   3. Involuntary movements  R25.9 781.0   4. Insomnia, unspecified type  G47.00 780.52   5. Psychogenic nonepileptic seizure  F44.5 300.11   6. Migraine without aura and with status migrainosus, not intractable  G43.001 346.12       PLAN:  · Patient with numerous medication changes last week by Neurologist (see above).  She also has appointment with Dr. Stevens on Monday for second opinion.  Will continue Effexor 225mg daily for now.  Consider addition of Abilify in the future if depression remains poorly controlled.    · Discussed with patient informed consent, risks versus benefits, alternative treatments, side effect profile and the inherent unpredictability of individual responses to these treatments.  The patient expresses understanding of the above and displays the capacity to agree with this  current plan.  · Continue individual psychotherapy with Apryl Carroll.    RETURN TO CLINIC:  Follow up in about 2 weeks (around 4/1/2022).

## 2022-03-20 PROBLEM — R25.9 INVOLUNTARY MOVEMENTS: Status: ACTIVE | Noted: 2022-03-20

## 2022-03-21 ENCOUNTER — OFFICE VISIT (OUTPATIENT)
Dept: NEUROLOGY | Facility: CLINIC | Age: 32
End: 2022-03-21
Payer: COMMERCIAL

## 2022-03-21 VITALS
HEIGHT: 66 IN | DIASTOLIC BLOOD PRESSURE: 74 MMHG | WEIGHT: 183.31 LBS | BODY MASS INDEX: 29.46 KG/M2 | SYSTOLIC BLOOD PRESSURE: 97 MMHG | HEART RATE: 104 BPM

## 2022-03-21 DIAGNOSIS — R56.9 CONVULSIONS, UNSPECIFIED CONVULSION TYPE: Primary | ICD-10-CM

## 2022-03-21 DIAGNOSIS — F44.5 PSYCHOGENIC NONEPILEPTIC SEIZURE: ICD-10-CM

## 2022-03-21 DIAGNOSIS — R56.9 SEIZURE-LIKE ACTIVITY: ICD-10-CM

## 2022-03-21 PROCEDURE — 99215 OFFICE O/P EST HI 40 MIN: CPT | Mod: S$GLB,,, | Performed by: PSYCHIATRY & NEUROLOGY

## 2022-03-21 PROCEDURE — 3008F BODY MASS INDEX DOCD: CPT | Mod: CPTII,S$GLB,, | Performed by: PSYCHIATRY & NEUROLOGY

## 2022-03-21 PROCEDURE — 3078F DIAST BP <80 MM HG: CPT | Mod: CPTII,S$GLB,, | Performed by: PSYCHIATRY & NEUROLOGY

## 2022-03-21 PROCEDURE — 1159F PR MEDICATION LIST DOCUMENTED IN MEDICAL RECORD: ICD-10-PCS | Mod: CPTII,S$GLB,, | Performed by: PSYCHIATRY & NEUROLOGY

## 2022-03-21 PROCEDURE — 99215 PR OFFICE/OUTPT VISIT, EST, LEVL V, 40-54 MIN: ICD-10-PCS | Mod: S$GLB,,, | Performed by: PSYCHIATRY & NEUROLOGY

## 2022-03-21 PROCEDURE — 3078F PR MOST RECENT DIASTOLIC BLOOD PRESSURE < 80 MM HG: ICD-10-PCS | Mod: CPTII,S$GLB,, | Performed by: PSYCHIATRY & NEUROLOGY

## 2022-03-21 PROCEDURE — 99999 PR PBB SHADOW E&M-EST. PATIENT-LVL III: ICD-10-PCS | Mod: PBBFAC,,, | Performed by: PSYCHIATRY & NEUROLOGY

## 2022-03-21 PROCEDURE — 3074F SYST BP LT 130 MM HG: CPT | Mod: CPTII,S$GLB,, | Performed by: PSYCHIATRY & NEUROLOGY

## 2022-03-21 PROCEDURE — 99999 PR PBB SHADOW E&M-EST. PATIENT-LVL III: CPT | Mod: PBBFAC,,, | Performed by: PSYCHIATRY & NEUROLOGY

## 2022-03-21 PROCEDURE — 3008F PR BODY MASS INDEX (BMI) DOCUMENTED: ICD-10-PCS | Mod: CPTII,S$GLB,, | Performed by: PSYCHIATRY & NEUROLOGY

## 2022-03-21 PROCEDURE — 3074F PR MOST RECENT SYSTOLIC BLOOD PRESSURE < 130 MM HG: ICD-10-PCS | Mod: CPTII,S$GLB,, | Performed by: PSYCHIATRY & NEUROLOGY

## 2022-03-21 PROCEDURE — 1159F MED LIST DOCD IN RCRD: CPT | Mod: CPTII,S$GLB,, | Performed by: PSYCHIATRY & NEUROLOGY

## 2022-03-21 NOTE — PROGRESS NOTES
[facility]  Epilepsy Clinic Progress Note    [facility] - NEUROLOGY 7TH FL  [facility] [place]    Date: 3/21/22  Patient Name: Rosalina Saavedra   MRN: [id number]   PCP: Madhavi Basilio  Referring Provider: Jaspal Stevens MD    Assessment:   Rosalina Saavedra is a 32 y.o. female presenting for evaluation of reported seizure-like episodes.  Upon review of video today have high level of concern for psychogenic nonepileptic events.  Obtaining baseline routine EEG in anticipate likely EMU monitoring pending results if episode not captured during routine EEG.  Will taper patient off of AEDs while in EMU.  Suspect need for involvement of neuropsychiatry.  Plan:     Problem List Items Addressed This Visit        Psychiatric    Psychogenic nonepileptic seizure    Relevant Orders    EMU Monitoring    EEG,w/awake & asleep record      Other Visit Diagnoses     Convulsions, unspecified convulsion type    -  Primary    Relevant Orders    EMU Monitoring    EEG,w/awake & asleep record    Seizure-like activity        Relevant Orders    EMU Monitoring    EEG,w/awake & asleep record        The patient's AED regimen is being held/reduced/changed and/or the patient is undergoing provocative interventions in an effort to capture clinical events, making inpatient admission medically necessary for patient safety.  This patient is felt to be high risk due to the likelihood of seizures during this admission.     I completed education on seizure first aid and safety. I recommended seizure precautions with regards to avoiding unsupervised water recreational activity or bathing in tubs, climbing or working at heights, operation of heavy or dangerous machinery, caution around fire and sources of high heat, as well as any other activity which could put a patient at danger in case of a seizure.  I also reviewed the [place] DMV law and recommended that the patient not drive  for 6 months in the event of breakthrough  seizures.    As the patient is a female of childbearing age, I have counseled the patient on issues pertaining to pregnancy and epilepsy and recommended folic acid supplementation. I have reviewed and recommended the AAN guideline of folic acid supplementation prior to and during pregnancy.      I spent a total of 52 minutes preparing to see the patient, obtaining and reviewing history and results, performing a medically appropriate exam, counseling and educating the patient/family/caregiver, documenting clinical information, coordinating care, and ordering medications, tests, procedures, and referrals.    Jaspal Stevens MD  [facility]   Department of Neurology    Patient note was created using ANT Farm[employer] Dictation.  Any errors in syntax or even information may not have been identified and edited on initial review prior to signing this note.  Subjective:   HPI:   Ms. Rosalina Saavedra is a 32 y.o. female who presents with a chief complaint of reported seizures.  The patient presents today with her mother who contributes to the history.  Together they report that the patient experienced an abrupt onset of seizure-like episodes beginning in January 2022.  The patient endorses 3 event types including episodes of her vision going completely black while remaining aware of her surroundings, episodes of inability to control her body and unresponsiveness while remaining aware, and extremity jerking.  She has been followed by an outside neurologist, Dr. Crain, who has tried her on numerous seizure medications that have not control the episodes.  She has undergone routine and ambulatory EEGs which were normal per patient and did not capture typical events.  Her mother does believe that stress may be contributing to the episodes.      They provided video today of the episodes.  Episodes are characterized by the patient exhibiting robotic type movements and exhibiting her feet stuck to the floor before falling to the  "ground.  She lies on the ground shaking her arms and legs while crying before standing in a robotic fashion.  During the entire event, the patient makes a variety of cartoon-like noises such as wee oo", "hansel hansel hansel" and "beep boop."    Seizure Type: Suspected PNEE  Seizure Etiology:  Suspected PNEE  Current AEDs: Lamictal 150 mg BID    The patient is accompanied by family who contribute to the history. This patient has 3 types of seizure as described below. The patient reports having seizures for months The patient reports to have worse seizure control. The seizure frequency is variable. The last seizure was yesterday . The patient does not report side effects from seizure medication.     Seizure Type 1:   Seizure Description: everything goes "black in her vision", remains aware  Aura: No warning  Associated Symptoms:  none  Seizure Frequency: Up to 2x a day every 3-4 days  Last seizure: January 2022    Seizure Type 2:   Seizure Description: Screams, shakes all over, can't open mouth, eyes lock closed and can't open them, lasts 5-15 minutes, confused and tired  After, remains aware after  Aura: Burning in back of neck  Associated Symptoms:  none  Seizure Frequency: Daily, 6-7 times per day  Last seizure: Early Feb 2022    Seizure Type 3:   Seizure Description: Intermittent limb and head jerks  Aura: None  Associated Symptoms: none  Seizure Frequency: Every day  Last seizure: Today    Handedness: right  Seizure Onset Age: 32  Seizure/ Epilepsy Risk Factors: none  Birth/Developmental History: normal birth history and Normal developmental history  Seizure Triggers/ Provoking Features: stress  Previous Seizure Medications: brivaracetam (Briviact, BRV) , lacosamide (Vimpat, LCS), lamotrigine (Lamictal, LTG), oxcarbazepine (Trileptal OXC),, topiramate (Topamax, TPM) and clonazepam (Klonopin, CZP)  Recent Med Changes: None  Other Treatments: None  Prior Episodes of Status: None  Psychiatric/Behavioral Comorbitidies: " Anxiety and depression, social anxieyt  Surgical Candidacy: None    Prior Studies:  EEG : At [facility], routine and ambulatory- both normal per patient  vEEG/ EMU evaluation: Not done  MRI of brain: January 2022, Done at [facility]- normal per patient   AED levels:   Not done  CT/CTA Scan:   Not done  PET Scan:  Not done  Neuropsychological Evaluation:  Not done  DEXA Scan:  Not done  Other studies:  Not done    PAST MEDICAL HISTORY:  Past Medical History:   Diagnosis Date    Anxiety     Depression     Migraine     Migraine headache        PAST SURGICAL HISTORY:  Past Surgical History:   Procedure Laterality Date    KNEE ARTHROSCOPY      TIBIA FRACTURE SURGERY         CURRENT MEDS:  Current Outpatient Medications   Medication Sig Dispense Refill    clonazePAM (KLONOPIN) 0.5 MG tablet Take 0.5 mg by mouth 2 (two) times a day.      erenumab-aooe (AIMOVIG AUTOINJECTOR) 140 mg/mL autoinjector Inject 140 mg into the skin every 30 days.      lamoTRIgine (LAMICTAL) 200 MG tablet Take 200 mg by mouth 2 (two) times a day.      OXcarbazepine (TRILEPTAL) 150 MG Tab Take 150 mg by mouth 2 (two) times a day.      rimegepant (NURTEC) 75 mg odt Place 75 mg under the tongue every other day. For 10 days      sumatriptan (IMITREX STATDOSE) 6 mg/0.5 mL kit Inject 6 mg into the skin daily as needed for Headaches.      venlafaxine (EFFEXOR-XR) 150 MG Cp24 Take 1 capsule (150 mg total) by mouth once daily. Take with 75mg capsule to equal 225mg daily. 30 capsule 11    venlafaxine (EFFEXOR-XR) 75 MG 24 hr capsule Take 1 capsule (75 mg total) by mouth once daily. Take with 150mg capsule to equal 225mg daily. 30 capsule 11    ergocalciferol (ERGOCALCIFEROL) 50,000 unit Cap Take 50,000 Units by mouth every 30 days.       No current facility-administered medications for this visit.       ALLERGIES:  Review of patient's allergies indicates:   Allergen Reactions    Codeine Itching    Morphine Itching       FAMILY HISTORY:  Family History  "  Problem Relation Age of Onset    Diabetes Mother     Breast cancer Mother 61    Diabetes Maternal Grandmother     Hypertension Maternal Grandmother     Breast cancer Other 71        moms paternal grand mother       SOCIAL HISTORY:  Social History     Tobacco Use    Smoking status: Never Smoker    Smokeless tobacco: Never Used   Substance Use Topics    Alcohol use: No    Drug use: No       Review of Systems:  12 system review of systems is negative except for the symptoms mentioned in HPI.      Objective:     Vitals:    03/21/22 1316   BP: 97/74   Pulse: 104   Weight: 83.2 kg (183 lb 5 oz)   Height: 5' 6" (1.676 m)     General: NAD, well nourished   Eyes: no tearing, discharge, no erythema   ENT: moist mucous membranes of the oral cavity, nares patent    Neck: Supple, full range of motion  Cardiovascular: Warm and well perfused, pulses equal and symmetrical  Lungs: Normal work of breathing, normal chest wall excursions  Skin: No rash, lesions, or breakdown on exposed skin  Psychiatry: Mood and affect are appropriate   Abdomen: soft, non tender, non distended  Extremeties: No cyanosis, clubbing or edema.    Neurological   MENTAL STATUS: Alert and oriented to person, place, and time. Attention and concentration within normal limits. Speech without dysarthria, able to name and repeat without difficulty. Recent and remote memory within normal limits   CRANIAL NERVES: Visual fields intact. PERRL. EOMI. Facial sensation intact. Face symmetrical. Hearing grossly intact. Full shoulder shrug bilaterally. Tongue protrudes midline   SENSORY: Sensation is intact to light touch throughout.   MOTOR: Normal bulk and tone.  5/5 deltoid, biceps, triceps, interosseous, hand  bilaterally. 5/5 iliopsoas, knee extension/flexion, foot dorsi/plantarflexion bilaterally.    REFLEXES: Symmetric and 2+ throughout.   CEREBELLAR/COORDINATION/GAIT: Gait steady. Finger to nose intact. Normal rapid alternating movements.       "

## 2022-03-23 ENCOUNTER — PATIENT MESSAGE (OUTPATIENT)
Dept: PSYCHIATRY | Facility: CLINIC | Age: 32
End: 2022-03-23
Payer: COMMERCIAL

## 2022-03-28 ENCOUNTER — TELEPHONE (OUTPATIENT)
Dept: NEUROLOGY | Facility: CLINIC | Age: 32
End: 2022-03-28
Payer: COMMERCIAL

## 2022-03-28 DIAGNOSIS — F44.5 PSYCHOGENIC NONEPILEPTIC SEIZURE: ICD-10-CM

## 2022-03-28 DIAGNOSIS — R56.9 CONVULSION DISORDER: ICD-10-CM

## 2022-03-28 DIAGNOSIS — R56.9 CONVULSIONS, UNSPECIFIED CONVULSION TYPE: Primary | ICD-10-CM

## 2022-03-28 NOTE — TELEPHONE ENCOUNTER
Scheduled EMU 5/2/22, 1pm. Instructions thoroughly discussed, mailed via [employer] and sent in portal.

## 2022-03-29 ENCOUNTER — TELEPHONE (OUTPATIENT)
Dept: PODIATRY | Facility: CLINIC | Age: 32
End: 2022-03-29
Payer: COMMERCIAL

## 2022-03-29 NOTE — TELEPHONE ENCOUNTER
Spoke with MsLeesa Quentin inquiring if it would be possible for her to come in earlier for her appt. Per Ms Quentin she cannot, but was fine with being rescheduled for a later date and time. New appt date and time of 4/18/22 at 3:45 pm accepted. No other needs voiced.

## 2022-04-01 ENCOUNTER — HOSPITAL ENCOUNTER (OUTPATIENT)
Dept: PSYCHIATRY | Facility: HOSPITAL | Age: 32
Discharge: HOME | End: 2022-04-01
Attending: PSYCHIATRY & NEUROLOGY
Payer: COMMERCIAL

## 2022-04-01 DIAGNOSIS — F33.1 MDD (MAJOR DEPRESSIVE DISORDER), RECURRENT EPISODE, MODERATE: ICD-10-CM

## 2022-04-01 PROCEDURE — 90853 PR GROUP PSYCHOTHERAPY: ICD-10-PCS | Mod: ,,, | Performed by: PSYCHOLOGIST

## 2022-04-01 PROCEDURE — 90853 GROUP PSYCHOTHERAPY: CPT | Mod: ,,, | Performed by: PSYCHOLOGIST

## 2022-04-01 PROCEDURE — 90853 GROUP PSYCHOTHERAPY: CPT

## 2022-04-01 NOTE — PLAN OF CARE
04/01/22 1646   Activity/Group Therapy Checklist   Group Meditation/Relaxation  (YOGA)   Attendance Attended   Follows Direction Followed directions   Group Interactions/Observations Interacted appropriately;Alert;Sharing;Supportive   Affect/Mood Range Normal range   Affect/Mood Display Appropriate   Goal Progression Progressing     Patient's location: Home  Group therapy held virtually via Zoom.   Patient attended yoga group led by RAEGAN Solano. Pt was appropriate and followed instructions.
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
## 2022-04-01 NOTE — PROGRESS NOTES
Group Psychotherapy (PhD/LCSW)     The patient location is: [place], [place]  The chief complaint leading to consultation is: mood disorder  Visit type: audiovisual  Face to Face time with patient: 55 minutes of total time spent on the encounter, which includes face to face time and non-face to face time preparing to see the patient (eg, review of tests), Obtaining and/or reviewing separately obtained history, Documenting clinical information in the electronic or other health record, Independently interpreting results (not separately reported) and communicating results to the patient/family/caregiver, or Care coordination (not separately reported).   Each patient to whom he or she provides medical services by telemedicine is:  (1) informed of the relationship between the physician and patient and the respective role of any other health care provider with respect to management of the patient; and (2) notified that he or she may decline to receive medical services by telemedicine and may withdraw from such care at any time.     Notes: used Zoom     Clinical status of patient: Intensive Outpatient Program (IOP)     Date:04/01/2022        Group Focus: Psychodynamic Process Group     Length of service: 08856 - 55 minutes     Number of patients in attendance: 3     Referred by: [facility]     Target symptoms: Mood Disorder     Patient's response to treatment: Active Listening and Self-disclosure     Progress toward goals: Progressing adequately     Interval History: Patient introduced herself to the group.    Diagnosis:MDD      Plan: Continue [facility] program
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
## 2022-04-04 ENCOUNTER — HOSPITAL ENCOUNTER (OUTPATIENT)
Dept: PSYCHIATRY | Facility: HOSPITAL | Age: 32
Discharge: HOME | End: 2022-04-04
Attending: PSYCHIATRY & NEUROLOGY
Payer: COMMERCIAL

## 2022-04-04 DIAGNOSIS — F33.1 MDD (MAJOR DEPRESSIVE DISORDER), RECURRENT EPISODE, MODERATE: Primary | ICD-10-CM

## 2022-04-04 PROCEDURE — 90853 GROUP PSYCHOTHERAPY: CPT | Mod: ,,, | Performed by: PSYCHOLOGIST

## 2022-04-04 PROCEDURE — 90853 PR GROUP PSYCHOTHERAPY: ICD-10-PCS | Mod: ,,, | Performed by: PSYCHOLOGIST

## 2022-04-04 PROCEDURE — 90853 GROUP PSYCHOTHERAPY: CPT

## 2022-04-04 NOTE — PROGRESS NOTES
Group Psychotherapy (PhD/LCSW)    [place] [facility]     Clinical status of patient: Intensive Outpatient Program (IOP)     Date:04/04/2022     Group Focus: Psychodynamic Process Group     Length of service: 65477 - 55 minutes     Number of patients in attendance: 4     Referred by: [facility]     Target symptoms: Mood Disorder     Patient's response to treatment: Active Listening and Self-disclosure     Progress toward goals: Progressing adequately     Interval History: Patient discussed the need to have an assertive conversation with her advisor but she has been putting it off. Discussed how to break this goal down into more realistic and manageable steps.    Diagnosis:MDD      Plan: Continue OM program
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
## 2022-04-04 NOTE — PLAN OF CARE
04/04/22 1606   Activity/Group Therapy Checklist   Group Meditation/Relaxation   Attendance Attended   Follows Direction Followed directions   Group Interactions/Observations Interacted appropriately;Alert;Sharing;Supportive   Affect/Mood Range Normal range   Affect/Mood Display Appropriate   Goal Progression Progressing     Patient's location:   Group therapy held on site in conference room 456 with SW.    SW discussed with group negative thinking and how to combat those thoughts. SW used R.A.I.N technique to manage stressful thoughts and shared a short video on negative thinking. SW demonstrated box breath and relaxation.

## 2022-04-04 NOTE — PROGRESS NOTES
Group Psychotherapy (PhD/LCSW)    Site: [facility] ([place], [place])    Clinical status of patient: Intensive Outpatient Program (IOP)    Date: 4/4/2022    Group Focus: ACT Skills    Length of service: 50721 - 45-50 minutes    Number of patients in attendance: 8    [facility]    Target symptoms: Depression, Anxiety    Patient's response to treatment: Active Listening and Self-disclosure    Progress toward goals: Progressing adequately    Interval History: Session focus was Psychological Flexibility and the ACT Matrix. Patient populated the matrix with personalized experiences in the designated quadrants (toward, away, internal, and external). Patients were encouraged to use present moment awareness to practice noticing their toward and away moves to assist with making values-based actions.    Diagnosis: JESUS    Plan: Continue treatment on [facility]

## 2022-04-05 ENCOUNTER — HOSPITAL ENCOUNTER (OUTPATIENT)
Dept: PSYCHIATRY | Facility: HOSPITAL | Age: 32
Discharge: HOME | End: 2022-04-05
Attending: PSYCHIATRY & NEUROLOGY
Payer: COMMERCIAL

## 2022-04-05 DIAGNOSIS — F33.1 MDD (MAJOR DEPRESSIVE DISORDER), RECURRENT EPISODE, MODERATE: Primary | ICD-10-CM

## 2022-04-05 PROCEDURE — 90853 PR GROUP PSYCHOTHERAPY: ICD-10-PCS | Mod: ,,, | Performed by: PSYCHOLOGIST

## 2022-04-05 PROCEDURE — 99232 PR SUBSEQUENT HOSPITAL CARE,LEVL II: ICD-10-PCS | Mod: ,,, | Performed by: PSYCHIATRY & NEUROLOGY

## 2022-04-05 PROCEDURE — 90853 GROUP PSYCHOTHERAPY: CPT

## 2022-04-05 PROCEDURE — 90853 GROUP PSYCHOTHERAPY: CPT | Mod: ,,, | Performed by: PSYCHOLOGIST

## 2022-04-05 PROCEDURE — 99232 SBSQ HOSP IP/OBS MODERATE 35: CPT | Mod: ,,, | Performed by: PSYCHIATRY & NEUROLOGY

## 2022-04-05 NOTE — PLAN OF CARE
04/05/22 1108   Activity/Group Therapy Checklist   Group Other (Comments)  (Communication Skill)   Follows Direction Followed directions   Group Interactions/Observations Interacted appropriately;Sharing;Supportive   Affect/Mood Range Normal range   Affect/Mood Display Appropriate   Goal Progression Progressing       SW and group reviewed active listening skills, such as verbal and non-verbal communication, open vs closed ended questions, and reflections. Patient denied any questions or concerns at this time.

## 2022-04-05 NOTE — TREATMENT PLAN
"[facility]  BEHAVIORAL MEDICINE UNIT  INTERDISCIPLINARY TREATMENT PLAN  INTENSIVE OUTPATIENT PROGRAM    INTERDISCIPLINARY  TREATMENT TEAM:    Dipak Coleman M.D., Psychiatrist      Alma Gomez, Ph.D., Clinical Psychologist    Sacha Hudson LPN, Licensed Practical Nurse    Arin Pereira, [facility] [place],     CECILIO Cheng,       Resident: Ruba Slater DO     (Signatures scanned into record separately).      ESTIMATED LOS:  2 weeks      The patient has reviewed the treatment plan with staff and has signed the "Patient Responsibilities" form.    (Patient signature scanned into record separately).      TREATMENT PLAN    DIAGNOSIS:  MDD  JESUS  Likely PNES      Patient Education Needs/Barriers to Learning (i.e., Language, Reading, Comprehension): None        Support/Advocacy Services/Needs (i.e., Financial, Transportation, Medications): None         Community Resources (i.e., Alcoholics Anonymous, Al Anon): None     Patients Identified Goals:  1. How to deal with stress  2. Deal with negative people  3. Be happy  4. Be who I am     Coping Skills:  1. Deep Breathing  2. Sleeping  3. Eating    Strengths:  1. I'm nice   2. I'm a good teacher  3. I help people  4. I'm strong willed      Limitations:  1. Stress  2. Hyprocricy  3. Self-doubt  4. Pressure      Goals and Objectives:  1. Goal: Attend and participate in all groups   Objective measure: Progress notes indicating active listening,    self-disclosure, feedback   Time frame to reach goal: Each day    2. Goal: Medication management   Objective measure: Physician progress note indicating improved medication   status   Time frame to reach goal: By discharge    3. Goal: Reduce depression   Objective measure: Physician progress note indicating depression is improved   Time frame to reach goal: By discharge    4.  Goal: Reduce anxiety   Objective measure: Physician progress note indicating anxiety is improved   Time " frame to reach goal: By discharge    5. Goal: Develop stress coping skills   Objective measure: Patient self-report of improved coping   Time frame to reach goal: By discharge      Group Interventions:    Psychodynamic Group Psychotherapy - 1 hour, 5 times per week  Goals: 1. Utilize group empathy and support for problem solving; 2. Apply stress management, communication, and assertiveness skills to personal issues; 3. Discuss mental health symptoms and explore strategies for coping; 4. Discuss ways to change lifestyle to maintain better emotional health.    Communication Skills - 1 hour, 2 times per week  Goals: 1. Learn rules of effective communication; 2. Improve listening skills; 3. Practice clear communication.    Nutrition and Health - 1 hour, 1 time per week  Goals: 1. Explore impact that nutrition has on health; 2. Identify positive nutritional choices; 3. Explore how nutrition relates to stress tolerance.    Personal Growth - 1 hour, 2 times per week  Goals: 1. Discuss the development of self; 2. Create personal awareness and insight; 3. Explore a variety of psycho-educational techniques.    Promoting Healthy Lifestyles - 1 hour, 1 time per week  Goals: 1. Understand the Biopsychosocial Model of Health; 2. Develop insight into how mental health can impact other dimensions of health; 3. Develop appropriate health promotion strategies.    Rational Emotive Therapy (RET) - 1 hour, 1 time per week  Goals: 1. Learn an action-oriented psychotherapy called RET; 2. Focus on identifying, challenging, and replacing self-defeating thoughts and beliefs; 3. Explore how healthier thinking can lead to healthier emotional well-being.    Relationship Dynamics - 1 hour, 1 time per week  Goals: 1. Learn about factors that shape relationships; 2. Understand the central role of relationships in personal well-being; 3. Learn how to improve all relationships.    Relaxation Training - 1 hour, 3 times per week  Goals: 1. Learn about  and implement various techniques for releasing physical tension from the body.    Spirituality - 1 hour, 1 time per week  Goals: 1. Discuss and reflect on the process of seeking peace and comfort; 2. Identify healthy ways of exploring spirituality.    Stress Management - 1 hour, 4 times per week  Goals: 1. Identify types and levels of stress; 2. Identify and change maladaptive beliefs and behaviors; 3. Identify and practice techniques of stress management.

## 2022-04-05 NOTE — PROGRESS NOTES
Group Psychotherapy (PhD/LCSW)    Site: [facility] [place]     Clinical status of patient: Intensive Outpatient Program (IOP)     Date:04/05/2022     Group Focus: Psychodynamic Process Group     Length of service: 17797 - 55 minutes     Number of patients in attendance: 4     Referred by: [facility] Team     Target symptoms: Mood Disorder     Patient's response to treatment: Active Listening and Self-disclosure     Progress toward goals: Progressing adequately     Interval History: Patient shared she has been having some difficulty with training her new puppy and it is starting to make her parents fearful of the dog. She is worried that they will ask her to get rid of him due to his aggressiveness.    Diagnosis:MDD      Plan: Continue [facility] program

## 2022-04-05 NOTE — PROGRESS NOTES
"BEHAVIORAL MEDICAL UNIT PROGRESS NOTE      ENCOUNTER DATE:  2022 9:39 AM   SITE[place]  BM[facility] [place] [facility] [place]  ADMIT DATE:   Pt Name: Rosalina Saavedra   : 1990   MRN: [id number]      HISTORY OF PRESENTING ILLNESS:  Rosalina Saavedra is a 32 y.o. 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 Stevens, on Friday who was concerned that tapering seizure medications (klonopin, lamictal) could lead to withdrawal seizure but she is not opposed to med changes if necessary. Will leave medications where they are for now and continue to monitor depressionn.    Patient seen in person.  Reports had "okay" weekend - says can't remember everything she did but did train her a dog a bit. Only left house to walk her dog. Minimal improvement in mood and energy. Slept  3-4 hours last night, going to bed at 11pm, falling asleep at 3am and waking at 7am for program. Using phone and watching tv in room. States ruminative thoughts keep her awake at night. Appetite stable - no binging over weekend. Reports anxiety stable; no panic attacks.  Denies suicidal ideation, intent or plan.    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 Identified with other program members descriptions of  Depression such  As  Having difficulty getting out of bed.    Medication compliant; denies side effects. On Effexor 225mg for 6 weeks.      Risk Parameters:  Patient reports no suicidal ideation  Patient reports no homicidal ideation  Patient reports no self-injurious behavior  Patient reports no violent behavior    Current psych meds  effexor 225mg po daily for anxiety and depression  Prescribed by neuro  - lamictal 200mg po bid  - klonopin 0.5mg po bid  - sumatriptan IM  - aimovig IM    Current Substance use  Alcohol : None  Nicotine:  None  Illicit's: None  Other Rx controlled substances (Ex-opiates, " stimulants etc): None      PAST PSYCHIATRIC, MEDICAL, AND SOCIAL HISTORY REVIEWED  The patient's past medical, family and social history have been reviewed and updated as appropriate within the electronic medical record     MEDICAL REVIEW OF SYSTEMS  History obtained from the patient   General : NO chills or fever   Respiratory: NO cough, shortness of breath   Cardiovascular: NO chest pain, palpitations or racing heart   Gastrointestinal: NO nausea, vomiting, constipation or diarrhea   Neurological: NO confusion, dizziness, headaches or tremors   Psychiatric: please see HPI     ALL MEDICATIONS:    Current Outpatient Medications:     clonazePAM (KLONOPIN) 0.5 MG tablet, Take 0.5 mg by mouth 2 (two) times a day., Disp: , Rfl:     erenumab-aooe (AIMOVIG AUTOINJECTOR) 140 mg/mL autoinjector, Inject 140 mg into the skin every 30 days., Disp: , Rfl:     ergocalciferol (ERGOCALCIFEROL) 50,000 unit Cap, Take 50,000 Units by mouth every 30 days., Disp: , Rfl:     lamoTRIgine (LAMICTAL) 200 MG tablet, Take 200 mg by mouth 2 (two) times a day., Disp: , Rfl:     OXcarbazepine (TRILEPTAL) 150 MG Tab, Take 150 mg by mouth 2 (two) times a day., Disp: , Rfl:     rimegepant (NURTEC) 75 mg odt, Place 75 mg under the tongue every other day. For 10 days, Disp: , Rfl:     sumatriptan (IMITREX STATDOSE) 6 mg/0.5 mL kit, Inject 6 mg into the skin daily as needed for Headaches., Disp: , Rfl:     venlafaxine (EFFEXOR-XR) 150 MG Cp24, Take 1 capsule (150 mg total) by mouth once daily. Take with 75mg capsule to equal 225mg daily., Disp: 30 capsule, Rfl: 11    venlafaxine (EFFEXOR-XR) 75 MG 24 hr capsule, Take 1 capsule (75 mg total) by mouth once daily. Take with 150mg capsule to equal 225mg daily., Disp: 30 capsule, Rfl: 11    ALLERGIES:  Review of patient's allergies indicates:   Allergen Reactions    Codeine Itching    Morphine Itching       RELEVANT LABS/STUDIES:    Lab Results   Component Value Date    WBC 5.09  10/23/2021    HGB 11.8 (L) 10/23/2021    HCT 36.1 (L) 10/23/2021    MCV 96 10/23/2021     10/23/2021     BMP  Lab Results   Component Value Date     12/21/2021    K 3.8 12/21/2021     (H) 12/21/2021    CO2 24 12/21/2021    BUN 11 12/21/2021    CREATININE 1.0 12/21/2021    CALCIUM 9.0 12/21/2021    ANIONGAP 6 (L) 12/21/2021    ESTGFRAFRICA >60 12/21/2021    EGFRNONAA >60 12/21/2021     Lab Results   Component Value Date    ALT 69 (H) 10/23/2021    AST 58 (H) 10/23/2021    ALKPHOS 60 10/23/2021    BILITOT 0.2 10/23/2021     Lab Results   Component Value Date    TSH 0.524 03/26/2021     Lab Results   Component Value Date    HGBA1C 5.0 03/26/2021       VITALS  defer to nursing note    PHYSICAL EXAM  General: well developed, well nourished  Neurologic:   Gait: Normal   Psychomotor signs:  No involuntary movements or tremor  AIMS: none    PSYCHIATRIC EXAM:     Mental Status Exam:  Appearance: unremarkable, age appropriate;  Dressed in basketball shorts  Behavior/Cooperation: normal, cooperative  Speech: normal rate, normal pitch, normal volume, monotone  Language: uses words appropriately; NO aphasia or dysarthria  Mood: depressed  Affect constricted, mood congruent, slow to react  Thought Process: normal and logical  Thought Content: normal, no suicidality, no homicidality, delusions, or paranoia  Level of Consciousness: Alert and Oriented x3  Memory:  Intact  Attention/concentration: appropriate for age/education.   Fund of Knowledge: appears adequate  Insight:   Intact  Judgment:  Intact       IMPRESSION:    Rosalina Saavedra is a 32 y.o. 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  New problems have not been presented today.     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     2) Psych Med:  · Continue Effexor 225mg  Po daily  · Reach out to neurology medications at this time;  Patient  Reports being compliant      Discussed with patient informed consent, risks vs. benefits, alternative treatments, side effect profile and the inherent unpredictability of individual responses to these treatments. Answered any questions patient may have had. The patient expresses understanding of the above and displays the capacity to agree with this current plan     3) Other: Labs/medical problems/ collateral- none needed      Ruba Slater DO  [facility], PGY-4  [facility]

## 2022-04-06 ENCOUNTER — HOSPITAL ENCOUNTER (OUTPATIENT)
Dept: PSYCHIATRY | Facility: HOSPITAL | Age: 32
Discharge: HOME | End: 2022-04-06
Attending: PSYCHIATRY & NEUROLOGY
Payer: COMMERCIAL

## 2022-04-06 VITALS
HEART RATE: 90 BPM | TEMPERATURE: 98 F | SYSTOLIC BLOOD PRESSURE: 133 MMHG | DIASTOLIC BLOOD PRESSURE: 69 MMHG | RESPIRATION RATE: 18 BRPM

## 2022-04-06 DIAGNOSIS — F33.1 MDD (MAJOR DEPRESSIVE DISORDER), RECURRENT EPISODE, MODERATE: Primary | ICD-10-CM

## 2022-04-06 PROCEDURE — 90853 GROUP PSYCHOTHERAPY: CPT | Mod: ,,, | Performed by: PSYCHOLOGIST

## 2022-04-06 PROCEDURE — 90853 PR GROUP PSYCHOTHERAPY: ICD-10-PCS | Mod: 95,,, | Performed by: PSYCHOLOGIST

## 2022-04-06 PROCEDURE — 90853 GROUP PSYCHOTHERAPY: CPT

## 2022-04-06 PROCEDURE — 90853 GROUP PSYCHOTHERAPY: CPT | Mod: 95,,, | Performed by: PSYCHOLOGIST

## 2022-04-06 PROCEDURE — 90853 PR GROUP PSYCHOTHERAPY: ICD-10-PCS | Mod: ,,, | Performed by: PSYCHOLOGIST

## 2022-04-06 NOTE — PATIENT CARE CONFERENCE
"Socia worker received report that patient was unable to participate in group sessions today and struggled to stay awake. During 10 AM group patient was observed sleeping and nodding back and forth. During 11 AM group patient was asked to leave group and speak with SW regarding participation.     SW spoke with patient and the concerns. Patient expressed that she was exhausted and struggled to stay awake because she did not get much sleep and wetn to bed at 4 AM. At that time patient agreed to go home and get some rest and start fresh tomorrow. During lunch , patient returned to the office for her lunch voucher and informed SW's that she had taken a walk and gained energy and that she would complete remainder of groups today.    Patient attended 1 PM and half hour into group instructor led patient to office due to her falling asleep again. Patient expressed she was still exhausted. Sw discussed with patient that it would best and safest to end group 20 minutes early and walked patient to conference room to wait for her mother. Patient asked instructor if she could continue for last of group and instructor informed patient that she didn't feel comfortable and wanted to avoid all disturbances if possible. Patient verbalized understanding.    Following placement in the conference room patient came to office and verbalized she felt that it was "unfair and hypocritical" to be thrown out of the group because of the "problem she wanted to talk about".       RAEGAN and instructor were able to discuss with patient the concern and the disruption that her sleeping was causing. SW reassured patient that she was not being targeted and that the staff wanted to ensure that she was safe and received adequate rest so that she could participate and commit to the program successfully. Patient expressed her frustration and then said her mother was outside to pick her up and asked to leave. No additional concerns were addressed.   "

## 2022-04-06 NOTE — PLAN OF CARE
04/06/22 1106   Activity/Group Therapy Checklist   Group Other (Comments)  (Coping Skills)   Attendance Attended   Follows Direction Unable to follow directions   Group Interactions/Observations Drowsy;Fell Asleep

## 2022-04-06 NOTE — PATIENT CARE CONFERENCE
"Presenting Problem and History:    32 year old female with past history or MDD, JESUS, and social anxiety disorder who is admitted to BMU after a suicide attempt in January 2022.      Attempt was with unknown pill overdose. Took pills, did not tell anyone, "lost a day". States family saw her "readign off of a plate" but they did not take her to the ER bc they thought it was due to her seizures. Did not tell mom of attempt until a month later. No other attempts. Not hospitalized      For the past several months, Rosalina endorses feelings of depression including anhedonia, guilt and feelings of worthlessness, intermittent thoughts of suicide, decreased sleep, increased appetite, decreased energy, and decreased concentration. 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      In addition to depressive symptoms, Rosalina also endorses symptoms associated with social anxiety disorder, reporting she experiences profuse sweating, worry about saying the wrong thing, and fear of rejection when speaking to other individuals, especially when those individuals are similar to her age. She also endorses symptoms associated with panic attacks, including sweating, tachycardia, shortness of breath and anxiety. These appear suddenly without precipitating events approximately 1-2 times per week. They are usually random, but can be associated with other stressors in her life.      During the interview, Rosalina reports hallucinations that are mostly inconsistent with organic psychosis. Examples include seeing "spirals" or "being in a bubble" Upon  Clarification, majority of visual hallucinations occur during dreams. Does endorse hearing random sounds that is occasionally " "words or "negative statements" States these are not in her own voice or the voices of someone she knows.      Rosalina endorses episodes of seizures, which she describes as somewhat improving - used to occur daily, now are "twitches". Prior to this improvement from February when she describes her seizures as daily which involved lack of control over her body, screaming uncontrollably, and inability to open her eyes. (see neurology note 3/21/22). 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      Rosalina reports intermittent feelings of suicidality, but denies intent or plan to complete.  No active attempts other than the one in February 2022. She denies any symptoms associated with lisa. She denies any symptoms associated with PTSD.     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 Patient initiated program on 4/1. Patient has been doing okay in group. Patient participates but isn't s forthcoming as other patients. Patient I struggling with sleeping in groups and having a hard time adjusting. Team will continue to monitor patient's progress.     Plan of Care:    Patient will continue [facility] program .     Aftercare/Follow ups:  Med Management: F/up with Dr. Samuels 4/22 @ 3 PM  Psychotherapy: Outside follow up w/ provider         Staff present:    MD Alma Ruvalcaba, PhD  Ruba Slater DO, Resident  Morales Goyal, MS3  Sacha Hudson, MEGAN Payton, RSW  Gayatri Torre , MSW intern        "

## 2022-04-06 NOTE — PROGRESS NOTES
Group Psychotherapy (PhD/LCSW)    Site: [facility] [place]     Clinical status of patient: Intensive Outpatient Program (IOP)     Date:04/06/2022     Group Focus: Psychodynamic Process Group     Length of service: 96939 - 45 minutes     Number of patients in attendance: 5     Referred by: [facility]     Target symptoms: Mood Disorder     Patient's response to treatment: Active Listening and Self-disclosure     Progress toward goals: Progressing slowly     Interval History: Patient was unable to participate in group appropriately. She was observed dozing off and snoring multiple times during the first 30 minutes of group.  called her name and she did not respond.  walked over to patient and tapped her on the arm to wake her and asked her to step out of group. Patient was noted to fall asleep in two previous groups today as well. She was asked to go home for the remainder of the day to rest or to sit in the conference room to rest and wait for her ride, but she would not be allowed to return to group due to being unable to participate and her snoring being disruptive to other group members. Patient was frustrated with this, stating she wanted to talk about her sleep issues she is having and insisted she was paying attention. Provider, along with IOP SW reiterated that she must be able to actively participate in group and it would be best for her to end early today and regroup tomorrow. Patient then looked at her phone, stated her mother was here to pick her up, and left.    Diagnosis:MDD      Plan: Continue [facility] program

## 2022-04-06 NOTE — PROGRESS NOTES
Group Psychotherapy (PhD/LCSW)    Site: [facility] [place]     Clinical status of patient: Intensive Outpatient Program (IOP)     Date:04/06/2022     Group Focus: Distress Tolerance     Length of service: 84268 - 55 minutes     Number of patients in attendance: 8     Referred by: [facility]     Target symptoms: Mood Disorder     Patient's response to treatment: Self-disclosure on two occasions when prompted.  She spontaneously responded to a question appropriately on one occasion.  Otherwise, she was clearly lethargic and slept periodically throughout the group.     Progress toward goals: Progressing slowly     Interval History: Session focus was Distress Tolerance:  TIP skill.  Patients were encouraged to use temperature, intense exercise, paced breathing, or paired muscle relaxation to reduce intensity of distress.    Diagnosis: MDD      Plan: Continue [facility] program

## 2022-04-07 ENCOUNTER — HOSPITAL ENCOUNTER (OUTPATIENT)
Dept: PSYCHIATRY | Facility: HOSPITAL | Age: 32
Discharge: HOME | End: 2022-04-07
Attending: PSYCHIATRY & NEUROLOGY
Payer: COMMERCIAL

## 2022-04-07 DIAGNOSIS — F33.1 MDD (MAJOR DEPRESSIVE DISORDER), RECURRENT EPISODE, MODERATE: Primary | ICD-10-CM

## 2022-04-07 PROCEDURE — 90853 GROUP PSYCHOTHERAPY: CPT | Mod: ,,, | Performed by: PSYCHOLOGIST

## 2022-04-07 PROCEDURE — 99231 PR SUBSEQUENT HOSPITAL CARE,LEVL I: ICD-10-PCS | Mod: ,,, | Performed by: PSYCHIATRY & NEUROLOGY

## 2022-04-07 PROCEDURE — 90853 GROUP PSYCHOTHERAPY: CPT | Mod: 95,,, | Performed by: PSYCHOLOGIST

## 2022-04-07 PROCEDURE — 99231 SBSQ HOSP IP/OBS SF/LOW 25: CPT | Mod: ,,, | Performed by: PSYCHIATRY & NEUROLOGY

## 2022-04-07 PROCEDURE — 90853 GROUP PSYCHOTHERAPY: CPT

## 2022-04-07 PROCEDURE — 90853 PR GROUP PSYCHOTHERAPY: ICD-10-PCS | Mod: 95,,, | Performed by: PSYCHOLOGIST

## 2022-04-07 PROCEDURE — 90853 PR GROUP PSYCHOTHERAPY: ICD-10-PCS | Mod: ,,, | Performed by: PSYCHOLOGIST

## 2022-04-07 NOTE — PROGRESS NOTES
Group Psychotherapy (PhD/LCSW)    Site: [facility] ([place], [place])    Clinical status of patient: Intensive Outpatient Program (IOP)    Date: 4/5/2022    Group Focus: Interpersonal Effectiveness Skills    Length of service: 60859 - 45-50 minutes    Number of patients in attendance: 7    [facility]    Target symptoms: Depression, Anxiety    Patient's response to treatment: Active Listening and Self-disclosure    Progress toward goals: Progressing adequately    Interval History: Group focused on introduction to Interpersonal Effectiveness goals and teaching the LEXI skills for objective effectiveness.    Diagnosis: JESUS    Plan: Continue treatment on [facility]

## 2022-04-07 NOTE — MEDICAL/APP STUDENT
BEHAVIORAL MEDICAL UNIT PROGRESS NOTE      ENCOUNTER DATE:  2022 10:28 AM   SITE:  BMU unit, [facility] [place]  ADMIT DATE:   Pt Name: Rosalina Saavedra   : 1990   MRN: [id number]    HISTORY OF PRESENTING ILLNESS:  Rosalina Saavedra is a 32 y.o. 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     Today, patient was seen to discuss worsening somnolence that presented yesterday during group therapy. Yesterday, staff noticed patient falling asleep with snoring during multiple group sessions. Today, patient confirms falling asleep, but did not think it was as severe. Admits she could have been unaware of the severity. She admits having fatigue and low energy during the day. She reports trouble falling asleep last night, staying in bed for multiple hours before finally falling asleep. She reports not taking any of her medication today due to potential somnolence.      Objectively, the patient's affect is choudhury and less constricted today. She is able to stay awake during the conversation. Discussed sleep hygiene, including creating a night time routine to improve sleeping at night time. Discussed changing klonopin 1mg bid to 1mg qhs due to the daytime somonlence. Discussed the importance of consistently taking her lamotrigine as prescribed due to risk of Renzo-Francisco Syndrome. She was receptive and voiced understanding of points discussed.     Biankas thoughts are linear and logical. She is goal-oriented, reporting multiple questions she would like to discuss during group therapy.     Risk Parameters:  Patient reports no suicidal ideation  Patient reports no homicidal ideation  Patient reports no self-injurious behavior  Patient reports no violent behavior    Current psych meds  effexor 225mg po daily for anxiety and depression  Prescribed by neuro:   - lamictal 200mg po bid  - klonopin 0.5mg po bid  - sumatriptan IM  - aimovig IM  Medication  side effects:  Somnolence    Current Substance use  Alcohol : None  Nicotine:  None  Illicit's: None  Other Rx controlled substances (Ex-opiates, stimulants etc): None      PAST PSYCHIATRIC, MEDICAL, AND SOCIAL HISTORY REVIEWED  The patient's past medical, family and social history have been reviewed and updated as appropriate within the electronic medical record     MEDICAL REVIEW OF SYSTEMS  History obtained from the patient   General : NO chills or fever   Respiratory: NO cough, shortness of breath   Cardiovascular: NO chest pain, palpitations or racing heart   Gastrointestinal: NO nausea, vomiting, constipation or diarrhea   Neurological: NO confusion, dizziness, headaches or tremors   Psychiatric: please see HPI     ALL MEDICATIONS:    Current Outpatient Medications:     clonazePAM (KLONOPIN) 0.5 MG tablet, Take 0.5 mg by mouth 2 (two) times a day., Disp: , Rfl:     erenumab-aooe (AIMOVIG AUTOINJECTOR) 140 mg/mL autoinjector, Inject 140 mg into the skin every 30 days., Disp: , Rfl:     ergocalciferol (ERGOCALCIFEROL) 50,000 unit Cap, Take 50,000 Units by mouth every 30 days., Disp: , Rfl:     lamoTRIgine (LAMICTAL) 200 MG tablet, Take 200 mg by mouth 2 (two) times a day., Disp: , Rfl:     OXcarbazepine (TRILEPTAL) 150 MG Tab, Take 150 mg by mouth 2 (two) times a day., Disp: , Rfl:     rimegepant (NURTEC) 75 mg odt, Place 75 mg under the tongue every other day. For 10 days, Disp: , Rfl:     sumatriptan (IMITREX STATDOSE) 6 mg/0.5 mL kit, Inject 6 mg into the skin daily as needed for Headaches., Disp: , Rfl:     venlafaxine (EFFEXOR-XR) 150 MG Cp24, Take 1 capsule (150 mg total) by mouth once daily. Take with 75mg capsule to equal 225mg daily., Disp: 30 capsule, Rfl: 11    venlafaxine (EFFEXOR-XR) 75 MG 24 hr capsule, Take 1 capsule (75 mg total) by mouth once daily. Take with 150mg capsule to equal 225mg daily., Disp: 30 capsule, Rfl: 11    ALLERGIES:  Review of patient's allergies indicates:    Allergen Reactions    Codeine Itching    Morphine Itching       RELEVANT LABS/STUDIES:    Lab Results   Component Value Date    WBC 5.09 10/23/2021    HGB 11.8 (L) 10/23/2021    HCT 36.1 (L) 10/23/2021    MCV 96 10/23/2021     10/23/2021     BMP  Lab Results   Component Value Date     12/21/2021    K 3.8 12/21/2021     (H) 12/21/2021    CO2 24 12/21/2021    BUN 11 12/21/2021    CREATININE 1.0 12/21/2021    CALCIUM 9.0 12/21/2021    ANIONGAP 6 (L) 12/21/2021    ESTGFRAFRICA >60 12/21/2021    EGFRNONAA >60 12/21/2021     Lab Results   Component Value Date    ALT 69 (H) 10/23/2021    AST 58 (H) 10/23/2021    ALKPHOS 60 10/23/2021    BILITOT 0.2 10/23/2021     Lab Results   Component Value Date    TSH 0.524 03/26/2021     Lab Results   Component Value Date    HGBA1C 5.0 03/26/2021       VITALS  defer to nursing note    PHYSICAL EXAM  General: well developed, well nourished  Neurologic:   Gait: Normal   Psychomotor signs:  No involuntary movements or tremor  AIMS: none    PSYCHIATRIC EXAM:     Mental Status Exam:  Appearance: unremarkable, age appropriate  Behavior/Cooperation: normal, cooperative  Speech: normal tone, normal rate, normal pitch, normal volume  Language: uses words appropriately; NO aphasia or dysarthria  Mood: frustrated  Affect  Thought Process: normal and logical  Thought Content: normal, no suicidality, no homicidality, delusions, or paranoia  Level of Consciousness: Alert and Oriented x3  Memory:  Intact  Attention/concentration: appropriate for age/education.   Fund of Knowledge: appears adequate  Insight:  Impaired/  Limited/ Intact  Judgment: Impaired/  Limited/ Intact       IMPRESSION:    Rosalina Saavedra is a 32 y.o. 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     Status/Progress:  Based on the examination today, the patient's problem(s) is/are adequately but not ideally controlled.  New  problems have been presented today. Rosalina complains of daytime somnolence. Will change Klonopin 1mg bid to 1mg qhs.       DIAGNOSES:  No diagnosis found.    PLAN:    1) Continue BMU program with group and individual therapies.      2) Psych Med:  · Continue Effexor 225mg  Po daily  · Continue lamotrigine 200mg po bid  · Klonopin 1mg bid to 1mg qhs (due to daytime somnolence)       · Discussed with patient informed consent, risks vs. benefits, alternative treatments, side effect profile and the inherent unpredictability of individual responses to these treatments. Answered any questions patient may have had. The patient expresses understanding of the above and displays the capacity to agree with this current plan      3) Other: Labs/medical problems/ collateral- none needed        Ruba Slater,   [employer], PGY-4  [facility]

## 2022-04-07 NOTE — PROGRESS NOTES
Group Psychotherapy (PhD/LCSW)    Site: [facility] [place]     Clinical status of patient: Intensive Outpatient Program (IOP)     Date:04/07/2022     Group Focus: Emotion REgulation     Length of service: 80890 - 55 minutes     Number of patients in attendance: 8     Referred by: [facility]     Target symptoms: Mood Disorder     Patient's response to treatment: Self-disclosure on two occasions when prompted.  She spontaneously responded to a question appropriately on one occasion.  Otherwise, she was clearly lethargic and slept periodically throughout the group.     Progress toward goals: Progressing slowly     Interval History: Session focus was Emotion Regulation:  Understanding Emotions.  Patients were encouraged to understand what their emotions do for them (motivate them to action, communicate to themselves and others).      Diagnosis: MDD      Plan: Continue [facility] program

## 2022-04-08 ENCOUNTER — HOSPITAL ENCOUNTER (OUTPATIENT)
Dept: PSYCHIATRY | Facility: HOSPITAL | Age: 32
Discharge: HOME | End: 2022-04-08
Attending: PSYCHIATRY & NEUROLOGY
Payer: COMMERCIAL

## 2022-04-08 VITALS
SYSTOLIC BLOOD PRESSURE: 109 MMHG | HEART RATE: 95 BPM | DIASTOLIC BLOOD PRESSURE: 55 MMHG | RESPIRATION RATE: 18 BRPM | TEMPERATURE: 97 F

## 2022-04-08 DIAGNOSIS — F41.1 GAD (GENERALIZED ANXIETY DISORDER): ICD-10-CM

## 2022-04-08 DIAGNOSIS — G47.00 INSOMNIA, UNSPECIFIED TYPE: Primary | ICD-10-CM

## 2022-04-08 DIAGNOSIS — F33.1 MDD (MAJOR DEPRESSIVE DISORDER), RECURRENT EPISODE, MODERATE: ICD-10-CM

## 2022-04-08 PROCEDURE — 90853 GROUP PSYCHOTHERAPY: CPT

## 2022-04-08 PROCEDURE — 90853 PR GROUP PSYCHOTHERAPY: ICD-10-PCS | Mod: ,,, | Performed by: PSYCHOLOGIST

## 2022-04-08 PROCEDURE — 90853 GROUP PSYCHOTHERAPY: CPT | Mod: ,,, | Performed by: PSYCHOLOGIST

## 2022-04-08 NOTE — PLAN OF CARE
04/08/22 1447   Activity/Group Therapy Checklist   Group Meditation/Relaxation  (YOGA)   Attendance Attended   Follows Direction Followed directions   Group Interactions/Observations Interacted appropriately;Alert;Supportive;Sharing   Affect/Mood Range Normal range   Affect/Mood Display Appropriate   Goal Progression Progressing        Intern/  Gayatri led group and discussed yoga as a practice.  led restorative yoga practice and engaged in breathing exercises where members were invited to witness body sensations, breath, and thoughts coming and going without attaching onto them. Participants were appropriate and responsive in group.

## 2022-04-08 NOTE — PROGRESS NOTES
Group Psychotherapy (PhD/LCSW)    [place] [facility] [place]     Clinical status of patient: Intensive Outpatient Program (IOP)     Date:4/7/22     Group Focus: Psychodynamic Process Group     Length of service: 49961 - 45 minutes     Number of patients in attendance: 5     Referred by: [facility]     Target symptoms: Mood Disorder     Patient's response to treatment: Active Listening and Self-disclosure     Progress toward goals: Progressing slowly     Interval History: Patient shared she is aware that her sleep issues hindered her group participation yesterday. She asked for support from the group on ways she could improve her sleep. She discussed doing several things that are considered poor sleep hygiene at the moment and plans to start implementing change.    Diagnosis:MDD      Plan: Continue [facility] program

## 2022-04-08 NOTE — PROGRESS NOTES
BEHAVIORAL MEDICAL UNIT PROGRESS NOTE      ENCOUNTER DATE:  2022 10:28 AM   SITE:  BMU unit, [facility] [place] [place]  ADMIT DATE:   Pt Name: Rosalina Saavedra   : 1990   MRN: [id number]    HISTORY OF PRESENTING ILLNESS:  Rosalina Saavedra is a 32 y.o. 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     Today, patient was seen to discuss worsening somnolence that presented yesterday during group therapy. Yesterday, staff noticed patient falling asleep with snoring during multiple group sessions. Today, patient confirms falling asleep, but did not think it was as severe. Admits she could have been unaware of the severity. She admits having fatigue and low energy during the day. She reports trouble falling asleep last night, going to bed at 10am and staying in bed for multiple hours before finally falling asleep at 4am. She reports not taking any of her medication this morning today due to potential somnolence. Future oriented.     Objectively, the patient's affect is less constricted today and appropriately reactive. She is able to stay awake during the conversation. Discussed sleep hygiene, including creating a night time routine to improve sleeping at night time. Discussed changing klonopin 1mg bid to 1mg qhs due to the daytime somonlence. Discussed the importance of consistently taking her lamotrigine as prescribed due to risk of Renzo-Francisco Syndrome. She was receptive and voiced understanding of points discussed.       Risk Parameters:  Patient reports no suicidal ideation  Patient reports no homicidal ideation  Patient reports no self-injurious behavior  Patient reports no violent behavior    Current psych meds  effexor 225mg po daily for anxiety and depression  Prescribed by neuro:   - lamictal 200mg po bid  - klonopin 0.5mg po bid  - sumatriptan IM  - aimovig IM  Medication side effects:  Somnolence    Current Substance use  Alcohol :  None  Nicotine:  None  Illicit's: None  Other Rx controlled substances (Ex-opiates, stimulants etc): None      PAST PSYCHIATRIC, MEDICAL, AND SOCIAL HISTORY REVIEWED  The patient's past medical, family and social history have been reviewed and updated as appropriate within the electronic medical record     MEDICAL REVIEW OF SYSTEMS  History obtained from the patient   General : NO chills or fever   Respiratory: NO cough, shortness of breath   Cardiovascular: NO chest pain, palpitations or racing heart   Gastrointestinal: NO nausea, vomiting, constipation or diarrhea   Neurological: NO confusion, dizziness, headaches or tremors   Psychiatric: please see HPI     ALL MEDICATIONS:    Current Outpatient Medications:     clonazePAM (KLONOPIN) 0.5 MG tablet, Take 0.5 mg by mouth 2 (two) times a day., Disp: , Rfl:     erenumab-aooe (AIMOVIG AUTOINJECTOR) 140 mg/mL autoinjector, Inject 140 mg into the skin every 30 days., Disp: , Rfl:     ergocalciferol (ERGOCALCIFEROL) 50,000 unit Cap, Take 50,000 Units by mouth every 30 days., Disp: , Rfl:     lamoTRIgine (LAMICTAL) 200 MG tablet, Take 200 mg by mouth 2 (two) times a day., Disp: , Rfl:     OXcarbazepine (TRILEPTAL) 150 MG Tab, Take 150 mg by mouth 2 (two) times a day., Disp: , Rfl:     rimegepant (NURTEC) 75 mg odt, Place 75 mg under the tongue every other day. For 10 days, Disp: , Rfl:     sumatriptan (IMITREX STATDOSE) 6 mg/0.5 mL kit, Inject 6 mg into the skin daily as needed for Headaches., Disp: , Rfl:     venlafaxine (EFFEXOR-XR) 150 MG Cp24, Take 1 capsule (150 mg total) by mouth once daily. Take with 75mg capsule to equal 225mg daily., Disp: 30 capsule, Rfl: 11    venlafaxine (EFFEXOR-XR) 75 MG 24 hr capsule, Take 1 capsule (75 mg total) by mouth once daily. Take with 150mg capsule to equal 225mg daily., Disp: 30 capsule, Rfl: 11    ALLERGIES:  Review of patient's allergies indicates:   Allergen Reactions    Codeine Itching    Morphine Itching        RELEVANT LABS/STUDIES:    Lab Results   Component Value Date    WBC 5.09 10/23/2021    HGB 11.8 (L) 10/23/2021    HCT 36.1 (L) 10/23/2021    MCV 96 10/23/2021     10/23/2021     BMP  Lab Results   Component Value Date     12/21/2021    K 3.8 12/21/2021     (H) 12/21/2021    CO2 24 12/21/2021    BUN 11 12/21/2021    CREATININE 1.0 12/21/2021    CALCIUM 9.0 12/21/2021    ANIONGAP 6 (L) 12/21/2021    ESTGFRAFRICA >60 12/21/2021    EGFRNONAA >60 12/21/2021     Lab Results   Component Value Date    ALT 69 (H) 10/23/2021    AST 58 (H) 10/23/2021    ALKPHOS 60 10/23/2021    BILITOT 0.2 10/23/2021     Lab Results   Component Value Date    TSH 0.524 03/26/2021     Lab Results   Component Value Date    HGBA1C 5.0 03/26/2021       VITALS  defer to nursing note    PHYSICAL EXAM  General: well developed, well nourished  Neurologic:   Gait: Normal   Psychomotor signs:  No involuntary movements or tremor  AIMS: none    PSYCHIATRIC EXAM:     Mental Status Exam:  Appearance: unremarkable, age appropriate  Behavior/Cooperation: normal, cooperative  Speech: normal tone, normal rate, normal pitch, normal volume  Language: uses words appropriately; NO aphasia or dysarthria  Mood: frustrated  Affect  Thought Process: normal and logical  Thought Content: normal, no suicidality, no homicidality, delusions, or paranoia  Level of Consciousness: Alert and Oriented x3  Memory:  Intact  Attention/concentration: appropriate for age/education.   Fund of Knowledge: appears adequate  Insight:  Impaired/  Limited/ Intact  Judgment: Impaired/  Limited/ Intact       IMPRESSION:    Rosalina Saavedra is a 32 y.o. 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     Status/Progress:  Based on the examination today, the patient's problem(s) is/are adequately but not ideally controlled.  New problems have been presented today. Rosalina complains of  daytime somnolence. Will change Klonopin 1mg bid to 1mg qhs.       DIAGNOSES:    ICD-10-CM ICD-9-CM   1. MDD (major depressive disorder), recurrent episode, moderate  F33.1 296.32       PLAN:    1) Continue BMU program with group and individual therapies.      2) Psych Med:  · Continue Effexor 225mg  Po daily  · Continue lamotrigine 200mg po bid  · DECREASE Klonopin 1mg bid to 1mg qhs (due to daytime somnolence)       · Discussed with patient informed consent, risks vs. benefits, alternative treatments, side effect profile and the inherent unpredictability of individual responses to these treatments. Answered any questions patient may have had. The patient expresses understanding of the above and displays the capacity to agree with this current plan      3) Other: Labs/medical problems/ collateral- none needed        Ruba Slater, DO  [facility], PGY-4  [facility]

## 2022-04-08 NOTE — PLAN OF CARE
04/08/22 1448   Activity/Group Therapy Checklist   Group Other (Comments)  (Self-Care)   Attendance Attended   Follows Direction Followed directions   Group Interactions/Observations Interacted appropriately;Alert;Sharing;Supportive   Affect/Mood Range Normal range   Affect/Mood Display Appropriate   Goal Progression Progressing      discussed self care definition with group. SW asked patients to describe what self-care  meant to them and to share some self care practices. SW went over self-care/ mental health maintenance plan. SW discussed with patients spotting mental health risks and ways of preventing and dealing with problems.  SW had patients to list 2-3 activities/strategies in  each of the following columns: identifying triggers/stressors, warning signs, self care, coping strategies, and returning to therapy. Patients were cooperative and appropriate during group.

## 2022-04-08 NOTE — PROGRESS NOTES
Group Psychotherapy (PhD/LCSW)    Site: [facility] [place]     Clinical status of patient: Intensive Outpatient Program (IOP)     Date:4/7/22     Group Focus: Psychodynamic Process Group     Length of service: 31275 - 45 minutes     Number of patients in attendance: 5     Referred by: [facility]     Target symptoms: Mood Disorder     Patient's response to treatment: Active Listening and Self-disclosure     Progress toward goals: Progressing slowly     Interval History: Patient shared she feels like a prisoner in her home due to being unable to drive and having to ask for a ride anytime she wants to go somewhere. She discussed how her parents frequently disrespect her boundaries, but she feels like she cannot set any consequences due to needing to live there while she is out of work.     Diagnosis:MDD      Plan: Continue [facility] program

## 2022-04-09 ENCOUNTER — PATIENT OUTREACH (OUTPATIENT)
Dept: ADMINISTRATIVE | Facility: OTHER | Age: 32
End: 2022-04-09
Payer: COMMERCIAL

## 2022-04-11 ENCOUNTER — TELEPHONE (OUTPATIENT)
Dept: NEUROLOGY | Facility: CLINIC | Age: 32
End: 2022-04-11
Payer: COMMERCIAL

## 2022-04-11 ENCOUNTER — OFFICE VISIT (OUTPATIENT)
Dept: NEUROLOGY | Facility: CLINIC | Age: 32
End: 2022-04-11
Payer: COMMERCIAL

## 2022-04-11 VITALS
BODY MASS INDEX: 29.59 KG/M2 | DIASTOLIC BLOOD PRESSURE: 85 MMHG | HEIGHT: 66 IN | SYSTOLIC BLOOD PRESSURE: 121 MMHG | HEART RATE: 88 BPM

## 2022-04-11 DIAGNOSIS — G43.701 CHRONIC MIGRAINE W/O AURA, NOT INTRACTABLE, W STAT MIGR: Primary | ICD-10-CM

## 2022-04-11 DIAGNOSIS — G43.701 CHRONIC MIGRAINE W/O AURA, NOT INTRACTABLE, W STAT MIGR: ICD-10-CM

## 2022-04-11 DIAGNOSIS — G47.09 OTHER INSOMNIA: ICD-10-CM

## 2022-04-11 DIAGNOSIS — G43.809 OTHER MIGRAINE WITHOUT STATUS MIGRAINOSUS, NOT INTRACTABLE: Primary | ICD-10-CM

## 2022-04-11 PROCEDURE — 3074F SYST BP LT 130 MM HG: CPT | Mod: CPTII,S$GLB,, | Performed by: PSYCHIATRY & NEUROLOGY

## 2022-04-11 PROCEDURE — 99999 PR PBB SHADOW E&M-EST. PATIENT-LVL III: ICD-10-PCS | Mod: PBBFAC,,, | Performed by: PSYCHIATRY & NEUROLOGY

## 2022-04-11 PROCEDURE — 3008F BODY MASS INDEX DOCD: CPT | Mod: CPTII,S$GLB,, | Performed by: PSYCHIATRY & NEUROLOGY

## 2022-04-11 PROCEDURE — 3074F PR MOST RECENT SYSTOLIC BLOOD PRESSURE < 130 MM HG: ICD-10-PCS | Mod: CPTII,S$GLB,, | Performed by: PSYCHIATRY & NEUROLOGY

## 2022-04-11 PROCEDURE — 1159F PR MEDICATION LIST DOCUMENTED IN MEDICAL RECORD: ICD-10-PCS | Mod: CPTII,S$GLB,, | Performed by: PSYCHIATRY & NEUROLOGY

## 2022-04-11 PROCEDURE — 1159F MED LIST DOCD IN RCRD: CPT | Mod: CPTII,S$GLB,, | Performed by: PSYCHIATRY & NEUROLOGY

## 2022-04-11 PROCEDURE — 99999 PR PBB SHADOW E&M-EST. PATIENT-LVL III: CPT | Mod: PBBFAC,,, | Performed by: PSYCHIATRY & NEUROLOGY

## 2022-04-11 PROCEDURE — 3079F PR MOST RECENT DIASTOLIC BLOOD PRESSURE 80-89 MM HG: ICD-10-PCS | Mod: CPTII,S$GLB,, | Performed by: PSYCHIATRY & NEUROLOGY

## 2022-04-11 PROCEDURE — 3079F DIAST BP 80-89 MM HG: CPT | Mod: CPTII,S$GLB,, | Performed by: PSYCHIATRY & NEUROLOGY

## 2022-04-11 PROCEDURE — 99215 OFFICE O/P EST HI 40 MIN: CPT | Mod: S$GLB,,, | Performed by: PSYCHIATRY & NEUROLOGY

## 2022-04-11 PROCEDURE — 3008F PR BODY MASS INDEX (BMI) DOCUMENTED: ICD-10-PCS | Mod: CPTII,S$GLB,, | Performed by: PSYCHIATRY & NEUROLOGY

## 2022-04-11 PROCEDURE — 1160F PR REVIEW ALL MEDS BY PRESCRIBER/CLIN PHARMACIST DOCUMENTED: ICD-10-PCS | Mod: CPTII,S$GLB,, | Performed by: PSYCHIATRY & NEUROLOGY

## 2022-04-11 PROCEDURE — 1160F RVW MEDS BY RX/DR IN RCRD: CPT | Mod: CPTII,S$GLB,, | Performed by: PSYCHIATRY & NEUROLOGY

## 2022-04-11 PROCEDURE — 99215 PR OFFICE/OUTPT VISIT, EST, LEVL V, 40-54 MIN: ICD-10-PCS | Mod: S$GLB,,, | Performed by: PSYCHIATRY & NEUROLOGY

## 2022-04-11 RX ORDER — ESZOPICLONE 2 MG/1
2 TABLET, FILM COATED ORAL NIGHTLY PRN
Qty: 30 TABLET | Refills: 0 | Status: SHIPPED | OUTPATIENT
Start: 2022-04-11 | End: 2022-04-12 | Stop reason: SDUPTHER

## 2022-04-11 RX ORDER — ATOGEPANT 60 MG/1
60 TABLET ORAL DAILY
Qty: 30 TABLET | Refills: 6 | Status: SHIPPED | OUTPATIENT
Start: 2022-04-11 | End: 2022-07-12

## 2022-04-11 NOTE — TELEPHONE ENCOUNTER
----- Message from Geoff Dorsey MD sent at 4/11/2022  1:46 PM CDT -----  Regarding: BOTOX  Can we set her up for BOTOX fir chronic migraine. Thanks

## 2022-04-11 NOTE — PROGRESS NOTES
Subjective:       Patient ID: Rosalina Saavedra is a 32 y.o. female.    Chief Complaint: Headache  Pt of Dr. Stevens, Dr. Murphy   HPI   Headache history:   Age of onset -  5   Location - occipital, temples   Nature of pain -  Stabbing, aching   Prodrome - no   Aura -  No   Duration of headache - > 4 hrs   Time to peak intensity - n/a   Pain scale - range of intensity -  9-10/10  Frequency -  daily   Status Migrainosus history - yes   Time of day of most headaches- anytime     Associated symptoms with the headache:   Meningeal symptoms - photophobia, phonophobia, exercise intolerance +   Nausea/vomitting +   Nasal drainage   Visual blurriness   Pallor/flushing  Dizziness   Vertigo  Confusion  Impaired concentration +   Pain worsened with physical activity  +   Neck pain +     Cluster headache symptoms: none   Symptoms of increased intracranial pressure: none  Basilar migraine symptoms: none     Headache Triggers:  Stress, anxiety, emotional overload   H/o SI     Treatment history:  Resolution of headache with sleep - yes   Meds tried:    Failed:   prozac 40   effexor 150   Gabapentin 300 mg   mobic 15   namenda   nurtec   trokemdi   Diamox   imitrex shots  lamictal     10/21/21  Care Timeline    0000   EKG 12-LEAD   1228   Arrived   1308   Comprehensive metabolic panel      CBC auto differential    1321   POCT glucose   1340   CT Head Without Contrast   1341   POCT urine pregnancy   1400   EKG 12-lead   1422   metoclopramide HCl 10 mg   1424   ketorolac tromethamine 30 mg     diphenhydramine HCl 25 mg   1425   0.9 % sodium chloride 1000 mL   1546   haloperidol lactate 2.5 mg   1553   magnesium sulfate in water 2 g   1826   prochlorperazine edisylate 5 mg   1827   diphenhydramine HCl 25 mg   2027   Discharged       Headache risk factors:   H/o TBI  - concussion at age 16   H/o Meningitis  - no   F/h Aneurysms  - no    Headache burden:   Missing college days         Review of Systems   Constitutional: Negative.    HENT:  Negative.    Eyes: Negative.    Respiratory: Negative.    Cardiovascular: Negative.    Gastrointestinal: Negative.    Endocrine: Negative.    Genitourinary: Negative.    Musculoskeletal: Negative.    Integumentary:  Negative.   Allergic/Immunologic: Negative.    Neurological: Positive for headaches.   Hematological: Negative.    Psychiatric/Behavioral: Positive for dysphoric mood and sleep disturbance.         Objective:      Physical Exam  Constitutional:       Comments: overweight   HENT:      Head: Normocephalic and atraumatic.      Nose: Nose normal.      Mouth/Throat:      Mouth: Mucous membranes are moist.   Eyes:      Extraocular Movements: Extraocular movements intact.      Conjunctiva/sclera: Conjunctivae normal.      Pupils: Pupils are equal, round, and reactive to light.   Cardiovascular:      Rate and Rhythm: Normal rate and regular rhythm.      Pulses: Normal pulses.   Pulmonary:      Effort: Pulmonary effort is normal.      Breath sounds: Normal breath sounds.   Musculoskeletal:         General: Normal range of motion.      Cervical back: Normal range of motion.   Skin:     General: Skin is warm and dry.   Neurological:      General: No focal deficit present.      Mental Status: She is alert. Mental status is at baseline.      Cranial Nerves: No cranial nerve deficit.      Sensory: No sensory deficit.      Motor: No weakness.      Coordination: Coordination normal.   Psychiatric:         Behavior: Behavior normal.         Thought Content: Thought content normal.         Judgment: Judgment normal.      Comments: Flat affect         Assessment:       Problem List Items Addressed This Visit     Insomnia    Relevant Medications    eszopiclone (LUNESTA) 2 MG Tab    Other Relevant Orders    Ambulatory referral/consult to Sleep Disorders      Other Visit Diagnoses     Other migraine without status migrainosus, not intractable    -  Primary    Relevant Medications    atogepant (QULIPTA) 60 mg Tab    Other  Relevant Orders    Vitamin D    Vitamin B6    Vitamin B2    Vitamin B12    Vitamin B1    Celiac Disease Panel          Impression:   No major branch stenosis/occlusion at the [place].  No aneurysm.   No evidence of venous sinus thrombosis or venous sinus stenosis..     Electronically signed by: Armani Lopez  Date:                                            11/24/2021  Time:                                           16:24    Impression:   No major branch stenosis/occlusion at the [place].  No aneurysm.   No evidence of venous sinus thrombosis or venous sinus stenosis..     Electronically signed by: Armani Lopez  Date:                                            11/24/2021  Time:                                           16:24    Impression:   Normal pre and postcontrast MR imaging appearance of the brain.     Electronically signed by: Armani Lopez  Date:                                            11/24/2021  Time:                                           16:51  Plan:     Patient is in status migrainous. The etiology is multifactorial. Her headaches are  refractory to several medications. Her headaches are aggravated by untreated depression     1. Given long standing history of headaches with no change in character and no associated neurological symptoms, no reportable neurological symptoms in between episodes, and normal neuro exam today there is no indication for an MRI.   2. Will check vitamin B1, B2, B6, B12, Vitamin D,  TSH,  gluten enteropathy testing.  3. Given prescription for prophylactic medication - Qulipta 60 mg. I discussed side effects of the medications. We will start at a lower dose. I asked her to stop the medication if she notices serious adverse effects like psychosis and seek immediate medical attention at an ER.   Cont Lamictal  Stop Nurtec every other day    Pt a candidate for BOTOX for chr migraine   Breakthrough headache - nurtec   Multiple alternative treatment options were  offered to the patient  4. Patient education. Discussed prevention and treatment of migraine headaches. Discussed sleep hygiene, healthy diet, importance of minimizing caffeine intake to a maximum of 100-200 mg /day, importance of avoiding foods with MSG, Nutrasweet, and Aspartame.   Provided hand outs on this.   5. Refrain from over counter pain medications. Discussed medication overuse headache. Recommend also taking Magnesium 400 mg PO BID  6.cont with psychiatrist   referral to sleep clinic   Trial of Dianaa    Patient verbalized understanding to plan. I answered all her questions to her satisfaction.

## 2022-04-12 ENCOUNTER — TELEPHONE (OUTPATIENT)
Dept: NEUROLOGY | Facility: CLINIC | Age: 32
End: 2022-04-12
Payer: COMMERCIAL

## 2022-04-12 ENCOUNTER — HOSPITAL ENCOUNTER (OUTPATIENT)
Dept: PSYCHIATRY | Facility: HOSPITAL | Age: 32
Discharge: HOME | End: 2022-04-12
Attending: PSYCHIATRY & NEUROLOGY
Payer: COMMERCIAL

## 2022-04-12 ENCOUNTER — LAB VISIT (OUTPATIENT)
Dept: LAB | Facility: HOSPITAL | Age: 32
End: 2022-04-12
Attending: PSYCHIATRY & NEUROLOGY
Payer: COMMERCIAL

## 2022-04-12 DIAGNOSIS — F33.1 MDD (MAJOR DEPRESSIVE DISORDER), RECURRENT EPISODE, MODERATE: Primary | ICD-10-CM

## 2022-04-12 DIAGNOSIS — G47.09 OTHER INSOMNIA: ICD-10-CM

## 2022-04-12 DIAGNOSIS — G43.809 OTHER MIGRAINE WITHOUT STATUS MIGRAINOSUS, NOT INTRACTABLE: ICD-10-CM

## 2022-04-12 LAB — VIT B12 SERPL-MCNC: 770 PG/ML (ref 210–950)

## 2022-04-12 PROCEDURE — 84207 ASSAY OF VITAMIN B-6: CPT | Performed by: PSYCHIATRY & NEUROLOGY

## 2022-04-12 PROCEDURE — 90853 PR GROUP PSYCHOTHERAPY: ICD-10-PCS | Mod: ,,, | Performed by: PSYCHOLOGIST

## 2022-04-12 PROCEDURE — 90853 GROUP PSYCHOTHERAPY: CPT | Mod: ,,, | Performed by: PSYCHOLOGIST

## 2022-04-12 PROCEDURE — 83516 IMMUNOASSAY NONANTIBODY: CPT | Performed by: PSYCHIATRY & NEUROLOGY

## 2022-04-12 PROCEDURE — 82607 VITAMIN B-12: CPT | Performed by: PSYCHIATRY & NEUROLOGY

## 2022-04-12 PROCEDURE — 90853 GROUP PSYCHOTHERAPY: CPT

## 2022-04-12 PROCEDURE — 84425 ASSAY OF VITAMIN B-1: CPT | Performed by: PSYCHIATRY & NEUROLOGY

## 2022-04-12 PROCEDURE — 84252 ASSAY OF VITAMIN B-2: CPT | Performed by: PSYCHIATRY & NEUROLOGY

## 2022-04-12 RX ORDER — ESZOPICLONE 2 MG/1
2 TABLET, FILM COATED ORAL NIGHTLY PRN
Qty: 30 TABLET | Refills: 0 | Status: SHIPPED | OUTPATIENT
Start: 2022-04-12 | End: 2022-05-06 | Stop reason: SDUPTHER

## 2022-04-12 NOTE — TELEPHONE ENCOUNTER
----- Message from Marina Phelps sent at 4/12/2022  3:26 PM CDT -----  Regarding: Lab Orders  Good afternoon,      I just wanted to make your team aware that we are not able to perform the Vitamin D lab for          MRN [id number] (Rosalina Saavedra). This is a system wide lab issue.  If you have any questions or concerns, please reach out to Evelin Bush or Blaze Peña for Roger.        Thanks  VANCE Kevin Supervisor Roger

## 2022-04-12 NOTE — PLAN OF CARE
04/12/22 1127   Activity/Group Therapy Checklist   Group Other (Comments)  (Communication Skills)   Attendance Attended   Follows Direction Followed directions   Group Interactions/Observations Interacted appropriately;Sharing;Supportive   Affect/Mood Range Normal range   Affect/Mood Display Appropriate   Goal Progression Progressing      and group reviewed types of boundaries and how to establish healthy boundaries. Patient denied any questions or concerns at this time.

## 2022-04-12 NOTE — PROGRESS NOTES
Group Psychotherapy (PhD/LCSW)    Site: [facility] ([place], [place])    Clinical status of patient: Intensive Outpatient Program (IOP)    Date: 4/12/2022    Group Focus: Interpersonal Effectiveness Skills    Length of service: 24081 - 45-50 minutes    Number of patients in attendance: 12    [facility]    Target symptoms: Depression, Anxiety    Patient's response to treatment: Active Listening and Self-disclosure    Progress toward goals: Progressing adequately    Interval History: Group session focused on Interpersonal Effectiveness skills, including a review of LEXI (objective effectiveness) skills from last week and introduction to GIVE (relationship effectiveness) and FAST (self-respect effectiveness).    Diagnosis: JESUS    Plan: Continue treatment on [facility]

## 2022-04-13 ENCOUNTER — HOSPITAL ENCOUNTER (OUTPATIENT)
Dept: PSYCHIATRY | Facility: HOSPITAL | Age: 32
Discharge: HOME | End: 2022-04-13
Attending: PSYCHIATRY & NEUROLOGY
Payer: COMMERCIAL

## 2022-04-13 VITALS
HEART RATE: 100 BPM | SYSTOLIC BLOOD PRESSURE: 115 MMHG | TEMPERATURE: 97 F | DIASTOLIC BLOOD PRESSURE: 69 MMHG | RESPIRATION RATE: 18 BRPM

## 2022-04-13 DIAGNOSIS — F33.1 MDD (MAJOR DEPRESSIVE DISORDER), RECURRENT EPISODE, MODERATE: Primary | ICD-10-CM

## 2022-04-13 PROCEDURE — 90853 GROUP PSYCHOTHERAPY: CPT

## 2022-04-13 PROCEDURE — 90853 PR GROUP PSYCHOTHERAPY: ICD-10-PCS | Mod: ,,, | Performed by: PSYCHOLOGIST

## 2022-04-13 PROCEDURE — 90853 GROUP PSYCHOTHERAPY: CPT | Mod: ,,, | Performed by: PSYCHOLOGIST

## 2022-04-13 NOTE — PLAN OF CARE
04/13/22 1106   Activity/Group Therapy Checklist   Group Other (Comments)  (Communication Skills)   Attendance Attended   Follows Direction Followed directions   Group Interactions/Observations Interacted appropriately;Sharing;Supportive   Affect/Mood Range Normal range   Affect/Mood Display Appropriate   Goal Progression Progressing      and group reviewed conflict resolution skills, how to apologize, soft startups, and I statements. Patient denied other questions or concerns at this time.

## 2022-04-13 NOTE — PROGRESS NOTES
BEHAVIORAL MEDICAL UNIT PROGRESS NOTE      ENCOUNTER DATE:  2022 10:28 AM   SITE:  BMU unit, [facility]  ADMIT DATE: 22  Pt Name: Rosalina Saavedra   : 1990   MRN: [id number]    HISTORY OF PRESENTING ILLNESS:  Rosalina Saavedra is a 32 y.o. 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     Today, patient states head is feeling better.  Saw Dr. Rosales (migraine neurologist) who approved her for program. Gave her more prescriptions for migraines and states he will be starting botox for her migraines soon. Precribed her lunesta but has not been approved by insurance. States she has migraines daily; today is out 6/10. Slept 6-7 hours last night; states she feels better today and not as drowsy.  Reports energy improving. Denies improvements in motivation or hobbies. Patient believes migraines are affecting her improvement. Anxiety levels have decreased.  Denies suicidal ideation, intent or plan.    Has found the boundaries and relationship process groups helpful. Found sleep hygeine with Sacha very helpful.    Patient admitted to BMU on  with psychiatry medication regimen of effexor 225mg po daily. Neurology prescriptions for migraine and possible seizures included lamictal 200mg po bid, klonopin 0.5mg po bid, sumatriptan IM, aimovig IM. Spoke with patient's neurologist who preferred we maintain current neurology regimen while they continue to work up patient for seizures vs PNES. On  patient fell asleep in every group. Reviewed sleep hygiene and decreased klonopin to 1mg qhs for somnolence on . Saw neurologist yesterday who prescribed lunesta but it still hasn't been approved yet. Has had improvement in sleep with sleep hygeine up to 6-7 hours a night.       Risk Parameters:  Patient reports no suicidal ideation  Patient reports no homicidal ideation  Patient reports no self-injurious behavior  Patient reports no violent  behavior    Current psych meds  effexor 225mg po daily for anxiety and depression  Prescribed by neuro:   - lamictal 200mg po bid  - klonopin 0.5mg po qhs (decreased 4/7)  - sumatriptan IM  - aimovig IM  Medication side effects:  Somnolence    Current Substance use  Alcohol : None  Nicotine:  None  Illicit's: None  Other Rx controlled substances (Ex-opiates, stimulants etc): None      PAST PSYCHIATRIC, MEDICAL, AND SOCIAL HISTORY REVIEWED  The patient's past medical, family and social history have been reviewed and updated as appropriate within the electronic medical record     MEDICAL REVIEW OF SYSTEMS  History obtained from the patient   General : NO chills or fever   Respiratory: NO cough, shortness of breath   Cardiovascular: NO chest pain, palpitations or racing heart   Gastrointestinal: NO nausea, vomiting, constipation or diarrhea   Neurological: NO confusion, dizziness, headaches or tremors   Psychiatric: please see HPI     ALL MEDICATIONS:    Current Outpatient Medications:     clonazePAM (KLONOPIN) 0.5 MG tablet, Take 0.5 mg by mouth 2 (two) times a day., Disp: , Rfl:     erenumab-aooe (AIMOVIG AUTOINJECTOR) 140 mg/mL autoinjector, Inject 140 mg into the skin every 30 days., Disp: , Rfl:     ergocalciferol (ERGOCALCIFEROL) 50,000 unit Cap, Take 50,000 Units by mouth every 30 days., Disp: , Rfl:     lamoTRIgine (LAMICTAL) 200 MG tablet, Take 200 mg by mouth 2 (two) times a day., Disp: , Rfl:     OXcarbazepine (TRILEPTAL) 150 MG Tab, Take 150 mg by mouth 2 (two) times a day., Disp: , Rfl:     rimegepant (NURTEC) 75 mg odt, Place 75 mg under the tongue every other day. For 10 days, Disp: , Rfl:     sumatriptan (IMITREX STATDOSE) 6 mg/0.5 mL kit, Inject 6 mg into the skin daily as needed for Headaches., Disp: , Rfl:     venlafaxine (EFFEXOR-XR) 150 MG Cp24, Take 1 capsule (150 mg total) by mouth once daily. Take with 75mg capsule to equal 225mg daily., Disp: 30 capsule, Rfl: 11    venlafaxine  "(EFFEXOR-XR) 75 MG 24 hr capsule, Take 1 capsule (75 mg total) by mouth once daily. Take with 150mg capsule to equal 225mg daily., Disp: 30 capsule, Rfl: 11    ALLERGIES:  Review of patient's allergies indicates:   Allergen Reactions    Codeine Itching    Morphine Itching       RELEVANT LABS/STUDIES:    Lab Results   Component Value Date    WBC 5.09 10/23/2021    HGB 11.8 (L) 10/23/2021    HCT 36.1 (L) 10/23/2021    MCV 96 10/23/2021     10/23/2021     BMP  Lab Results   Component Value Date     12/21/2021    K 3.8 12/21/2021     (H) 12/21/2021    CO2 24 12/21/2021    BUN 11 12/21/2021    CREATININE 1.0 12/21/2021    CALCIUM 9.0 12/21/2021    ANIONGAP 6 (L) 12/21/2021    ESTGFRAFRICA >60 12/21/2021    EGFRNONAA >60 12/21/2021     Lab Results   Component Value Date    ALT 69 (H) 10/23/2021    AST 58 (H) 10/23/2021    ALKPHOS 60 10/23/2021    BILITOT 0.2 10/23/2021     Lab Results   Component Value Date    TSH 0.524 03/26/2021     Lab Results   Component Value Date    HGBA1C 5.0 03/26/2021       VITALS  defer to nursing note    PHYSICAL EXAM  General: well developed, well nourished  Neurologic:   Gait: Normal   Psychomotor signs:  No involuntary movements or tremor  AIMS: none    PSYCHIATRIC EXAM:     Mental Status Exam:  Appearance: unremarkable, age appropriate  Behavior/Cooperation: normal, cooperative  Speech: normal tone, normal rate, normal pitch, normal volume  Language: uses words appropriately; NO aphasia or dysarthria  Mood: "ok"  Affect constricted reactive mood congruent  Thought Process: normal and logical  Thought Content: normal, no suicidality, no homicidality, delusions, or paranoia  Level of Consciousness: Alert and Oriented x3  Memory:  Intact  Attention/concentration: appropriate for age/education.   Fund of Knowledge: appears adequate  Insight:  Intact  Judgment: Intact       IMPRESSION:    Rosalina Saavedra is a 32 y.o. 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     Status/Progress:  Based on the examination today, the patient's problem(s) is/are adequately but not ideally controlled.  New problems have been presented today. Tarclarencea complains of daytime somnolence. Will change Klonopin 1mg bid to 1mg qhs.       DIAGNOSES:    ICD-10-CM ICD-9-CM   1. MDD (major depressive disorder), recurrent episode, moderate  F33.1 296.32   - JESUS moderate to severe  - social anxiety  - r/o PNES    PLAN:    1) Continue BMU program with group and individual therapies.      2) Psych Med:  · Continue Effexor 225mg po daily  · Continue lamotrigine 200mg po bid per neurology  · Continue Klonopin 1mg bid to 1mg qhs due to daytime somnolence (decreased 4/7)    · lunesta prescribed by neurology however not approved by insurance     · Discussed with patient informed consent, risks vs. benefits, alternative treatments, side effect profile and the inherent unpredictability of individual responses to these treatments. Answered any questions patient may have had. The patient expresses understanding of the above and displays the capacity to agree with this current plan      3) Other: Labs/medical problems/ collateral- none needed        Ruba Slater, DO  [facility], PGY-4  [facility]

## 2022-04-13 NOTE — PATIENT CARE CONFERENCE
Diagnoses:    MDD  JESUS  Likely PNES    Progress:    Patient was staffed by Team. Patient has been appropriate and sharing in groups. Patient has been progressing and practicing sleep hygiene. Patient reports that she has been getting more than 2-3 hours of sleep for the last few days. Patient is improving and team will continue to follow up with progress.     Plan of Care:    Patient will continue [facility] program.     Aftercare/Follow ups:  Med Management: Dr. Worley 4/22 @ 3 PM  Psychotherapy: Outside therapist    Staff present:  Ruba Slater DO, Resident  MEGAN Rios, VENUS Payton, RSW  Gayatri Torre, MSW intern

## 2022-04-13 NOTE — PROGRESS NOTES
Group Psychotherapy (PhD/LCSW)    Site: [facility] [place] [facility]     Clinical status of patient: Intensive Outpatient Program (IOP)     Date:04/13/2022     Group Focus: Distress Tolerance     Length of service: 83712 - 55 minutes     Number of patients in attendance: 12     Referred by: [facility]     Target symptoms: Mood Disorder     Patient's response to treatment: Active Listening, Self-disclosure      Progress toward goals: Progressing slowly     Interval History: Session focus was Distress Tolerance:  Distract with ACCEPTS.  Patients were encouraged to use activities, contributing, comparisons, different emotions, pushing away, other thoughts, and sensations to reduce intensity of distress.      Diagnosis: MDD      Plan: Continue [facility] program

## 2022-04-14 ENCOUNTER — HOSPITAL ENCOUNTER (OUTPATIENT)
Dept: PSYCHIATRY | Facility: HOSPITAL | Age: 32
Discharge: HOME | End: 2022-04-14
Attending: PSYCHIATRY & NEUROLOGY
Payer: COMMERCIAL

## 2022-04-14 DIAGNOSIS — F41.1 GAD (GENERALIZED ANXIETY DISORDER): ICD-10-CM

## 2022-04-14 DIAGNOSIS — F33.1 MDD (MAJOR DEPRESSIVE DISORDER), RECURRENT EPISODE, MODERATE: Primary | ICD-10-CM

## 2022-04-14 PROCEDURE — 90853 PR GROUP PSYCHOTHERAPY: ICD-10-PCS | Mod: ,,, | Performed by: PSYCHOLOGIST

## 2022-04-14 PROCEDURE — 90853 GROUP PSYCHOTHERAPY: CPT | Mod: ,,, | Performed by: PSYCHOLOGIST

## 2022-04-14 NOTE — PROGRESS NOTES
Group Psychotherapy (PhD/LCSW)    [place] [facility] [place] [facility]     Clinical status of patient: Intensive Outpatient Program (IOP)     Date:04/13/2022     Group Focus: Psychodynamic Process Group     Length of service: 61045 - 45 minutes     Number of patients in attendance: 7     Referred by: [facility]     Target symptoms: Mood Disorder     Patient's response to treatment: Active Listening and Self-disclosure     Progress toward goals: Progressing appropriately     Interval History: Patient shared she enjoyed learning about SUDs as she has enjoyed being able to place a number on her distress and observe how it changes throughout the day.     Diagnosis:MDD      Plan: Continue [facility] program

## 2022-04-14 NOTE — PROGRESS NOTES
Group Psychotherapy (PhD/LCSW)    [place] [facility] [place]     Clinical status of patient: Intensive Outpatient Program (IOP)     Date:04/14/2022     Group Focus: Psychodynamic Process Group     Length of service: 36810 - 45 minutes     Number of patients in attendance: 7     [facility]     Target symptoms: Mood Disorder     Patient's response to treatment: Active Listening and Self-disclosure     Progress toward goals: Progressing appropriately     Interval History: Patient shared she has been practicing the defusion skill she learned earlier in the week and she is finding it helpful to relate to her thoughts differently.    Diagnosis:   1. MDD (major depressive disorder), recurrent episode, moderate    2. JESUS (generalized anxiety disorder)            Plan: Continue [facility] program

## 2022-04-14 NOTE — PROGRESS NOTES
Group Psychotherapy (PhD/LCSW)    [place] [facility] [place]     Clinical status of patient: Intensive Outpatient Program (IOP)     Date:04/14/2022     Group Focus: Emotion Regulation     Length of service: 35901 - 55 minutes     Number of patients in attendance: 11     Referred by: [facility]     Target symptoms: Mood Disorder     Patient's response to treatment: Active Listening and Self-disclosure      Progress toward goals: Progressing slowly     Interval History: Session focus was Emotion Regulation:  Check the Facts.  Patients were encouraged to understand what their emotions do for them (motivate them to action, communicate to themselves and others).  They were encouraged to check the facts to ensure their emotion intensity fits the situation.     Diagnosis: MDD      Plan: Continue [facility] program

## 2022-04-15 LAB
GLIADIN PEPTIDE IGA SER-ACNC: 2 UNITS
GLIADIN PEPTIDE IGG SER-ACNC: 2 UNITS
IGA SERPL-MCNC: 41 MG/DL (ref 70–400)
TTG IGA SER-ACNC: 2 UNITS
TTG IGG SER-ACNC: 3 UNITS

## 2022-04-16 LAB — VIT B2 SERPL-MCNC: 4 MCG/L (ref 1–19)

## 2022-04-18 LAB — PYRIDOXAL SERPL-MCNC: 14 UG/L (ref 5–50)

## 2022-04-19 ENCOUNTER — HOSPITAL ENCOUNTER (OUTPATIENT)
Dept: PSYCHIATRY | Facility: HOSPITAL | Age: 32
Discharge: HOME | End: 2022-04-19
Attending: PSYCHIATRY & NEUROLOGY
Payer: COMMERCIAL

## 2022-04-19 ENCOUNTER — PATIENT MESSAGE (OUTPATIENT)
Dept: FAMILY MEDICINE | Facility: CLINIC | Age: 32
End: 2022-04-19
Payer: COMMERCIAL

## 2022-04-19 DIAGNOSIS — F33.1 MDD (MAJOR DEPRESSIVE DISORDER), RECURRENT EPISODE, MODERATE: Primary | ICD-10-CM

## 2022-04-19 LAB — VIT B1 BLD-MCNC: 62 UG/L (ref 38–122)

## 2022-04-19 PROCEDURE — 99238 PR HOSPITAL DISCHARGE DAY,<30 MIN: ICD-10-PCS | Mod: ,,, | Performed by: PSYCHIATRY & NEUROLOGY

## 2022-04-19 PROCEDURE — 90853 GROUP PSYCHOTHERAPY: CPT | Mod: ,,, | Performed by: PSYCHOLOGIST

## 2022-04-19 PROCEDURE — 90853 PR GROUP PSYCHOTHERAPY: ICD-10-PCS | Mod: ,,, | Performed by: PSYCHOLOGIST

## 2022-04-19 PROCEDURE — 99238 HOSP IP/OBS DSCHRG MGMT 30/<: CPT | Mod: ,,, | Performed by: PSYCHIATRY & NEUROLOGY

## 2022-04-19 PROCEDURE — 90853 GROUP PSYCHOTHERAPY: CPT

## 2022-04-19 NOTE — PLAN OF CARE
04/19/22 1300   Activity/Group Therapy Checklist   Group Other (Comments)  (Communication Skills)   Attendance Attended   Follows Direction Followed directions   Group Interactions/Observations Interacted appropriately;Sharing;Supportive   Affect/Mood Range Normal range   Affect/Mood Display Appropriate   Goal Progression Progressing

## 2022-04-19 NOTE — PROGRESS NOTES
Group Psychotherapy (PhD/LCSW)    [place] [facility] [place]     Clinical status of patient: Intensive Outpatient Program (IOP)     Date:04/19/2022     Group Focus: Psychodynamic Process Group     Length of service: 89086 - 45 minutes     Number of patients in attendance: 7     Referred by: [facility]     Target symptoms: Mood Disorder     Patient's response to treatment: Active Listening and Self-disclosure     Progress toward goals: Progressing appropriately     Interval History: Patient discussed what she found to be most helpful throughout the program. She expressed gratitude for the support she received from everyone else in the program.    Diagnosis:   1. MDD (major depressive disorder), recurrent episode, moderate            Plan: Continue [facility] program

## 2022-04-19 NOTE — PROGRESS NOTES
Group Psychotherapy (PhD/LCSW)    Site: [facility] ([place], [place])    Clinical status of patient: Intensive Outpatient Program (IOP)    Date: 4/19/2022    Group Focus: Interpersonal Effectiveness Skills    Length of service: 04320 - 45-50 minutes    Number of patients in attendance: 12    [facility]    Target symptoms: Depression, Anxiety    Patient's response to treatment: Active Listening and Self-disclosure    Progress toward goals: Progressing adequately    Interval History: Group focused on introduction to Interpersonal Effectiveness skills, specifically validation and self-validation.    Diagnosis: JESUS    Plan: Continue treatment on [facility]

## 2022-04-19 NOTE — PROGRESS NOTES
"BEHAVIORAL MEDICAL UNIT DISCHARGE NOTE      ENCOUNTER DATE:  2022 10:28 AM   SITE:  BMU [place], [facility] [place]  ADMIT DATE: 22  Pt Name: Rosalina Saavedra   : 1990   MRN: [id number]    HISTORY OF PRESENTING ILLNESS:  Rosalina Saavedra is a 32 y.o. 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     Patient admitted to BMU on  with psychiatry medication regimen of effexor 225mg po daily. Neurology prescriptions for migraine and possible seizures included lamictal 200mg po bid, klonopin 0.5mg po bid, sumatriptan IM, aimovig IM. Spoke with patient's seizure neurologist, Dr. Stevens, who preferred we maintain current neurology regimen while they continue to work up patient for seizures vs PNES. On  patient fell asleep in every group. Reviewed sleep hygiene and decreased klonopin to 1mg qhs for somnolence on . Patient began sleeping 6-7 hours a night and was no longer falling asleep in groups. On  patient hit head w/o LOC. Followed up with migraine neurologist, Dr. Rosales, who prescribed lunesta (not improved by insurance yet) and changed migraine medication (also pending PA). Overall patient demonstrated improvement in affect, energy, motivation, mood. Actively participated in process groups.    Today, patient states her mood is "good". Mood, energy, motivation improved. Some improvement in anhedonia. Continues to endorse stress because of conflict with father. Strongly encouraged restarting with outpatient counselor for help with family dynamics. Sleeping 6-7 hours a night. Appetite good. Denies SI, HI, AVH.       Risk Parameters:  Patient reports no suicidal ideation  Patient reports no homicidal ideation  Patient reports no self-injurious behavior  Patient reports no violent behavior    Current psych meds  effexor 225mg po daily for anxiety and depression  Prescribed by neuro:   - lamictal 200mg po bid  - klonopin 0.5mg po qhs " (decreased 4/7)  - sumatriptan IM  - aimovig IM  Medication side effects:  Somnolence    Current Substance use  Alcohol : None  Nicotine:  None  Illicit's: None  Other Rx controlled substances (Ex-opiates, stimulants etc): None      PAST PSYCHIATRIC, MEDICAL, AND SOCIAL HISTORY REVIEWED  The patient's past medical, family and social history have been reviewed and updated as appropriate within the electronic medical record     MEDICAL REVIEW OF SYSTEMS  History obtained from the patient   General : NO chills or fever   Respiratory: NO cough, shortness of breath   Cardiovascular: NO chest pain, palpitations or racing heart   Gastrointestinal: NO nausea, vomiting, constipation or diarrhea   Neurological: NO confusion, dizziness, headaches or tremors   Psychiatric: please see HPI     ALL MEDICATIONS:    Current Outpatient Medications:     clonazePAM (KLONOPIN) 0.5 MG tablet, Take 0.5 mg by mouth 2 (two) times a day., Disp: , Rfl:     erenumab-aooe (AIMOVIG AUTOINJECTOR) 140 mg/mL autoinjector, Inject 140 mg into the skin every 30 days., Disp: , Rfl:     ergocalciferol (ERGOCALCIFEROL) 50,000 unit Cap, Take 50,000 Units by mouth every 30 days., Disp: , Rfl:     lamoTRIgine (LAMICTAL) 200 MG tablet, Take 200 mg by mouth 2 (two) times a day., Disp: , Rfl:     OXcarbazepine (TRILEPTAL) 150 MG Tab, Take 150 mg by mouth 2 (two) times a day., Disp: , Rfl:     rimegepant (NURTEC) 75 mg odt, Place 75 mg under the tongue every other day. For 10 days, Disp: , Rfl:     sumatriptan (IMITREX STATDOSE) 6 mg/0.5 mL kit, Inject 6 mg into the skin daily as needed for Headaches., Disp: , Rfl:     venlafaxine (EFFEXOR-XR) 150 MG Cp24, Take 1 capsule (150 mg total) by mouth once daily. Take with 75mg capsule to equal 225mg daily., Disp: 30 capsule, Rfl: 11    venlafaxine (EFFEXOR-XR) 75 MG 24 hr capsule, Take 1 capsule (75 mg total) by mouth once daily. Take with 150mg capsule to equal 225mg daily., Disp: 30 capsule, Rfl:  "11    ALLERGIES:  Review of patient's allergies indicates:   Allergen Reactions    Codeine Itching    Morphine Itching       RELEVANT LABS/STUDIES:    Lab Results   Component Value Date    WBC 5.09 10/23/2021    HGB 11.8 (L) 10/23/2021    HCT 36.1 (L) 10/23/2021    MCV 96 10/23/2021     10/23/2021     BMP  Lab Results   Component Value Date     12/21/2021    K 3.8 12/21/2021     (H) 12/21/2021    CO2 24 12/21/2021    BUN 11 12/21/2021    CREATININE 1.0 12/21/2021    CALCIUM 9.0 12/21/2021    ANIONGAP 6 (L) 12/21/2021    ESTGFRAFRICA >60 12/21/2021    EGFRNONAA >60 12/21/2021     Lab Results   Component Value Date    ALT 69 (H) 10/23/2021    AST 58 (H) 10/23/2021    ALKPHOS 60 10/23/2021    BILITOT 0.2 10/23/2021     Lab Results   Component Value Date    TSH 0.524 03/26/2021     Lab Results   Component Value Date    HGBA1C 5.0 03/26/2021       VITALS  defer to nursing note    PHYSICAL EXAM  General: well developed, well nourished  Neurologic:   Gait: Normal   Psychomotor signs:  No involuntary movements or tremor  AIMS: none    PSYCHIATRIC EXAM:     Mental Status Exam:  Appearance: unremarkable, age appropriate  Behavior/Cooperation: normal, cooperative  Speech: normal tone, normal rate, normal pitch, normal volume  Language: uses words appropriately; NO aphasia or dysarthria  Mood: "good"  Affect mildly constricted reactive mood congruent  Thought Process: normal and logical  Thought Content: normal, no suicidality, no homicidality, delusions, or paranoia  Level of Consciousness: Alert and Oriented x3  Memory:  Intact  Attention/concentration: appropriate for age/education.   Fund of Knowledge: appears adequate  Insight:  Intact  Judgment: Intact       IMPRESSION:    Rosalina Saavedra is a 32 y.o. 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     Status/Progress:  Patient demonstrated moderate improvement with the " program. Needs to continue seizure vs PNES workup with Dr. Stevens in May as well as management of daily migraines with Dr. Rosales. Strongly encouraged restarting counseling for stress management and family dynamics. Will continue psych medication follow up with Dr. Worley.    DIAGNOSES:  - MDD recurrent in partial remission  - JESUS  - social anxiety  - r/o PNES    PLAN:    1) Continue BMU program with group and individual therapies.      2) Psych Med:  · Continue Effexor 225mg po daily  · Continue lamotrigine 200mg po bid per neurology  · Continue Klonopin 1mg bid to 1mg qhs due to daytime somnolence (decreased 4/7)    · lunesta prescribed by neurology however not approved by insurance     · Discussed with patient informed consent, risks vs. benefits, alternative treatments, side effect profile and the inherent unpredictability of individual responses to these treatments. Answered any questions patient may have had. The patient expresses understanding of the above and displays the capacity to agree with this current plan      3) Follow up:  · Dr. Worley 4/22  · Pt already established with outside counselor  · Continue follow up with Dr. Stevens for PNES rule out and Dr. Rosales for migraines        Ruba Slater,   [facility], PGY-4  [facility]

## 2022-04-21 ENCOUNTER — TELEPHONE (OUTPATIENT)
Dept: PHARMACY | Facility: CLINIC | Age: 32
End: 2022-04-21
Payer: COMMERCIAL

## 2022-04-22 ENCOUNTER — OFFICE VISIT (OUTPATIENT)
Dept: PSYCHIATRY | Facility: CLINIC | Age: 32
End: 2022-04-22
Payer: COMMERCIAL

## 2022-04-22 DIAGNOSIS — F41.1 GAD (GENERALIZED ANXIETY DISORDER): ICD-10-CM

## 2022-04-22 DIAGNOSIS — F33.0 MILD EPISODE OF RECURRENT MAJOR DEPRESSIVE DISORDER: ICD-10-CM

## 2022-04-22 DIAGNOSIS — F33.1 MDD (MAJOR DEPRESSIVE DISORDER), RECURRENT EPISODE, MODERATE: Primary | ICD-10-CM

## 2022-04-22 DIAGNOSIS — F40.10 SOCIAL ANXIETY DISORDER: ICD-10-CM

## 2022-04-22 DIAGNOSIS — F41.1 GAD (GENERALIZED ANXIETY DISORDER): Primary | ICD-10-CM

## 2022-04-22 DIAGNOSIS — G47.00 INSOMNIA, UNSPECIFIED TYPE: ICD-10-CM

## 2022-04-22 DIAGNOSIS — G43.E09 CHRONIC MIGRAINE WITH AURA: ICD-10-CM

## 2022-04-22 DIAGNOSIS — F44.5 PSYCHOGENIC NONEPILEPTIC SEIZURE: ICD-10-CM

## 2022-04-22 PROCEDURE — 1159F MED LIST DOCD IN RCRD: CPT | Mod: CPTII,95,, | Performed by: INTERNAL MEDICINE

## 2022-04-22 PROCEDURE — 90853 GROUP PSYCHOTHERAPY: CPT | Mod: 95,,, | Performed by: PSYCHOLOGIST

## 2022-04-22 PROCEDURE — 90833 PSYTX W PT W E/M 30 MIN: CPT | Mod: 95,,, | Performed by: INTERNAL MEDICINE

## 2022-04-22 PROCEDURE — 1159F PR MEDICATION LIST DOCUMENTED IN MEDICAL RECORD: ICD-10-PCS | Mod: CPTII,95,, | Performed by: INTERNAL MEDICINE

## 2022-04-22 PROCEDURE — 99214 PR OFFICE/OUTPT VISIT, EST, LEVL IV, 30-39 MIN: ICD-10-PCS | Mod: 95,,, | Performed by: INTERNAL MEDICINE

## 2022-04-22 PROCEDURE — 1160F PR REVIEW ALL MEDS BY PRESCRIBER/CLIN PHARMACIST DOCUMENTED: ICD-10-PCS | Mod: CPTII,95,, | Performed by: INTERNAL MEDICINE

## 2022-04-22 PROCEDURE — 90833 PR PSYCHOTHERAPY W/PATIENT W/E&M, 30 MIN (ADD ON): ICD-10-PCS | Mod: 95,,, | Performed by: INTERNAL MEDICINE

## 2022-04-22 PROCEDURE — 90853 PR GROUP PSYCHOTHERAPY: ICD-10-PCS | Mod: 95,,, | Performed by: PSYCHOLOGIST

## 2022-04-22 PROCEDURE — 1160F RVW MEDS BY RX/DR IN RCRD: CPT | Mod: CPTII,95,, | Performed by: INTERNAL MEDICINE

## 2022-04-22 PROCEDURE — 99214 OFFICE O/P EST MOD 30 MIN: CPT | Mod: 95,,, | Performed by: INTERNAL MEDICINE

## 2022-04-22 NOTE — PROGRESS NOTES
Group Psychotherapy    The patient location is: Patient's home in [place], [place].  The chief complaint leading to consultation is: depression, anxiety  Visit type: audiovisual  Face to Face time with patient: 50 minutes  55 minutes of total time spent on the encounter, which includes face to face time and non-face to face time preparing to see the patient (eg, review of tests), Obtaining and/or reviewing separately obtained history, Documenting clinical information in the electronic or other health record, Independently interpreting results (not separately reported) and communicating results to the patient/family/caregiver, or Care coordination (not separately reported).     Each patient to whom he or she provides medical services by telemedicine is:  (1) informed of the relationship between the physician and patient and the respective role of any other health care provider with respect to management of the patient; and (2) notified that he or she may decline to receive medical services by telemedicine and may withdraw from such care at any time.    Clinical status of patient: Outpatient    Date: 04/22/2022    Length of service: 60841-28six    Referred by: Behavioral Medicine Unit     Number of patients in attendance: 5    Target symptoms: depression, anxiety     Patient's response to intervention:  The patient's response to intervention is active listening, self-disclosure, feedback to other patients.    Progress toward goals and other mental status changes:  The patient's progress toward goals is good.    Interval history: Patient introduced herself to the group and discussed her recent time in the BMU.    Diagnosis:     ICD-10-CM ICD-9-CM   1. MDD (major depressive disorder), recurrent episode, moderate  F33.1 296.32   2. JESUS (generalized anxiety disorder)  F41.1 300.02       Plan: group psychotherapy    Return to clinic: 1 week

## 2022-04-22 NOTE — PROGRESS NOTES
OUTPATIENT PSYCHIATRY RETURN VISIT    sENCOUNTER DATE:  5/1/2022  SITE:  [facility]  LENGTH OF SESSION:  30 minutes    The patient location is:  [place], not in healthcare facility  The chief complaint leading to consultation is:  Fatigue    Visit type:  audiovisual    Face to Face time with patient:  30 minutes  35 minutes of total time spent on the encounter, which includes face to face time and non-face to face time preparing to see the patient (eg, review of tests), Obtaining and/or reviewing separately obtained history, Documenting clinical information in the electronic or other health record, Independently interpreting results (not separately reported) and communicating results to the patient/family/caregiver, or Care coordination (not separately reported).     Each patient to whom he or she provides medical services by telemedicine is:  (1) informed of the relationship between the physician and patient and the respective role of any other health care provider with respect to management of the patient; and (2) notified that he or she may decline to receive medical services by telemedicine and may withdraw from such care at any time.    CHIEF COMPLAINT:  Fatigue      HISTORY OF PRESENTING ILLNESS:  Rosalina Saavedra is a 32 y.o. female with history of MDD, JESUS, and Social Anxiety Disorder who presents for follow up appointment.      Plan at last appointment on 3/18/2022:  · Patient with numerous medication changes last week by Neurologist (see above).  She also has appointment with Dr. Stevens on Monday for second opinion.  Will continue Effexor 225mg daily for now.  Consider addition of Abilify in the future if depression remains poorly controlled.    · Discussed with patient informed consent, risks versus benefits, alternative treatments, side effect profile and the inherent unpredictability of individual responses to these treatments.  The patient expresses understanding of the  above and displays the capacity to agree with this current plan.  · Continue individual psychotherapy with Apryl Carroll.    History as told by patient:  Just finished the BMU on Tuesday.  Really liked it - the tools helped.  Liked the groups.  Doing the aftercare group as well.  Klonopin (prescribed by Dr. Armijo) decreased from BID to qHS; Continued on Effexor 225mg daily.  Says she went back to Klonopin 0.5mg BID because migraines got bad again.  She is not sleeping during the day.  Dr. Dorsey has prescribed Lunesta 2mg qHS - helps most of the time.  Taking Lunesta 8-10pm-rhoda - falls asleep.  Gets up some in the night to take dog out to go to the bathroom but not too sedated.  Gets up anywhere between 7-11am.  Says depression and anxiety were doing better but now not so good.  Tired, doesn't want to get up.  Anxiety isn't as bad, depression is worse.  Issues at work trying to get things together.  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  Denies SI.    Psychotherapy:  · Target symptoms: depression, anxiety   · Why chosen therapy is appropriate versus another modality: relevant to diagnosis, patient responds to this modality  · Outcome monitoring methods: self-report, observation  · Therapeutic intervention type: supportive psychotherapy  · Topics discussed/themes: relationships difficulties, stress related to medical comorbidities, building skills sets for symptom management, symptom recognition  · The patient's response to the intervention is accepting. The patient's progress toward treatment goals is good.   · Duration of intervention: 18 minutes.    Medication side effects:  Denies  Medication compliance:  Yes    PSYCHIATRIC REVIEW OF SYSTEMS:  Trouble with sleep:  Improved with Lunesta  Appetite changes:  Denies  Weight changes:  Denies  Lack of energy:   Yes  Anhedonia:  Sometimes  Somatic symptoms:  As above  Libido:  Not discussed  Anxiety/panic:  Yes  Guilty/hopeless:  Denies  Self-injurious behavior/risky behavior:  Denies  Any drugs:  Denies  Alcohol:  Denies     MEDICAL REVIEW OF SYSTEMS:  Complete review of systems performed covering Constitutional, Musculoskeletal, Neurologic.  All systems negative except for that covered in HPI.    PAST PSYCHIATRIC, MEDICAL, AND SOCIAL HISTORY REVIEWED  The patient's past medical, family and social history have been reviewed and updated as appropriate within the electronic medical record - see encounter notes.    MEDICATIONS:    Current Outpatient Medications:     eszopiclone (LUNESTA) 2 MG Tab, Take 1 tablet (2 mg total) by mouth nightly as needed (insomnia)., Disp: 30 tablet, Rfl: 0    lamoTRIgine (LAMICTAL) 200 MG tablet, Take 200 mg by mouth 2 (two) times a day., Disp: , Rfl:     OXcarbazepine (TRILEPTAL) 150 MG Tab, Take 300 mg by mouth 2 (two) times daily., Disp: , Rfl:     venlafaxine (EFFEXOR-XR) 150 MG Cp24, Take 1 capsule (150 mg total) by mouth once daily. Take with 75mg capsule to equal 225mg daily., Disp: 30 capsule, Rfl: 11    venlafaxine (EFFEXOR-XR) 75 MG 24 hr capsule, Take 1 capsule (75 mg total) by mouth once daily. Take with 150mg capsule to equal 225mg daily., Disp: 30 capsule, Rfl: 11    atogepant (QULIPTA) 60 mg Tab, Take 60 mg  (1 tablet )  by mouth once daily at 6am., Disp: 30 tablet, Rfl: 6    clonazePAM (KLONOPIN) 0.5 MG tablet, Take 0.5 mg by mouth 2 (two) times a day., Disp: , Rfl:     erenumab-aooe (AIMOVIG AUTOINJECTOR) 140 mg/mL autoinjector, Inject 140 mg into the skin every 30 days., Disp: , Rfl:     ergocalciferol (ERGOCALCIFEROL) 50,000 unit Cap, Take 50,000 Units by mouth every 30 days., Disp: , Rfl:     rimegepant (NURTEC) 75 mg odt, Place 75 mg under the tongue every other day. For 10 days, Disp: , Rfl:     sumatriptan (IMITREX STATDOSE) 6 mg/0.5 mL kit, Inject 6 mg into the  "skin daily as needed for Headaches., Disp: , Rfl:     ALLERGIES:  Review of patient's allergies indicates:   Allergen Reactions    Codeine Itching    Morphine Itching       PSYCHIATRIC EXAM:  There were no vitals filed for this visit.  Appearance:  Well groomed, appearing healthy and of stated age  Behavior:  Cooperative, pleasant, no psychomotor agitation or retardation  Speech:  Normal rate, rhythm, prosody, and volume  Mood:  "Ok"  Affect:  Euthymic  Thought Process:  Linear, logical, goal directed  Thought Content:  Negative for suicidal ideation, homicidal ideation, delusions or hallucinations.  Associations:  Intact  Memory:  Grossly Intact  Level of Consciousness/Orientation:  Grossly intact  Fund of Knowledge:  Good  Attention:  Good  Language:  Fluent, able to name abstract and concrete objects  Insight:  Good  Judgment:  Intact  Psychomotor signs:  No abnormal movements of face  Gait:  Unable to assess via virtual visit    RELEVANT LABS/STUDIES:    Lab Results   Component Value Date    WBC 5.09 10/23/2021    HGB 11.8 (L) 10/23/2021    HCT 36.1 (L) 10/23/2021    MCV 96 10/23/2021     10/23/2021     BMP  Lab Results   Component Value Date     12/21/2021    K 3.8 12/21/2021     (H) 12/21/2021    CO2 24 12/21/2021    BUN 11 12/21/2021    CREATININE 1.0 12/21/2021    CALCIUM 9.0 12/21/2021    ANIONGAP 6 (L) 12/21/2021    ESTGFRAFRICA >60 12/21/2021    EGFRNONAA >60 12/21/2021     Lab Results   Component Value Date    ALT 69 (H) 10/23/2021    AST 58 (H) 10/23/2021    ALKPHOS 60 10/23/2021    BILITOT 0.2 10/23/2021     Lab Results   Component Value Date    TSH 0.524 03/26/2021     Lab Results   Component Value Date    HGBA1C 5.0 03/26/2021       IMPRESSION:    Rosalina Saavedra is a 32 y.o. female with history of MDD, JESUS, and Social Anxiety Disorder who presents for follow up appointment.    Status/Progress:  Based on the examination today, the patient's problem(s) is/are inadequately " controlled.  New problems have not been presented today.    Risk Parameters:  Patient reports no suicidal ideation  Patient reports no homicidal ideation  Patient reports no self-injurious behavior  Patient reports no violent behavior      DIAGNOSES:    ICD-10-CM ICD-9-CM   1. JESUS (generalized anxiety disorder)  F41.1 300.02   2. Social anxiety disorder  F40.10 300.23   3. Mild episode of recurrent major depressive disorder  F33.0 296.31   4. Insomnia, unspecified type  G47.00 780.52   5. Chronic migraine with aura  G43.109 346.00   6. Psychogenic nonepileptic seizure  F44.5 300.11       PLAN:  · Reduce Klonopin to 0.25mg/0.5mg since she is having some daytime fatigue.  Sleep has improved with addition of Lunesta and she is not oversedated when taking this with Klonopin.  Will continue for now and plan to touch base with her neurologists since she has upcoming hospitalization for video EEG.  · Continue Effexor 225mg daily for now.  Consider addition of Abilify in the future if depression remains poorly controlled.    · Discussed with patient informed consent, risks versus benefits, alternative treatments, side effect profile and the inherent unpredictability of individual responses to these treatments.  The patient expresses understanding of the above and displays the capacity to agree with this current plan.  · Continue individual psychotherapy with Apryl Carroll.    RETURN TO CLINIC:  Follow up in about 3 weeks (around 5/13/2022).

## 2022-04-27 ENCOUNTER — PATIENT MESSAGE (OUTPATIENT)
Dept: PSYCHIATRY | Facility: CLINIC | Age: 32
End: 2022-04-27
Payer: COMMERCIAL

## 2022-04-27 ENCOUNTER — PATIENT OUTREACH (OUTPATIENT)
Dept: ADMINISTRATIVE | Facility: OTHER | Age: 32
End: 2022-04-27
Payer: COMMERCIAL

## 2022-04-29 ENCOUNTER — PATIENT MESSAGE (OUTPATIENT)
Dept: NEUROLOGY | Facility: CLINIC | Age: 32
End: 2022-04-29
Payer: COMMERCIAL

## 2022-04-29 DIAGNOSIS — G43.809 OTHER MIGRAINE WITHOUT STATUS MIGRAINOSUS, NOT INTRACTABLE: ICD-10-CM

## 2022-04-29 DIAGNOSIS — G47.09 OTHER INSOMNIA: ICD-10-CM

## 2022-05-03 NOTE — PROGRESS NOTES
I returned the call of this client this morning and she informed me that she has received long term disability paperwork that she was presently working on. She requested an appointment to come into the office for assistance with completing an application for medicaid. She stated that she can come Monday 5/9/2022.   
This CHW spoke with client this morning and client stated that she went on sick leave working in Oct 2021 but was forced to signed a letter of resignation by her employer on 4/27/2022. Client stated that she is without insurance coverage.   This CHW contacted [facility] and was informed that her policy will end on 4/30/22. I informed client that when she received her disability papers that she can come to the office and I will assist her with them or she can go to the Social Security Office.  This client stated that she would like assistance with finding therapy session that are low cost or free for now. Client stated that her anxiety and depression have grown worse since her health has started to decline.  
Emotional support and pre-op teaching provided to patient.

## 2022-05-06 RX ORDER — ESZOPICLONE 2 MG/1
2 TABLET, FILM COATED ORAL NIGHTLY PRN
Qty: 20 TABLET | Refills: 2 | Status: SHIPPED | OUTPATIENT
Start: 2022-05-06 | End: 2022-06-05

## 2022-05-06 RX ORDER — ONDANSETRON 8 MG/1
8 TABLET, ORALLY DISINTEGRATING ORAL EVERY 12 HOURS PRN
Qty: 30 TABLET | Refills: 12 | Status: SHIPPED | OUTPATIENT
Start: 2022-05-06 | End: 2022-07-12

## 2022-05-06 RX ORDER — ERENUMAB-AOOE 140 MG/ML
140 INJECTION, SOLUTION SUBCUTANEOUS
Qty: 1 ML | Refills: 12 | Status: SHIPPED | OUTPATIENT
Start: 2022-05-06 | End: 2022-07-20

## 2022-05-06 RX ORDER — CEFUROXIME AXETIL 250 MG/1
6 TABLET ORAL
Qty: 9 ML | Refills: 12 | Status: SHIPPED | OUTPATIENT
Start: 2022-05-06 | End: 2022-07-12

## 2022-05-06 RX ORDER — LAMOTRIGINE 200 MG/1
200 TABLET ORAL 2 TIMES DAILY
Qty: 60 TABLET | Refills: 5 | Status: SHIPPED | OUTPATIENT
Start: 2022-05-06 | End: 2022-07-29

## 2022-05-09 ENCOUNTER — PATIENT OUTREACH (OUTPATIENT)
Dept: ADMINISTRATIVE | Facility: OTHER | Age: 32
End: 2022-05-09
Payer: COMMERCIAL

## 2022-05-09 NOTE — PROGRESS NOTES
CHW - Follow Up    This Community Health Worker completed a follow up visit with MRN:[id number] and mother in person today.  Pt/Caregiver reported: that she no longer has insurance and is out of her medications  Community Health Worker provided: this CHW printed and gave her a copy of SNAP and MEDICAID forms. CHW will submit referrals for assistance with transportation, finances and contact pharmacy assistance for [facility] patients.   Follow up required: YES  No future outreach task assigned

## 2022-05-17 ENCOUNTER — TELEPHONE (OUTPATIENT)
Dept: NEUROLOGY | Facility: CLINIC | Age: 32
End: 2022-05-17
Payer: COMMERCIAL

## 2022-05-17 NOTE — TELEPHONE ENCOUNTER
----- Message from Magaly Daigle MD PhD sent at 4/27/2022  4:38 PM CDT -----  Regarding: FW: Reschedule appt  Contact: pt @ [phone]    ----- Message -----  From: Lilliarnold Hudson  Sent: 4/27/2022   1:07 PM CDT  To: [facility] Mckenzie Staff  Subject: Reschedule appt                                  Pt is calling to reschedule appt, she is having trouble with insurance and stated that when she get it approve she will call back

## 2022-05-18 ENCOUNTER — PATIENT MESSAGE (OUTPATIENT)
Dept: PHARMACY | Facility: CLINIC | Age: 32
End: 2022-05-18
Payer: COMMERCIAL

## 2022-05-18 ENCOUNTER — TELEPHONE (OUTPATIENT)
Dept: PHARMACY | Facility: CLINIC | Age: 32
End: 2022-05-18
Payer: COMMERCIAL

## 2022-05-19 NOTE — TELEPHONE ENCOUNTER
----- Message from Kelly Mack sent at 5/12/2022  1:45 PM CDT -----  Regarding: FW: Community Health Worker    ----- Message -----  From: Yina Caballero, Patient Care Assistant  Sent: 5/11/2022   8:41 AM CDT  To: Pharmacy Patient Assistance Team  Subject: Community Health Worker                          Hello,    This patient is requesting assistance with her medication. She no longer has insurance and is unable to afford her current medications.   She is currently out.

## 2022-05-19 NOTE — TELEPHONE ENCOUNTER
Left voicemail message. Sent my chart message with information about patient assistance program and what's required  to enroll.

## 2022-05-19 NOTE — PROGRESS NOTES
No future outreach task assigned     This CHW returned the call from MRN: [id number]. She informed me that she received a call from [facility]. I explained the program and encouraged her to contact Kelly Mack to get started, she stated that she would.

## 2022-06-06 ENCOUNTER — PATIENT MESSAGE (OUTPATIENT)
Dept: NEUROLOGY | Facility: CLINIC | Age: 32
End: 2022-06-06
Payer: COMMERCIAL

## 2022-07-07 ENCOUNTER — TELEPHONE (OUTPATIENT)
Dept: NEUROLOGY | Facility: CLINIC | Age: 32
End: 2022-07-07
Payer: COMMERCIAL

## 2022-07-07 ENCOUNTER — PATIENT MESSAGE (OUTPATIENT)
Dept: NEUROLOGY | Facility: CLINIC | Age: 32
End: 2022-07-07
Payer: COMMERCIAL

## 2022-07-07 NOTE — TELEPHONE ENCOUNTER
----- Message from Jaspal Stevens MD sent at 7/7/2022  2:57 PM CDT -----  Regarding: FW: Message from mother  Looping in [facility] to see if we can get her rescheduled. I don't think we will have availability that soon unfortunately.         ----- Message -----  From: Stella Worley MD  Sent: 7/7/2022   2:04 PM CDT  To: Geoff Dorsey MD, Jaspal Stevens MD  Subject: Message from mother                              Please see message I received from patient's mother...    Dr. Worley   Rosalina's seizures are back. She now have insurance as of July 1. I tried to make appointments with Gilmar Dorsey and [facility]ney but nothing was available please help. 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 She needs to get her medication in order asap. Dr. Stevens wanted to put her in the hospital for seizure observation before she lost her insurance. Please see if this can be done before the end of July.  Sincerely,  Mrs. Eliseo Saavedra

## 2022-07-12 ENCOUNTER — OFFICE VISIT (OUTPATIENT)
Dept: NEUROLOGY | Facility: CLINIC | Age: 32
End: 2022-07-12
Payer: COMMERCIAL

## 2022-07-12 VITALS
DIASTOLIC BLOOD PRESSURE: 89 MMHG | SYSTOLIC BLOOD PRESSURE: 140 MMHG | BODY MASS INDEX: 29.59 KG/M2 | HEART RATE: 116 BPM | HEIGHT: 66 IN

## 2022-07-12 DIAGNOSIS — R25.9 ABNORMAL INVOLUNTARY MOVEMENT: Primary | ICD-10-CM

## 2022-07-12 PROCEDURE — 3008F BODY MASS INDEX DOCD: CPT | Mod: CPTII,S$GLB,,

## 2022-07-12 PROCEDURE — 99999 PR PBB SHADOW E&M-EST. PATIENT-LVL II: CPT | Mod: PBBFAC,,,

## 2022-07-12 PROCEDURE — 99999 PR PBB SHADOW E&M-EST. PATIENT-LVL II: ICD-10-PCS | Mod: PBBFAC,,,

## 2022-07-12 PROCEDURE — 99215 PR OFFICE/OUTPT VISIT, EST, LEVL V, 40-54 MIN: ICD-10-PCS | Mod: S$GLB,,,

## 2022-07-12 PROCEDURE — 3079F DIAST BP 80-89 MM HG: CPT | Mod: CPTII,S$GLB,,

## 2022-07-12 PROCEDURE — 3077F SYST BP >= 140 MM HG: CPT | Mod: CPTII,S$GLB,,

## 2022-07-12 PROCEDURE — 99215 OFFICE O/P EST HI 40 MIN: CPT | Mod: S$GLB,,,

## 2022-07-12 PROCEDURE — 3079F PR MOST RECENT DIASTOLIC BLOOD PRESSURE 80-89 MM HG: ICD-10-PCS | Mod: CPTII,S$GLB,,

## 2022-07-12 PROCEDURE — 3077F PR MOST RECENT SYSTOLIC BLOOD PRESSURE >= 140 MM HG: ICD-10-PCS | Mod: CPTII,S$GLB,,

## 2022-07-12 PROCEDURE — 3008F PR BODY MASS INDEX (BMI) DOCUMENTED: ICD-10-PCS | Mod: CPTII,S$GLB,,

## 2022-07-12 RX ORDER — ESZOPICLONE 2 MG/1
TABLET, FILM COATED ORAL NIGHTLY PRN
COMMUNITY
End: 2022-08-04

## 2022-07-12 NOTE — PROGRESS NOTES
[facility]  Epilepsy Clinic Progress Note    [facility] - NEUROLOGY 7TH FL  [facility], [place]    Date: 7/12/22  Patient Name: Rosalina Saavedra   MRN: [id number]   PCP: Madhavi Basilio  Referring Provider: No ref. provider found    Assessment:   Rosalina Saavedra is a 32 y.o. female presenting in follow up for evaluation of daily episodes consisting of abnormal movements, decreased responsiveness, and speech difficulty. Episodes persist despite medication regimen of lamotrigine 200mg BID and oxcarbazepine 300mg BID, though patient notes frequent missed doses. Upon review of video today, I suspect these events are non-epileptic in nature. Favor EMU admission for event capture and characterization to further guide management. We will need to decrease or stop antiseizure drugs in order to provoke epileptic seizures for event semiology/characterization. Must be done inpatient. Follow up in 3 months after EMU admission or sooner with issues. Patient is comfortable with plan and verbalizes agreement. All questions and concerns are addressed at this time.   Plan:     Problem List Items Addressed This Visit    None     Visit Diagnoses     Abnormal involuntary movement    -  Primary    Relevant Orders    EMU Monitoring        The patient's AED regimen is being held/reduced/changed and/or the patient is undergoing provocative interventions in an effort to capture clinical events, making inpatient admission medically necessary for patient safety.  This patient is felt to be high risk due to the likelihood of seizures during this admission.     I completed education on seizure first aid and safety. I recommended seizure precautions with regards to avoiding unsupervised water recreational activity or bathing in tubs, climbing or working at heights, operation of heavy or dangerous machinery, caution around fire and sources of high heat, as well as any other activity which could put a patient at  "danger in case of a seizure.  I also reviewed the [place] DMV law and recommended that the patient not drive  for 6 months in the event of breakthrough seizures.    As the patient is a female of childbearing age, I have counseled the patient on issues pertaining to pregnancy and epilepsy and recommended folic acid supplementation. I have reviewed and recommended the AAN guideline of folic acid supplementation prior to and during pregnancy.      Jaci Bynum PA-C   Neurology-Epilepsy  [facility]    Collaborating physician, Dr. Jaspal Stevens, was available during today's encounter.     I spent approximately 40 minutes on the day of this encounter preparing to see the patient, obtaining and reviewing history and results, performing a medically appropriate exam, counseling and educating the patient/family/caregiver, documenting clinical information, coordinating care, and ordering medications, tests, procedures, and referrals.    Patient note was created using MModal Dictation.  Any errors in syntax or even information may not have been identified and edited on initial review prior to signing this note.  Subjective:     Interval Events/ROS 7/12/2022:    Patient presents in follow up for evaluation of daily episodes consisting of abnormal movements, decreased responsiveness, and speech difficulty. Accompanied by mom who also contributes to the history. Last event was 7/10/2022. Episodes persist despite medication regimen of lamotrigine 200mg BID and oxcarbazepine 300mg BID though patient does note frequent missed doses. Episodes may last anywhere from 2-10 minutes. Reports these events began happening 10/2021, then seemed to resolve for some time, but returned 02/2022 and have been occurring nearly daily since then. Patient describes they usually occur upon waking up in the morning, or when transitioning from sleep to wakefulness. Her body feels "stuck", can't get up, can't speak sentences but is able " "to make noises, vision is "scrambled" then becomes exhausted afterward. Denies tongue bite or UI. Mom shows a video of one event during which the patient is lying in bed, makes occasional grunting noises in response to mom saying patient's name, eyes are closed, and video ends by patient hitting the phone out of mom's hand. Patient states she was able to hear her mom during this episode. Events are often triggered by stress or feeling overheated. Hx of anxiety and depression. 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 Otherwise, no fever, no cold symptoms, no headache, no changes in vision, no new weakness, no chest pain, no shortness of breath, no nausea, no vomiting, no diarrhea, no constipation, no tingling/numbness, no problems walking. Patient also accompanied today by service dog.     HPI:   Ms. Rosalina Saavedra is a 32 y.o. female who presents with a chief complaint of reported seizures.  The patient presents today with her mother who contributes to the history.  Together they report that the patient experienced an abrupt onset of seizure-like episodes beginning in January 2022.  The patient endorses 3 event types including episodes of her vision going completely black while remaining aware of her surroundings, episodes of inability to control her body and unresponsiveness while remaining aware, and extremity jerking.  She has been followed by an outside neurologist, Dr. Crain, who has tried her on numerous seizure medications that have not control the episodes.  She has undergone routine and ambulatory EEGs which were normal per patient and did not capture typical events.  Her mother does believe that stress may be contributing to the episodes.      They provided video today of the episodes.  Episodes are characterized by the patient exhibiting robotic type movements and exhibiting her feet stuck to the floor before falling to the ground.  She lies on the ground shaking her arms and legs while " "crying before standing in a robotic fashion.  During the entire event, the patient makes a variety of cartoon-like noises such as wee oo", "hansel hansel hansel" and "beep boop."    Seizure Type: Suspected PNEE  Seizure Etiology:  Suspected PNEE  Current AEDs: Lamictal 150 mg BID    The patient is accompanied by family who contribute to the history. This patient has 3 types of seizure as described below. The patient reports having seizures for months The patient reports to have worse seizure control. The seizure frequency is variable. The last seizure was yesterday . The patient does not report side effects from seizure medication.     Seizure Type 1:   Seizure Description: everything goes "black in her vision", remains aware  Aura: No warning  Associated Symptoms:  none  Seizure Frequency: Up to 2x a day every 3-4 days  Last seizure: January 2022    Seizure Type 2:   Seizure Description: Screams, shakes all over, can't open mouth, eyes lock closed and can't open them, lasts 5-15 minutes, confused and tired  After, remains aware after  Aura: Burning in back of neck  Associated Symptoms:  none  Seizure Frequency: Daily, 6-7 times per day  Last seizure: Early Feb 2022    Seizure Type 3:   Seizure Description: Intermittent limb and head jerks  Aura: None  Associated Symptoms: none  Seizure Frequency: Every day  Last seizure: Today    Handedness: right  Seizure Onset Age: 32  Seizure/ Epilepsy Risk Factors: none  Birth/Developmental History: normal birth history and Normal developmental history  Seizure Triggers/ Provoking Features: stress  Previous Seizure Medications: brivaracetam (Briviact, BRV) , lacosamide (Vimpat, LCS), lamotrigine (Lamictal, LTG), oxcarbazepine (Trileptal OXC),, topiramate (Topamax, TPM) and clonazepam (Klonopin, CZP)  Recent Med Changes: None  Other Treatments: None  Prior Episodes of Status: None  Psychiatric/Behavioral Comorbitidies: Anxiety and depression, social anxieyt  Surgical Candidacy: " None    Prior Studies:  EEG : At [facility], routine and ambulatory- both normal per patient  vEEG/ EMU evaluation: Not done  MRI of brain: January 2022, Done at [facility]- normal per patient   AED levels:   Not done  CT/CTA Scan:   Not done  PET Scan:  Not done  Neuropsychological Evaluation:  Not done  DEXA Scan:  Not done  Other studies:  Not done    PAST MEDICAL HISTORY:  Past Medical History:   Diagnosis Date    Anxiety     Depression     Migraine     Migraine headache        PAST SURGICAL HISTORY:  Past Surgical History:   Procedure Laterality Date    KNEE ARTHROSCOPY      TIBIA FRACTURE SURGERY         CURRENT MEDS:  Current Outpatient Medications   Medication Sig Dispense Refill    atogepant (QULIPTA) 60 mg Tab Take 60 mg  (1 tablet )  by mouth once daily at 6am. 30 tablet 6    clonazePAM (KLONOPIN) 0.5 MG tablet Take 0.5 mg by mouth 2 (two) times a day.      erenumab-aooe (AIMOVIG AUTOINJECTOR) 140 mg/mL autoinjector Inject 1 mL (140 mg total) into the skin every 30 days. 1 mL 12    ergocalciferol (ERGOCALCIFEROL) 50,000 unit Cap Take 50,000 Units by mouth every 30 days.      lamoTRIgine (LAMICTAL) 200 MG tablet Take 1 tablet (200 mg total) by mouth 2 (two) times a day. 60 tablet 5    ondansetron (ZOFRAN-ODT) 8 MG TbDL Take 1 tablet (8 mg total) by mouth every 12 (twelve) hours as needed (migraine). 30 tablet 12    OXcarbazepine (TRILEPTAL) 150 MG Tab Take 300 mg by mouth 2 (two) times daily.      rimegepant (NURTEC) 75 mg odt Place 75 mg under the tongue every other day. For 10 days      sumatriptan (IMITREX STATDOSE) 6 mg/0.5 mL kit Inject 0.5 mLs (6 mg total) into the skin every 2 (two) hours as needed for Migraine. 9 mL 12    venlafaxine (EFFEXOR-XR) 150 MG Cp24 Take 1 capsule (150 mg total) by mouth once daily. Take with 75mg capsule to equal 225mg daily. 30 capsule 11    venlafaxine (EFFEXOR-XR) 75 MG 24 hr capsule Take 1 capsule (75 mg total) by mouth once daily. Take with 150mg capsule to equal  "225mg daily. 30 capsule 11     No current facility-administered medications for this visit.       ALLERGIES:  Review of patient's allergies indicates:   Allergen Reactions    Codeine Itching    Morphine Itching       FAMILY HISTORY:  Family History   Problem Relation Age of Onset    Diabetes Mother     Breast cancer Mother 61    Diabetes Maternal Grandmother     Hypertension Maternal Grandmother     Breast cancer Other 71        moms paternal grand mother       SOCIAL HISTORY:  Social History     Tobacco Use    Smoking status: Never Smoker    Smokeless tobacco: Never Used   Substance Use Topics    Alcohol use: No    Drug use: No       Review of Systems:  12 system review of systems is negative except for the symptoms mentioned in HPI.      Objective:     Vitals:    07/12/22 1508   BP: (!) 140/89   Pulse: (!) 116   Height: 5' 6" (1.676 m)     General: NAD, well nourished   Eyes: no tearing, discharge, no erythema   ENT: moist mucous membranes of the oral cavity, nares patent    Neck: Supple, full range of motion  Cardiovascular: Warm and well perfused  Lungs: Normal work of breathing, normal chest wall excursions  Skin: No rash, lesions, or breakdown on exposed skin  Psychiatry: Mood and affect are appropriate   Abdomen: soft, non tender, non distended  Extremeties: No cyanosis, clubbing or edema.    Neurological   MENTAL STATUS: Alert and oriented to person, place, and time. Attention and concentration within normal limits. Speech without dysarthria, able to name and repeat without difficulty. Recent and remote memory within normal limits   CRANIAL NERVES: Visual fields intact. EOMI. Facial sensation intact. Face symmetrical. Hearing grossly intact. Full shoulder shrug bilaterally. Tongue protrudes midline   SENSORY: Sensation is intact to light touch throughout.   MOTOR: Normal bulk and tone.  5/5 deltoid, biceps, triceps, interosseous, hand  bilaterally. 5/5 iliopsoas, knee extension/flexion, foot " dorsi/plantarflexion bilaterally.    CEREBELLAR/COORDINATION/GAIT: Gait steady. Finger to nose intact. Normal rapid alternating movements.

## 2022-07-13 ENCOUNTER — TELEPHONE (OUTPATIENT)
Dept: NEUROLOGY | Facility: CLINIC | Age: 32
End: 2022-07-13
Payer: COMMERCIAL

## 2022-07-13 NOTE — TELEPHONE ENCOUNTER
----- Message from Delfina Colbert MA sent at 7/13/2022 10:38 AM CDT -----  Regarding: FW: call back    ----- Message -----  From: Piedad Hernandez  Sent: 7/13/2022  10:32 AM CDT  To: Fletcher Beatty Staff  Subject: call back                                        Type: Patient Call Back    Who called:Kamillearnold     What is the request in detail: the patient is requesting a call back from Ms. Joyce. She stated that she misplaced the number and fax number that was given to her. Please return call at at earliest convenience.    Can the clinic reply by [facility]?no     Would the patient rather a call back or a response via [facility]? Call back     Best call back number:[phone]

## 2022-07-13 NOTE — TELEPHONE ENCOUNTER
Spoke with patient about getting her EEG done so we can move forward with scheduling her EMU. She says she had one done at an outside facility and will get the report for our records. I gave her my direct line and fax #.

## 2022-07-20 ENCOUNTER — PATIENT MESSAGE (OUTPATIENT)
Dept: NEUROLOGY | Facility: CLINIC | Age: 32
End: 2022-07-20

## 2022-07-20 ENCOUNTER — PATIENT MESSAGE (OUTPATIENT)
Dept: FAMILY MEDICINE | Facility: CLINIC | Age: 32
End: 2022-07-20
Payer: COMMERCIAL

## 2022-07-20 ENCOUNTER — OFFICE VISIT (OUTPATIENT)
Dept: NEUROLOGY | Facility: CLINIC | Age: 32
End: 2022-07-20
Payer: COMMERCIAL

## 2022-07-20 ENCOUNTER — TELEPHONE (OUTPATIENT)
Dept: NEUROLOGY | Facility: CLINIC | Age: 32
End: 2022-07-20
Payer: COMMERCIAL

## 2022-07-20 VITALS
DIASTOLIC BLOOD PRESSURE: 91 MMHG | HEIGHT: 66 IN | WEIGHT: 192.88 LBS | HEART RATE: 93 BPM | SYSTOLIC BLOOD PRESSURE: 127 MMHG | BODY MASS INDEX: 31 KG/M2

## 2022-07-20 DIAGNOSIS — N94.6 DYSMENORRHEA: Primary | ICD-10-CM

## 2022-07-20 DIAGNOSIS — G43.701 CHRONIC MIGRAINE W/O AURA, NOT INTRACTABLE, W STAT MIGR: Primary | ICD-10-CM

## 2022-07-20 PROCEDURE — 3008F BODY MASS INDEX DOCD: CPT | Mod: CPTII,S$GLB,, | Performed by: PSYCHIATRY & NEUROLOGY

## 2022-07-20 PROCEDURE — 99213 PR OFFICE/OUTPT VISIT, EST, LEVL III, 20-29 MIN: ICD-10-PCS | Mod: S$GLB,,, | Performed by: PSYCHIATRY & NEUROLOGY

## 2022-07-20 PROCEDURE — 1159F MED LIST DOCD IN RCRD: CPT | Mod: CPTII,S$GLB,, | Performed by: PSYCHIATRY & NEUROLOGY

## 2022-07-20 PROCEDURE — 3008F PR BODY MASS INDEX (BMI) DOCUMENTED: ICD-10-PCS | Mod: CPTII,S$GLB,, | Performed by: PSYCHIATRY & NEUROLOGY

## 2022-07-20 PROCEDURE — 99999 PR PBB SHADOW E&M-EST. PATIENT-LVL III: CPT | Mod: PBBFAC,,, | Performed by: PSYCHIATRY & NEUROLOGY

## 2022-07-20 PROCEDURE — 99213 OFFICE O/P EST LOW 20 MIN: CPT | Mod: S$GLB,,, | Performed by: PSYCHIATRY & NEUROLOGY

## 2022-07-20 PROCEDURE — 3080F PR MOST RECENT DIASTOLIC BLOOD PRESSURE >= 90 MM HG: ICD-10-PCS | Mod: CPTII,S$GLB,, | Performed by: PSYCHIATRY & NEUROLOGY

## 2022-07-20 PROCEDURE — 3074F PR MOST RECENT SYSTOLIC BLOOD PRESSURE < 130 MM HG: ICD-10-PCS | Mod: CPTII,S$GLB,, | Performed by: PSYCHIATRY & NEUROLOGY

## 2022-07-20 PROCEDURE — 1159F PR MEDICATION LIST DOCUMENTED IN MEDICAL RECORD: ICD-10-PCS | Mod: CPTII,S$GLB,, | Performed by: PSYCHIATRY & NEUROLOGY

## 2022-07-20 PROCEDURE — 3074F SYST BP LT 130 MM HG: CPT | Mod: CPTII,S$GLB,, | Performed by: PSYCHIATRY & NEUROLOGY

## 2022-07-20 PROCEDURE — 99999 PR PBB SHADOW E&M-EST. PATIENT-LVL III: ICD-10-PCS | Mod: PBBFAC,,, | Performed by: PSYCHIATRY & NEUROLOGY

## 2022-07-20 PROCEDURE — 3080F DIAST BP >= 90 MM HG: CPT | Mod: CPTII,S$GLB,, | Performed by: PSYCHIATRY & NEUROLOGY

## 2022-07-20 RX ORDER — CEFUROXIME AXETIL 250 MG/1
6 TABLET ORAL
COMMUNITY
End: 2023-10-07

## 2022-07-20 RX ORDER — PROMETHAZINE HYDROCHLORIDE 25 MG/1
25 TABLET ORAL EVERY 6 HOURS PRN
Qty: 40 TABLET | Refills: 12 | Status: SHIPPED | OUTPATIENT
Start: 2022-07-20 | End: 2023-10-01 | Stop reason: SDUPTHER

## 2022-07-20 RX ORDER — RIMEGEPANT SULFATE 75 MG/75MG
75 TABLET, ORALLY DISINTEGRATING ORAL ONCE AS NEEDED
COMMUNITY
End: 2024-02-01

## 2022-07-20 NOTE — TELEPHONE ENCOUNTER
Spoke with patient about scheduling EMU, she is starting a new job and cannot schedule after 8/2. We have no availability until 8/16, she will call me back if able and I will note this in case of a cancellation

## 2022-07-20 NOTE — PROGRESS NOTES
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     Headache history:   Age of onset -  5   Location - occipital, temples   Nature of pain -  Stabbing, aching   Prodrome - no   Aura -  No   Duration of headache - > 4 hrs   Time to peak intensity - n/a   Pain scale - range of intensity -  9-10/10  Frequency -  15-20/month    Status Migrainosus history - yes   Time of day of most headaches- anytime      Associated symptoms with the headache:   Meningeal symptoms - photophobia, phonophobia, exercise intolerance +   Nausea/vomitting +   Nasal drainage   Visual blurriness   Pallor/flushing  Dizziness   Vertigo  Confusion  Impaired concentration +   Pain worsened with physical activity  +   Neck pain +      Cluster headache symptoms: none   Symptoms of increased intracranial pressure: none  Basilar migraine symptoms: none      Headache Triggers:  Stress, anxiety, emotional overload   H/o SI      Treatment history:  Resolution of headache with sleep - yes   Meds tried:     Failed:   prozac 40   effexor 150   Gabapentin 300 mg   mobic 15   namenda   nurtec   trokemdi   Diamox   imitrex shots  lamictal      10/21/21  Care Timeline     0000    EKG 12-LEAD   1228    Arrived   1308    Comprehensive metabolic panel        CBC auto differential    1321    POCT glucose   1340    CT Head Without Contrast   1341    POCT urine pregnancy   1400    EKG 12-lead   1422    metoclopramide HCl 10 mg   1424    ketorolac tromethamine 30 mg       diphenhydramine HCl 25 mg   1425    0.9 % sodium chloride 1000 mL   1546    haloperidol lactate 2.5 mg   1553    magnesium sulfate in water 2 g   1826    prochlorperazine edisylate 5 mg   1827    diphenhydramine HCl 25 mg   2027    Discharged         Headache risk factors:   H/o TBI  - concussion at age 16   H/o Meningitis  - no   F/h Aneurysms  - no     Headache burden:   Missing college days            Review of Systems   Constitutional: Negative.    HENT: Negative.    Eyes: Negative.     Respiratory: Negative.    Cardiovascular: Negative.    Gastrointestinal: Negative.    Endocrine: Negative.    Genitourinary: Negative.    Musculoskeletal: Negative.    Integumentary:  Negative.   Allergic/Immunologic: Negative.    Neurological: Positive for headaches.   Hematological: Negative.    Psychiatric/Behavioral: Positive for dysphoric mood and sleep disturbance.          Objective:   Physical Exam  Constitutional:       Comments: overweight      Comments: Flat affect          Assessment:     Chr Migraine       Impression:   No major branch stenosis/occlusion at the [place].  No aneurysm.   No evidence of venous sinus thrombosis or venous sinus stenosis..     Electronically signed by: Armani Lopez  Date:                                            11/24/2021  Time:                                           16:24     Impression:   No major branch stenosis/occlusion at the [place].  No aneurysm.   No evidence of venous sinus thrombosis or venous sinus stenosis..     Electronically signed by: Armani Lopez  Date:                                            11/24/2021  Time:                                           16:24     Impression:   Normal pre and postcontrast MR imaging appearance of the brain.     Electronically signed by: Armani Lopez  Date:                                            11/24/2021  Time:                                           16:51  Plan:      Patient is in status migrainous. The etiology is multifactorial. Her headaches are  refractory to several medications. Her headaches are aggravated by untreated depression      f/up Jaci Bynum PA-C   Start VYEPTI 300 mg IV   Cont Lamictal, Effexor   Pt a candidate for BOTOX for chr migraine   Breakthrough headache - nurtec   Multiple alternative treatment options were offered to the patient  cont with psychiatrist   Phenergan 25 mg TID PRN

## 2022-07-20 NOTE — TELEPHONE ENCOUNTER
----- Message from Iman Guerin sent at 7/20/2022  9:57 AM CDT -----  Pt is calling to schedule an EMU.  Call back [phone]

## 2022-07-21 ENCOUNTER — TELEPHONE (OUTPATIENT)
Dept: NEUROLOGY | Facility: CLINIC | Age: 32
End: 2022-07-21
Payer: COMMERCIAL

## 2022-07-21 ENCOUNTER — PATIENT MESSAGE (OUTPATIENT)
Dept: NEUROLOGY | Facility: CLINIC | Age: 32
End: 2022-07-21
Payer: COMMERCIAL

## 2022-07-21 RX ORDER — ACETAMINOPHEN 500 MG
1000 TABLET ORAL ONCE AS NEEDED
Status: CANCELLED | OUTPATIENT
Start: 2022-07-21

## 2022-07-21 RX ORDER — SODIUM CHLORIDE 0.9 % (FLUSH) 0.9 %
10 SYRINGE (ML) INJECTION
Status: CANCELLED | OUTPATIENT
Start: 2022-07-21

## 2022-07-21 RX ORDER — METHYLPREDNISOLONE SOD SUCC 125 MG
125 VIAL (EA) INJECTION ONCE AS NEEDED
Status: CANCELLED
Start: 2022-07-21

## 2022-07-21 RX ORDER — ONDANSETRON 2 MG/ML
8 INJECTION INTRAMUSCULAR; INTRAVENOUS ONCE AS NEEDED
Status: CANCELLED | OUTPATIENT
Start: 2022-07-21

## 2022-07-21 RX ORDER — DIPHENHYDRAMINE HYDROCHLORIDE 50 MG/ML
25 INJECTION INTRAMUSCULAR; INTRAVENOUS ONCE AS NEEDED
Status: CANCELLED | OUTPATIENT
Start: 2022-07-21

## 2022-07-21 RX ORDER — EPINEPHRINE 0.3 MG/.3ML
0.3 INJECTION SUBCUTANEOUS ONCE AS NEEDED
Status: CANCELLED
Start: 2022-07-21

## 2022-07-21 RX ORDER — SODIUM CHLORIDE 9 MG/ML
INJECTION, SOLUTION INTRAVENOUS ONCE AS NEEDED
Status: CANCELLED | OUTPATIENT
Start: 2022-07-21

## 2022-07-21 NOTE — TELEPHONE ENCOUNTER
----- Message from Geoff Dorsey MD sent at 7/20/2022  5:02 PM CDT -----  I would like to start VYEPTI 300 mg IV Q90 days for chronic migraine

## 2022-07-21 NOTE — TELEPHONE ENCOUNTER
Per  provider, plan to initiate vyepti 300mg IV q 3 months. Therapy plan entered. [facility] notified. Pending authorization from insurance. Once approved OHIP will contact patient for scheduling.

## 2022-07-22 ENCOUNTER — PATIENT MESSAGE (OUTPATIENT)
Dept: FAMILY MEDICINE | Facility: CLINIC | Age: 32
End: 2022-07-22
Payer: COMMERCIAL

## 2022-07-22 ENCOUNTER — TELEPHONE (OUTPATIENT)
Dept: NEUROLOGY | Facility: CLINIC | Age: 32
End: 2022-07-22
Payer: COMMERCIAL

## 2022-07-22 DIAGNOSIS — R25.9 ABNORMAL INVOLUNTARY MOVEMENT: Primary | ICD-10-CM

## 2022-07-22 RX ORDER — IBUPROFEN 800 MG/1
800 TABLET ORAL 3 TIMES DAILY PRN
Qty: 90 TABLET | Refills: 2 | Status: SHIPPED | OUTPATIENT
Start: 2022-07-22 | End: 2023-10-01 | Stop reason: SDUPTHER

## 2022-07-22 NOTE — TELEPHONE ENCOUNTER
Spoke with patient and she said Dr. Dorsey told her we could get her scheduled for an EMU admission on a Friday. I explained we do not admit on Fridays. , offered Thursday 7/28 EMU admit day 2/2 a cancellation, 1pm. Patient is aware she is required to have an adult accompany her for the duration including overnight. Patient accepted scheduling on 7/28. Instructions thoroughly discussed, mailed via [employer] and sent in portal.     In regards to an EMU admission:    Can you tolerate being connected to monitoring equipment/lines to your head, arms, and chest for a possible 7+ days? yes    Can you tolerate being tethered to the hospital room 24/7 during your EMU stay (meaning you cannot leave the room)? yes      Please describe your typical events and the behaviors you demonstrate during and after the event: body contorts, starts yelling uncontrollably, other times can't move at all or talk.       Have you ever had to be restrained, sedated, PEC'd, or require 1:1 observation in any past hospitalizations? no

## 2022-07-28 ENCOUNTER — HOSPITAL ENCOUNTER (INPATIENT)
Facility: HOSPITAL | Age: 32
LOS: 1 days | Discharge: HOME | DRG: 880 | End: 2022-07-29
Attending: PSYCHIATRY & NEUROLOGY | Admitting: PSYCHIATRY & NEUROLOGY
Payer: COMMERCIAL

## 2022-07-28 DIAGNOSIS — R25.9 ABNORMAL INVOLUNTARY MOVEMENT: ICD-10-CM

## 2022-07-28 DIAGNOSIS — F44.7 FUNCTIONAL NEUROLOGICAL SYMPTOM DISORDER WITH MIXED SYMPTOMS: Primary | ICD-10-CM

## 2022-07-28 DIAGNOSIS — R56.9 SEIZURES: ICD-10-CM

## 2022-07-28 DIAGNOSIS — F40.10 SOCIAL ANXIETY DISORDER: ICD-10-CM

## 2022-07-28 DIAGNOSIS — F41.1 GAD (GENERALIZED ANXIETY DISORDER): ICD-10-CM

## 2022-07-28 LAB
ALBUMIN SERPL BCP-MCNC: 3.7 G/DL (ref 3.5–5.2)
ALP SERPL-CCNC: 86 U/L (ref 55–135)
ALT SERPL W/O P-5'-P-CCNC: 12 U/L (ref 10–44)
AMPHET+METHAMPHET UR QL: NEGATIVE
ANION GAP SERPL CALC-SCNC: 10 MMOL/L (ref 8–16)
AST SERPL-CCNC: 15 U/L (ref 10–40)
B-HCG UR QL: NEGATIVE
BARBITURATES UR QL SCN>200 NG/ML: NEGATIVE
BASOPHILS # BLD AUTO: 0.07 K/UL (ref 0–0.2)
BASOPHILS NFR BLD: 1.2 % (ref 0–1.9)
BENZODIAZ UR QL SCN>200 NG/ML: NEGATIVE
BILIRUB SERPL-MCNC: 0.3 MG/DL (ref 0.1–1)
BUN SERPL-MCNC: 9 MG/DL (ref 6–20)
BZE UR QL SCN: NEGATIVE
CALCIUM SERPL-MCNC: 9.2 MG/DL (ref 8.7–10.5)
CANNABINOIDS UR QL SCN: NEGATIVE
CHLORIDE SERPL-SCNC: 108 MMOL/L (ref 95–110)
CO2 SERPL-SCNC: 20 MMOL/L (ref 23–29)
CREAT SERPL-MCNC: 0.9 MG/DL (ref 0.5–1.4)
CREAT UR-MCNC: 193 MG/DL (ref 15–325)
DIFFERENTIAL METHOD: ABNORMAL
EOSINOPHIL # BLD AUTO: 0.1 K/UL (ref 0–0.5)
EOSINOPHIL NFR BLD: 2 % (ref 0–8)
ERYTHROCYTE [DISTWIDTH] IN BLOOD BY AUTOMATED COUNT: 12.7 % (ref 11.5–14.5)
EST. GFR  (AFRICAN AMERICAN): >60 ML/MIN/1.73 M^2
EST. GFR  (NON AFRICAN AMERICAN): >60 ML/MIN/1.73 M^2
GLUCOSE SERPL-MCNC: 107 MG/DL (ref 70–110)
HCT VFR BLD AUTO: 36.5 % (ref 37–48.5)
HGB BLD-MCNC: 11.6 G/DL (ref 12–16)
IMM GRANULOCYTES # BLD AUTO: 0.02 K/UL (ref 0–0.04)
IMM GRANULOCYTES NFR BLD AUTO: 0.3 % (ref 0–0.5)
LYMPHOCYTES # BLD AUTO: 2.6 K/UL (ref 1–4.8)
LYMPHOCYTES NFR BLD: 42.9 % (ref 18–48)
MCH RBC QN AUTO: 28.9 PG (ref 27–31)
MCHC RBC AUTO-ENTMCNC: 31.8 G/DL (ref 32–36)
MCV RBC AUTO: 91 FL (ref 82–98)
METHADONE UR QL SCN>300 NG/ML: NEGATIVE
MONOCYTES # BLD AUTO: 0.5 K/UL (ref 0.3–1)
MONOCYTES NFR BLD: 8.2 % (ref 4–15)
NEUTROPHILS # BLD AUTO: 2.7 K/UL (ref 1.8–7.7)
NEUTROPHILS NFR BLD: 45.4 % (ref 38–73)
NRBC BLD-RTO: 0 /100 WBC
OPIATES UR QL SCN: NEGATIVE
PCP UR QL SCN>25 NG/ML: NEGATIVE
PLATELET # BLD AUTO: 408 K/UL (ref 150–450)
PMV BLD AUTO: 8.9 FL (ref 9.2–12.9)
POTASSIUM SERPL-SCNC: 4.1 MMOL/L (ref 3.5–5.1)
PROT SERPL-MCNC: 7.4 G/DL (ref 6–8.4)
RBC # BLD AUTO: 4.01 M/UL (ref 4–5.4)
SODIUM SERPL-SCNC: 138 MMOL/L (ref 136–145)
TOXICOLOGY INFORMATION: NORMAL
WBC # BLD AUTO: 5.94 K/UL (ref 3.9–12.7)

## 2022-07-28 PROCEDURE — 95720 PR EEG, W/VIDEO, CONT RECORD, I&R, >12<26 HRS: ICD-10-PCS | Mod: ,,, | Performed by: PSYCHIATRY & NEUROLOGY

## 2022-07-28 PROCEDURE — 95720 EEG PHY/QHP EA INCR W/VEEG: CPT | Mod: ,,, | Performed by: PSYCHIATRY & NEUROLOGY

## 2022-07-28 PROCEDURE — 25000003 PHARM REV CODE 250: Performed by: PHYSICIAN ASSISTANT

## 2022-07-28 PROCEDURE — 80307 DRUG TEST PRSMV CHEM ANLYZR: CPT | Performed by: PHYSICIAN ASSISTANT

## 2022-07-28 PROCEDURE — 93010 EKG 12-LEAD: ICD-10-PCS | Mod: ,,, | Performed by: INTERNAL MEDICINE

## 2022-07-28 PROCEDURE — 81025 URINE PREGNANCY TEST: CPT | Performed by: PHYSICIAN ASSISTANT

## 2022-07-28 PROCEDURE — 99223 1ST HOSP IP/OBS HIGH 75: CPT | Mod: ,,, | Performed by: PSYCHIATRY & NEUROLOGY

## 2022-07-28 PROCEDURE — 80183 DRUG SCRN QUANT OXCARBAZEPIN: CPT | Performed by: PHYSICIAN ASSISTANT

## 2022-07-28 PROCEDURE — 93010 ELECTROCARDIOGRAM REPORT: CPT | Mod: ,,, | Performed by: INTERNAL MEDICINE

## 2022-07-28 PROCEDURE — 95714 VEEG EA 12-26 HR UNMNTR: CPT

## 2022-07-28 PROCEDURE — 85025 COMPLETE CBC W/AUTO DIFF WBC: CPT | Performed by: PHYSICIAN ASSISTANT

## 2022-07-28 PROCEDURE — 36415 COLL VENOUS BLD VENIPUNCTURE: CPT | Performed by: PHYSICIAN ASSISTANT

## 2022-07-28 PROCEDURE — 93005 ELECTROCARDIOGRAM TRACING: CPT

## 2022-07-28 PROCEDURE — 80053 COMPREHEN METABOLIC PANEL: CPT | Performed by: PHYSICIAN ASSISTANT

## 2022-07-28 PROCEDURE — 11000001 HC ACUTE MED/SURG PRIVATE ROOM

## 2022-07-28 PROCEDURE — 80175 DRUG SCREEN QUAN LAMOTRIGINE: CPT | Performed by: PHYSICIAN ASSISTANT

## 2022-07-28 PROCEDURE — 99223 PR INITIAL HOSPITAL CARE,LEVL III: ICD-10-PCS | Mod: ,,, | Performed by: PSYCHIATRY & NEUROLOGY

## 2022-07-28 PROCEDURE — 95700 EEG CONT REC W/VID EEG TECH: CPT

## 2022-07-28 RX ORDER — ONDANSETRON 8 MG/1
8 TABLET, ORALLY DISINTEGRATING ORAL EVERY 8 HOURS PRN
Status: DISCONTINUED | OUTPATIENT
Start: 2022-07-28 | End: 2022-07-28

## 2022-07-28 RX ORDER — DOCUSATE SODIUM 100 MG/1
100 CAPSULE, LIQUID FILLED ORAL 2 TIMES DAILY
Status: DISCONTINUED | OUTPATIENT
Start: 2022-07-28 | End: 2022-07-29 | Stop reason: HOSPADM

## 2022-07-28 RX ORDER — SODIUM CHLORIDE 0.9 % (FLUSH) 0.9 %
10 SYRINGE (ML) INJECTION
Status: DISCONTINUED | OUTPATIENT
Start: 2022-07-28 | End: 2022-07-29 | Stop reason: HOSPADM

## 2022-07-28 RX ORDER — VENLAFAXINE HYDROCHLORIDE 37.5 MG/1
75 CAPSULE, EXTENDED RELEASE ORAL DAILY
Status: DISCONTINUED | OUTPATIENT
Start: 2022-07-28 | End: 2022-07-29 | Stop reason: HOSPADM

## 2022-07-28 RX ORDER — ACETAMINOPHEN 325 MG/1
650 TABLET ORAL EVERY 4 HOURS PRN
Status: DISCONTINUED | OUTPATIENT
Start: 2022-07-28 | End: 2022-07-29 | Stop reason: HOSPADM

## 2022-07-28 RX ORDER — HYDROXYZINE HYDROCHLORIDE 25 MG/1
25 TABLET, FILM COATED ORAL 3 TIMES DAILY PRN
Status: DISCONTINUED | OUTPATIENT
Start: 2022-07-28 | End: 2022-07-29 | Stop reason: HOSPADM

## 2022-07-28 RX ORDER — LORAZEPAM 2 MG/ML
2 INJECTION INTRAMUSCULAR
Status: DISCONTINUED | OUTPATIENT
Start: 2022-07-28 | End: 2022-07-29 | Stop reason: HOSPADM

## 2022-07-28 RX ORDER — VENLAFAXINE HYDROCHLORIDE 150 MG/1
150 CAPSULE, EXTENDED RELEASE ORAL DAILY
Status: DISCONTINUED | OUTPATIENT
Start: 2022-07-28 | End: 2022-07-29 | Stop reason: HOSPADM

## 2022-07-28 RX ADMIN — VENLAFAXINE HYDROCHLORIDE 75 MG: 37.5 CAPSULE, EXTENDED RELEASE ORAL at 03:07

## 2022-07-28 RX ADMIN — VENLAFAXINE HYDROCHLORIDE 150 MG: 150 CAPSULE, EXTENDED RELEASE ORAL at 03:07

## 2022-07-28 NOTE — H&P
[facility]  Neurology-Epilepsy  History & Physical    Patient Name: Rosalina Saavedra  MRN: [id number]   Admission Date: 7/28/2022  Code Status: Full Code   Attending Provider: Jaspal Stevens MD   Primary Care Physician: Madhavi Basilio MD  Principal Problem:Abnormal involuntary movement    Subjective:     Chief Complaint:  Transient neurologic symptoms     HPI:   Ms. Saavedra is a 31 yo female with significant medical history of migraines, generalized anxiety disorder admitted to EMU for capture and characterization of daily events described as shaking, screaming, inability to move extremities with retained awareness. Patient reports these events began in October 2021, and typically occur when she is waking up. She states they will last from 5-10 minutes, and are occurring every day, at times multiple per day. After the events, patient reports she is very fatigued, and often has a headache and nausea. Denies associated tongue biting, urinary/bowel incontinence.  Patient was following with Dr. Crain and reports she was trialed on several seizure medications, currently maintained on Lamotrigine 200 mg BID and Oxcarbazepine 300 mg BID with no major improvement noted in event frequency. No noted family history of seizures or seizure risk factors. Patient referred to EMU for event capture and characterization.    Clinic Visit with Jaci Bynum PA-C and Dr. Stevens 7/12/2022  Interval Events/ROS 7/12/2022:     Patient presents in follow up for evaluation of daily episodes consisting of abnormal movements, decreased responsiveness, and speech difficulty. Accompanied by mom who also contributes to the history. Last event was 7/10/2022. Episodes persist despite medication regimen of lamotrigine 200mg BID and oxcarbazepine 300mg BID though patient does note frequent missed doses. Episodes may last anywhere from 2-10 minutes. Reports these events began happening 10/2021, then seemed to resolve for some time, but returned  "02/2022 and have been occurring nearly daily since then. Patient describes they usually occur upon waking up in the morning, or when transitioning from sleep to wakefulness. Her body feels "stuck", can't get up, can't speak sentences but is able to make noises, vision is "scrambled" then becomes exhausted afterward. Denies tongue bite or UI. Mom shows a video of one event during which the patient is lying in bed, makes occasional grunting noises in response to mom saying patient's name, eyes are closed, and video ends by patient hitting the phone out of mom's hand. Patient states she was able to hear her mom during this episode. Events are often triggered by stress or feeling overheated. Hx of anxiety and depression. 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 Otherwise, no fever, no cold symptoms, no headache, no changes in vision, no new weakness, no chest pain, no shortness of breath, no nausea, no vomiting, no diarrhea, no constipation, no tingling/numbness, no problems walking. Patient also accompanied today by service dog.      HPI:   Ms. Rosalina Saavedra is a 32 y.o. female who presents with a chief complaint of reported seizures.  The patient presents today with her mother who contributes to the history.  Together they report that the patient experienced an abrupt onset of seizure-like episodes beginning in January 2022.  The patient endorses 3 event types including episodes of her vision going completely black while remaining aware of her surroundings, episodes of inability to control her body and unresponsiveness while remaining aware, and extremity jerking.  She has been followed by an outside neurologist, Dr. Crain, who has tried her on numerous seizure medications that have not control the episodes.  She has undergone routine and ambulatory EEGs which were normal per patient and did not capture typical events.  Her mother does believe that stress may be contributing to the episodes.       They " "provided video today of the episodes.  Episodes are characterized by the patient exhibiting robotic type movements and exhibiting her feet stuck to the floor before falling to the ground.  She lies on the ground shaking her arms and legs while crying before standing in a robotic fashion.  During the entire event, the patient makes a variety of cartoon-like noises such as wee oo", "hansel hansel hansel" and "beep boop."     Seizure Type: Suspected PNEE  Seizure Etiology:  Suspected PNEE  Current AEDs: Lamictal 150 mg BID     The patient is accompanied by family who contribute to the history. This patient has 3 types of seizure as described below. The patient reports having seizures for months The patient reports to have worse seizure control. The seizure frequency is variable. The last seizure was yesterday . The patient does not report side effects from seizure medication.     Seizure Type 1:   Seizure Description: everything goes "black in her vision", remains aware  Aura: No warning  Associated Symptoms:  none  Seizure Frequency: Up to 2x a day every 3-4 days  Last seizure: January 2022     Seizure Type 2:   Seizure Description: Screams, shakes all over, can't open mouth, eyes lock closed and can't open them, lasts 5-15 minutes, confused and tired  After, remains aware after  Aura: Burning in back of neck  Associated Symptoms:  none  Seizure Frequency: Daily, 6-7 times per day  Last seizure: Early Feb 2022     Seizure Type 3:   Seizure Description: Intermittent limb and head jerks  Aura: None  Associated Symptoms: none  Seizure Frequency: Every day  Last seizure: Today     Handedness: right  Seizure Onset Age: 32  Seizure/ Epilepsy Risk Factors: none  Birth/Developmental History: normal birth history and Normal developmental history  Seizure Triggers/ Provoking Features: stress  Previous Seizure Medications: brivaracetam (Briviact, BRV) , lacosamide (Vimpat, LCS), lamotrigine (Lamictal, LTG), oxcarbazepine (Trileptal " OXC),, topiramate (Topamax, TPM) and clonazepam (Klonopin, CZP)  Recent Med Changes: None  Other Treatments: None  Prior Episodes of Status: None  Psychiatric/Behavioral Comorbitidies: Anxiety and depression, social anxieyt  Surgical Candidacy: None     Prior Studies:  EEG : At [facility], routine and ambulatory- both normal per patient  vEEG/ EMU evaluation: Not done  MRI of brain: January 2022, Done at OSH- normal per patient   AED levels:   Not done  CT/CTA Scan:   Not done  PET Scan:  Not done  Neuropsychological Evaluation:  Not done  DEXA Scan:  Not done  Other studies:  Not done      Past Medical History:   Diagnosis Date    Anxiety     Depression     Migraine     Migraine headache        Past Surgical History:   Procedure Laterality Date    KNEE ARTHROSCOPY      TIBIA FRACTURE SURGERY         Review of patient's allergies indicates:   Allergen Reactions    Codeine Itching    Morphine Itching       No current facility-administered medications on file prior to encounter.     Current Outpatient Medications on File Prior to Encounter   Medication Sig    eszopiclone (LUNESTA) 2 MG Tab nightly as needed.    ibuprofen (ADVIL,MOTRIN) 800 MG tablet Take 1 tablet (800 mg total) by mouth 3 (three) times daily as needed for Pain.    lamoTRIgine (LAMICTAL) 200 MG tablet Take 1 tablet (200 mg total) by mouth 2 (two) times a day.    OXcarbazepine (TRILEPTAL) 150 MG Tab Take 300 mg by mouth 2 (two) times daily.    promethazine (PHENERGAN) 25 MG tablet Take 1 tablet (25 mg total) by mouth every 6 (six) hours as needed for Nausea.    rimegepant (NURTEC) 75 mg odt Take 75 mg by mouth once as needed for Migraine. Place ODT tablet on the tongue; alternatively the ODT tablet may be placed under the tongue    sumatriptan (IMITREX STATDOSE) 6 mg/0.5 mL kit Inject 6 mg into the skin every 2 (two) hours as needed for Migraine.    venlafaxine (EFFEXOR-XR) 150 MG Cp24 Take 1 capsule (150 mg total) by mouth once daily. Take  with 75mg capsule to equal 225mg daily.    venlafaxine (EFFEXOR-XR) 75 MG 24 hr capsule Take 1 capsule (75 mg total) by mouth once daily. Take with 150mg capsule to equal 225mg daily.     Continuous Infusions:    Family History       Problem Relation (Age of Onset)    Breast cancer Mother (61), Other (71)    Diabetes Mother, Maternal Grandmother    Hypertension Maternal Grandmother          Tobacco Use    Smoking status: Never Smoker    Smokeless tobacco: Never Used   Substance and Sexual Activity    Alcohol use: No    Drug use: No    Sexual activity: Never     Birth control/protection: None     Review of Systems   Constitutional:  Positive for fatigue. Negative for chills and fever.   HENT:  Negative for trouble swallowing and voice change.    Eyes:  Negative for photophobia and visual disturbance.   Respiratory:  Negative for cough and shortness of breath.    Cardiovascular:  Negative for chest pain and leg swelling.   Gastrointestinal:  Negative for abdominal pain, nausea and vomiting.   Musculoskeletal:  Negative for neck pain and neck stiffness.   Skin:  Negative for pallor and rash.   Neurological:  Negative for facial asymmetry, speech difficulty and headaches.   Psychiatric/Behavioral:  Negative for agitation and confusion.    Objective:     Vital Signs (Most Recent):    Vital Signs (24h Range):           There is no height or weight on file to calculate BMI.    Physical Exam  Vitals reviewed.   Constitutional:       Appearance: She is not ill-appearing or diaphoretic.   HENT:      Head: Normocephalic and atraumatic.   Eyes:      General: No scleral icterus.     Extraocular Movements: Extraocular movements intact.      Pupils: Pupils are equal, round, and reactive to light.   Pulmonary:      Effort: Pulmonary effort is normal. No respiratory distress.   Abdominal:      General: There is no distension.      Palpations: Abdomen is soft.   Musculoskeletal:         General: No deformity or signs of injury.  Normal range of motion.      Cervical back: Normal range of motion and neck supple. No rigidity.   Skin:     General: Skin is warm and dry.   Neurological:      Mental Status: She is alert and oriented to person, place, and time.      Motor: Motor strength is normal.      Coordination: Finger-Nose-Finger Test normal.   Psychiatric:         Mood and Affect: Mood normal.         Speech: Speech normal.         Behavior: Behavior normal.       NEUROLOGICAL EXAMINATION:     MENTAL STATUS   Oriented to person, place, and time.   Attention: normal. Concentration: normal.   Speech: speech is normal   Level of consciousness: alert    CRANIAL NERVES     CN III, IV, VI   Pupils are equal, round, and reactive to light.    CN VII   Facial expression full, symmetric.     CN VIII   Hearing: intact    CN IX, X   CN IX normal.     CN XI   CN XI normal.     MOTOR EXAM   Muscle bulk: normal  Overall muscle tone: normal    Strength   Strength 5/5 throughout.     SENSORY EXAM   Light touch normal.   Proprioception normal.     GAIT AND COORDINATION      Coordination   Finger to nose coordination: normal    Significant Labs: All pertinent lab results from the past 24 hours have been reviewed.    Significant Studies: I have reviewed all pertinent imaging results/findings within the past 24 hours.    Assessment and Plan:     * Abnormal involuntary movement  31 yo female with daily events described as abnormal movements, decreased responsiveness, speech difficulty, and at times inability to move admitted to EMU for event capture and characterization. Events persist at times multiple per day on current AED regimen of Lamotrigine 200 mg BID, Oxcarbazepine 300 mg BID.     - Continuous vEEG   - Hold home Lamotrigine, Oxcarbazepine  - Activation procedures per protocol- medication adjustment as above, sleep deprivation 7/28>7/29  - IV Ativan PRN for GTC greater than 5 min - please call epilepsy on call before administering  - Seizure precautions,  suction and oxygen at bedside  - Fall precautions, side rail padding in place  - Visi monitoring with continuous pulse oximetry   - Telemetry    JESUS (generalized anxiety disorder)  - Continue home Effexor 225 mg daily    Chronic migraine with aura  - Followed by Dr. Dorsey as outpatient, on Nurtec, Imitrex as needed  - Supportive care        VTE Risk Mitigation (From admission, onward)         Ordered     IP VTE LOW RISK PATIENT  Once         07/28/22 1316     Place LILLIANA hose  Until discontinued         07/28/22 1316     Place sequential compression device  Until discontinued         07/28/22 1316                Delfina Martin PA-C  Neurology-Epilepsy  [facility]  Staff: Dr. Stevens

## 2022-07-28 NOTE — SUBJECTIVE & OBJECTIVE
Past Medical History:   Diagnosis Date    Anxiety     Depression     Migraine     Migraine headache        Past Surgical History:   Procedure Laterality Date    KNEE ARTHROSCOPY      TIBIA FRACTURE SURGERY         Review of patient's allergies indicates:   Allergen Reactions    Codeine Itching    Morphine Itching       No current facility-administered medications on file prior to encounter.     Current Outpatient Medications on File Prior to Encounter   Medication Sig    eszopiclone (LUNESTA) 2 MG Tab nightly as needed.    ibuprofen (ADVIL,MOTRIN) 800 MG tablet Take 1 tablet (800 mg total) by mouth 3 (three) times daily as needed for Pain.    lamoTRIgine (LAMICTAL) 200 MG tablet Take 1 tablet (200 mg total) by mouth 2 (two) times a day.    OXcarbazepine (TRILEPTAL) 150 MG Tab Take 300 mg by mouth 2 (two) times daily.    promethazine (PHENERGAN) 25 MG tablet Take 1 tablet (25 mg total) by mouth every 6 (six) hours as needed for Nausea.    rimegepant (NURTEC) 75 mg odt Take 75 mg by mouth once as needed for Migraine. Place ODT tablet on the tongue; alternatively the ODT tablet may be placed under the tongue    sumatriptan (IMITREX STATDOSE) 6 mg/0.5 mL kit Inject 6 mg into the skin every 2 (two) hours as needed for Migraine.    venlafaxine (EFFEXOR-XR) 150 MG Cp24 Take 1 capsule (150 mg total) by mouth once daily. Take with 75mg capsule to equal 225mg daily.    venlafaxine (EFFEXOR-XR) 75 MG 24 hr capsule Take 1 capsule (75 mg total) by mouth once daily. Take with 150mg capsule to equal 225mg daily.     Continuous Infusions:    Family History       Problem Relation (Age of Onset)    Breast cancer Mother (61), Other (71)    Diabetes Mother, Maternal Grandmother    Hypertension Maternal Grandmother          Tobacco Use    Smoking status: Never Smoker    Smokeless tobacco: Never Used   Substance and Sexual Activity    Alcohol use: No    Drug use: No    Sexual activity: Never     Birth control/protection: None     Review  of Systems   Constitutional:  Positive for fatigue. Negative for chills and fever.   HENT:  Negative for trouble swallowing and voice change.    Eyes:  Negative for photophobia and visual disturbance.   Respiratory:  Negative for cough and shortness of breath.    Cardiovascular:  Negative for chest pain and leg swelling.   Gastrointestinal:  Negative for abdominal pain, nausea and vomiting.   Musculoskeletal:  Negative for neck pain and neck stiffness.   Skin:  Negative for pallor and rash.   Neurological:  Negative for facial asymmetry, speech difficulty and headaches.   Psychiatric/Behavioral:  Negative for agitation and confusion.    Objective:     Vital Signs (Most Recent):    Vital Signs (24h Range):           There is no height or weight on file to calculate BMI.    Physical Exam  Vitals reviewed.   Constitutional:       Appearance: She is not ill-appearing or diaphoretic.   HENT:      Head: Normocephalic and atraumatic.   Eyes:      General: No scleral icterus.     Extraocular Movements: Extraocular movements intact.      Pupils: Pupils are equal, round, and reactive to light.   Pulmonary:      Effort: Pulmonary effort is normal. No respiratory distress.   Abdominal:      General: There is no distension.      Palpations: Abdomen is soft.   Musculoskeletal:         General: No deformity or signs of injury. Normal range of motion.      Cervical back: Normal range of motion and neck supple. No rigidity.   Skin:     General: Skin is warm and dry.   Neurological:      Mental Status: She is alert and oriented to person, place, and time.      Motor: Motor strength is normal.      Coordination: Finger-Nose-Finger Test normal.   Psychiatric:         Mood and Affect: Mood normal.         Speech: Speech normal.         Behavior: Behavior normal.       NEUROLOGICAL EXAMINATION:     MENTAL STATUS   Oriented to person, place, and time.   Attention: normal. Concentration: normal.   Speech: speech is normal   Level of  consciousness: alert    CRANIAL NERVES     CN III, IV, VI   Pupils are equal, round, and reactive to light.    CN VII   Facial expression full, symmetric.     CN VIII   Hearing: intact    CN IX, X   CN IX normal.     CN XI   CN XI normal.     MOTOR EXAM   Muscle bulk: normal  Overall muscle tone: normal    Strength   Strength 5/5 throughout.     SENSORY EXAM   Light touch normal.   Proprioception normal.     GAIT AND COORDINATION      Coordination   Finger to nose coordination: normal    Significant Labs: All pertinent lab results from the past 24 hours have been reviewed.    Significant Studies: I have reviewed all pertinent imaging results/findings within the past 24 hours.

## 2022-07-28 NOTE — PLAN OF CARE
Problem: Adult Inpatient Plan of Care  Goal: Plan of Care Review  Outcome: Ongoing, Progressing  Goal: Patient-Specific Goal (Individualized)  Outcome: Ongoing, Progressing  Goal: Absence of Hospital-Acquired Illness or Injury  Outcome: Ongoing, Progressing  Goal: Optimal Comfort and Wellbeing  Outcome: Ongoing, Progressing     Problem: Fall Injury Risk  Goal: Absence of Fall and Fall-Related Injury  Outcome: Ongoing, Progressing     Problem: Seizure, Active Management  Goal: Absence of Seizure/Seizure-Related Injury  Outcome: Ongoing, Progressing    POC reviewed with the patient and they verbalized understanding. All comments and concerns addressed. Bed locked in lowest position with bed alarm set, call light within reach. Safety precautions maintained. VSS, see flowsheets. No events this shift. Will continue to monitor for changes to POC and clinical condition.

## 2022-07-28 NOTE — HPI
"Ms. Saavedra is a 33 yo female with significant medical history of migraines, generalized anxiety disorder admitted to EMU for capture and characterization of daily events described as shaking, screaming, inability to move extremities with retained awareness. Patient reports these events began in October 2021, and typically occur when she is waking up. She states they will last from 5-10 minutes, and are occurring every day, at times multiple per day. After the events, patient reports she is very fatigued, and often has a headache and nausea. Denies associated tongue biting, urinary/bowel incontinence.  Patient was following with Dr. Crain and reports she was trialed on several seizure medications, currently maintained on Lamotrigine 200 mg BID and Oxcarbazepine 300 mg BID with no major improvement noted in event frequency. No noted family history of seizures or seizure risk factors. Patient referred to EMU for event capture and characterization.    Clinic Visit with Jaci Bynum PA-C and Dr. Stevens 7/12/2022  Interval Events/ROS 7/12/2022:     Patient presents in follow up for evaluation of daily episodes consisting of abnormal movements, decreased responsiveness, and speech difficulty. Accompanied by mom who also contributes to the history. Last event was 7/10/2022. Episodes persist despite medication regimen of lamotrigine 200mg BID and oxcarbazepine 300mg BID though patient does note frequent missed doses. Episodes may last anywhere from 2-10 minutes. Reports these events began happening 10/2021, then seemed to resolve for some time, but returned 02/2022 and have been occurring nearly daily since then. Patient describes they usually occur upon waking up in the morning, or when transitioning from sleep to wakefulness. Her body feels "stuck", can't get up, can't speak sentences but is able to make noises, vision is "scrambled" then becomes exhausted afterward. Denies tongue bite or UI. Mom shows a video of one " event during which the patient is lying in bed, makes occasional grunting noises in response to mom saying patient's name, eyes are closed, and video ends by patient hitting the phone out of mom's hand. Patient states she was able to hear her mom during this episode. Events are often triggered by stress or feeling overheated. Hx of anxiety and depression. 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 Otherwise, no fever, no cold symptoms, no headache, no changes in vision, no new weakness, no chest pain, no shortness of breath, no nausea, no vomiting, no diarrhea, no constipation, no tingling/numbness, no problems walking. Patient also accompanied today by service dog.      HPI:   Ms. Rosalina Saavedra is a 32 y.o. female who presents with a chief complaint of reported seizures.  The patient presents today with her mother who contributes to the history.  Together they report that the patient experienced an abrupt onset of seizure-like episodes beginning in January 2022.  The patient endorses 3 event types including episodes of her vision going completely black while remaining aware of her surroundings, episodes of inability to control her body and unresponsiveness while remaining aware, and extremity jerking.  She has been followed by an outside neurologist, Dr. Crain, who has tried her on numerous seizure medications that have not control the episodes.  She has undergone routine and ambulatory EEGs which were normal per patient and did not capture typical events.  Her mother does believe that stress may be contributing to the episodes.       They provided video today of the episodes.  Episodes are characterized by the patient exhibiting robotic type movements and exhibiting her feet stuck to the floor before falling to the ground.  She lies on the ground shaking her arms and legs while crying before standing in a robotic fashion.  During the entire event, the patient makes a variety of cartoon-like noises such  "as wee oo", "hansel hansel hansel" and "beep boop."     Seizure Type: Suspected PNEE  Seizure Etiology:  Suspected PNEE  Current AEDs: Lamictal 150 mg BID     The patient is accompanied by family who contribute to the history. This patient has 3 types of seizure as described below. The patient reports having seizures for months The patient reports to have worse seizure control. The seizure frequency is variable. The last seizure was yesterday . The patient does not report side effects from seizure medication.     Seizure Type 1:   Seizure Description: everything goes "black in her vision", remains aware  Aura: No warning  Associated Symptoms:  none  Seizure Frequency: Up to 2x a day every 3-4 days  Last seizure: January 2022     Seizure Type 2:   Seizure Description: Screams, shakes all over, can't open mouth, eyes lock closed and can't open them, lasts 5-15 minutes, confused and tired  After, remains aware after  Aura: Burning in back of neck  Associated Symptoms:  none  Seizure Frequency: Daily, 6-7 times per day  Last seizure: Early Feb 2022     Seizure Type 3:   Seizure Description: Intermittent limb and head jerks  Aura: None  Associated Symptoms: none  Seizure Frequency: Every day  Last seizure: Today     Handedness: right  Seizure Onset Age: 32  Seizure/ Epilepsy Risk Factors: none  Birth/Developmental History: normal birth history and Normal developmental history  Seizure Triggers/ Provoking Features: stress  Previous Seizure Medications: brivaracetam (Briviact, BRV) , lacosamide (Vimpat, LCS), lamotrigine (Lamictal, LTG), oxcarbazepine (Trileptal OXC),, topiramate (Topamax, TPM) and clonazepam (Klonopin, CZP)  Recent Med Changes: None  Other Treatments: None  Prior Episodes of Status: None  Psychiatric/Behavioral Comorbitidies: Anxiety and depression, social anxieyt  Surgical Candidacy: None     Prior Studies:  EEG : At [facility], routine and ambulatory- both normal per patient  vEEG/ EMU evaluation: Not done  MRI of " brain: January 2022, Done at [facility]- normal per patient   AED levels:   Not done  CT/CTA Scan:   Not done  PET Scan:  Not done  Neuropsychological Evaluation:  Not done  DEXA Scan:  Not done  Other studies:  Not done

## 2022-07-28 NOTE — ASSESSMENT & PLAN NOTE
33 yo female with daily events described as abnormal movements, decreased responsiveness, speech difficulty, and at times inability to move admitted to EMU for event capture and characterization. Events persist at times multiple per day on current AED regimen of Lamotrigine 200 mg BID, Oxcarbazepine 300 mg BID.     - Continuous vEEG   - Hold home Lamotrigine, Oxcarbazepine  - Activation procedures per protocol- medication adjustment as above, sleep deprivation 7/28>7/29  - IV Ativan PRN for GTC greater than 5 min - please call epilepsy on call before administering  - Seizure precautions, suction and oxygen at bedside  - Fall precautions, side rail padding in place  - Visi monitoring with continuous pulse oximetry   - Telemetry

## 2022-07-29 VITALS
WEIGHT: 193 LBS | RESPIRATION RATE: 18 BRPM | DIASTOLIC BLOOD PRESSURE: 74 MMHG | TEMPERATURE: 97 F | SYSTOLIC BLOOD PRESSURE: 118 MMHG | BODY MASS INDEX: 31.02 KG/M2 | HEART RATE: 81 BPM | HEIGHT: 66 IN | OXYGEN SATURATION: 96 %

## 2022-07-29 PROBLEM — F44.7 FUNCTIONAL NEUROLOGICAL SYMPTOM DISORDER WITH MIXED SYMPTOMS: Status: ACTIVE | Noted: 2022-03-20

## 2022-07-29 PROCEDURE — 25000003 PHARM REV CODE 250: Performed by: PHYSICIAN ASSISTANT

## 2022-07-29 PROCEDURE — 99239 HOSP IP/OBS DSCHRG MGMT >30: CPT | Mod: ,,, | Performed by: PSYCHIATRY & NEUROLOGY

## 2022-07-29 PROCEDURE — 99239 PR HOSPITAL DISCHARGE DAY,>30 MIN: ICD-10-PCS | Mod: ,,, | Performed by: PSYCHIATRY & NEUROLOGY

## 2022-07-29 RX ADMIN — VENLAFAXINE HYDROCHLORIDE 75 MG: 37.5 CAPSULE, EXTENDED RELEASE ORAL at 08:07

## 2022-07-29 RX ADMIN — DOCUSATE SODIUM 100 MG: 100 CAPSULE ORAL at 08:07

## 2022-07-29 RX ADMIN — VENLAFAXINE HYDROCHLORIDE 150 MG: 150 CAPSULE, EXTENDED RELEASE ORAL at 08:07

## 2022-07-29 NOTE — ASSESSMENT & PLAN NOTE
31 yo female with daily events described as abnormal movements, decreased responsiveness, speech difficulty, and at times inability to move admitted to EMU for event capture and characterization. Events persist at times multiple per day on current AED regimen of Lamotrigine 200 mg BID, Oxcarbazepine 300 mg BID.       - After HV/photic 7/29, patient had event of eyes closed, grunting, torso rocking back and forth. Vitals remained stable during event. Discussed with Father at bedside, Mother by phone, who report this is consistent with patients typical event  - EEG reviewed by Dr. Stevens and no EEG correlate to event, consistent with psychogenic nonepileptic event  - Discussed PNEE in detail with patient, Mother, and Father at bedside, all questions answered and they are in agreement with plan of care   - Neuropsychology to schedule outpatient consult with patient for further discussion and recommendations on management  - Will discontinue Lamotrigine and Oxcarbazepine as no evidence of underlying epilepsy  - Stable for discharge home today with close Neuropsychology and outpatient therapy follow up  - Patient to continue outpatient follow up with Dr. Dorsey for migraine management

## 2022-07-29 NOTE — NURSING
AVS reviewed w/ patient. Patient verbalized understanding of education. All comments and concerns addressed. PIV & telemetry removed. VSS at discharge. All belongings sent with patient via private vehicle.

## 2022-07-29 NOTE — PLAN OF CARE
[facility])  Discharge Final Note    Primary Care Provider: Madhavi Basilio MD    Expected Discharge Date: 7/29/2022    Patient to be discharged to home.  The patient does not have any home needs.  Family to provide transportation home.    Future Appointments   Date Time Provider Department Center   8/4/2022  3:30 PM Stella Worley MD [facility]     Final Discharge Note (most recent)     Final Note - 07/29/22 1353        Final Note    Assessment Type Final Discharge Note     Anticipated Discharge Disposition Home or Self Care        Post-Acute Status    Post-Acute Authorization Other     Other Status No Post-Acute Service Needs     Discharge Delays None known at this time                 Important Message from Medicare             Contact Info     Geoff Dorsey MD   Specialty: Neurology    [facility] [address]   Phone: [phone]       Next Steps: Follow up    Instructions: Please continue outpatient follow up with Dr. Dorsey for migraine management    Jaspal Stevens MD   Specialty: Neurology    [address] [place] [id number]   Phone: [phone]       Next Steps: Follow up    Instructions: Please follow up with Dr. Stevens/Jaci Bynum as needed

## 2022-07-29 NOTE — HOSPITAL COURSE
Ms. Saavedra is a 33 yo female with daily events described as abnormal movements, decreased responsiveness, speech difficulty, and at times inability to move admitted to EMU for event capture and characterization. Events persist daily (at times multiple per day) on current AED regimen of Lamotrigine 200 mg BID, Oxcarbazepine 300 mg BID.  7/28>7/29: No events overnight, EEG normal. Completed sleep deprivation overnight. After HV/photic 7/29, patient had event of eyes closed, grunting, torso rocking back and forth. Vitals remained stable during event. Discussed with Father at bedside, Mother by phone, who report this is consistent with patients typical event. EEG reviewed by Dr. Stevens and no EEG correlate to event, consistent with psychogenic nonepileptic event. Discussed PNEE in detail with patient, Mother, and Father at bedside, all questions answered and they are in agreement with plan of care. Neuropsychology to schedule outpatient consult with patient for further discussion and recommendations on management. Will discontinue Lamotrigine and Oxcarbazepine as no evidence of underlying epilepsy. Stable for discharge home today with close Neuropsychology and outpatient therapy follow up. Patient to continue outpatient follow up with Dr. Dorsey for migraine management.

## 2022-07-29 NOTE — SUBJECTIVE & OBJECTIVE
Interval History: No typical events captured since admission, completed sleep deprivation overnight. Plan for HV/photic today, continue holding home Lamotrigine, Oxcarbazepine.     Current Facility-Administered Medications   Medication Dose Route Frequency Provider Last Rate Last Admin    acetaminophen tablet 650 mg  650 mg Oral Q4H PRN Delfina Fine, PA-C        docusate sodium capsule 100 mg  100 mg Oral BID Delfina Fine, PA-C   100 mg at 07/29/22 0832    hydrOXYzine HCL tablet 25 mg  25 mg Oral TID PRN Delfina Fine, PA-C        lorazepam injection 2 mg  2 mg Intravenous Q15 Min PRN Delfina Fine, PA-C        sodium chloride 0.9% flush 10 mL  10 mL Intravenous PRN Delfina Fine, PA-C        venlafaxine 24 hr capsule 150 mg  150 mg Oral Daily Delfina Fine, PA-C   150 mg at 07/29/22 0832    venlafaxine 24 hr capsule 75 mg  75 mg Oral Daily Delfina Fine, PA-C   75 mg at 07/29/22 0832     Continuous Infusions:    Review of Systems   Constitutional:  Positive for fatigue. Negative for chills and fever.   HENT:  Negative for trouble swallowing and voice change.    Eyes:  Negative for photophobia and visual disturbance.   Respiratory:  Negative for cough and shortness of breath.    Cardiovascular:  Negative for chest pain and leg swelling.   Gastrointestinal:  Negative for abdominal pain, nausea and vomiting.   Musculoskeletal:  Negative for neck pain and neck stiffness.   Skin:  Negative for pallor and rash.   Neurological:  Negative for facial asymmetry, speech difficulty and headaches.   Psychiatric/Behavioral:  Negative for agitation and confusion.    Objective:     Vital Signs (Most Recent):  Temp: 97.2 °F (36.2 °C) (07/29/22 0752)  Pulse: 99 (07/29/22 0752)  Resp: 18 (07/29/22 0752)  BP: 118/74 (07/29/22 0752)  SpO2: 96 % (07/29/22 0752) Vital Signs (24h Range):  Temp:  [97.2 °F (36.2 °C)-98.5 °F (36.9 °C)] 97.2 °F (36.2 °C)  Pulse:  [72-99] 99  Resp:  [13-19] 18  SpO2:  [96 %-99 %] 96 %  BP: (108-135)/(71-97) 118/74      Weight: 87.5 kg (193 lb)  Body mass index is 31.15 kg/m².    Physical Exam  Vitals reviewed.   Constitutional:       Appearance: She is not ill-appearing or diaphoretic.   HENT:      Head: Normocephalic and atraumatic.   Eyes:      General: No scleral icterus.     Extraocular Movements: Extraocular movements intact.      Pupils: Pupils are equal, round, and reactive to light.   Pulmonary:      Effort: Pulmonary effort is normal. No respiratory distress.   Abdominal:      General: There is no distension.      Palpations: Abdomen is soft.   Musculoskeletal:         General: No deformity or signs of injury. Normal range of motion.      Cervical back: Normal range of motion and neck supple. No rigidity.   Skin:     General: Skin is warm and dry.   Neurological:      Mental Status: She is alert and oriented to person, place, and time.      Motor: Motor strength is normal.      Coordination: Finger-Nose-Finger Test normal.   Psychiatric:         Mood and Affect: Mood normal.         Speech: Speech normal.         Behavior: Behavior normal.       NEUROLOGICAL EXAMINATION:     MENTAL STATUS   Oriented to person, place, and time.   Attention: normal. Concentration: normal.   Speech: speech is normal   Level of consciousness: alert    CRANIAL NERVES     CN III, IV, VI   Pupils are equal, round, and reactive to light.    CN VII   Facial expression full, symmetric.     CN VIII   Hearing: intact    CN IX, X   CN IX normal.     CN XI   CN XI normal.     MOTOR EXAM   Muscle bulk: normal  Overall muscle tone: normal    Strength   Strength 5/5 throughout.     SENSORY EXAM   Light touch normal.   Proprioception normal.     GAIT AND COORDINATION      Coordination   Finger to nose coordination: normal    Significant Labs: All pertinent lab results from the past 24 hours have been reviewed.    Significant Studies: I have reviewed all pertinent imaging results/findings within the past 24 hours.

## 2022-07-29 NOTE — DISCHARGE SUMMARY
[facility]  Neurology-Epilepsy  Discharge Summary      Patient Name: Rosalina Saavedra  MRN: [id number]  Admission Date: 7/28/2022  Hospital Length of Stay: 1 days  Discharge Date and Time:  07/29/2022 1:42 PM  Attending Physician: Jaspal Stevens MD   Discharging Provider: Delfina Martin PA-C  Primary Care Physician: Madhavi Basilio MD    HPI:   Ms. Saavedra is a 33 yo female with significant medical history of migraines, generalized anxiety disorder admitted to EMU for capture and characterization of daily events described as shaking, screaming, inability to move extremities with retained awareness. Patient reports these events began in October 2021, and typically occur when she is waking up. She states they will last from 5-10 minutes, and are occurring every day, at times multiple per day. After the events, patient reports she is very fatigued, and often has a headache and nausea. Denies associated tongue biting, urinary/bowel incontinence.  Patient was following with Dr. Crain and reports she was trialed on several seizure medications, currently maintained on Lamotrigine 200 mg BID and Oxcarbazepine 300 mg BID with no major improvement noted in event frequency. No noted family history of seizures or seizure risk factors. Patient referred to EMU for event capture and characterization.    Clinic Visit with Jaic Bynum PA-C and Dr. Stevens 7/12/2022  Interval Events/ROS 7/12/2022:     Patient presents in follow up for evaluation of daily episodes consisting of abnormal movements, decreased responsiveness, and speech difficulty. Accompanied by mom who also contributes to the history. Last event was 7/10/2022. Episodes persist despite medication regimen of lamotrigine 200mg BID and oxcarbazepine 300mg BID though patient does note frequent missed doses. Episodes may last anywhere from 2-10 minutes. Reports these events began happening 10/2021, then seemed to resolve for some time, but returned 02/2022 and have been  "occurring nearly daily since then. Patient describes they usually occur upon waking up in the morning, or when transitioning from sleep to wakefulness. Her body feels "stuck", can't get up, can't speak sentences but is able to make noises, vision is "scrambled" then becomes exhausted afterward. Denies tongue bite or UI. Mom shows a video of one event during which the patient is lying in bed, makes occasional grunting noises in response to mom saying patient's name, eyes are closed, and video ends by patient hitting the phone out of mom's hand. Patient states she was able to hear her mom during this episode. Events are often triggered by stress or feeling overheated. Hx of anxiety and depression. 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 Otherwise, no fever, no cold symptoms, no headache, no changes in vision, no new weakness, no chest pain, no shortness of breath, no nausea, no vomiting, no diarrhea, no constipation, no tingling/numbness, no problems walking. Patient also accompanied today by service dog.      HPI:   Ms. Rosalina Saavedra is a 32 y.o. female who presents with a chief complaint of reported seizures.  The patient presents today with her mother who contributes to the history.  Together they report that the patient experienced an abrupt onset of seizure-like episodes beginning in January 2022.  The patient endorses 3 event types including episodes of her vision going completely black while remaining aware of her surroundings, episodes of inability to control her body and unresponsiveness while remaining aware, and extremity jerking.  She has been followed by an outside neurologist, Dr. Crain, who has tried her on numerous seizure medications that have not control the episodes.  She has undergone routine and ambulatory EEGs which were normal per patient and did not capture typical events.  Her mother does believe that stress may be contributing to the episodes.       They provided video today of " "the episodes.  Episodes are characterized by the patient exhibiting robotic type movements and exhibiting her feet stuck to the floor before falling to the ground.  She lies on the ground shaking her arms and legs while crying before standing in a robotic fashion.  During the entire event, the patient makes a variety of cartoon-like noises such as wee oo", "hansel hansel hansel" and "beep boop."     Seizure Type: Suspected PNEE  Seizure Etiology:  Suspected PNEE  Current AEDs: Lamictal 150 mg BID     The patient is accompanied by family who contribute to the history. This patient has 3 types of seizure as described below. The patient reports having seizures for months The patient reports to have worse seizure control. The seizure frequency is variable. The last seizure was yesterday . The patient does not report side effects from seizure medication.     Seizure Type 1:   Seizure Description: everything goes "black in her vision", remains aware  Aura: No warning  Associated Symptoms:  none  Seizure Frequency: Up to 2x a day every 3-4 days  Last seizure: January 2022     Seizure Type 2:   Seizure Description: Screams, shakes all over, can't open mouth, eyes lock closed and can't open them, lasts 5-15 minutes, confused and tired  After, remains aware after  Aura: Burning in back of neck  Associated Symptoms:  none  Seizure Frequency: Daily, 6-7 times per day  Last seizure: Early Feb 2022     Seizure Type 3:   Seizure Description: Intermittent limb and head jerks  Aura: None  Associated Symptoms: none  Seizure Frequency: Every day  Last seizure: Today     Handedness: right  Seizure Onset Age: 32  Seizure/ Epilepsy Risk Factors: none  Birth/Developmental History: normal birth history and Normal developmental history  Seizure Triggers/ Provoking Features: stress  Previous Seizure Medications: brivaracetam (Briviact, BRV) , lacosamide (Vimpat, LCS), lamotrigine (Lamictal, LTG), oxcarbazepine (Trileptal OXC),, topiramate " (Topamax, TPM) and clonazepam (Klonopin, CZP)  Recent Med Changes: None  Other Treatments: None  Prior Episodes of Status: None  Psychiatric/Behavioral Comorbitidies: Anxiety and depression, social anxieyt  Surgical Candidacy: None     Prior Studies:  EEG : At [facility]SH, routine and ambulatory- both normal per patient  vEEG/ EMU evaluation: Not done  MRI of brain: January 2022, Done at OSH- normal per patient   AED levels:   Not done  CT/CTA Scan:   Not done  PET Scan:  Not done  Neuropsychological Evaluation:  Not done  DEXA Scan:  Not done  Other studies:  Not done      * No surgery found *     Indwelling Lines/Drains at time of discharge:   Lines/Drains/Airways     None               Hospital Course:   Ms. Saavedra is a 31 yo female with daily events described as abnormal movements, decreased responsiveness, speech difficulty, and at times inability to move admitted to EMU for event capture and characterization. Events persist daily (at times multiple per day) on current AED regimen of Lamotrigine 200 mg BID, Oxcarbazepine 300 mg BID.  7/28>7/29: No events overnight, EEG normal. Completed sleep deprivation overnight. After HV/photic 7/29, patient had event of eyes closed, grunting, torso rocking back and forth. Vitals remained stable during event. Discussed with Father at bedside, Mother by phone, who report this is consistent with patients typical event. EEG reviewed by Dr. Stevens and no EEG correlate to event, consistent with psychogenic nonepileptic event. Discussed PNEE in detail with patient, Mother, and Father at bedside, all questions answered and they are in agreement with plan of care. Neuropsychology to schedule outpatient consult with patient for further discussion and recommendations on management. Will discontinue Lamotrigine and Oxcarbazepine as no evidence of underlying epilepsy. Stable for discharge home today with close Neuropsychology and outpatient therapy follow up. Patient to continue outpatient  follow up with Dr. Dorsey for migraine management.      Goals of Care Treatment Preferences:  Code Status: Full Code      Consults:   Consults (From admission, onward)        Status Ordering Provider     Inpatient consult to Midline team  Once        Provider:  (Not yet assigned)    Completed KENZIE STEVENS          Significant Labs: All pertinent lab results from the past 24 hours have been reviewed.    Significant Studies: I have reviewed all pertinent imaging results/findings within the past 24 hours.    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 Events persist at times multiple per day on current AED regimen of Lamotrigine 200 mg BID, Oxcarbazepine 300 mg BID.       - After HV/photic 7/29, patient had event of eyes closed, grunting, torso rocking back and forth. Vitals remained stable during event. Discussed with Father at bedside, Mother by phone, who report this is consistent with patients typical event  - EEG reviewed by Dr. Stevens and no EEG correlate to event, consistent with psychogenic nonepileptic  event  - Discussed PNEE in detail with patient, Mother, and Father at bedside, all questions answered and they are in agreement with plan of care   - Neuropsychology to schedule outpatient consult with patient for further discussion and recommendations on management  - Will discontinue Lamotrigine and Oxcarbazepine as no evidence of underlying epilepsy  - Stable for discharge home today with close Neuropsychology and outpatient therapy follow up  - Patient to continue outpatient follow up with Dr. Dorsey for migraine management    JESUS (generalized anxiety disorder)  - Continue home Effexor 225 mg daily    Chronic migraine with aura  - Followed by Dr. Dorsey as outpatient, on Nurtec, Imitrex as needed  - Supportive care        Discharged Condition: stable    Disposition: Home or Self Care    Follow Up:   Follow-up Information     Geoff Dorsey MD Follow up.    Specialty: Neurology  Why: Please continue outpatient follow up with Dr. Dorsey for migraine management  Contact information:  [address] [facility] [address] [facility] [address]  [facility] [id number] [phone]             Jaspal Stevens MD Follow up.    Specialty: Neurology  Why: Please follow up with Dr. Stevens/Jaci Bynum as needed  Contact information:  [address]  [facility] [id number] [phone]                       Patient Instructions:      Diet Adult Regular     Notify your health care provider if you experience any of the following:  temperature >100.4     Notify your health care provider if you experience any of the following:  persistent nausea and vomiting or diarrhea     Notify your health care provider if you experience any of the following:  difficulty breathing or increased cough     Notify your health care provider if you experience any of the following:  severe persistent headache     Notify your health care provider if you experience any of the following:  persistent dizziness, light-headedness, or visual disturbances     Notify your health care provider if you  experience any of the following:  increased confusion or weakness     Activity as tolerated   Order Comments: No driving, bathing alone, swimming, or any event during which sudden loss of awareness could harm patient or others until 6 months event free, or cleared by Neurology       Medications:  Reconciled Home Medications:      Medication List      CONTINUE taking these medications    eszopiclone 2 MG Tab  Commonly known as: LUNESTA  nightly as needed.     ibuprofen 800 MG tablet  Commonly known as: ADVIL,MOTRIN  Take 1 tablet (800 mg total) by mouth 3 (three) times daily as needed for Pain.     NURTEC 75 mg odt  Generic drug: rimegepant  Take 75 mg by mouth once as needed for Migraine. Place ODT tablet on the tongue; alternatively the ODT tablet may be placed under the tongue     promethazine 25 MG tablet  Commonly known as: PHENERGAN  Take 1 tablet (25 mg total) by mouth every 6 (six) hours as needed for Nausea.     sumatriptan 6 mg/0.5 mL kit  Commonly known as: IMITREX STATDOSE  Inject 6 mg into the skin every 2 (two) hours as needed for Migraine.     * venlafaxine 75 MG 24 hr capsule  Commonly known as: EFFEXOR-XR  Take 1 capsule (75 mg total) by mouth once daily. Take with 150mg capsule to equal 225mg daily.     * venlafaxine 150 MG Cp24  Commonly known as: EFFEXOR-XR  Take 1 capsule (150 mg total) by mouth once daily. Take with 75mg capsule to equal 225mg daily.         * This list has 2 medication(s) that are the same as other medications prescribed for you. Read the directions carefully, and ask your doctor or other care provider to review them with you.            STOP taking these medications    lamoTRIgine 200 MG tablet  Commonly known as: LAMICTAL     OXcarbazepine 150 MG Tab  Commonly known as: TRILEPTAL          Time spent on the discharge of patient: 60 minutes    Delfina Martin PA-C  Neurology-Epilepsy  [facility]  Staff: Dr. Stevens

## 2022-07-29 NOTE — PROCEDURES
EMU Monitoring    Date/Time: 7/28/2022 1:06 PM  Performed by: Jaspal Stevens MD  Authorized by: Delfina Martin PA-C       VIDEO ELECTROENCEPHALOGRAM  REPORT    DATE OF SERVICE: 7/28/22-7/29/22  EEG NUMBER: EMU -1.2  REQUESTED BY:  Mario  LOCATION OF SERVICE: [facility]    METHODOLOGY   Electroencephalographic (EEG) recording is with electrodes placed according to the International 10-20 placement system.  Thirty two (32) channels of digital signal (sampling rate of 512/sec) including T1 and T2 was simultaneously recorded from the scalp and may include  EKG, EMG, and/or eye monitors.  Recording band pass was 0.1 to 512 hz.  Digital video recording of the patient is simultaneously recorded with the EEG.  The patient is instructed report clinical symptoms which may occur during the recording session.  EEG and video recording is stored and archived in digital format.  Activation procedures which include photic stimulation, hyperventilation and instructing patients to perform simple task are done in selected patients.   The EEG is displayed on a monitor screen and can be reviewed using different montages.  Computer assisted analysis is employed to detect spike and electrographic seizure activity.   The entire record is submitted for computer analysis.  The entire recording is visually reviewed and the times identified by computer analysis as being spikes or seizures are reviewed again.  Compresses spectral analysis (CSA) is also performed on the activity recorded from each individual channel.  This is displayed as a power display of frequencies from 0 to 30 Hz over time.   The CSA is reviewed looking for asymmetries in power between homologous areas of the scalp and then compared with the original EEG recording.     Platypus TV software was also utilized in the review of this study.  This software suite analyzes the EEG recording in multiple domains.  Coherence and rhythmicity is computed to identify EEG sections which may  contain organized seizures.  Each channel undergoes analysis to detect presence of spike and sharp waves which have special and morphological characteristic of epileptic activity.  The routine EEG recording is converted from spacial into frequency domain.  This is then displayed comparing homologous areas to identify areas of significant asymmetry.  Algorithm to identify non-cortically generated artifact is used to separate eye movement, EMG and other artifact from the EEG.      Start on 7/28/22 at 15:47:57  Stop on 7/28/22 at 07:00:04  Start on 7/28/22 at 07:00:57  Stop on 7/28/22 at 14:06:29  A total of 22 hours of EEG was recorded.    ELECTROENCEPHALOGRAM:  The record shows a good  organization at rest, consisting of a 10-11 Hz posterior dominant rhythm with good  reactivity. There is mild bilateral beta activity.    Drowsiness is characterized by attenuation of the background, vertex waves, and bilateral theta slowing. Stage II sleep is characterized by slowing, vertex waves, and symmetric sleep spindles. Slow wave and REM sleep are recorded.    Provocative maneuvers including hyperventilation and photic stimulation were performed.     EKG recording shows a sinus rhythm.    There is one push button or clinical event at 10:45. The patient exhibits unresponsiveness, hyperventilation,  staring, whole body jerking, pelvic thrusting, stiffening, and vocalizations that wax and wane in variable patterns following photic stimulation and hyperventilation exercises. She is intermittently able to follow commands. The event lasts over 15 minutes. No epileptiform discharges or electrographic seizures are seen.     IMPRESSION:  Normal study. A typical patient event is captured as above and is consistent with a psychogenic nonepileptic event.     Jaspal Stevens MD

## 2022-07-29 NOTE — PLAN OF CARE
Problem: Adult Inpatient Plan of Care  Goal: Plan of Care Review  Outcome: Ongoing, Progressing  Flowsheets (Taken 7/29/2022 0333)  Plan of Care Reviewed With:   patient   father  Goal: Optimal Comfort and Wellbeing  Outcome: Ongoing, Progressing     Problem: Fall Injury Risk  Goal: Absence of Fall and Fall-Related Injury  Outcome: Ongoing, Progressing     Problem: Seizure, Active Management  Goal: Absence of Seizure/Seizure-Related Injury  Outcome: Ongoing, Progressing           POC reviewed and updated with the patient/caregiver. Questions regarding POC were encouraged and addressed with the patient/caregiver.  VSS, see flow-sheets. Tele and VISI maintained. Patient is AO X 4 at this time. Fall/safety precautions maintained, no signs of injury noted during shift. Patient has sleep deprivation orders until 0800.   Seizure precautions maintained, no events noted during shift. Patient was repositioned for comfort, bed locked in low position with side rails X 4, bed alarm refused, with call light within reach. Instructed patient to call staff for mobility, verbalized understanding.  No acute signs of distress noted at this time.

## 2022-07-29 NOTE — NURSING
EMU SEIZURE EVENT NOTE    Time event started: 10.45am    Duration: ~3-4mins    Describe symptoms during event and post-ictal symptoms: During event patient was shaking upper body with audible groaning sounds and rapid breathing, eyes were closed. BP dropped to low 90s/60s with decrease in SpO2 to 89%. Patient was able to follow a few commands during the event. Post-ictal BP and SpO2 normalized, patient fatigued and remains with eyes closed. Able to intermittently follow a few commands but with muscle weakness.     Who notified: EMU team. Fine, PA-C already in room with patient when event began.     Intervention: Patient HOB raised to ~45 degrees, O2 placed via face mask. Vital signs taken q5min during event and post VS taken.  Standardized neurological assessment completed.    Patient response: Neurological assessment back at baseline, VSS at this time. See flowsheets for more event information.

## 2022-07-29 NOTE — PLAN OF CARE
[facility] (Hospital)  Initial Discharge Assessment       Primary Care Provider: Madhavi Basilio MD    Admission Diagnosis: Abnormal involuntary movement [R25.9]    Admission Date: 7/28/2022  Expected Discharge Date:     Discharge Barriers Identified: None    Payor: BLUE CROSS BLUE SHIELD / Plan: [place] PPO / Product Type: PPO /     Extended Emergency Contact Information  Primary Emergency Contact: Eliseo Saavedra  Address: [address] [place] [address] [place]  Home Phone: [phone]  Relation: Mother    Discharge Plan A: Home with family  Discharge Plan B: Home with family      [id number] - [place], [place] - [address]  [address]  [place] [address]  Phone: [phone] Fax: [phone]    [facility] - [place], [place] - [address]  [address] [facility] [address]  [facility] [address]  Phone: [phone] Fax: [phone]    [facility] - [place], [place] - [address]  [address]  [place] [address]  Phone: [phone] Fax: [phone]    [id number] - [place], [place] - [address]  [address]  [place] [address]  Phone: [phone] Fax: [phone]    [facility] [place] [facility]  [address]  [place] [address]  Phone: [phone] Fax: [phone]      Initial Assessment (most recent)     Adult Discharge Assessment - 07/29/22 1256        Discharge Assessment    Assessment Type Discharge Planning Assessment     Confirmed/corrected address, phone number and insurance Yes     Confirmed Demographics Correct on Facesheet     Source of Information patient     Communicated JOEY with patient/caregiver Date not available/Unable to determine     Reason For Admission emu     Lives With parent(s)     Do you expect to return to your current living situation? Yes     Do you have help at home or someone to help you manage your care at home? Yes     Who are your  caregiver(s) and their phone number(s)? Eliseo Saavedra - mother [phone]     Prior to hospitilization cognitive status: Alert/Oriented     Current cognitive status: Alert/Oriented     Walking or Climbing Stairs Difficulty none     Dressing/Bathing Difficulty none     Equipment Currently Used at Home none     Readmission within 30 days? No     Patient currently being followed by outpatient case management? No     Do you currently have service(s) that help you manage your care at home? No     Do you take prescription medications? Yes     Do you have prescription coverage? Yes     Coverage [facility]     Do you have any problems affording any of your prescribed medications? No     Is the patient taking medications as prescribed? yes     Who is going to help you get home at discharge? family     How do you get to doctors appointments? family or friend will provide     Are you on dialysis? No     Do you take coumadin? No     Discharge Plan A Home with family     Discharge Plan B Home with family     DME Needed Upon Discharge  none     Discharge Plan discussed with: Patient     Discharge Barriers Identified None        Relationship/Environment    Name(s) of Who Lives With Patient parents

## 2022-07-30 LAB — OXCARBAZEPINE METABOLITE: 3 MCG/ML (ref 10–35)

## 2022-08-01 ENCOUNTER — OFFICE VISIT (OUTPATIENT)
Dept: NEUROLOGY | Facility: CLINIC | Age: 32
End: 2022-08-01
Payer: COMMERCIAL

## 2022-08-01 DIAGNOSIS — F44.7 FUNCTIONAL NEUROLOGICAL SYMPTOM DISORDER WITH MIXED SYMPTOMS: Primary | ICD-10-CM

## 2022-08-01 DIAGNOSIS — F41.1 GAD (GENERALIZED ANXIETY DISORDER): ICD-10-CM

## 2022-08-01 LAB — LAMOTRIGINE SERPL-MCNC: 5.8 UG/ML (ref 2–15)

## 2022-08-01 PROCEDURE — 90791 PR PSYCHIATRIC DIAGNOSTIC EVALUATION: ICD-10-PCS | Mod: FQ,95,, | Performed by: PSYCHIATRY & NEUROLOGY

## 2022-08-01 PROCEDURE — 99499 UNLISTED E&M SERVICE: CPT | Mod: 95,,, | Performed by: PSYCHIATRY & NEUROLOGY

## 2022-08-01 PROCEDURE — 90791 PSYCH DIAGNOSTIC EVALUATION: CPT | Mod: FQ,95,, | Performed by: PSYCHIATRY & NEUROLOGY

## 2022-08-01 PROCEDURE — 99499 NO LOS: ICD-10-PCS | Mod: 95,,, | Performed by: PSYCHIATRY & NEUROLOGY

## 2022-08-01 NOTE — PROGRESS NOTES
NEUROPSYCHOLOGY CONSULT (TELEHEALTH)    Referral Information  Name: Rosalina Saavedra  MRN: [id number]  : 1990  Age: 32 y.o.  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  Visit type:  Virtual visit with audio only (telephone)     Total time spent with patient: 55 minutes  The reason for the audio only service rather than synchronous audio and video virtual visit was related to technical difficulties or patient preference/necessity.  Each patient to whom I provide medical services by telemedicine is:  (1) informed of the relationship between the physician and patient and the respective role of any other health care provider with respect to management of the patient; and (2) notified that they may decline to receive medical services by telemedicine and may withdraw from such care at any time. Patient verbally consented to receive this service via voice-only telephone call.   This service was not originating from a related E/M service provided within the previous 7 days nor will  to an E/M service or procedure within the next 24 hours or my soonest available appointment.  Prevailing standard of care was able to be met in this audio-only visit.    Consent/Emergency Plan: The patient expressed an understanding of the purpose of the evaluation and consented to all procedures. I informed the patient of limits to confidentiality and discussed an emergency plan.    NEUROPSYCHOLOGICAL EVALUATION - CONFIDENTIAL     SOURCES OF INFORMATION:  The following was  gathered from a clinical interview with Rosalina Saavedra and review of the available medical records.     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 Additionally, diagnosis of Generalized Anxiety Disorder by history remains appropriate as anxiety symptoms continue to be prominent at present.      Plan  · I reviewed the diagnosis, onset/course, optimal treatment recommendations, and provided ways to seek treatment as an outpatient. I also discussed all asked questions to help with diagnosis understanding/treatment follow-through.     · A referral to Behavioral Health was placed with the Pt's permission.    · Recommended weekly one-on-one psychotherapy to address presenting concerns. This patient will not have difficulty implementing the treatment plan on their own.    · Updated consultation with psychiatry or a medical psychologist for medication review is recommended, as mood symptoms might be mitigated by optimized pharmacotherapy regimen in addition to counseling.    · Suicidality was assessed and Pt explicitly denied any current suicidal ideation, plan, intent, or attempts. Safety protocol regarding steps to take in case she experiences any future SI was discussed in detail. Pt demonstrated an understanding of these steps and verbalized her intent to follow them in this scenario. All regular providers are encouraged to assess for suicide risk regularly, given her history of suicidality.    · Discussed below sleep hygiene strategies and resources to help improve sleep quality, given ongoing insomnia and sleep deprivation. A consult with Sleep Medicine should be considered if sleep quality/quantity does not improve with implementation of sleep hygiene strategies and  better management of mood symptoms.    · Ms. Saavedra is encouraged to follow these guidelines in an effort to help consolidate sleep:   · Establish a quiet, relaxing bedtime routine to help you wind down before going to bed  · Go to bed at the same time every night  · Wake up at the same time every morning, even on the weekends and holidays  · Put clocks in the bedroom out of sight, as clock-watching causes stress and makes it harder to get back to sleep upon waking up during the night  · Avoid caffeine, nicotine, and alcohol before bedtime, as these substances can interfere with sleep  · Avoid exercising too close to bedtime, as this raises body temperature which may interfere with sleep  · Stretching and meditation before sleep may help with sleep  · Avoid eating heavy meals prior to sleep to allow time for digestion, but also avoid going to bed hungry as this can also interrupt sleep  · Avoid napping for more than 30 minutes, as this may throw off the sleep cycle  · Get out of bed if cannot fall asleep within 15-20 minutes of waking up and reading in another room or engaging in some other activity during this time until feeling sleepy  · Watching TV and using computers during this time is discouraged as it may unintentionally lead to staying up past the point of feeling tired. Likewise, using blue-light emitting devices (e.g., TV, computer) before bedtime can negatively affect sleep due to disruption of circadian rhythms, which causes reduced nighttime sleepiness, increased time to fall asleep, and reduced next-morning alertness.    · Optimize Brain Health - Prioritize physical and social activity. Maintain a heart healthy diet.      · Continued medical assessment and follow-up as recommended by Neurology. It is important to note that a diagnosis or impression of Conversion Disorder or Somatization Disorder does not rule other medical symptoms in need of assessment/management.       Thank you for allowing us to  participate in Ms. Saavedra's care. If you have any questions, please contact us.     Eamon Palomo, Ph.D.  Clinical Neuropsychology Fellow  [facility] - Department of Neurology and Neurosciences    Nelson Rush Psy.D., ABPP  Board Certified in Clinical Neuropsychology  Department of Neurology    HPI  Per EMU Neurology-Epilepsy note (7/28/22):   Ms. Saavedra is a 31 yo female with significant medical history of migraines, generalized anxiety disorder admitted to EMU for capture and characterization of daily events described as shaking, screaming, inability to move extremities with retained awareness. Patient reports these events began in October 2021, and typically occur when she is waking up. She states they will last from 5-10 minutes, and are occurring every day, at times multiple per day. After the events, patient reports she is very fatigued, and often has a headache and nausea. Denies associated tongue biting, urinary/bowel incontinence.  Patient was following with Dr. Crain and reports she was trialed on several seizure medications, currently maintained on Lamotrigine 200 mg BID and Oxcarbazepine 300 mg BID with no major improvement noted in event frequency. No noted family history of seizures or seizure risk factors. Patient referred to EMU for event capture and characterization.     Hospital/EMU Course  Per EMU Neurology-Epilepsy note (7/29/22):   Ms. Saavedra is a 31 yo female with daily events described as abnormal movements, decreased responsiveness, speech difficulty, and at times inability to move admitted to EMU for event capture and characterization. Events persist daily (at times multiple per day) on current AED regimen of Lamotrigine 200 mg BID, Oxcarbazepine 300 mg BID.  7/28>7/29: No events overnight, EEG normal. Completed sleep deprivation overnight. After HV/photic 7/29, patient had event of eyes closed, grunting, torso rocking back and forth. Vitals remained stable during event.  "Discussed with Father at bedside, Mother by phone, who report this is consistent with patients typical event. EEG reviewed by Dr. Stevens and no EEG correlate to event, consistent with psychogenic nonepileptic event. Discussed PNEE in detail with patient, Mother, and Father at bedside, all questions answered and they are in agreement with plan of care. Neuropsychology to schedule outpatient consult with patient for further discussion and recommendations on management. Will discontinue Lamotrigine and Oxcarbazepine as no evidence of underlying epilepsy. Stable for discharge home today with close Neuropsychology and outpatient therapy follow up. Patient to continue outpatient follow up with Dr. Dorsey for migraine management.     As a result, Neuropsychology Consult was requested to assess psychiatric status.     Current Symptoms/Subjective      Current Psychiatric Symptoms  Review below for psychiatric history.     Current mood is described as "ok."     There are no significant substance abuse, current SI/HI, psychotic, or other severe symptoms of psychopathology. She explicitly denied any current SI/HI. She reported suicide attempt with subsequent week-long inpatient stay in January 2022, but denied any ideation, plan, intent, or attempts in the interim. Safety protocol regarding steps to take in case she experiences any future SI was discussed with her in detail. She demonstrated an understanding of these steps and verbalized her intent to follow them if experiencing SI in the future.     Sleep is notably poor, 4 hrs/night approximated, marked by initial insomnia. She has never had a sleep study. Appetite is increased, with associated weight gain noted over the last year approximately. Energy level is decreased.     Current Cognitive Symptoms  None reported    Physical Symptoms: Please review inpatient notes (7/29/22) for current assessment of motor, sensory, and other pain symptoms.     Current Functioning " "(I/ADLs):  · ADLs: Independent   · IADLs: Independent, with the exception of not driving at present due to seizure-event precautions.     Pertinent Background     Family History   Problem Relation Age of Onset    Diabetes Mother     Breast cancer Mother 61    Diabetes Maternal Grandmother     Hypertension Maternal Grandmother     Breast cancer Other 71        moms paternal grand mother     Family Neurologic History: Aneurysm (maternal great aunt)   Family Psychiatric History: Depression and anxiety (maternal side)     Developmental/Academic Hx:   Developmental: No gestational or later developmental concerns.  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   o Took time off work due to headaches and seizure events, but reportedly exhausted her sick leave and subsequently lost her job  o Reported presenting physical symptoms (headaches, seizure events) and uncertainty about origin/nature of these symptoms to be a source of stress  o Lack of adaptive coping skills (e.g., described avoidant-coping style and was unable to provide any specific coping strategies other than "go to sleep and just bottle it up")   Occupational Status: Not working currently    Primary Occupation(s):  (middle school; previously taught high school) " "    MEDICAL HISTORY  Patient Active Problem List   Diagnosis    Chronic migraine with aura    Menstrual cramps    JESUS (generalized anxiety disorder)    Social anxiety disorder    Impaired mobility and activities of daily living    Fatigue    ERRONEOUS ENCOUNTER--DISREGARD    Medication overuse headache    Decreased range of motion of neck    Poor posture    Weakness of both upper extremities    Migraine without aura and with status migrainosus, not intractable    Mild episode of recurrent major depressive disorder    Psychogenic nonepileptic seizure    Functional neurological symptom disorder with mixed symptoms    Insomnia    Chronic migraine w/o aura, not intractable, w stat migr     Past Medical History:   Diagnosis Date    Anxiety     Depression     Migraine     Migraine headache      Past Surgical History:   Procedure Laterality Date    KNEE ARTHROSCOPY      TIBIA FRACTURE SURGERY       Neurologic History  · TBI: Concussion at age 16 without lasting symptoms; Denied any other head injury history   · Seizures:  See inpatient notes from 7/29/22  · Stroke: None  · Movement Disorder: None    Neurodiagnostics  Review current notes/records from 7/28-7/29/22 EMU stay:  (7/29/22 EEG): "EEG reviewed by Dr. Stevens and no EEG correlate to event, consistent with psychogenic nonepileptic event."      Current Outpatient Medications:     eszopiclone (LUNESTA) 2 MG Tab, nightly as needed., Disp: , Rfl:     ibuprofen (ADVIL,MOTRIN) 800 MG tablet, Take 1 tablet (800 mg total) by mouth 3 (three) times daily as needed for Pain., Disp: 90 tablet, Rfl: 2    promethazine (PHENERGAN) 25 MG tablet, Take 1 tablet (25 mg total) by mouth every 6 (six) hours as needed for Nausea., Disp: 40 tablet, Rfl: 12    rimegepant (NURTEC) 75 mg odt, Take 75 mg by mouth once as needed for Migraine. Place ODT tablet on the tongue; alternatively the ODT tablet may be placed under the tongue, Disp: , Rfl:     sumatriptan " (IMITREX STATDOSE) 6 mg/0.5 mL kit, Inject 6 mg into the skin every 2 (two) hours as needed for Migraine., Disp: , Rfl:     venlafaxine (EFFEXOR-XR) 150 MG Cp24, Take 1 capsule (150 mg total) by mouth once daily. Take with 75mg capsule to equal 225mg daily., Disp: 30 capsule, Rfl: 11    venlafaxine (EFFEXOR-XR) 75 MG 24 hr capsule, Take 1 capsule (75 mg total) by mouth once daily. Take with 150mg capsule to equal 225mg daily., Disp: 30 capsule, Rfl: 11    Psychiatric Hx:  · Childhood:  · Adverse Events: Denied any specific physical, sexual, or domestic abuse or trauma, but reported longstanding anxiety associated with pressure to succeed academically, with a remarkable lack of coping strategies for managed anxiety noted  · Symptoms: Depression, anxiety reportedly most prominent in HS   · Treatment Hx: None  · Adult:  · Adverse Events: Loss of job; suicide attempt (Jan 2022); reported significant stress when teaching full time and writing dissertation in late 2021  · Symptoms: Anxiety, depression, suicidality  · Treatment Hx:   · Followed by Psychiatry (Stella Worley MD) -- last visit was 4/22/22   · (lost insurance when she lost her job, so she has not had a follow-up appointment since --- Rx'd venlafaxine - refills have been called in but no updated visit since 4/22/22)  · Sporadic short-term counseling since early 20's   · Reported last counselor as outpatient was for several sessions in weeks/months preceding suicide attempt in Jan 2022   · In Jan 2022, 1-week intensive inpatient treatment after attempted suicide (note: reported benefiting from group counseling while in treatment here)   · Has not reestablished regular counseling  · Substance Abuse: None reported    Social History     Tobacco Use    Smoking status: Never Smoker    Smokeless tobacco: Never Used   Substance and Sexual Activity    Alcohol use: No    Drug use: No    Sexual activity: Never     Birth control/protection: None       Behavioral  "Observations/Mental Status (Objective)   APPEARANCE: Not assessed.   ALERTNESS/ORIENTATION: Attentive and alert. Fully oriented (x5) to time and place  GAIT: Not assessed  MOTOR MOVEMENTS/MANNERISMS: Not assessed  SPEECH/LANGUAGE: Normal in rate, rhythm, tone, and volume. No significant word finding difficulty noted. Expressive and receptive language was normal.  STATED MOOD/AFFECT: The patients stated mood was "ok." Affect was seemingly congruent with stated mood.   INTERPERSONAL BEHAVIOR: Rapport was quickly and easily established   SUICIDALITY/HOMICIDALITY: Denied  HALLUCINATIONS/DELUSIONS: None evidenced or endorsed  THOUGHT PROCESSES/INSIGHT: Thoughts seemed logical and goal-directed.                         "

## 2022-08-04 ENCOUNTER — OFFICE VISIT (OUTPATIENT)
Dept: PSYCHIATRY | Facility: CLINIC | Age: 32
End: 2022-08-04
Payer: COMMERCIAL

## 2022-08-04 DIAGNOSIS — F33.0 MILD EPISODE OF RECURRENT MAJOR DEPRESSIVE DISORDER: ICD-10-CM

## 2022-08-04 DIAGNOSIS — F44.5 PSYCHOGENIC NONEPILEPTIC SEIZURE: ICD-10-CM

## 2022-08-04 DIAGNOSIS — G47.00 INSOMNIA, UNSPECIFIED TYPE: ICD-10-CM

## 2022-08-04 DIAGNOSIS — F41.1 GAD (GENERALIZED ANXIETY DISORDER): ICD-10-CM

## 2022-08-04 DIAGNOSIS — F44.7 FUNCTIONAL NEUROLOGICAL SYMPTOM DISORDER WITH MIXED SYMPTOMS: Primary | ICD-10-CM

## 2022-08-04 DIAGNOSIS — F40.10 SOCIAL ANXIETY DISORDER: ICD-10-CM

## 2022-08-04 DIAGNOSIS — G43.E09 CHRONIC MIGRAINE WITH AURA: ICD-10-CM

## 2022-08-04 PROCEDURE — 99214 PR OFFICE/OUTPT VISIT, EST, LEVL IV, 30-39 MIN: ICD-10-PCS | Mod: 95,,, | Performed by: INTERNAL MEDICINE

## 2022-08-04 PROCEDURE — 1160F RVW MEDS BY RX/DR IN RCRD: CPT | Mod: CPTII,95,, | Performed by: INTERNAL MEDICINE

## 2022-08-04 PROCEDURE — 90833 PR PSYCHOTHERAPY W/PATIENT W/E&M, 30 MIN (ADD ON): ICD-10-PCS | Mod: 95,,, | Performed by: INTERNAL MEDICINE

## 2022-08-04 PROCEDURE — 1111F DSCHRG MED/CURRENT MED MERGE: CPT | Mod: CPTII,95,, | Performed by: INTERNAL MEDICINE

## 2022-08-04 PROCEDURE — 1159F PR MEDICATION LIST DOCUMENTED IN MEDICAL RECORD: ICD-10-PCS | Mod: CPTII,95,, | Performed by: INTERNAL MEDICINE

## 2022-08-04 PROCEDURE — 90833 PSYTX W PT W E/M 30 MIN: CPT | Mod: 95,,, | Performed by: INTERNAL MEDICINE

## 2022-08-04 PROCEDURE — 1160F PR REVIEW ALL MEDS BY PRESCRIBER/CLIN PHARMACIST DOCUMENTED: ICD-10-PCS | Mod: CPTII,95,, | Performed by: INTERNAL MEDICINE

## 2022-08-04 PROCEDURE — 1111F PR DISCHARGE MEDS RECONCILED W/ CURRENT OUTPATIENT MED LIST: ICD-10-PCS | Mod: CPTII,95,, | Performed by: INTERNAL MEDICINE

## 2022-08-04 PROCEDURE — 1159F MED LIST DOCD IN RCRD: CPT | Mod: CPTII,95,, | Performed by: INTERNAL MEDICINE

## 2022-08-04 PROCEDURE — 99214 OFFICE O/P EST MOD 30 MIN: CPT | Mod: 95,,, | Performed by: INTERNAL MEDICINE

## 2022-08-04 RX ORDER — CLONAZEPAM 0.5 MG/1
TABLET ORAL
Qty: 60 TABLET | Refills: 1 | Status: SHIPPED | OUTPATIENT
Start: 2022-08-04 | End: 2023-01-10

## 2022-08-04 RX ORDER — BUSPIRONE HYDROCHLORIDE 5 MG/1
5 TABLET ORAL 3 TIMES DAILY
Qty: 90 TABLET | Refills: 5 | Status: SHIPPED | OUTPATIENT
Start: 2022-08-04 | End: 2022-10-28 | Stop reason: SDUPTHER
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
## 2022-08-12 ENCOUNTER — PATIENT MESSAGE (OUTPATIENT)
Dept: PSYCHIATRY | Facility: CLINIC | Age: 32
End: 2022-08-12
Payer: COMMERCIAL

## 2022-08-29 ENCOUNTER — PATIENT MESSAGE (OUTPATIENT)
Dept: PSYCHIATRY | Facility: CLINIC | Age: 32
End: 2022-08-29
Payer: COMMERCIAL

## 2022-09-01 ENCOUNTER — TELEPHONE (OUTPATIENT)
Dept: PSYCHIATRY | Facility: CLINIC | Age: 32
End: 2022-09-01
Payer: COMMERCIAL

## 2022-09-01 NOTE — TELEPHONE ENCOUNTER
----- Message from Maria Luisa Hammond LCSW sent at 9/1/2022  2:00 PM CDT -----  Regarding: FW: Fredy referral  Alejandra,    Please reach out to this new patient for scheduling.    Thank you,  R      ----- Message -----  From: Stella Worley MD  Sent: 9/1/2022   1:02 PM CDT  To: Maria Luisa Hammond LCSW  Subject: Thearpy referral                                 Maria Luisa,    This is the patient we were speaking about.  Thank you sooooo much for agreeing to see her.  I just sent her a [facility] message letting her know someone will be reaching out to her soon to schedule appointment.    Stella

## 2022-09-15 ENCOUNTER — OFFICE VISIT (OUTPATIENT)
Dept: PSYCHIATRY | Facility: CLINIC | Age: 32
End: 2022-09-15
Payer: COMMERCIAL

## 2022-09-15 DIAGNOSIS — F33.1 MDD (MAJOR DEPRESSIVE DISORDER), RECURRENT EPISODE, MODERATE: Primary | ICD-10-CM

## 2022-09-15 PROCEDURE — 90834 PR PSYCHOTHERAPY W/PATIENT, 45 MIN: ICD-10-PCS | Mod: 95,,, | Performed by: SOCIAL WORKER

## 2022-09-15 PROCEDURE — 90834 PSYTX W PT 45 MINUTES: CPT | Mod: 95,,, | Performed by: SOCIAL WORKER
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
## 2022-09-22 ENCOUNTER — OFFICE VISIT (OUTPATIENT)
Dept: PSYCHIATRY | Facility: CLINIC | Age: 32
End: 2022-09-22
Payer: COMMERCIAL

## 2022-09-22 DIAGNOSIS — F40.10 SOCIAL ANXIETY DISORDER: ICD-10-CM

## 2022-09-22 DIAGNOSIS — F33.41 RECURRENT MAJOR DEPRESSIVE DISORDER, IN PARTIAL REMISSION: ICD-10-CM

## 2022-09-22 DIAGNOSIS — F44.5 PSYCHOGENIC NONEPILEPTIC SEIZURE: ICD-10-CM

## 2022-09-22 DIAGNOSIS — F41.1 GAD (GENERALIZED ANXIETY DISORDER): ICD-10-CM

## 2022-09-22 DIAGNOSIS — G47.00 INSOMNIA, UNSPECIFIED TYPE: Primary | ICD-10-CM

## 2022-09-22 DIAGNOSIS — F44.7 FUNCTIONAL NEUROLOGICAL SYMPTOM DISORDER WITH MIXED SYMPTOMS: ICD-10-CM

## 2022-09-22 PROCEDURE — 1159F MED LIST DOCD IN RCRD: CPT | Mod: CPTII,95,, | Performed by: INTERNAL MEDICINE

## 2022-09-22 PROCEDURE — 1160F RVW MEDS BY RX/DR IN RCRD: CPT | Mod: CPTII,95,, | Performed by: INTERNAL MEDICINE

## 2022-09-22 PROCEDURE — 1159F PR MEDICATION LIST DOCUMENTED IN MEDICAL RECORD: ICD-10-PCS | Mod: CPTII,95,, | Performed by: INTERNAL MEDICINE

## 2022-09-22 PROCEDURE — 1160F PR REVIEW ALL MEDS BY PRESCRIBER/CLIN PHARMACIST DOCUMENTED: ICD-10-PCS | Mod: CPTII,95,, | Performed by: INTERNAL MEDICINE

## 2022-09-22 PROCEDURE — 99214 OFFICE O/P EST MOD 30 MIN: CPT | Mod: 95,,, | Performed by: INTERNAL MEDICINE

## 2022-09-22 PROCEDURE — 99214 PR OFFICE/OUTPT VISIT, EST, LEVL IV, 30-39 MIN: ICD-10-PCS | Mod: 95,,, | Performed by: INTERNAL MEDICINE

## 2022-09-22 RX ORDER — TRAZODONE HYDROCHLORIDE 50 MG/1
50-100 TABLET ORAL NIGHTLY PRN
Qty: 60 TABLET | Refills: 3 | Status: SHIPPED | OUTPATIENT
Start: 2022-09-22 | End: 2022-12-19

## 2022-09-22 NOTE — PROGRESS NOTES
OUTPATIENT PSYCHIATRY RETURN VISIT    sENCOUNTER DATE:  9/27/2022  SITE:  [facility]  LENGTH OF SESSION:  20 minutes    The patient location is:  [place], not in healthcare facility  Visit type:  audiovisual    Face to Face time with patient:  20 minutes  25 minutes of total time spent on the encounter, which includes face to face time and non-face to face time preparing to see the patient (eg, review of tests), Obtaining and/or reviewing separately obtained history, Documenting clinical information in the electronic or other health record, Independently interpreting results (not separately reported) and communicating results to the patient/family/caregiver, or Care coordination (not separately reported).     Each patient to whom he or she provides medical services by telemedicine is:  (1) informed of the relationship between the physician and patient and the respective role of any other health care provider with respect to management of the patient; and (2) notified that he or she may decline to receive medical services by telemedicine and may withdraw from such care at any time.    CHIEF COMPLAINT:  Mood and Insomnia      HISTORY OF PRESENTING ILLNESS:  Rosalina Saavedra is a 32 y.o. female with history of MDD, JESUS, and Social Anxiety Disorder who presents for follow up appointment.      Plan at last appointment on 8/4/2022:  Start Buspar 5mg BID-TID for anxiety.  Restart Klonopin 0.5mg every evening before bed with an additional 0.25-0.5mg daily PRN panic attack.    Continue Effexor 225mg daily.  I will reach out to Dr. Stevens about the patient driving.   Discussed with patient informed consent, risks versus benefits, alternative treatments, side effect profile and the inherent unpredictability of individual responses to these treatments.  The patient expresses understanding of the above and displays the capacity to agree with this current plan.  She will reach out to Apryl Carroll  about the possibility of restarting therapy.  I will also reach out to Dr. Gomez and Dr. Kuhn for recommendations regarding therapists who may specialize in Conversion Disorder.      History as told by patient:  Believes medications have helped.  Buspar helps her anxiety - it has been better.  Denies feeling depressed.  Maybe 1 episode since last appointment.  Mood overall has been a lot better.  Was taking Buspar once a day, just increased to BID (when she gets home from work) a few days ago.  Still having trouble sleeping.  When she first started taking Klonopin it would knock her out.  But now it doesn't seem to be working.  Trouble falling asleep initially and some trouble waking up in the morning but once up feels fine.  Gets about 4-5 hours per night.  Falls asleep 12-12:30.  Wakes up 5-5:30 to go to work.  Seizures very rare now.  Klonopin really helps her anxiety.  Hasn't been taking Klonopin during the day.  Work is stressful because kids are bad but seeing some growth.     Medication side effects:  Denies  Medication compliance:  Yes    PSYCHIATRIC REVIEW OF SYSTEMS:  Trouble with sleep:  Yes as above  Appetite changes:  Denies  Weight changes:  Denies  Lack of energy:  Denies  Anhedonia:  Denies  Somatic symptoms:  As above  Libido:  Not discussed  Anxiety/panic:  Sometimes but improved  Guilty/hopeless:  Denies  Self-injurious behavior/risky behavior:  Denies  Any drugs:  Denies  Alcohol:  Denies     MEDICAL REVIEW OF SYSTEMS:  Complete review of systems performed covering Constitutional, Musculoskeletal, Neurologic.  All systems negative except for that covered in HPI.    PAST PSYCHIATRIC, MEDICAL, AND SOCIAL HISTORY REVIEWED  The patient's past medical, family and social history have been reviewed and updated as appropriate within the electronic medical record - see encounter notes.    MEDICATIONS:    Current Outpatient Medications:     busPIRone (BUSPAR) 5 MG Tab, Take 1 tablet (5 mg total) by  "mouth 3 (three) times daily., Disp: 90 tablet, Rfl: 5    clonazePAM (KLONOPIN) 0.5 MG tablet, Take 1 tablet PO every evening before bed.  You can take an addition 1/2-1 tablet as needed during the day for panic attack., Disp: 60 tablet, Rfl: 1    ibuprofen (ADVIL,MOTRIN) 800 MG tablet, Take 1 tablet (800 mg total) by mouth 3 (three) times daily as needed for Pain., Disp: 90 tablet, Rfl: 2    promethazine (PHENERGAN) 25 MG tablet, Take 1 tablet (25 mg total) by mouth every 6 (six) hours as needed for Nausea., Disp: 40 tablet, Rfl: 12    rimegepant (NURTEC) 75 mg odt, Take 75 mg by mouth once as needed for Migraine. Place ODT tablet on the tongue; alternatively the ODT tablet may be placed under the tongue, Disp: , Rfl:     sumatriptan (IMITREX STATDOSE) 6 mg/0.5 mL kit, Inject 6 mg into the skin every 2 (two) hours as needed for Migraine., Disp: , Rfl:     traZODone (DESYREL) 50 MG tablet, Take 1-2 tablets ( mg total) by mouth nightly as needed for Insomnia., Disp: 60 tablet, Rfl: 3    venlafaxine (EFFEXOR-XR) 150 MG Cp24, Take 1 capsule (150 mg total) by mouth once daily. Take with 75mg capsule to equal 225mg daily., Disp: 30 capsule, Rfl: 11    venlafaxine (EFFEXOR-XR) 75 MG 24 hr capsule, Take 1 capsule (75 mg total) by mouth once daily. Take with 150mg capsule to equal 225mg daily., Disp: 30 capsule, Rfl: 11    ALLERGIES:  Review of patient's allergies indicates:   Allergen Reactions    Codeine Itching    Morphine Itching       PSYCHIATRIC EXAM:  There were no vitals filed for this visit.  Appearance:  Well groomed, appearing healthy and of stated age  Behavior:  Cooperative, pleasant, no psychomotor agitation or retardation  Speech:  Normal rate, rhythm, prosody, and volume  Mood:  "Better"  Affect:  Congruent  Thought Process:  Linear, logical, goal directed  Thought Content:  Negative for suicidal ideation, homicidal ideation, delusions or hallucinations.  Associations:  Intact  Memory:  Grossly " Intact  Level of Consciousness/Orientation:  Grossly intact  Fund of Knowledge:  Good  Attention:  Good  Language:  Fluent, able to name abstract and concrete objects  Insight:  Good  Judgment:  Intact  Psychomotor signs:  No abnormal movements of face  Gait:  Unable to assess via virtual visit    RELEVANT LABS/STUDIES:    Lab Results   Component Value Date    WBC 5.94 07/28/2022    HGB 11.6 (L) 07/28/2022    HCT 36.5 (L) 07/28/2022    MCV 91 07/28/2022     07/28/2022     BMP  Lab Results   Component Value Date     07/28/2022    K 4.1 07/28/2022     07/28/2022    CO2 20 (L) 07/28/2022    BUN 9 07/28/2022    CREATININE 0.9 07/28/2022    CALCIUM 9.2 07/28/2022    ANIONGAP 10 07/28/2022    ESTGFRAFRICA >60.0 07/28/2022    EGFRNONAA >60.0 07/28/2022     Lab Results   Component Value Date    ALT 12 07/28/2022    AST 15 07/28/2022    ALKPHOS 86 07/28/2022    BILITOT 0.3 07/28/2022     Lab Results   Component Value Date    TSH 0.524 03/26/2021     Lab Results   Component Value Date    HGBA1C 5.0 03/26/2021       IMPRESSION:    Rosalina Saavedra is a 32 y.o. female with history of MDD, JESUS, and Social Anxiety Disorder who presents for follow up appointment.    Status/Progress:  Based on the examination today, the patient's problem(s) is/are improving but still inadequately controlled.  New problems have not been presented today.    Risk Parameters:  Patient reports no suicidal ideation  Patient reports no homicidal ideation  Patient reports no self-injurious behavior  Patient reports no violent behavior      DIAGNOSES:    ICD-10-CM ICD-9-CM   1. Insomnia, unspecified type  G47.00 780.52   2. Social anxiety disorder  F40.10 300.23   3. JESUS (generalized anxiety disorder)  F41.1 300.02   4. Psychogenic nonepileptic seizure  F44.5 300.11   5. Functional neurological symptom disorder with mixed symptoms  F44.7 300.11   6. Recurrent major depressive disorder, in partial remission  F33.41 296.35        PLAN:  Restart Trazodone 50-100mg qHS PRN insomnia.  Continue Effexor 225mg daily and Buspar 5mg BID-TID.  Continue Klonopin 0.5mg every evening before bed with an additional 0.25-0.5mg daily PRN panic attack.    Discussed with patient informed consent, risks versus benefits, alternative treatments, side effect profile and the inherent unpredictability of individual responses to these treatments.  The patient expresses understanding of the above and displays the capacity to agree with this current plan.  Continue individual psychotherapy with Maria Luisa Hammond LCSW.    RETURN TO CLINIC:  Follow up in about 4 weeks (around 10/20/2022).

## 2022-09-29 ENCOUNTER — OFFICE VISIT (OUTPATIENT)
Dept: PSYCHIATRY | Facility: CLINIC | Age: 32
End: 2022-09-29
Payer: COMMERCIAL

## 2022-09-29 DIAGNOSIS — F33.1 MODERATE EPISODE OF RECURRENT MAJOR DEPRESSIVE DISORDER: Primary | ICD-10-CM

## 2022-09-29 PROCEDURE — 90834 PR PSYCHOTHERAPY W/PATIENT, 45 MIN: ICD-10-PCS | Mod: 95,,, | Performed by: SOCIAL WORKER

## 2022-09-29 PROCEDURE — 90834 PSYTX W PT 45 MINUTES: CPT | Mod: 95,,, | Performed by: SOCIAL WORKER
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
## 2022-10-17 ENCOUNTER — PATIENT MESSAGE (OUTPATIENT)
Dept: NEUROLOGY | Facility: CLINIC | Age: 32
End: 2022-10-17
Payer: COMMERCIAL

## 2022-10-17 ENCOUNTER — OFFICE VISIT (OUTPATIENT)
Dept: NEUROLOGY | Facility: CLINIC | Age: 32
End: 2022-10-17
Payer: COMMERCIAL

## 2022-10-17 DIAGNOSIS — G43.701 CHRONIC MIGRAINE W/O AURA, NOT INTRACTABLE, W STAT MIGR: Primary | ICD-10-CM

## 2022-10-17 PROCEDURE — 99213 OFFICE O/P EST LOW 20 MIN: CPT | Mod: 95,,, | Performed by: PSYCHIATRY & NEUROLOGY

## 2022-10-17 PROCEDURE — 99213 PR OFFICE/OUTPT VISIT, EST, LEVL III, 20-29 MIN: ICD-10-PCS | Mod: 95,,, | Performed by: PSYCHIATRY & NEUROLOGY

## 2022-10-17 RX ORDER — ATOGEPANT 30 MG/1
30 TABLET ORAL DAILY
Qty: 30 TABLET | Refills: 12 | Status: SHIPPED | OUTPATIENT
Start: 2022-10-17 | End: 2023-10-07

## 2022-10-17 RX ORDER — ETODOLAC 500 MG/1
500 TABLET, FILM COATED ORAL 2 TIMES DAILY PRN
Qty: 20 TABLET | Refills: 12 | Status: SHIPPED | OUTPATIENT
Start: 2022-10-17 | End: 2023-11-30

## 2022-10-17 NOTE — PROGRESS NOTES
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     Headache history:   Age of onset -  5   Location - occipital, temples   Nature of pain -  Stabbing, aching   Prodrome - no   Aura -  No   Duration of headache - > 4 hrs   Time to peak intensity - n/a   Pain scale - range of intensity -  9-10/10  Frequency -  15-20/month    Status Migrainosus history - yes   Time of day of most headaches- anytime      Associated symptoms with the headache:   Meningeal symptoms - photophobia, phonophobia, exercise intolerance +   Nausea/vomitting +   Nasal drainage   Visual blurriness   Pallor/flushing  Dizziness   Vertigo  Confusion  Impaired concentration +   Pain worsened with physical activity  +   Neck pain +      Cluster headache symptoms: none   Symptoms of increased intracranial pressure: none  Basilar migraine symptoms: none      Headache Triggers:  Stress, anxiety, emotional overload   H/o SI      Treatment history:  Resolution of headache with sleep - yes   Meds tried:     Failed:   prozac 40   effexor 150   Gabapentin 300 mg   mobic 15   namenda   nurtec   trokemdi   Diamox   imitrex shots  lamictal      10/21/21  Care Timeline     0000    EKG 12-LEAD   1228    Arrived   1308    Comprehensive metabolic panel        CBC auto differential    1321    POCT glucose   1340    CT Head Without Contrast   1341    POCT urine pregnancy   1400    EKG 12-lead   1422    metoclopramide HCl 10 mg   1424    ketorolac tromethamine 30 mg       diphenhydramine HCl 25 mg   1425    0.9 % sodium chloride 1000 mL   1546    haloperidol lactate 2.5 mg   1553    magnesium sulfate in water 2 g   1826    prochlorperazine edisylate 5 mg   1827    diphenhydramine HCl 25 mg   2027    Discharged         Headache risk factors:   H/o TBI  - concussion at age 16   H/o Meningitis  - no   F/h Aneurysms  - no     Headache burden:   Missing college days            Review of Systems   Constitutional: Negative.     HENT: Negative.    Eyes: Negative.    Respiratory: Negative.    Cardiovascular: Negative.    Gastrointestinal: Negative.    Endocrine: Negative.    Genitourinary: Negative.    Musculoskeletal: Negative.    Integumentary:  Negative.   Allergic/Immunologic: Negative.    Neurological: Positive for headaches.   Hematological: Negative.    Psychiatric/Behavioral: Positive for dysphoric mood and sleep disturbance.          Objective:   Physical Exam  Unchanged          Assessment:     Chr Migraine     Impression:   No major branch stenosis/occlusion at the [place].  No aneurysm.   No evidence of venous sinus thrombosis or venous sinus stenosis..     Electronically signed by: Armani Lopez  Date:                                            11/24/2021  Time:                                           16:24     Impression:   No major branch stenosis/occlusion at the [place].  No aneurysm.   No evidence of venous sinus thrombosis or venous sinus stenosis..     Electronically signed by: Armani Lopez  Date:                                            11/24/2021  Time:                                           16:24     Impression:   Normal pre and postcontrast MR imaging appearance of the brain.     Electronically signed by: Armani Lopez  Date:                                            11/24/2021  Time:                                           16:51  Plan:      Patient is in status migrainous. The etiology is multifactorial. Her headaches are  refractory to several medications. Her headaches are aggravated by untreated depression      VYEPTI 300 mg IV (sept 2022 -)   Start Qulipta 30 mg daily, discussed side effect   Cont Lamictal, Effexor   Pt a candidate for BOTOX for chr migraine   Breakthrough headache - etodolac  Multiple alternative treatment options were offered to the patient  cont with psychiatrist   Phenergan 25 mg TID PRN     The patient location is: home  The chief complaint leading to consultation  is: headache    Visit type: audiovisual    Face to Face time with patient:20  20 minutes of total time spent on the encounter, which includes face to face time and non-face to face time preparing to see the patient (eg, review of tests), Obtaining and/or reviewing separately obtained history, Documenting clinical information in the electronic or other health record, Independently interpreting results (not separately reported) and communicating results to the patient/family/caregiver, or Care coordination (not separately reported).     Each patient to whom he or she provides medical services by telemedicine is:  (1) informed of the relationship between the physician and patient and the respective role of any other health care provider with respect to management of the patient; and (2) notified that he or she may decline to receive medical services by telemedicine and may withdraw from such care at any time.

## 2022-10-24 ENCOUNTER — PATIENT MESSAGE (OUTPATIENT)
Dept: PSYCHIATRY | Facility: CLINIC | Age: 32
End: 2022-10-24
Payer: COMMERCIAL

## 2022-10-28 ENCOUNTER — OFFICE VISIT (OUTPATIENT)
Dept: PSYCHIATRY | Facility: CLINIC | Age: 32
End: 2022-10-28
Payer: COMMERCIAL

## 2022-10-28 DIAGNOSIS — F41.1 GAD (GENERALIZED ANXIETY DISORDER): Primary | ICD-10-CM

## 2022-10-28 DIAGNOSIS — F44.5 PSYCHOGENIC NONEPILEPTIC SEIZURE: ICD-10-CM

## 2022-10-28 DIAGNOSIS — F33.41 RECURRENT MAJOR DEPRESSIVE DISORDER, IN PARTIAL REMISSION: ICD-10-CM

## 2022-10-28 DIAGNOSIS — G47.00 INSOMNIA, UNSPECIFIED TYPE: ICD-10-CM

## 2022-10-28 PROCEDURE — 1160F RVW MEDS BY RX/DR IN RCRD: CPT | Mod: CPTII,95,, | Performed by: INTERNAL MEDICINE

## 2022-10-28 PROCEDURE — 99999 PR PBB SHADOW E&M-EST. PATIENT-LVL II: CPT | Mod: PBBFAC,,, | Performed by: INTERNAL MEDICINE

## 2022-10-28 PROCEDURE — 99999 PR PBB SHADOW E&M-EST. PATIENT-LVL II: ICD-10-PCS | Mod: PBBFAC,,, | Performed by: INTERNAL MEDICINE

## 2022-10-28 PROCEDURE — 99214 PR OFFICE/OUTPT VISIT, EST, LEVL IV, 30-39 MIN: ICD-10-PCS | Mod: 95,,, | Performed by: INTERNAL MEDICINE

## 2022-10-28 PROCEDURE — 99214 OFFICE O/P EST MOD 30 MIN: CPT | Mod: 95,,, | Performed by: INTERNAL MEDICINE

## 2022-10-28 PROCEDURE — 1160F PR REVIEW ALL MEDS BY PRESCRIBER/CLIN PHARMACIST DOCUMENTED: ICD-10-PCS | Mod: CPTII,95,, | Performed by: INTERNAL MEDICINE

## 2022-10-28 PROCEDURE — 1159F PR MEDICATION LIST DOCUMENTED IN MEDICAL RECORD: ICD-10-PCS | Mod: CPTII,95,, | Performed by: INTERNAL MEDICINE

## 2022-10-28 PROCEDURE — 1159F MED LIST DOCD IN RCRD: CPT | Mod: CPTII,95,, | Performed by: INTERNAL MEDICINE

## 2022-10-28 RX ORDER — BUSPIRONE HYDROCHLORIDE 10 MG/1
10 TABLET ORAL 3 TIMES DAILY
Qty: 90 TABLET | Refills: 5 | Status: SHIPPED | OUTPATIENT
Start: 2022-10-28 | End: 2022-12-19 | Stop reason: SDUPTHER

## 2022-10-28 NOTE — PROGRESS NOTES
OUTPATIENT PSYCHIATRY RETURN VISIT    ENCOUNTER DATE:  11/7/2022  SITE:  [facility], [facility]  LENGTH OF SESSION:  20 minutes    The patient location is:  [place], not in healthcare facility  Visit type:  audiovisual    Face to Face time with patient:  20 minutes  25 minutes of total time spent on the encounter, which includes face to face time and non-face to face time preparing to see the patient (eg, review of tests), Obtaining and/or reviewing separately obtained history, Documenting clinical information in the electronic or other health record, Independently interpreting results (not separately reported) and communicating results to the patient/family/caregiver, or Care coordination (not separately reported).     Each patient to whom he or she provides medical services by telemedicine is:  (1) informed of the relationship between the physician and patient and the respective role of any other health care provider with respect to management of the patient; and (2) notified that he or she may decline to receive medical services by telemedicine and may withdraw from such care at any time.    CHIEF COMPLAINT:  Stress      HISTORY OF PRESENTING ILLNESS:  Rosalina Saavedra is a 32 y.o. female with history of MDD, JESUS, and Social Anxiety Disorder who presents for follow up appointment.      Plan at last appointment on 9/22/2022:  Restart Trazodone 50-100mg qHS PRN insomnia.  Continue Effexor 225mg daily and Buspar 5mg BID-TID.  Continue Klonopin 0.5mg every evening before bed with an additional 0.25-0.5mg daily PRN panic attack.    Discussed with patient informed consent, risks versus benefits, alternative treatments, side effect profile and the inherent unpredictability of individual responses to these treatments.  The patient expresses understanding of the above and displays the capacity to agree with this current plan.  Continue individual psychotherapy with Maria Luisa Hammond LCSW.    History as  told by patient:  Having more seizures - last week was daily.  This week none at all.  Seizures associated with feeling overwhelmed.  Feeling very frustrated at work - kids have no drive, she has to break it down to  level and they are still complaining (they are in 8th grade).  Gets cursed out all the time.  Other teachers quit like every day.  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  Its a very bad environment.  Anxiety is high.  Not enjoying things.  Tired in the morning but not dreading the day.  Taking Trazodone 100mg and this is helpful.  Takes Klonopin every night.  Sometimes waits to take Trazodone to see if she needs it after Klonopin.  Racing heart, racing thoughts when she feels anxious at school.  Had a panic attack she thinks when driving.  Does associate the seizures with high anxiety or while having panic attack.  Feels depression is relatively stable.  Denies SI.      Medication side effects:  Denies  Medication compliance:  Yes    PSYCHIATRIC REVIEW OF SYSTEMS:  Trouble with sleep:  Yes as above  Appetite changes:  Denies  Weight changes:  Denies  Lack of energy:  Denies  Anhedonia:  Denies  Somatic symptoms:  As above  Libido:  Not discussed  Anxiety/panic:  Sometimes but improved  Guilty/hopeless:  Denies  Self-injurious behavior/risky behavior:  Denies  Any drugs:  Denies  Alcohol:  Denies     MEDICAL REVIEW OF SYSTEMS:  Complete review of systems performed covering Constitutional, Musculoskeletal, Neurologic.  All systems negative except for that covered in HPI.    PAST PSYCHIATRIC, MEDICAL, AND SOCIAL HISTORY REVIEWED  The patient's past medical, family and social history have been reviewed and updated as appropriate within the electronic medical record - see encounter notes.    MEDICATIONS:    Current Outpatient Medications:     atogepant (QULIPTA) 30 mg Tab, Take 30 mg by mouth once daily., Disp: 30 tablet, Rfl: 12    busPIRone (BUSPAR) 10 MG tablet, Take 1 tablet (10 mg total) by  "mouth 3 (three) times daily., Disp: 90 tablet, Rfl: 5    clonazePAM (KLONOPIN) 0.5 MG tablet, Take 1 tablet PO every evening before bed.  You can take an addition 1/2-1 tablet as needed during the day for panic attack., Disp: 60 tablet, Rfl: 1    etodolac (LODINE) 500 MG tablet, Take 1 tablet (500 mg total) by mouth 2 (two) times daily as needed (migraine)., Disp: 20 tablet, Rfl: 12    ibuprofen (ADVIL,MOTRIN) 800 MG tablet, Take 1 tablet (800 mg total) by mouth 3 (three) times daily as needed for Pain., Disp: 90 tablet, Rfl: 2    promethazine (PHENERGAN) 25 MG tablet, Take 1 tablet (25 mg total) by mouth every 6 (six) hours as needed for Nausea., Disp: 40 tablet, Rfl: 12    rimegepant (NURTEC) 75 mg odt, Take 75 mg by mouth once as needed for Migraine. Place ODT tablet on the tongue; alternatively the ODT tablet may be placed under the tongue, Disp: , Rfl:     sumatriptan (IMITREX STATDOSE) 6 mg/0.5 mL kit, Inject 6 mg into the skin every 2 (two) hours as needed for Migraine., Disp: , Rfl:     traZODone (DESYREL) 50 MG tablet, Take 1-2 tablets ( mg total) by mouth nightly as needed for Insomnia., Disp: 60 tablet, Rfl: 3    venlafaxine (EFFEXOR-XR) 150 MG Cp24, Take 1 capsule (150 mg total) by mouth once daily. Take with 75mg capsule to equal 225mg daily., Disp: 30 capsule, Rfl: 11    venlafaxine (EFFEXOR-XR) 75 MG 24 hr capsule, Take 1 capsule (75 mg total) by mouth once daily. Take with 150mg capsule to equal 225mg daily., Disp: 30 capsule, Rfl: 11    ALLERGIES:  Review of patient's allergies indicates:   Allergen Reactions    Codeine Itching    Morphine Itching       PSYCHIATRIC EXAM:  There were no vitals filed for this visit.  Appearance:  Well groomed, appearing healthy and of stated age  Behavior:  Cooperative, pleasant, no psychomotor agitation or retardation  Speech:  Normal rate, rhythm, prosody, and volume  Mood:  "Stressed"  Affect:  Congruent  Thought Process:  Linear, logical, goal " directed  Thought Content:  Negative for suicidal ideation, homicidal ideation, delusions or hallucinations.  Associations:  Intact  Memory:  Grossly Intact  Level of Consciousness/Orientation:  Grossly intact  Fund of Knowledge:  Good  Attention:  Good  Language:  Fluent, able to name abstract and concrete objects  Insight:  Good  Judgment:  Intact  Psychomotor signs:  No abnormal movements of face  Gait:  Unable to assess via virtual visit    RELEVANT LABS/STUDIES:    Lab Results   Component Value Date    WBC 5.94 07/28/2022    HGB 11.6 (L) 07/28/2022    HCT 36.5 (L) 07/28/2022    MCV 91 07/28/2022     07/28/2022     BMP  Lab Results   Component Value Date     07/28/2022    K 4.1 07/28/2022     07/28/2022    CO2 20 (L) 07/28/2022    BUN 9 07/28/2022    CREATININE 0.9 07/28/2022    CALCIUM 9.2 07/28/2022    ANIONGAP 10 07/28/2022    ESTGFRAFRICA >60.0 07/28/2022    EGFRNONAA >60.0 07/28/2022     Lab Results   Component Value Date    ALT 12 07/28/2022    AST 15 07/28/2022    ALKPHOS 86 07/28/2022    BILITOT 0.3 07/28/2022     Lab Results   Component Value Date    TSH 0.524 03/26/2021     Lab Results   Component Value Date    HGBA1C 5.0 03/26/2021       IMPRESSION:    Rosalina Saavedra is a 32 y.o. female with history of MDD, JESUS, and Social Anxiety Disorder who presents for follow up appointment.    Status/Progress:  Based on the examination today, the patient's problem(s) is/are improving but still inadequately controlled.  New problems have not been presented today.    Risk Parameters:  Patient reports no suicidal ideation  Patient reports no homicidal ideation  Patient reports no self-injurious behavior  Patient reports no violent behavior      DIAGNOSES:    ICD-10-CM ICD-9-CM   1. JESUS (generalized anxiety disorder)  F41.1 300.02   2. Psychogenic nonepileptic seizure  F44.5 300.11   3. Recurrent major depressive disorder, in partial remission  F33.41 296.35   4. Insomnia, unspecified type   G47.00 780.52       PLAN:  Increase Buspar to 10mg TID to better target anxiety.  Take Trazodone 100mg every night for sleep so that Klonopin 0.25-0.5mg can only be used as needed for severe anxiety/panic attack.    Continue Effexor 225mg daily - she feels this is very helpful.  Discussed with patient informed consent, risks versus benefits, alternative treatments, side effect profile and the inherent unpredictability of individual responses to these treatments.  The patient expresses understanding of the above and displays the capacity to agree with this current plan.  Continue individual psychotherapy with Maria Luisa Hammond LCSW.    RETURN TO CLINIC:  Follow up in about 2 weeks (around 11/11/2022).

## 2022-11-08 ENCOUNTER — OFFICE VISIT (OUTPATIENT)
Dept: PSYCHIATRY | Facility: CLINIC | Age: 32
End: 2022-11-08
Payer: COMMERCIAL

## 2022-11-08 DIAGNOSIS — F33.1 MODERATE EPISODE OF RECURRENT MAJOR DEPRESSIVE DISORDER: Primary | ICD-10-CM

## 2022-11-08 PROCEDURE — 99999 PR PBB SHADOW E&M-EST. PATIENT-LVL I: ICD-10-PCS | Mod: PBBFAC,,, | Performed by: SOCIAL WORKER

## 2022-11-08 PROCEDURE — 90834 PSYTX W PT 45 MINUTES: CPT | Mod: 95,,, | Performed by: SOCIAL WORKER

## 2022-11-08 PROCEDURE — 99999 PR PBB SHADOW E&M-EST. PATIENT-LVL I: CPT | Mod: PBBFAC,,, | Performed by: SOCIAL WORKER

## 2022-11-08 PROCEDURE — 90834 PR PSYCHOTHERAPY W/PATIENT, 45 MIN: ICD-10-PCS | Mod: 95,,, | Performed by: SOCIAL WORKER
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
## 2022-11-10 ENCOUNTER — PATIENT MESSAGE (OUTPATIENT)
Dept: RESEARCH | Facility: HOSPITAL | Age: 32
End: 2022-11-10
Payer: COMMERCIAL

## 2022-11-22 ENCOUNTER — PATIENT MESSAGE (OUTPATIENT)
Dept: RESEARCH | Facility: HOSPITAL | Age: 32
End: 2022-11-22
Payer: COMMERCIAL

## 2022-11-28 ENCOUNTER — TELEPHONE (OUTPATIENT)
Dept: PSYCHIATRY | Facility: CLINIC | Age: 32
End: 2022-11-28
Payer: COMMERCIAL

## 2022-11-28 NOTE — TELEPHONE ENCOUNTER
Called pt, made aware appt tomorrow was cx as esteban will be out of office at that time. Mad ept aware appt was not r/s as she had another appt scheduled for this Thursday with esteban. Requested call back in case iof questions.

## 2022-11-29 ENCOUNTER — TELEPHONE (OUTPATIENT)
Dept: RESEARCH | Facility: HOSPITAL | Age: 32
End: 2022-11-29
Payer: COMMERCIAL

## 2022-11-29 NOTE — TELEPHONE ENCOUNTER
Called patient to consent for Vyepti, left a voicemail to have patient call back.     Monica Quinones  Clinical Research Coordinator  Neuroscience Research Department  [facility] [address] [place] [address]  [email or web address]  [phone] ph

## 2022-12-01 ENCOUNTER — RESEARCH ENCOUNTER (OUTPATIENT)
Dept: RESEARCH | Facility: HOSPITAL | Age: 32
End: 2022-12-01

## 2022-12-01 ENCOUNTER — TELEPHONE (OUTPATIENT)
Dept: PSYCHIATRY | Facility: CLINIC | Age: 32
End: 2022-12-01
Payer: COMMERCIAL

## 2022-12-01 NOTE — TELEPHONE ENCOUNTER
Called pt, no answer, lvm and profusely apologized, and made aware todays appt will have to be cx'd as well per Maria Luisa as she is sick. Mad ept aware in vm that Maria Luisa will see her on her next appt- 12/7. Provided number in case of any questions.

## 2022-12-07 ENCOUNTER — OFFICE VISIT (OUTPATIENT)
Dept: PSYCHIATRY | Facility: CLINIC | Age: 32
End: 2022-12-07
Payer: COMMERCIAL

## 2022-12-07 DIAGNOSIS — F33.1 MODERATE EPISODE OF RECURRENT MAJOR DEPRESSIVE DISORDER: Primary | ICD-10-CM

## 2022-12-07 PROCEDURE — 90832 PSYTX W PT 30 MINUTES: CPT | Mod: 95,,, | Performed by: SOCIAL WORKER

## 2022-12-07 PROCEDURE — 90832 PR PSYCHOTHERAPY W/PATIENT, 30 MIN: ICD-10-PCS | Mod: 95,,, | Performed by: SOCIAL WORKER
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
## 2022-12-15 ENCOUNTER — PATIENT MESSAGE (OUTPATIENT)
Dept: PSYCHIATRY | Facility: CLINIC | Age: 32
End: 2022-12-15

## 2022-12-15 ENCOUNTER — OFFICE VISIT (OUTPATIENT)
Dept: PSYCHIATRY | Facility: CLINIC | Age: 32
End: 2022-12-15
Payer: COMMERCIAL

## 2022-12-15 DIAGNOSIS — F33.1 MODERATE EPISODE OF RECURRENT MAJOR DEPRESSIVE DISORDER: Primary | ICD-10-CM

## 2022-12-15 PROCEDURE — 90834 PR PSYCHOTHERAPY W/PATIENT, 45 MIN: ICD-10-PCS | Mod: 95,,, | Performed by: SOCIAL WORKER

## 2022-12-15 PROCEDURE — 90834 PSYTX W PT 45 MINUTES: CPT | Mod: 95,,, | Performed by: SOCIAL WORKER
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
## 2022-12-19 ENCOUNTER — OFFICE VISIT (OUTPATIENT)
Dept: NEUROLOGY | Facility: CLINIC | Age: 32
End: 2022-12-19
Payer: COMMERCIAL

## 2022-12-19 ENCOUNTER — OFFICE VISIT (OUTPATIENT)
Dept: PSYCHIATRY | Facility: CLINIC | Age: 32
End: 2022-12-19
Payer: COMMERCIAL

## 2022-12-19 DIAGNOSIS — F41.1 GAD (GENERALIZED ANXIETY DISORDER): Primary | ICD-10-CM

## 2022-12-19 DIAGNOSIS — F33.0 MILD EPISODE OF RECURRENT MAJOR DEPRESSIVE DISORDER: ICD-10-CM

## 2022-12-19 DIAGNOSIS — F40.10 SOCIAL ANXIETY DISORDER: ICD-10-CM

## 2022-12-19 DIAGNOSIS — G43.701 CHRONIC MIGRAINE W/O AURA, NOT INTRACTABLE, W STAT MIGR: Primary | ICD-10-CM

## 2022-12-19 DIAGNOSIS — F44.5 PSYCHOGENIC NONEPILEPTIC SEIZURE: ICD-10-CM

## 2022-12-19 DIAGNOSIS — G47.00 INSOMNIA, UNSPECIFIED TYPE: ICD-10-CM

## 2022-12-19 PROCEDURE — 1159F PR MEDICATION LIST DOCUMENTED IN MEDICAL RECORD: ICD-10-PCS | Mod: CPTII,95,, | Performed by: INTERNAL MEDICINE

## 2022-12-19 PROCEDURE — 99214 PR OFFICE/OUTPT VISIT, EST, LEVL IV, 30-39 MIN: ICD-10-PCS | Mod: 95,,, | Performed by: INTERNAL MEDICINE

## 2022-12-19 PROCEDURE — 99213 PR OFFICE/OUTPT VISIT, EST, LEVL III, 20-29 MIN: ICD-10-PCS | Mod: 95,,, | Performed by: PSYCHIATRY & NEUROLOGY

## 2022-12-19 PROCEDURE — 1159F MED LIST DOCD IN RCRD: CPT | Mod: CPTII,95,, | Performed by: INTERNAL MEDICINE

## 2022-12-19 PROCEDURE — 99213 OFFICE O/P EST LOW 20 MIN: CPT | Mod: 95,,, | Performed by: PSYCHIATRY & NEUROLOGY

## 2022-12-19 PROCEDURE — 99214 OFFICE O/P EST MOD 30 MIN: CPT | Mod: 95,,, | Performed by: INTERNAL MEDICINE

## 2022-12-19 PROCEDURE — 1160F PR REVIEW ALL MEDS BY PRESCRIBER/CLIN PHARMACIST DOCUMENTED: ICD-10-PCS | Mod: CPTII,95,, | Performed by: INTERNAL MEDICINE

## 2022-12-19 PROCEDURE — 1160F RVW MEDS BY RX/DR IN RCRD: CPT | Mod: CPTII,95,, | Performed by: INTERNAL MEDICINE

## 2022-12-19 RX ORDER — ATOGEPANT 60 MG/1
60 TABLET ORAL DAILY
Qty: 30 TABLET | Refills: 6 | Status: SHIPPED | OUTPATIENT
Start: 2022-12-19 | End: 2023-11-30

## 2022-12-19 RX ORDER — DEXAMETHASONE 4 MG/1
4 TABLET ORAL EVERY 12 HOURS
Qty: 10 TABLET | Refills: 0 | Status: SHIPPED | OUTPATIENT
Start: 2022-12-19 | End: 2022-12-24

## 2022-12-19 RX ORDER — BUSPIRONE HYDROCHLORIDE 15 MG/1
15 TABLET ORAL 3 TIMES DAILY
Qty: 90 TABLET | Refills: 5 | Status: SHIPPED | OUTPATIENT
Start: 2022-12-19 | End: 2023-08-05

## 2022-12-19 RX ORDER — ZOLMITRIPTAN 5 MG/1
5 TABLET, ORALLY DISINTEGRATING ORAL
Qty: 9 TABLET | Refills: 12 | Status: SHIPPED | OUTPATIENT
Start: 2022-12-19 | End: 2022-12-21

## 2022-12-19 NOTE — PROGRESS NOTES
OUTPATIENT PSYCHIATRY RETURN VISIT    ENCOUNTER DATE:  1/3/2023  SITE:  [facility]  LENGTH OF SESSION:  25 minutes    The patient location is:  [place], not in healthcare facility  Visit type:  audiovisual    Face to Face time with patient:  25 minutes  30 minutes of total time spent on the encounter, which includes face to face time and non-face to face time preparing to see the patient (eg, review of tests), Obtaining and/or reviewing separately obtained history, Documenting clinical information in the electronic or other health record, Independently interpreting results (not separately reported) and communicating results to the patient/family/caregiver, or Care coordination (not separately reported).     Each patient to whom he or she provides medical services by telemedicine is:  (1) informed of the relationship between the physician and patient and the respective role of any other health care provider with respect to management of the patient; and (2) notified that he or she may decline to receive medical services by telemedicine and may withdraw from such care at any time.    CHIEF COMPLAINT:  Anxiety      HISTORY OF PRESENTING ILLNESS:  Rosalina Saavedra is a 32 y.o. female with history of MDD, JESUS, and Social Anxiety Disorder who presents for follow up appointment.      Plan at last appointment on 10/28/2022:  Increase Buspar to 10mg TID to better target anxiety.  Take Trazodone 100mg every night for sleep so that Klonopin 0.25-0.5mg can only be used as needed for severe anxiety/panic attack.    Continue Effexor 225mg daily - she feels this is very helpful.  Discussed with patient informed consent, risks versus benefits, alternative treatments, side effect profile and the inherent unpredictability of individual responses to these treatments.  The patient expresses understanding of the above and displays the capacity to agree with this current plan.  Continue individual  psychotherapy with Maria Luisa Hammond LCSW.    History as told by patient:  Has been feeling stressed the last few weeks.  Work stress.  Dad is the same, mom flip flops.  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  Can't move out until she gets paid more.  Seizures coming back but she knows that is stress.  80% reason she can't move is financial, 20% is health.  Stress comes up randomly.  Unsure if higher dose of Buspar has changed anything.  Taking Buspar morning, evening, and night.  Has trouble falling asleep but then stays asleep pretty well.  Usually takes the Klonopin 0.5 in the morning because work is so stressful.  Doing training with Daron makes her happy, driving around, meeting with Maria Luisa.  So does have good moments.  Going to see her sister x 3 days so a little nervous about this.  Stopped taking Trazodone because it wasn't helping.      Medication side effects:  Denies  Medication compliance:  Yes    PSYCHIATRIC REVIEW OF SYSTEMS:  Trouble with sleep:  Yes as above  Appetite changes:  Denies  Weight changes:  Denies  Lack of energy:  Sometimes  Anhedonia:  Sometimes  Somatic symptoms:  As above  Libido:  Not discussed  Anxiety/panic:  Sometimes  Guilty/hopeless:  Denies  Self-injurious behavior/risky behavior:  Denies  Any drugs:  Denies  Alcohol:  Denies     MEDICAL REVIEW OF SYSTEMS:  Complete review of systems performed covering Constitutional, Musculoskeletal, Neurologic.  All systems negative except for that covered in HPI.    PAST PSYCHIATRIC, MEDICAL, AND SOCIAL HISTORY REVIEWED  The patient's past medical, family and social history have been reviewed and updated as appropriate within the electronic medical record - see encounter notes.    MEDICATIONS:    Current Outpatient Medications:     atogepant (QULIPTA) 30 mg Tab, Take 30 mg by mouth once daily., Disp: 30 tablet, Rfl: 12    atogepant (QULIPTA) 60 mg Tab, Take 60 mg by mouth once daily., Disp: 30  "tablet, Rfl: 6    busPIRone (BUSPAR) 15 MG tablet, Take 1 tablet (15 mg total) by mouth 3 (three) times daily., Disp: 90 tablet, Rfl: 5    clonazePAM (KLONOPIN) 0.5 MG tablet, Take 1 tablet PO every evening before bed.  You can take an addition 1/2-1 tablet as needed during the day for panic attack., Disp: 60 tablet, Rfl: 1    etodolac (LODINE) 500 MG tablet, Take 1 tablet (500 mg total) by mouth 2 (two) times daily as needed (migraine)., Disp: 20 tablet, Rfl: 12    ibuprofen (ADVIL,MOTRIN) 800 MG tablet, Take 1 tablet (800 mg total) by mouth 3 (three) times daily as needed for Pain., Disp: 90 tablet, Rfl: 2    promethazine (PHENERGAN) 25 MG tablet, Take 1 tablet (25 mg total) by mouth every 6 (six) hours as needed for Nausea., Disp: 40 tablet, Rfl: 12    rimegepant (NURTEC) 75 mg odt, Take 75 mg by mouth once as needed for Migraine. Place ODT tablet on the tongue; alternatively the ODT tablet may be placed under the tongue, Disp: , Rfl:     sumatriptan (IMITREX STATDOSE) 6 mg/0.5 mL kit, Inject 6 mg into the skin every 2 (two) hours as needed for Migraine., Disp: , Rfl:     venlafaxine (EFFEXOR-XR) 150 MG Cp24, Take 1 capsule (150 mg total) by mouth once daily. Take with 75mg capsule to equal 225mg daily., Disp: 30 capsule, Rfl: 11    venlafaxine (EFFEXOR-XR) 75 MG 24 hr capsule, Take 1 capsule (75 mg total) by mouth once daily. Take with 150mg capsule to equal 225mg daily., Disp: 30 capsule, Rfl: 11    ZOLMitriptan (ZOMIG-ZMT) 5 MG disintegrating tablet, TAKE 1 TABLET BY MOUTH AS NEEDED FOR MIGRAINE., Disp: 9 tablet, Rfl: 12    ALLERGIES:  Review of patient's allergies indicates:   Allergen Reactions    Codeine Itching    Morphine Itching       PSYCHIATRIC EXAM:  There were no vitals filed for this visit.  Appearance:  Well groomed, appearing healthy and of stated age  Behavior:  Cooperative, pleasant, no psychomotor agitation or retardation  Speech:  Normal rate, rhythm, prosody, and volume  Mood:  "Ok"  Affect:  " Euthymic  Thought Process:  Linear, logical, goal directed  Thought Content:  Negative for suicidal ideation, homicidal ideation, delusions or hallucinations.  Associations:  Intact  Memory:  Grossly Intact  Level of Consciousness/Orientation:  Grossly intact  Fund of Knowledge:  Good  Attention:  Good  Language:  Fluent, able to name abstract and concrete objects  Insight:  Good  Judgment:  Intact  Psychomotor signs:  No abnormal movements of face  Gait:  Unable to assess via virtual visit    RELEVANT LABS/STUDIES:    Lab Results   Component Value Date    WBC 5.94 07/28/2022    HGB 11.6 (L) 07/28/2022    HCT 36.5 (L) 07/28/2022    MCV 91 07/28/2022     07/28/2022     BMP  Lab Results   Component Value Date     07/28/2022    K 4.1 07/28/2022     07/28/2022    CO2 20 (L) 07/28/2022    BUN 9 07/28/2022    CREATININE 0.9 07/28/2022    CALCIUM 9.2 07/28/2022    ANIONGAP 10 07/28/2022    ESTGFRAFRICA >60.0 07/28/2022    EGFRNONAA >60.0 07/28/2022     Lab Results   Component Value Date    ALT 12 07/28/2022    AST 15 07/28/2022    ALKPHOS 86 07/28/2022    BILITOT 0.3 07/28/2022     Lab Results   Component Value Date    TSH 0.524 03/26/2021     Lab Results   Component Value Date    HGBA1C 5.0 03/26/2021       IMPRESSION:    Rosalina Saavedra is a 32 y.o. female with history of MDD, JESUS, and Social Anxiety Disorder who presents for follow up appointment.    Status/Progress:  Based on the examination today, the patient's problem(s) is/are inadequately controlled.  New problems have not been presented today.    Risk Parameters:  Patient reports no suicidal ideation  Patient reports no homicidal ideation  Patient reports no self-injurious behavior  Patient reports no violent behavior        DIAGNOSES:    ICD-10-CM ICD-9-CM   1. JESUS (generalized anxiety disorder)  F41.1 300.02   2. Mild episode of recurrent major depressive disorder  F33.0 296.31   3. Social anxiety disorder  F40.10 300.23   4. Psychogenic  nonepileptic seizure  F44.5 300.11   5. Insomnia, unspecified type  G47.00 780.52       PLAN:  No medication change today.  Discussed plan to focus on therapy with Maria Luisa.   Continue Effexor 225mg daily, Buspar 15mg TID, and Klonopin 0.25-0.5mg daily PRN anxiety.    Discussed with patient informed consent, risks versus benefits, alternative treatments, side effect profile and the inherent unpredictability of individual responses to these treatments.  The patient expresses understanding of the above and displays the capacity to agree with this current plan.  Continue individual psychotherapy with Maria Luisa Hammond LCSW.    RETURN TO CLINIC:  Follow up in about 4 weeks (around 1/16/2023).

## 2022-12-19 NOTE — PROGRESS NOTES
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     Headache history:   Age of onset -  5   Location - occipital, temples   Nature of pain -  Stabbing, aching   Prodrome - no   Aura -  No   Duration of headache - > 4 hrs   Time to peak intensity - n/a   Pain scale - range of intensity -  9-10/10  Frequency -  15-20/month    Status Migrainosus history - yes   Time of day of most headaches- anytime      Associated symptoms with the headache:   Meningeal symptoms - photophobia, phonophobia, exercise intolerance +   Nausea/vomitting +   Nasal drainage   Visual blurriness   Pallor/flushing  Dizziness   Vertigo  Confusion  Impaired concentration +   Pain worsened with physical activity  +   Neck pain +      Cluster headache symptoms: none   Symptoms of increased intracranial pressure: none  Basilar migraine symptoms: none      Headache Triggers:  Stress, anxiety, emotional overload   H/o SI      Treatment history:  Resolution of headache with sleep - yes   Meds tried:     Failed:   prozac 40   effexor 150   Gabapentin 300 mg   mobic 15   namenda   nurtec   trokemdi   Diamox   imitrex shots  lamictal      10/21/21  Care Timeline     0000    EKG 12-LEAD   1228    Arrived   1308    Comprehensive metabolic panel        CBC auto differential    1321    POCT glucose   1340    CT Head Without Contrast   1341    POCT urine pregnancy   1400    EKG 12-lead   1422    metoclopramide HCl 10 mg   1424    ketorolac tromethamine 30 mg       diphenhydramine HCl 25 mg   1425    0.9 % sodium chloride 1000 mL   1546    haloperidol lactate 2.5 mg   1553    magnesium sulfate in water 2 g   1826    prochlorperazine edisylate 5 mg   1827    diphenhydramine HCl 25 mg   2027    Discharged         Headache risk factors:   H/o TBI  - concussion at age 16   H/o Meningitis  - no   F/h Aneurysms  - no     Headache burden:   Missing college days         Review of  Systems   Constitutional: Negative.    HENT: Negative.    Eyes: Negative.    Respiratory: Negative.    Cardiovascular: Negative.    Gastrointestinal: Negative.    Endocrine: Negative.    Genitourinary: Negative.    Musculoskeletal: Negative.    Integumentary:  Negative.   Allergic/Immunologic: Negative.    Neurological: Positive for headaches.   Hematological: Negative.    Psychiatric/Behavioral: Positive for dysphoric mood and sleep disturbance.          Objective:   Physical Exam  Unchanged          Assessment:     Chr Migraine     Impression:   No major branch stenosis/occlusion at the [place].  No aneurysm.   No evidence of venous sinus thrombosis or venous sinus stenosis..     Electronically signed by: Armani Lopez  Date:                                            11/24/2021  Time:                                           16:24     Impression:   No major branch stenosis/occlusion at the [place].  No aneurysm.   No evidence of venous sinus thrombosis or venous sinus stenosis..     Electronically signed by: Armani Lopez  Date:                                            11/24/2021  Time:                                           16:24     Impression:   Normal pre and postcontrast MR imaging appearance of the brain.     Electronically signed by: Armani Lopez  Date:                                            11/24/2021  Time:                                           16:51  Plan:      Patient is in status migrainous. The etiology is multifactorial. Her headaches are  refractory to several medications. Her headaches are aggravated by untreated depression      VYEPTI 300 mg IV (sept 2022 -)   Increase Qulipta 30-> 60 mg daily, discussed side effect   Cont Lamictal, Effexor   Pt a candidate for BOTOX for chr migraine   Breakthrough headache - etodolac, zomig   Dexamethasone 4 mg BID x 3 days   Multiple alternative treatment options were offered to the patient  cont with psychiatrist   Phenergan 25 mg  TID PRN     The patient location is: home  The chief complaint leading to consultation is: headache    Visit type: audiovisual    Face to Face time with patient:20  20 minutes of total time spent on the encounter, which includes face to face time and non-face to face time preparing to see the patient (eg, review of tests), Obtaining and/or reviewing separately obtained history, Documenting clinical information in the electronic or other health record, Independently interpreting results (not separately reported) and communicating results to the patient/family/caregiver, or Care coordination (not separately reported).     Each patient to whom he or she provides medical services by telemedicine is:  (1) informed of the relationship between the physician and patient and the respective role of any other health care provider with respect to management of the patient; and (2) notified that he or she may decline to receive medical services by telemedicine and may withdraw from such care at any time.

## 2022-12-23 ENCOUNTER — PATIENT MESSAGE (OUTPATIENT)
Dept: NEUROLOGY | Facility: CLINIC | Age: 32
End: 2022-12-23
Payer: COMMERCIAL

## 2023-01-04 ENCOUNTER — OFFICE VISIT (OUTPATIENT)
Dept: PSYCHIATRY | Facility: CLINIC | Age: 33
End: 2023-01-04
Payer: COMMERCIAL

## 2023-01-04 DIAGNOSIS — F33.0 MILD EPISODE OF RECURRENT MAJOR DEPRESSIVE DISORDER: Primary | ICD-10-CM

## 2023-01-04 PROCEDURE — 90834 PSYTX W PT 45 MINUTES: CPT | Mod: 95,,, | Performed by: SOCIAL WORKER

## 2023-01-04 PROCEDURE — 90834 PR PSYCHOTHERAPY W/PATIENT, 45 MIN: ICD-10-PCS | Mod: 95,,, | Performed by: SOCIAL WORKER
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
## 2023-01-05 ENCOUNTER — PATIENT MESSAGE (OUTPATIENT)
Dept: PSYCHIATRY | Facility: CLINIC | Age: 33
End: 2023-01-05
Payer: COMMERCIAL

## 2023-01-11 ENCOUNTER — OFFICE VISIT (OUTPATIENT)
Dept: PSYCHIATRY | Facility: CLINIC | Age: 33
End: 2023-01-11
Payer: COMMERCIAL

## 2023-01-11 DIAGNOSIS — F33.0 MILD EPISODE OF RECURRENT MAJOR DEPRESSIVE DISORDER: Primary | ICD-10-CM

## 2023-01-11 PROCEDURE — 90832 PR PSYCHOTHERAPY W/PATIENT, 30 MIN: ICD-10-PCS | Mod: 95,,, | Performed by: SOCIAL WORKER

## 2023-01-11 PROCEDURE — 90832 PSYTX W PT 30 MINUTES: CPT | Mod: 95,,, | Performed by: SOCIAL WORKER
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
## 2023-01-17 ENCOUNTER — OFFICE VISIT (OUTPATIENT)
Dept: PSYCHIATRY | Facility: CLINIC | Age: 33
End: 2023-01-17
Payer: COMMERCIAL

## 2023-01-17 DIAGNOSIS — G47.00 INSOMNIA, UNSPECIFIED TYPE: ICD-10-CM

## 2023-01-17 DIAGNOSIS — F33.0 MILD EPISODE OF RECURRENT MAJOR DEPRESSIVE DISORDER: ICD-10-CM

## 2023-01-17 DIAGNOSIS — G43.E09 CHRONIC MIGRAINE WITH AURA: ICD-10-CM

## 2023-01-17 DIAGNOSIS — F41.1 GAD (GENERALIZED ANXIETY DISORDER): Primary | ICD-10-CM

## 2023-01-17 DIAGNOSIS — F44.5 PSYCHOGENIC NONEPILEPTIC SEIZURE: ICD-10-CM

## 2023-01-17 DIAGNOSIS — F44.7 FUNCTIONAL NEUROLOGICAL SYMPTOM DISORDER WITH MIXED SYMPTOMS: ICD-10-CM

## 2023-01-17 PROCEDURE — 1159F MED LIST DOCD IN RCRD: CPT | Mod: CPTII,95,, | Performed by: INTERNAL MEDICINE

## 2023-01-17 PROCEDURE — 99214 PR OFFICE/OUTPT VISIT, EST, LEVL IV, 30-39 MIN: ICD-10-PCS | Mod: 95,,, | Performed by: INTERNAL MEDICINE

## 2023-01-17 PROCEDURE — 99214 OFFICE O/P EST MOD 30 MIN: CPT | Mod: 95,,, | Performed by: INTERNAL MEDICINE

## 2023-01-17 PROCEDURE — 1160F RVW MEDS BY RX/DR IN RCRD: CPT | Mod: CPTII,95,, | Performed by: INTERNAL MEDICINE

## 2023-01-17 PROCEDURE — 1159F PR MEDICATION LIST DOCUMENTED IN MEDICAL RECORD: ICD-10-PCS | Mod: CPTII,95,, | Performed by: INTERNAL MEDICINE

## 2023-01-17 PROCEDURE — 1160F PR REVIEW ALL MEDS BY PRESCRIBER/CLIN PHARMACIST DOCUMENTED: ICD-10-PCS | Mod: CPTII,95,, | Performed by: INTERNAL MEDICINE

## 2023-01-17 RX ORDER — AMITRIPTYLINE HYDROCHLORIDE 25 MG/1
25 TABLET, FILM COATED ORAL NIGHTLY PRN
Qty: 30 TABLET | Refills: 2 | Status: SHIPPED | OUTPATIENT
Start: 2023-01-17 | End: 2023-03-31
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
## 2023-02-01 ENCOUNTER — PATIENT MESSAGE (OUTPATIENT)
Dept: PSYCHIATRY | Facility: CLINIC | Age: 33
End: 2023-02-01
Payer: COMMERCIAL

## 2023-02-02 NOTE — PROGRESS NOTES
Date consent signed: 12/01/2022     [employer] - REVIEW - Real-World Evidence and Insights into Experience with [employer]  IRB #: [id number]  Sponsor: [employer]  Sponsor's Protocol: REVIEW  Site Number: [facility]  Subject ID: [id number]     Informed Consent Process  Present for discussion: Rosalina Tomas     Prior to the Informed Consent Form (ICF) being signed, or any protocol required testing, procedure, or intervention being performed, the following was done or discussed:  Purpose of the Study, Qualifications to Participate: yes  Study Design, Schedule and Procedures: yes  Risks, Benefits, Alternative Treatments, Compensation and Costs: yes  Confidentiality and HIPAA Authorization for Release of Medical Records for the research trial/subject's right/study related injury: yes  Study related contact information: yes  Voluntary Participation and Withdrawal from the research trial at any time: yes  Patient has been offered the opportunity to ask questions regarding the study and all questions were answered satisfactorily: yes  CRC and PI contact information given to patient: yes  Signed copy given to patient: yes  Copy in patient's chart and original uploaded to Epic: yes     Subject able to adequately summarize: the purpose of the study, the risks associated with the study. Yes     Informed Consent was done by phone. The participant has acknowledged that he/she would like to continue with enrolling in study, and all questions have been answered.    Person Obtaining Consent:   Monica Quinones  Clinical Research Coordinator  Neuroscience Research Department  [facility] [address] [place] [address]  [email or web address]  [phone]

## 2023-02-05 ENCOUNTER — PATIENT MESSAGE (OUTPATIENT)
Dept: NEUROLOGY | Facility: CLINIC | Age: 33
End: 2023-02-05
Payer: COMMERCIAL

## 2023-02-07 ENCOUNTER — IMMUNIZATION (OUTPATIENT)
Dept: FAMILY MEDICINE | Facility: CLINIC | Age: 33
End: 2023-02-07
Payer: COMMERCIAL

## 2023-02-07 DIAGNOSIS — Z23 NEED FOR VACCINATION: Primary | ICD-10-CM

## 2023-02-07 PROCEDURE — 0124A COVID-19, MRNA, LNP-S, BIVALENT BOOSTER, PF, 30 MCG/0.3 ML DOSE: CPT | Mod: PBBFAC | Performed by: FAMILY MEDICINE

## 2023-02-07 PROCEDURE — 91312 COVID-19, MRNA, LNP-S, BIVALENT BOOSTER, PF, 30 MCG/0.3 ML DOSE: CPT | Mod: S$GLB,,, | Performed by: FAMILY MEDICINE

## 2023-02-07 PROCEDURE — 91312 COVID-19, MRNA, LNP-S, BIVALENT BOOSTER, PF, 30 MCG/0.3 ML DOSE: ICD-10-PCS | Mod: S$GLB,,, | Performed by: FAMILY MEDICINE

## 2023-02-09 ENCOUNTER — TELEPHONE (OUTPATIENT)
Dept: PSYCHIATRY | Facility: CLINIC | Age: 33
End: 2023-02-09
Payer: COMMERCIAL

## 2023-02-09 NOTE — TELEPHONE ENCOUNTER
Attempted to call pt x2 per Maria Luisa in regards to her continuous no shows & series of appts. 1st time pt picked up then hung up. Attempted to call a 2nd time, went to voicemail, unable to lvm due to vm being full.

## 2023-02-15 ENCOUNTER — OFFICE VISIT (OUTPATIENT)
Dept: PSYCHIATRY | Facility: CLINIC | Age: 33
End: 2023-02-15
Payer: COMMERCIAL

## 2023-02-15 DIAGNOSIS — F41.1 GAD (GENERALIZED ANXIETY DISORDER): ICD-10-CM

## 2023-02-15 DIAGNOSIS — F33.0 MILD EPISODE OF RECURRENT MAJOR DEPRESSIVE DISORDER: Primary | ICD-10-CM

## 2023-02-15 PROCEDURE — 90834 PR PSYCHOTHERAPY W/PATIENT, 45 MIN: ICD-10-PCS | Mod: 95,,, | Performed by: SOCIAL WORKER

## 2023-02-15 PROCEDURE — 90834 PSYTX W PT 45 MINUTES: CPT | Mod: 95,,, | Performed by: SOCIAL WORKER
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
## 2023-02-17 ENCOUNTER — OFFICE VISIT (OUTPATIENT)
Dept: PSYCHIATRY | Facility: CLINIC | Age: 33
End: 2023-02-17
Payer: COMMERCIAL

## 2023-02-17 DIAGNOSIS — F40.10 SOCIAL ANXIETY DISORDER: ICD-10-CM

## 2023-02-17 DIAGNOSIS — F44.5 PSYCHOGENIC NONEPILEPTIC SEIZURE: ICD-10-CM

## 2023-02-17 DIAGNOSIS — G47.00 INSOMNIA, UNSPECIFIED TYPE: ICD-10-CM

## 2023-02-17 DIAGNOSIS — F41.1 GAD (GENERALIZED ANXIETY DISORDER): Primary | ICD-10-CM

## 2023-02-17 DIAGNOSIS — F33.41 RECURRENT MAJOR DEPRESSIVE DISORDER, IN PARTIAL REMISSION: ICD-10-CM

## 2023-02-17 DIAGNOSIS — F44.7 FUNCTIONAL NEUROLOGICAL SYMPTOM DISORDER WITH MIXED SYMPTOMS: ICD-10-CM

## 2023-02-17 PROCEDURE — 99214 PR OFFICE/OUTPT VISIT, EST, LEVL IV, 30-39 MIN: ICD-10-PCS | Mod: 95,,, | Performed by: INTERNAL MEDICINE

## 2023-02-17 PROCEDURE — 1160F RVW MEDS BY RX/DR IN RCRD: CPT | Mod: CPTII,95,, | Performed by: INTERNAL MEDICINE

## 2023-02-17 PROCEDURE — 1159F MED LIST DOCD IN RCRD: CPT | Mod: CPTII,95,, | Performed by: INTERNAL MEDICINE

## 2023-02-17 PROCEDURE — 99214 OFFICE O/P EST MOD 30 MIN: CPT | Mod: 95,,, | Performed by: INTERNAL MEDICINE

## 2023-02-17 PROCEDURE — 1159F PR MEDICATION LIST DOCUMENTED IN MEDICAL RECORD: ICD-10-PCS | Mod: CPTII,95,, | Performed by: INTERNAL MEDICINE

## 2023-02-17 PROCEDURE — 1160F PR REVIEW ALL MEDS BY PRESCRIBER/CLIN PHARMACIST DOCUMENTED: ICD-10-PCS | Mod: CPTII,95,, | Performed by: INTERNAL MEDICINE
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
## 2023-02-26 ENCOUNTER — PATIENT MESSAGE (OUTPATIENT)
Dept: NEUROLOGY | Facility: CLINIC | Age: 33
End: 2023-02-26
Payer: COMMERCIAL

## 2023-02-28 ENCOUNTER — OFFICE VISIT (OUTPATIENT)
Dept: PSYCHIATRY | Facility: CLINIC | Age: 33
End: 2023-02-28
Payer: COMMERCIAL

## 2023-02-28 ENCOUNTER — PATIENT MESSAGE (OUTPATIENT)
Dept: PSYCHIATRY | Facility: CLINIC | Age: 33
End: 2023-02-28

## 2023-02-28 DIAGNOSIS — F41.1 GAD (GENERALIZED ANXIETY DISORDER): Primary | ICD-10-CM

## 2023-02-28 PROCEDURE — 90832 PSYTX W PT 30 MINUTES: CPT | Mod: 95,,, | Performed by: SOCIAL WORKER

## 2023-02-28 PROCEDURE — 90832 PR PSYCHOTHERAPY W/PATIENT, 30 MIN: ICD-10-PCS | Mod: 95,,, | Performed by: SOCIAL WORKER
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
## 2023-03-13 ENCOUNTER — OFFICE VISIT (OUTPATIENT)
Dept: PSYCHIATRY | Facility: CLINIC | Age: 33
End: 2023-03-13
Payer: COMMERCIAL

## 2023-03-13 VITALS
HEART RATE: 113 BPM | WEIGHT: 205.38 LBS | BODY MASS INDEX: 33.15 KG/M2 | SYSTOLIC BLOOD PRESSURE: 127 MMHG | DIASTOLIC BLOOD PRESSURE: 82 MMHG

## 2023-03-13 DIAGNOSIS — F33.41 RECURRENT MAJOR DEPRESSIVE DISORDER, IN PARTIAL REMISSION: ICD-10-CM

## 2023-03-13 DIAGNOSIS — G43.E09 CHRONIC MIGRAINE WITH AURA: ICD-10-CM

## 2023-03-13 DIAGNOSIS — F40.10 SOCIAL ANXIETY DISORDER: ICD-10-CM

## 2023-03-13 DIAGNOSIS — F44.7 FUNCTIONAL NEUROLOGICAL SYMPTOM DISORDER WITH MIXED SYMPTOMS: ICD-10-CM

## 2023-03-13 DIAGNOSIS — F44.5 PSYCHOGENIC NONEPILEPTIC SEIZURE: ICD-10-CM

## 2023-03-13 DIAGNOSIS — F41.1 GAD (GENERALIZED ANXIETY DISORDER): Primary | ICD-10-CM

## 2023-03-13 DIAGNOSIS — G47.00 INSOMNIA, UNSPECIFIED TYPE: ICD-10-CM

## 2023-03-13 PROCEDURE — 99214 PR OFFICE/OUTPT VISIT, EST, LEVL IV, 30-39 MIN: ICD-10-PCS | Mod: S$GLB,,, | Performed by: INTERNAL MEDICINE

## 2023-03-13 PROCEDURE — 1160F RVW MEDS BY RX/DR IN RCRD: CPT | Mod: CPTII,S$GLB,, | Performed by: INTERNAL MEDICINE

## 2023-03-13 PROCEDURE — 99214 OFFICE O/P EST MOD 30 MIN: CPT | Mod: S$GLB,,, | Performed by: INTERNAL MEDICINE

## 2023-03-13 PROCEDURE — 99999 PR PBB SHADOW E&M-EST. PATIENT-LVL II: CPT | Mod: PBBFAC,,, | Performed by: INTERNAL MEDICINE

## 2023-03-13 PROCEDURE — 3008F BODY MASS INDEX DOCD: CPT | Mod: CPTII,S$GLB,, | Performed by: INTERNAL MEDICINE

## 2023-03-13 PROCEDURE — 99999 PR PBB SHADOW E&M-EST. PATIENT-LVL II: ICD-10-PCS | Mod: PBBFAC,,, | Performed by: INTERNAL MEDICINE

## 2023-03-13 PROCEDURE — 3074F SYST BP LT 130 MM HG: CPT | Mod: CPTII,S$GLB,, | Performed by: INTERNAL MEDICINE

## 2023-03-13 PROCEDURE — 3079F PR MOST RECENT DIASTOLIC BLOOD PRESSURE 80-89 MM HG: ICD-10-PCS | Mod: CPTII,S$GLB,, | Performed by: INTERNAL MEDICINE

## 2023-03-13 PROCEDURE — 1159F MED LIST DOCD IN RCRD: CPT | Mod: CPTII,S$GLB,, | Performed by: INTERNAL MEDICINE

## 2023-03-13 PROCEDURE — 1160F PR REVIEW ALL MEDS BY PRESCRIBER/CLIN PHARMACIST DOCUMENTED: ICD-10-PCS | Mod: CPTII,S$GLB,, | Performed by: INTERNAL MEDICINE

## 2023-03-13 PROCEDURE — 3074F PR MOST RECENT SYSTOLIC BLOOD PRESSURE < 130 MM HG: ICD-10-PCS | Mod: CPTII,S$GLB,, | Performed by: INTERNAL MEDICINE

## 2023-03-13 PROCEDURE — 3008F PR BODY MASS INDEX (BMI) DOCUMENTED: ICD-10-PCS | Mod: CPTII,S$GLB,, | Performed by: INTERNAL MEDICINE

## 2023-03-13 PROCEDURE — 3079F DIAST BP 80-89 MM HG: CPT | Mod: CPTII,S$GLB,, | Performed by: INTERNAL MEDICINE

## 2023-03-13 PROCEDURE — 1159F PR MEDICATION LIST DOCUMENTED IN MEDICAL RECORD: ICD-10-PCS | Mod: CPTII,S$GLB,, | Performed by: INTERNAL MEDICINE
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
## 2023-03-15 ENCOUNTER — PATIENT MESSAGE (OUTPATIENT)
Dept: PSYCHIATRY | Facility: CLINIC | Age: 33
End: 2023-03-15
Payer: COMMERCIAL

## 2023-03-16 ENCOUNTER — OFFICE VISIT (OUTPATIENT)
Dept: PSYCHIATRY | Facility: CLINIC | Age: 33
End: 2023-03-16
Payer: COMMERCIAL

## 2023-03-16 DIAGNOSIS — F41.1 GAD (GENERALIZED ANXIETY DISORDER): Primary | ICD-10-CM

## 2023-03-16 PROCEDURE — 90834 PR PSYCHOTHERAPY W/PATIENT, 45 MIN: ICD-10-PCS | Mod: 95,,, | Performed by: SOCIAL WORKER

## 2023-03-16 PROCEDURE — 90834 PSYTX W PT 45 MINUTES: CPT | Mod: 95,,, | Performed by: SOCIAL WORKER
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
## 2023-03-19 RX ORDER — VENLAFAXINE HYDROCHLORIDE 150 MG/1
150 CAPSULE, EXTENDED RELEASE ORAL DAILY
Qty: 30 CAPSULE | Refills: 11 | Status: SHIPPED | OUTPATIENT
Start: 2023-03-19 | End: 2024-03-27 | Stop reason: SDUPTHER

## 2023-03-19 RX ORDER — VENLAFAXINE HYDROCHLORIDE 75 MG/1
75 CAPSULE, EXTENDED RELEASE ORAL DAILY
Qty: 30 CAPSULE | Refills: 11 | Status: SHIPPED | OUTPATIENT
Start: 2023-03-19 | End: 2024-03-27 | Stop reason: SDUPTHER

## 2023-03-31 ENCOUNTER — OFFICE VISIT (OUTPATIENT)
Dept: NEUROLOGY | Facility: CLINIC | Age: 33
End: 2023-03-31
Payer: COMMERCIAL

## 2023-03-31 VITALS
SYSTOLIC BLOOD PRESSURE: 139 MMHG | HEART RATE: 88 BPM | WEIGHT: 200.63 LBS | HEIGHT: 66 IN | BODY MASS INDEX: 32.24 KG/M2 | DIASTOLIC BLOOD PRESSURE: 97 MMHG

## 2023-03-31 DIAGNOSIS — F44.7 FUNCTIONAL NEUROLOGICAL SYMPTOM DISORDER WITH MIXED SYMPTOMS: ICD-10-CM

## 2023-03-31 DIAGNOSIS — G47.00 INSOMNIA, UNSPECIFIED TYPE: ICD-10-CM

## 2023-03-31 DIAGNOSIS — G43.701 CHRONIC MIGRAINE W/O AURA, NOT INTRACTABLE, W STAT MIGR: ICD-10-CM

## 2023-03-31 DIAGNOSIS — G43.E09 CHRONIC MIGRAINE WITH AURA: ICD-10-CM

## 2023-03-31 DIAGNOSIS — G43.001 MIGRAINE WITHOUT AURA AND WITH STATUS MIGRAINOSUS, NOT INTRACTABLE: Primary | ICD-10-CM

## 2023-03-31 DIAGNOSIS — F33.0 MILD EPISODE OF RECURRENT MAJOR DEPRESSIVE DISORDER: ICD-10-CM

## 2023-03-31 DIAGNOSIS — F41.1 GAD (GENERALIZED ANXIETY DISORDER): ICD-10-CM

## 2023-03-31 PROCEDURE — 1159F PR MEDICATION LIST DOCUMENTED IN MEDICAL RECORD: ICD-10-PCS | Mod: CPTII,S$GLB,, | Performed by: PSYCHIATRY & NEUROLOGY

## 2023-03-31 PROCEDURE — 3008F PR BODY MASS INDEX (BMI) DOCUMENTED: ICD-10-PCS | Mod: CPTII,S$GLB,, | Performed by: PSYCHIATRY & NEUROLOGY

## 2023-03-31 PROCEDURE — 99214 OFFICE O/P EST MOD 30 MIN: CPT | Mod: S$GLB,,, | Performed by: PSYCHIATRY & NEUROLOGY

## 2023-03-31 PROCEDURE — 3008F BODY MASS INDEX DOCD: CPT | Mod: CPTII,S$GLB,, | Performed by: PSYCHIATRY & NEUROLOGY

## 2023-03-31 PROCEDURE — 99999 PR PBB SHADOW E&M-EST. PATIENT-LVL IV: CPT | Mod: PBBFAC,,, | Performed by: PSYCHIATRY & NEUROLOGY

## 2023-03-31 PROCEDURE — 99999 PR PBB SHADOW E&M-EST. PATIENT-LVL IV: ICD-10-PCS | Mod: PBBFAC,,, | Performed by: PSYCHIATRY & NEUROLOGY

## 2023-03-31 PROCEDURE — 3075F SYST BP GE 130 - 139MM HG: CPT | Mod: CPTII,S$GLB,, | Performed by: PSYCHIATRY & NEUROLOGY

## 2023-03-31 PROCEDURE — 99214 PR OFFICE/OUTPT VISIT, EST, LEVL IV, 30-39 MIN: ICD-10-PCS | Mod: S$GLB,,, | Performed by: PSYCHIATRY & NEUROLOGY

## 2023-03-31 PROCEDURE — 3080F PR MOST RECENT DIASTOLIC BLOOD PRESSURE >= 90 MM HG: ICD-10-PCS | Mod: CPTII,S$GLB,, | Performed by: PSYCHIATRY & NEUROLOGY

## 2023-03-31 PROCEDURE — 1160F PR REVIEW ALL MEDS BY PRESCRIBER/CLIN PHARMACIST DOCUMENTED: ICD-10-PCS | Mod: CPTII,S$GLB,, | Performed by: PSYCHIATRY & NEUROLOGY

## 2023-03-31 PROCEDURE — 3075F PR MOST RECENT SYSTOLIC BLOOD PRESS GE 130-139MM HG: ICD-10-PCS | Mod: CPTII,S$GLB,, | Performed by: PSYCHIATRY & NEUROLOGY

## 2023-03-31 PROCEDURE — 3080F DIAST BP >= 90 MM HG: CPT | Mod: CPTII,S$GLB,, | Performed by: PSYCHIATRY & NEUROLOGY

## 2023-03-31 PROCEDURE — 1160F RVW MEDS BY RX/DR IN RCRD: CPT | Mod: CPTII,S$GLB,, | Performed by: PSYCHIATRY & NEUROLOGY

## 2023-03-31 PROCEDURE — 1159F MED LIST DOCD IN RCRD: CPT | Mod: CPTII,S$GLB,, | Performed by: PSYCHIATRY & NEUROLOGY

## 2023-03-31 RX ORDER — AMITRIPTYLINE HYDROCHLORIDE 25 MG/1
50 TABLET, FILM COATED ORAL NIGHTLY
Qty: 30 TABLET | Refills: 2 | Status: SHIPPED | OUTPATIENT
Start: 2023-03-31 | End: 2023-04-24

## 2023-03-31 NOTE — PROGRESS NOTES
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   Still has freq HAs     Headache history:   Age of onset -  5   Location - occipital, temples   Nature of pain -  Stabbing, aching   Prodrome - no   Aura -  No   Duration of headache - > 4 hrs   Time to peak intensity - n/a   Pain scale - range of intensity -  9-10/10  Frequency -  15-20/month    Status Migrainosus history - yes   Time of day of most headaches- anytime      Associated symptoms with the headache:   Meningeal symptoms - photophobia, phonophobia, exercise intolerance +   Nausea/vomitting +   Nasal drainage   Visual blurriness   Pallor/flushing  Dizziness   Vertigo  Confusion  Impaired concentration +   Pain worsened with physical activity  +   Neck pain +      Cluster headache symptoms: none   Symptoms of increased intracranial pressure: none  Basilar migraine symptoms: none      Headache Triggers:  Stress, anxiety, emotional overload   H/o SI      Treatment history:  Resolution of headache with sleep - yes   Meds tried:     Failed:   prozac 40   effexor 150   Gabapentin 300 mg   mobic 15   namenda   nurtec   trokemdi   Diamox   imitrex shots  lamictal      10/21/21  Care Timeline     0000    EKG 12-LEAD   1228    Arrived   1308    Comprehensive metabolic panel        CBC auto differential    1321    POCT glucose   1340    CT Head Without Contrast   1341    POCT urine pregnancy   1400    EKG 12-lead   1422    metoclopramide HCl 10 mg   1424    ketorolac tromethamine 30 mg       diphenhydramine HCl 25 mg   1425    0.9 % sodium chloride 1000 mL   1546    haloperidol lactate 2.5 mg   1553    magnesium  sulfate in water 2 g   1826    prochlorperazine edisylate 5 mg   1827    diphenhydramine HCl 25 mg   2027    Discharged         Headache risk factors:   H/o TBI  - concussion at age 16   H/o Meningitis  - no   F/h Aneurysms  - no      Review of Systems   Constitutional: Negative.    HENT: Negative.    Eyes: Negative.    Respiratory: Negative.    Cardiovascular: Negative.    Gastrointestinal: Negative.    Endocrine: Negative.    Genitourinary: Negative.    Musculoskeletal: Negative.    Integumentary:  Negative.   Allergic/Immunologic: Negative.    Neurological: Positive for headaches.   Hematological: Negative.    Psychiatric/Behavioral: Positive for dysphoric mood and sleep disturbance.          Objective:   Physical Exam  Unchanged          Assessment:     Chr Migraine     Impression:   No major branch stenosis/occlusion at the [place].  No aneurysm.   No evidence of venous sinus thrombosis or venous sinus stenosis..     Electronically signed by: Armani Lopez  Date:                                            11/24/2021  Time:                                           16:24     Impression:   No major branch stenosis/occlusion at the [place].  No aneurysm.   No evidence of venous sinus thrombosis or venous sinus stenosis..     Electronically signed by: Armani Lopez  Date:                                            11/24/2021  Time:                                           16:24     Impression:   Normal pre and postcontrast MR imaging appearance of the brain.     Electronically signed by: Armani Lopez  Date:                                            11/24/2021  Time:                                           16:51  Plan:       VYEPTI 300 mg IV (sept 2022 -)   Cont Qulipta 60 mg daily, discussed side effect   Pt a candidate for BOTOX for chr migraine   Breakthrough headache - etodolac, zomig   Dexamethasone 4 mg BID x 3 days   Multiple alternative treatment options were offered to the patient  cont  with psychiatrist   Increase  Elavil 25-> 50 mg qHS   Continue Effexor 225mg daily, Buspar 15mg TID, and Klonopin 0.25-0.5mg daily PRN anxiety.

## 2023-04-03 ENCOUNTER — TELEPHONE (OUTPATIENT)
Dept: NEUROLOGY | Facility: CLINIC | Age: 33
End: 2023-04-03
Payer: COMMERCIAL

## 2023-04-03 NOTE — TELEPHONE ENCOUNTER
Called patient to schedule first botox appointment with Dr. Dorsey on Thursday June 29th at 2:00 pm.

## 2023-04-19 ENCOUNTER — PATIENT MESSAGE (OUTPATIENT)
Dept: RESEARCH | Facility: HOSPITAL | Age: 33
End: 2023-04-19
Payer: COMMERCIAL

## 2023-04-19 ENCOUNTER — OFFICE VISIT (OUTPATIENT)
Dept: PSYCHIATRY | Facility: CLINIC | Age: 33
End: 2023-04-19
Payer: COMMERCIAL

## 2023-04-19 DIAGNOSIS — F41.1 GAD (GENERALIZED ANXIETY DISORDER): Primary | ICD-10-CM

## 2023-04-19 DIAGNOSIS — F33.0 MILD EPISODE OF RECURRENT MAJOR DEPRESSIVE DISORDER: ICD-10-CM

## 2023-04-19 DIAGNOSIS — F44.7 FUNCTIONAL NEUROLOGICAL SYMPTOM DISORDER WITH MIXED SYMPTOMS: ICD-10-CM

## 2023-04-19 DIAGNOSIS — F40.10 SOCIAL ANXIETY DISORDER: ICD-10-CM

## 2023-04-19 DIAGNOSIS — G47.00 INSOMNIA, UNSPECIFIED TYPE: ICD-10-CM

## 2023-04-19 DIAGNOSIS — F44.5 PSYCHOGENIC NONEPILEPTIC SEIZURE: ICD-10-CM

## 2023-04-19 PROCEDURE — 1160F PR REVIEW ALL MEDS BY PRESCRIBER/CLIN PHARMACIST DOCUMENTED: ICD-10-PCS | Mod: CPTII,95,, | Performed by: INTERNAL MEDICINE

## 2023-04-19 PROCEDURE — 1159F MED LIST DOCD IN RCRD: CPT | Mod: CPTII,95,, | Performed by: INTERNAL MEDICINE

## 2023-04-19 PROCEDURE — 99214 PR OFFICE/OUTPT VISIT, EST, LEVL IV, 30-39 MIN: ICD-10-PCS | Mod: 95,,, | Performed by: INTERNAL MEDICINE

## 2023-04-19 PROCEDURE — 99214 OFFICE O/P EST MOD 30 MIN: CPT | Mod: 95,,, | Performed by: INTERNAL MEDICINE

## 2023-04-19 PROCEDURE — 1160F RVW MEDS BY RX/DR IN RCRD: CPT | Mod: CPTII,95,, | Performed by: INTERNAL MEDICINE

## 2023-04-19 PROCEDURE — 1159F PR MEDICATION LIST DOCUMENTED IN MEDICAL RECORD: ICD-10-PCS | Mod: CPTII,95,, | Performed by: INTERNAL MEDICINE
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
## 2023-05-03 ENCOUNTER — OFFICE VISIT (OUTPATIENT)
Dept: PSYCHIATRY | Facility: CLINIC | Age: 33
End: 2023-05-03
Payer: COMMERCIAL

## 2023-05-03 DIAGNOSIS — F41.1 GAD (GENERALIZED ANXIETY DISORDER): Primary | ICD-10-CM

## 2023-05-03 PROCEDURE — 90837 PR PSYCHOTHERAPY W/PATIENT, 60 MIN: ICD-10-PCS | Mod: 95,,, | Performed by: SOCIAL WORKER

## 2023-05-03 PROCEDURE — 90837 PSYTX W PT 60 MINUTES: CPT | Mod: 95,,, | Performed by: SOCIAL WORKER
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
## 2023-05-10 DIAGNOSIS — G43.E09 CHRONIC MIGRAINE WITH AURA: ICD-10-CM

## 2023-05-10 DIAGNOSIS — F41.1 GAD (GENERALIZED ANXIETY DISORDER): ICD-10-CM

## 2023-05-10 DIAGNOSIS — G47.00 INSOMNIA, UNSPECIFIED TYPE: ICD-10-CM

## 2023-05-10 DIAGNOSIS — F33.0 MILD EPISODE OF RECURRENT MAJOR DEPRESSIVE DISORDER: ICD-10-CM

## 2023-05-10 RX ORDER — AMITRIPTYLINE HYDROCHLORIDE 25 MG/1
50 TABLET, FILM COATED ORAL NIGHTLY
Qty: 60 TABLET | Refills: 2 | Status: SHIPPED | OUTPATIENT
Start: 2023-05-10 | End: 2023-07-14 | Stop reason: ALTCHOICE

## 2023-05-23 ENCOUNTER — OFFICE VISIT (OUTPATIENT)
Dept: PSYCHIATRY | Facility: CLINIC | Age: 33
End: 2023-05-23
Payer: COMMERCIAL

## 2023-05-23 DIAGNOSIS — G47.00 INSOMNIA, UNSPECIFIED TYPE: ICD-10-CM

## 2023-05-23 DIAGNOSIS — F44.7 FUNCTIONAL NEUROLOGICAL SYMPTOM DISORDER WITH MIXED SYMPTOMS: ICD-10-CM

## 2023-05-23 DIAGNOSIS — F41.1 GAD (GENERALIZED ANXIETY DISORDER): Primary | ICD-10-CM

## 2023-05-23 DIAGNOSIS — F33.0 MILD EPISODE OF RECURRENT MAJOR DEPRESSIVE DISORDER: ICD-10-CM

## 2023-05-23 DIAGNOSIS — F40.10 SOCIAL ANXIETY DISORDER: ICD-10-CM

## 2023-05-23 PROCEDURE — 99214 PR OFFICE/OUTPT VISIT, EST, LEVL IV, 30-39 MIN: ICD-10-PCS | Mod: 95,,, | Performed by: INTERNAL MEDICINE

## 2023-05-23 PROCEDURE — 1159F MED LIST DOCD IN RCRD: CPT | Mod: CPTII,95,, | Performed by: INTERNAL MEDICINE

## 2023-05-23 PROCEDURE — 1160F RVW MEDS BY RX/DR IN RCRD: CPT | Mod: CPTII,95,, | Performed by: INTERNAL MEDICINE

## 2023-05-23 PROCEDURE — 1160F PR REVIEW ALL MEDS BY PRESCRIBER/CLIN PHARMACIST DOCUMENTED: ICD-10-PCS | Mod: CPTII,95,, | Performed by: INTERNAL MEDICINE

## 2023-05-23 PROCEDURE — 99214 OFFICE O/P EST MOD 30 MIN: CPT | Mod: 95,,, | Performed by: INTERNAL MEDICINE

## 2023-05-23 PROCEDURE — 1159F PR MEDICATION LIST DOCUMENTED IN MEDICAL RECORD: ICD-10-PCS | Mod: CPTII,95,, | Performed by: INTERNAL MEDICINE

## 2023-05-23 NOTE — PROGRESS NOTES
OUTPATIENT PSYCHIATRY RETURN VISIT    ENCOUNTER DATE:  6/9/2023  SITE:  [facility]  LENGTH OF SESSION:  20 minutes    The patient location is:  [place], not in a healthcare facility  Visit type:  audiovisual    Face to Face time with patient:  20 minutes  25 minutes of total time spent on the encounter, which includes face to face time and non-face to face time preparing to see the patient (eg, review of tests), Obtaining and/or reviewing separately obtained history, Documenting clinical information in the electronic or other health record, Independently interpreting results (not separately reported) and communicating results to the patient/family/caregiver, or Care coordination (not separately reported).     Each patient to whom he or she provides medical services by telemedicine is:  (1) informed of the relationship between the physician and patient and the respective role of any other health care provider with respect to management of the patient; and (2) notified that he or she may decline to receive medical services by telemedicine and may withdraw from such care at any time.    CHIEF COMPLAINT:  Anxiety      HISTORY OF PRESENTING ILLNESS:  Rosalina Saavedra is a 33 y.o. female with history of MDD, JESUS, and Social Anxiety Disorder who presents for follow up appointment.      Plan at last appointment on 4/19/2023:  Symptoms relatively stable other than situational stress.  Continue Effexor 225mg daily, Buspar 15mg TID, Elavil 25mg qHS, and Klonopin 0.25-0.5mg daily PRN anxiety (currently taking every morning due to significant work stress).    Discussed with patient informed consent, risks versus benefits, alternative treatments, side effect profile and the inherent unpredictability of individual responses to these treatments.  The patient expresses understanding of the above and displays the capacity to agree with this current plan.  Continue individual psychotherapy with Maria Luisa  TAE Hammond.    History as told by patient:  Last day of school is Thursday.  Not sure if she will stay.  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  She would need to talk to her boss about this.  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  The main thing is the Administration and she gets along well with current Administration.  Patient admits she is not where she wants to be.  A lot of the stress is work.  But its also life stress.  She has had some depression and some seizures recently.  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  Can't afford to leave her parents but she would like to, so that is a stressor.  Being an adult child who lives at home causes stress.  Seeing Maria Luisa regularly for therapy.  Health-wise she is also not used to being this heavy.  She thinks it is mostly from depression - she is an emotional eater.  Will try to be good and then binge on sweets and other unhealthy foods.  This happens weekly.  Used to do high intensity exercise but body can't handle it right now.  Would love to get back to [employer] [employer].      Medication side effects:  Denies  Medication compliance:  Yes    PSYCHIATRIC REVIEW OF SYSTEMS:  Trouble with sleep:  Improved on Elavil  Appetite changes:  Denies  Weight changes:  Gain as above  Lack of energy:  Sometimes  Anhedonia:  Sometimes  Somatic symptoms:  Seizures, migraines  Libido:  Not discussed  Anxiety/panic:  Yes  Guilty/hopeless:  Denies  Self-injurious behavior/risky behavior:  Denies  Any drugs:  Denies  Alcohol:  Denies     MEDICAL REVIEW OF SYSTEMS:  Complete review of systems performed covering Constitutional, Musculoskeletal, Neurologic.  All systems negative except for that covered in HPI.    PAST PSYCHIATRIC, MEDICAL, AND SOCIAL HISTORY REVIEWED  The patient's past medical, family and social history have been reviewed and updated as appropriate within the electronic medical record - see encounter  notes.    MEDICATIONS:    Current Outpatient Medications:     amitriptyline (ELAVIL) 25 MG tablet, TAKE 2 TABLETS (50 MG TOTAL) BY MOUTH EVERY EVENING, Disp: 60 tablet, Rfl: 2    atogepant (QULIPTA) 30 mg Tab, Take 30 mg by mouth once daily. (Patient not taking: Reported on 3/31/2023), Disp: 30 tablet, Rfl: 12    atogepant (QULIPTA) 60 mg Tab, Take 60 mg by mouth once daily., Disp: 30 tablet, Rfl: 6    busPIRone (BUSPAR) 15 MG tablet, Take 1 tablet (15 mg total) by mouth 3 (three) times daily. (Patient taking differently: Take 30 mg by mouth 2 (two) times daily.), Disp: 90 tablet, Rfl: 5    clonazePAM (KLONOPIN) 0.5 MG tablet, TAKE 1 TABLET BY MOUTH EVERY EVENING BEFORE BED. YOU CAN TAKE AN ADDITION 1/2-1 TABLET AS NEEDED DURING THE DAY FOR PANIC ATTACK., Disp: 60 tablet, Rfl: 1    etodolac (LODINE) 500 MG tablet, Take 1 tablet (500 mg total) by mouth 2 (two) times daily as needed (migraine)., Disp: 20 tablet, Rfl: 12    ibuprofen (ADVIL,MOTRIN) 800 MG tablet, Take 1 tablet (800 mg total) by mouth 3 (three) times daily as needed for Pain., Disp: 90 tablet, Rfl: 2    promethazine (PHENERGAN) 25 MG tablet, Take 1 tablet (25 mg total) by mouth every 6 (six) hours as needed for Nausea., Disp: 40 tablet, Rfl: 12    rimegepant (NURTEC) 75 mg odt, Take 75 mg by mouth once as needed for Migraine. Place ODT tablet on the tongue; alternatively the ODT tablet may be placed under the tongue, Disp: , Rfl:     sumatriptan (IMITREX STATDOSE) 6 mg/0.5 mL kit, Inject 6 mg into the skin every 2 (two) hours as needed for Migraine., Disp: , Rfl:     venlafaxine (EFFEXOR-XR) 150 MG Cp24, Take 1 capsule (150 mg total) by mouth once daily. Take with 75mg capsule to equal 225mg daily., Disp: 30 capsule, Rfl: 11    venlafaxine (EFFEXOR-XR) 75 MG 24 hr capsule, Take 1 capsule (75 mg total) by mouth once daily. Take with 150mg capsule to equal 225mg daily., Disp: 30 capsule, Rfl: 11    ZOLMitriptan (ZOMIG-ZMT) 5 MG disintegrating tablet, TAKE  "1 TABLET BY MOUTH AS NEEDED FOR MIGRAINE., Disp: 9 tablet, Rfl: 12    Current Facility-Administered Medications:     onabotulinumtoxina injection 200 Units, 200 Units, Intramuscular, 1 time in Clinic/HOD, Geoff Dorsey MD    ALLERGIES:  Review of patient's allergies indicates:   Allergen Reactions    Codeine Itching    Morphine Itching       PSYCHIATRIC EXAM:  There were no vitals filed for this visit.    Appearance:  Well groomed, appearing healthy and of stated age  Behavior:  Cooperative, pleasant, no psychomotor agitation or retardation  Speech:  Normal rate, rhythm, prosody, and volume  Mood:  "Ok"  Affect:  Euthymic  Thought Process:  Linear, logical, goal directed  Thought Content:  Negative for suicidal ideation, homicidal ideation, delusions or hallucinations.  Associations:  Intact  Memory:  Grossly Intact  Level of Consciousness/Orientation:  Grossly intact  Fund of Knowledge:  Good  Attention:  Good  Language:  Fluent, able to name abstract and concrete objects  Insight:  Good  Judgment:  Intact  Psychomotor signs:  No abnormal movements of face  Gait:  Unable to assess via virtual visit      RELEVANT LABS/STUDIES:    Lab Results   Component Value Date    WBC 5.94 07/28/2022    HGB 11.6 (L) 07/28/2022    HCT 36.5 (L) 07/28/2022    MCV 91 07/28/2022     07/28/2022     BMP  Lab Results   Component Value Date     07/28/2022    K 4.1 07/28/2022     07/28/2022    CO2 20 (L) 07/28/2022    BUN 9 07/28/2022    CREATININE 0.9 07/28/2022    CALCIUM 9.2 07/28/2022    ANIONGAP 10 07/28/2022    ESTGFRAFRICA >60.0 07/28/2022    EGFRNONAA >60.0 07/28/2022     Lab Results   Component Value Date    ALT 12 07/28/2022    AST 15 07/28/2022    ALKPHOS 86 07/28/2022    BILITOT 0.3 07/28/2022     Lab Results   Component Value Date    TSH 0.524 03/26/2021     Lab Results   Component Value Date    HGBA1C 5.0 03/26/2021       IMPRESSION:    Rosalina Saavedra is a 33 y.o. female with history of MDD, JESUS, and " Social Anxiety Disorder who presents for follow up appointment.    Status/Progress:  Based on the examination today, the patient's problem(s) is/are inadequately controlled.  New problems have not been presented today.    Risk Parameters:  Patient reports no suicidal ideation  Patient reports no homicidal ideation  Patient reports no self-injurious behavior  Patient reports no violent behavior      DIAGNOSES:    ICD-10-CM ICD-9-CM   1. JESUS (generalized anxiety disorder)  F41.1 300.02   2. Functional neurological symptom disorder with mixed symptoms  F44.7 300.11   3. Social anxiety disorder  F40.10 300.23   4. Mild episode of recurrent major depressive disorder  F33.0 296.31   5. Insomnia, unspecified type  G47.00 780.52       PLAN:  Discussed recommendation for weekly therapy and working towards setting small goals.  She plans to reach out to [facility].    Continue Effexor 225mg daily, Buspar 15mg TID, Elavil 25mg qHS, and Klonopin 0.25-0.5mg daily PRN anxiety (currently taking every morning due to significant work stress).    Discussed with patient informed consent, risks versus benefits, alternative treatments, side effect profile and the inherent unpredictability of individual responses to these treatments.  The patient expresses understanding of the above and displays the capacity to agree with this current plan.  Continue individual psychotherapy with Maria Luisa Hammond LCSW.    RETURN TO CLINIC:  Follow up in about 4 weeks (around 6/20/2023).

## 2023-05-29 ENCOUNTER — PATIENT MESSAGE (OUTPATIENT)
Dept: PSYCHIATRY | Facility: CLINIC | Age: 33
End: 2023-05-29
Payer: COMMERCIAL

## 2023-05-29 ENCOUNTER — OFFICE VISIT (OUTPATIENT)
Dept: PSYCHIATRY | Facility: CLINIC | Age: 33
End: 2023-05-29
Payer: COMMERCIAL

## 2023-05-29 DIAGNOSIS — F41.1 GAD (GENERALIZED ANXIETY DISORDER): Primary | ICD-10-CM

## 2023-05-29 PROCEDURE — 90785 PSYTX COMPLEX INTERACTIVE: CPT | Mod: 95,,, | Performed by: SOCIAL WORKER

## 2023-05-29 PROCEDURE — 90785 PR INTERACTIVE COMPLEXITY: ICD-10-PCS | Mod: 95,,, | Performed by: SOCIAL WORKER

## 2023-05-29 PROCEDURE — 90832 PR PSYCHOTHERAPY W/PATIENT, 30 MIN: ICD-10-PCS | Mod: 95,,, | Performed by: SOCIAL WORKER

## 2023-05-29 PROCEDURE — 90832 PSYTX W PT 30 MINUTES: CPT | Mod: 95,,, | Performed by: SOCIAL WORKER
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
## 2023-06-08 ENCOUNTER — TELEPHONE (OUTPATIENT)
Dept: NEUROLOGY | Facility: CLINIC | Age: 33
End: 2023-06-08
Payer: COMMERCIAL

## 2023-06-12 ENCOUNTER — OFFICE VISIT (OUTPATIENT)
Dept: PSYCHIATRY | Facility: CLINIC | Age: 33
End: 2023-06-12
Payer: COMMERCIAL

## 2023-06-12 DIAGNOSIS — F41.1 GAD (GENERALIZED ANXIETY DISORDER): Primary | ICD-10-CM

## 2023-06-12 PROCEDURE — 90837 PSYTX W PT 60 MINUTES: CPT | Mod: 95,,, | Performed by: SOCIAL WORKER

## 2023-06-12 PROCEDURE — 90837 PR PSYCHOTHERAPY W/PATIENT, 60 MIN: ICD-10-PCS | Mod: 95,,, | Performed by: SOCIAL WORKER
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
## 2023-06-26 ENCOUNTER — PROCEDURE VISIT (OUTPATIENT)
Dept: NEUROLOGY | Facility: CLINIC | Age: 33
End: 2023-06-26
Payer: COMMERCIAL

## 2023-06-26 VITALS
HEIGHT: 66 IN | HEART RATE: 88 BPM | BODY MASS INDEX: 34.18 KG/M2 | SYSTOLIC BLOOD PRESSURE: 134 MMHG | DIASTOLIC BLOOD PRESSURE: 91 MMHG

## 2023-06-26 DIAGNOSIS — G43.001 MIGRAINE WITHOUT AURA AND WITH STATUS MIGRAINOSUS, NOT INTRACTABLE: Primary | ICD-10-CM

## 2023-06-26 DIAGNOSIS — G43.701 CHRONIC MIGRAINE W/O AURA, NOT INTRACTABLE, W STAT MIGR: ICD-10-CM

## 2023-06-26 PROCEDURE — 64615 CHEMODENERV MUSC MIGRAINE: CPT | Mod: S$GLB,,, | Performed by: PSYCHIATRY & NEUROLOGY

## 2023-06-26 PROCEDURE — 64615 PR CHEMODENERVATION OF MUSCLE FOR CHRONIC MIGRAINE: ICD-10-PCS | Mod: S$GLB,,, | Performed by: PSYCHIATRY & NEUROLOGY

## 2023-06-26 RX ORDER — NORTRIPTYLINE HYDROCHLORIDE 10 MG/1
20 CAPSULE ORAL NIGHTLY
Qty: 60 CAPSULE | Refills: 11 | Status: SHIPPED | OUTPATIENT
Start: 2023-06-26 | End: 2023-07-17
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
## 2023-06-27 ENCOUNTER — OFFICE VISIT (OUTPATIENT)
Dept: PSYCHIATRY | Facility: CLINIC | Age: 33
End: 2023-06-27
Payer: COMMERCIAL

## 2023-06-27 DIAGNOSIS — F41.1 GAD (GENERALIZED ANXIETY DISORDER): Primary | ICD-10-CM

## 2023-06-27 PROCEDURE — 90837 PR PSYCHOTHERAPY W/PATIENT, 60 MIN: ICD-10-PCS | Mod: 95,,, | Performed by: SOCIAL WORKER

## 2023-06-27 PROCEDURE — 90837 PSYTX W PT 60 MINUTES: CPT | Mod: 95,,, | Performed by: SOCIAL WORKER
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
## 2023-06-28 ENCOUNTER — PATIENT MESSAGE (OUTPATIENT)
Dept: PSYCHIATRY | Facility: CLINIC | Age: 33
End: 2023-06-28

## 2023-06-28 ENCOUNTER — PATIENT MESSAGE (OUTPATIENT)
Dept: PSYCHIATRY | Facility: CLINIC | Age: 33
End: 2023-06-28
Payer: COMMERCIAL

## 2023-06-28 NOTE — TELEPHONE ENCOUNTER
"Called mother after receiving this message.  Mother reports seizures have been more frequent.  Usually patient is lying in bed not talking, almost like she is dissociating.  Patient was able to say "Dr. Meir wynn" which is how mother knew to send me a message.  Patient also can make phone calls during her episodes if she needs her mother's assistance due to difficulty moving.  She does not have tonic-clonic movements.  Episodes can last anywhere from 15 minutes to several hours.  Will reschedule appointment for next week.  I will also touch base with her therapist to discuss ideas for more intensive treatment.    "

## 2023-07-10 ENCOUNTER — PATIENT MESSAGE (OUTPATIENT)
Dept: PSYCHIATRY | Facility: CLINIC | Age: 33
End: 2023-07-10
Payer: COMMERCIAL

## 2023-07-13 ENCOUNTER — PATIENT MESSAGE (OUTPATIENT)
Dept: NEUROLOGY | Facility: CLINIC | Age: 33
End: 2023-07-13
Payer: COMMERCIAL

## 2023-07-14 ENCOUNTER — OFFICE VISIT (OUTPATIENT)
Dept: PSYCHIATRY | Facility: CLINIC | Age: 33
End: 2023-07-14
Payer: COMMERCIAL

## 2023-07-14 DIAGNOSIS — F44.7 FUNCTIONAL NEUROLOGICAL SYMPTOM DISORDER WITH MIXED SYMPTOMS: ICD-10-CM

## 2023-07-14 DIAGNOSIS — G43.E09 CHRONIC MIGRAINE WITH AURA: ICD-10-CM

## 2023-07-14 DIAGNOSIS — F44.5 PSYCHOGENIC NONEPILEPTIC SEIZURE: ICD-10-CM

## 2023-07-14 DIAGNOSIS — F41.1 GAD (GENERALIZED ANXIETY DISORDER): ICD-10-CM

## 2023-07-14 DIAGNOSIS — G47.00 INSOMNIA, UNSPECIFIED TYPE: Primary | ICD-10-CM

## 2023-07-14 DIAGNOSIS — F40.10 SOCIAL ANXIETY DISORDER: ICD-10-CM

## 2023-07-14 PROCEDURE — 1160F RVW MEDS BY RX/DR IN RCRD: CPT | Mod: CPTII,95,, | Performed by: INTERNAL MEDICINE

## 2023-07-14 PROCEDURE — 1160F PR REVIEW ALL MEDS BY PRESCRIBER/CLIN PHARMACIST DOCUMENTED: ICD-10-PCS | Mod: CPTII,95,, | Performed by: INTERNAL MEDICINE

## 2023-07-14 PROCEDURE — 90833 PR PSYCHOTHERAPY W/PATIENT W/E&M, 30 MIN (ADD ON): ICD-10-PCS | Mod: 95,,, | Performed by: INTERNAL MEDICINE

## 2023-07-14 PROCEDURE — 1159F MED LIST DOCD IN RCRD: CPT | Mod: CPTII,95,, | Performed by: INTERNAL MEDICINE

## 2023-07-14 PROCEDURE — 99214 OFFICE O/P EST MOD 30 MIN: CPT | Mod: 95,,, | Performed by: INTERNAL MEDICINE

## 2023-07-14 PROCEDURE — 1159F PR MEDICATION LIST DOCUMENTED IN MEDICAL RECORD: ICD-10-PCS | Mod: CPTII,95,, | Performed by: INTERNAL MEDICINE

## 2023-07-14 PROCEDURE — 99214 PR OFFICE/OUTPT VISIT, EST, LEVL IV, 30-39 MIN: ICD-10-PCS | Mod: 95,,, | Performed by: INTERNAL MEDICINE

## 2023-07-14 PROCEDURE — 90833 PSYTX W PT W E/M 30 MIN: CPT | Mod: 95,,, | Performed by: INTERNAL MEDICINE

## 2023-07-14 NOTE — PROGRESS NOTES
OUTPATIENT PSYCHIATRY RETURN VISIT    ENCOUNTER DATE:  7/20/2023  SITE:  [facility], [facility]  LENGTH OF SESSION:  38 minutes    The patient location is:  [place], not in a healthcare facility  Visit type:  audiovisual    Face to Face time with patient:  38 minutes  45 minutes of total time spent on the encounter, which includes face to face time and non-face to face time preparing to see the patient (eg, review of tests), Obtaining and/or reviewing separately obtained history, Documenting clinical information in the electronic or other health record, Independently interpreting results (not separately reported) and communicating results to the patient/family/caregiver, or Care coordination (not separately reported).     Each patient to whom he or she provides medical services by telemedicine is:  (1) informed of the relationship between the physician and patient and the respective role of any other health care provider with respect to management of the patient; and (2) notified that he or she may decline to receive medical services by telemedicine and may withdraw from such care at any time.    CHIEF COMPLAINT:  Insomnia      HISTORY OF PRESENTING ILLNESS:  Rosalina Saavedra is a 33 y.o. female with history of MDD, JESUS, and Social Anxiety Disorder who presents for follow up appointment.      Plan at last appointment on 5/23/2023:  Discussed recommendation for weekly therapy and working towards setting small goals.  She plans to reach out to [facility].    Continue Effexor 225mg daily, Buspar 15mg TID, Elavil 25mg qHS, and Klonopin 0.25-0.5mg daily PRN anxiety (currently taking every morning due to significant work stress).    Discussed with patient informed consent, risks versus benefits, alternative treatments, side effect profile and the inherent unpredictability of individual responses to these treatments.  The patient expresses understanding of the above and displays the capacity to agree  with this current plan.  Continue individual psychotherapy with Maria Luisa Hammond LCSW.    History as told by patient:  Patient doesn't feel seizures have gotten worse, but seizure during last appointment was bad.  Sleep has gotten worse.  Just went to sleep about 8am.  Was awake all night - was watching TV or just lying in bed.  This is happening most nights.  Saw Dr. Dorsey earlier this month and they did first round of botox.  He changed amitriptyline to nortriptyline.  May nap 1-2 hours during the day.  In the last week has been falling asleep at 10am and getting up around 1-2pm.  Has been feeling more depressed recently.  This started middle to end of June.  Depression and anxiety a little worse.  Anxiety especially worse when she thinks about going somewhere.  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  Finishing dissertation is needed for her degree which would also help getting the job she wants.  Will likely take this semester to finish 2nd chapter, next semester to finish 3rd, could maybe graduate next summer.      Psychotherapy:  Target symptoms: recurrent depression, anxiety   Why chosen therapy is appropriate versus another modality: relevant to diagnosis, patient responds to this modality  Outcome monitoring methods: self-report, observation  Therapeutic intervention type: supportive psychotherapy  Topics discussed/themes: relationships difficulties, stress related to medical comorbidities, building skills sets for symptom management, symptom recognition  The patient's response to the intervention is accepting. The patient's progress toward treatment goals is fair.   Duration of intervention: 20 minutes.    Medication side effects:  Denies  Medication compliance:  Yes    PSYCHIATRIC REVIEW OF SYSTEMS:  Trouble with sleep:  Worsened in nortiptyline  Appetite changes:  Denies  Weight changes:  Gain as above  Lack of energy:  Sometimes  Anhedonia:  Sometimes  Somatic symptoms:   Seizures, migraines  Libido:  Not discussed  Anxiety/panic:  Sometimes  Guilty/hopeless:  Denies  Self-injurious behavior/risky behavior:  Denies  Any drugs:  Denies  Alcohol:  Denies     MEDICAL REVIEW OF SYSTEMS:  Complete review of systems performed covering Constitutional, Musculoskeletal, Neurologic.  All systems negative except for that covered in HPI.    PAST PSYCHIATRIC, MEDICAL, AND SOCIAL HISTORY REVIEWED  The patient's past medical, family and social history have been reviewed and updated as appropriate within the electronic medical record - see encounter notes.    MEDICATIONS:    Current Outpatient Medications:     amitriptyline (ELAVIL) 25 MG tablet, Take 1 tablet (25 mg total) by mouth every evening., Disp: 30 tablet, Rfl: 2    atogepant (QULIPTA) 30 mg Tab, Take 30 mg by mouth once daily. (Patient not taking: Reported on 6/26/2023), Disp: 30 tablet, Rfl: 12    atogepant (QULIPTA) 60 mg Tab, Take 60 mg by mouth once daily., Disp: 30 tablet, Rfl: 6    busPIRone (BUSPAR) 15 MG tablet, Take 1 tablet (15 mg total) by mouth 3 (three) times daily. (Patient taking differently: Take 30 mg by mouth 2 (two) times daily.), Disp: 90 tablet, Rfl: 5    clonazePAM (KLONOPIN) 0.5 MG tablet, TAKE 1 TABLET BY MOUTH EVERY EVENING BEFORE BED. YOU CAN TAKE AN ADDITION 1/2-1 TABLET AS NEEDED DURING THE DAY FOR PANIC ATTACK., Disp: 60 tablet, Rfl: 1    eszopiclone (LUNESTA) 2 MG Tab, Take 1 tablet (2 mg total) by mouth every evening., Disp: 30 tablet, Rfl: 2    etodolac (LODINE) 500 MG tablet, Take 1 tablet (500 mg total) by mouth 2 (two) times daily as needed (migraine)., Disp: 20 tablet, Rfl: 12    ibuprofen (ADVIL,MOTRIN) 800 MG tablet, Take 1 tablet (800 mg total) by mouth 3 (three) times daily as needed for Pain., Disp: 90 tablet, Rfl: 2    promethazine (PHENERGAN) 25 MG tablet, Take 1 tablet (25 mg total) by mouth every 6 (six) hours as needed for Nausea., Disp: 40 tablet, Rfl: 12    rimegepant (NURTEC) 75 mg odt, Take  "75 mg by mouth once as needed for Migraine. Place ODT tablet on the tongue; alternatively the ODT tablet may be placed under the tongue, Disp: , Rfl:     sumatriptan (IMITREX STATDOSE) 6 mg/0.5 mL kit, Inject 6 mg into the skin every 2 (two) hours as needed for Migraine., Disp: , Rfl:     venlafaxine (EFFEXOR-XR) 150 MG Cp24, Take 1 capsule (150 mg total) by mouth once daily. Take with 75mg capsule to equal 225mg daily., Disp: 30 capsule, Rfl: 11    venlafaxine (EFFEXOR-XR) 75 MG 24 hr capsule, Take 1 capsule (75 mg total) by mouth once daily. Take with 150mg capsule to equal 225mg daily., Disp: 30 capsule, Rfl: 11    ZOLMitriptan (ZOMIG-ZMT) 5 MG disintegrating tablet, TAKE 1 TABLET BY MOUTH AS NEEDED FOR MIGRAINE., Disp: 9 tablet, Rfl: 12    ALLERGIES:  Review of patient's allergies indicates:   Allergen Reactions    Codeine Itching    Morphine Itching       PSYCHIATRIC EXAM:  There were no vitals filed for this visit.    Appearance:  Well groomed, appearing healthy and of stated age  Behavior:  Cooperative, pleasant, no psychomotor agitation or retardation  Speech:  Normal rate, rhythm, prosody, and volume  Mood:  "Ok"  Affect:  Euthymic  Thought Process:  Linear, logical, goal directed  Thought Content:  Negative for suicidal ideation, homicidal ideation, delusions or hallucinations.  Associations:  Intact  Memory:  Grossly Intact  Level of Consciousness/Orientation:  Grossly intact  Fund of Knowledge:  Good  Attention:  Good  Language:  Fluent, able to name abstract and concrete objects  Insight:  Good  Judgment:  Intact  Psychomotor signs:  No abnormal movements of face  Gait:  Unable to assess via virtual visit      RELEVANT LABS/STUDIES:    Lab Results   Component Value Date    WBC 5.94 07/28/2022    HGB 11.6 (L) 07/28/2022    HCT 36.5 (L) 07/28/2022    MCV 91 07/28/2022     07/28/2022     BMP  Lab Results   Component Value Date     07/28/2022    K 4.1 07/28/2022     07/28/2022    CO2 20 " (L) 07/28/2022    BUN 9 07/28/2022    CREATININE 0.9 07/28/2022    CALCIUM 9.2 07/28/2022    ANIONGAP 10 07/28/2022    ESTGFRAFRICA >60.0 07/28/2022    EGFRNONAA >60.0 07/28/2022     Lab Results   Component Value Date    ALT 12 07/28/2022    AST 15 07/28/2022    ALKPHOS 86 07/28/2022    BILITOT 0.3 07/28/2022     Lab Results   Component Value Date    TSH 0.524 03/26/2021     Lab Results   Component Value Date    HGBA1C 5.0 03/26/2021       IMPRESSION:    Rosalina Saavedra is a 33 y.o. female with history of MDD, JESUS, and Social Anxiety Disorder who presents for follow up appointment.    Status/Progress:  Based on the examination today, the patient's problem(s) is/are inadequately controlled.  New problems have not been presented today.    Risk Parameters:  Patient reports no suicidal ideation  Patient reports no homicidal ideation  Patient reports no self-injurious behavior  Patient reports no violent behavior      DIAGNOSES:    ICD-10-CM ICD-9-CM   1. Insomnia, unspecified type  G47.00 780.52   2. JESUS (generalized anxiety disorder)  F41.1 300.02   3. Social anxiety disorder  F40.10 300.23   4. Psychogenic nonepileptic seizure  F44.5 300.11   5. Functional neurological symptom disorder with mixed symptoms  F44.7 300.11   6. Chronic migraine with aura  G43.109 346.00         PLAN:  Will change Pamelor back to Elavil at low dose of 25mg.  Will also add Lunesta 2mg qHS for sleep since this was helpful in the past.    Continue Effexor 225mg daily, Buspar 15mg TID, and Klonopin 0.25-0.5mg daily PRN anxiety (currently taking every morning due to significant work stress).    Discussed goal of considering trial stay at grandfather's house.  Discussed with patient informed consent, risks versus benefits, alternative treatments, side effect profile and the inherent unpredictability of individual responses to these treatments.  The patient expresses understanding of the above and displays the capacity to agree with this current  plan.  Continue individual psychotherapy with Maria Luisa Hammond LCSW.    RETURN TO CLINIC:  Follow up in about 20 days (around 8/3/2023).

## 2023-07-17 ENCOUNTER — OFFICE VISIT (OUTPATIENT)
Dept: PSYCHIATRY | Facility: CLINIC | Age: 33
End: 2023-07-17
Payer: COMMERCIAL

## 2023-07-17 ENCOUNTER — PATIENT MESSAGE (OUTPATIENT)
Dept: PSYCHIATRY | Facility: CLINIC | Age: 33
End: 2023-07-17
Payer: COMMERCIAL

## 2023-07-17 DIAGNOSIS — G47.00 INSOMNIA, UNSPECIFIED TYPE: ICD-10-CM

## 2023-07-17 DIAGNOSIS — F41.1 GAD (GENERALIZED ANXIETY DISORDER): Primary | ICD-10-CM

## 2023-07-17 DIAGNOSIS — G47.00 INSOMNIA, UNSPECIFIED TYPE: Primary | ICD-10-CM

## 2023-07-17 DIAGNOSIS — F33.0 MILD EPISODE OF RECURRENT MAJOR DEPRESSIVE DISORDER: ICD-10-CM

## 2023-07-17 DIAGNOSIS — G43.E09 CHRONIC MIGRAINE WITH AURA: ICD-10-CM

## 2023-07-17 DIAGNOSIS — F41.1 GAD (GENERALIZED ANXIETY DISORDER): ICD-10-CM

## 2023-07-17 PROCEDURE — 90832 PSYTX W PT 30 MINUTES: CPT | Mod: 95,,, | Performed by: SOCIAL WORKER

## 2023-07-17 PROCEDURE — 90832 PR PSYCHOTHERAPY W/PATIENT, 30 MIN: ICD-10-PCS | Mod: 95,,, | Performed by: SOCIAL WORKER

## 2023-07-17 RX ORDER — AMITRIPTYLINE HYDROCHLORIDE 25 MG/1
25 TABLET, FILM COATED ORAL NIGHTLY
Qty: 30 TABLET | Refills: 2 | Status: SHIPPED | OUTPATIENT
Start: 2023-07-17 | End: 2023-08-05

## 2023-07-17 RX ORDER — ESZOPICLONE 2 MG/1
2 TABLET, FILM COATED ORAL NIGHTLY
Qty: 30 TABLET | Refills: 2 | Status: SHIPPED | OUTPATIENT
Start: 2023-07-17 | End: 2023-07-18 | Stop reason: SDUPTHER
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
## 2023-07-19 RX ORDER — ESZOPICLONE 2 MG/1
2 TABLET, FILM COATED ORAL NIGHTLY
Qty: 30 TABLET | Refills: 2 | Status: SHIPPED | OUTPATIENT
Start: 2023-07-19 | End: 2023-08-24 | Stop reason: SDUPTHER

## 2023-07-20 PROBLEM — F33.9 RECURRENT MAJOR DEPRESSIVE DISORDER: Status: ACTIVE | Noted: 2018-05-02

## 2023-07-24 ENCOUNTER — OFFICE VISIT (OUTPATIENT)
Dept: PSYCHIATRY | Facility: CLINIC | Age: 33
End: 2023-07-24
Payer: COMMERCIAL

## 2023-07-24 DIAGNOSIS — F41.1 GAD (GENERALIZED ANXIETY DISORDER): Primary | ICD-10-CM

## 2023-07-24 DIAGNOSIS — F33.0 MILD EPISODE OF RECURRENT MAJOR DEPRESSIVE DISORDER: ICD-10-CM

## 2023-07-24 PROCEDURE — 90837 PR PSYCHOTHERAPY W/PATIENT, 60 MIN: ICD-10-PCS | Mod: 95,,, | Performed by: SOCIAL WORKER

## 2023-07-24 PROCEDURE — 90837 PSYTX W PT 60 MINUTES: CPT | Mod: 95,,, | Performed by: SOCIAL WORKER
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
## 2023-08-01 ENCOUNTER — TELEPHONE (OUTPATIENT)
Dept: PHARMACY | Facility: CLINIC | Age: 33
End: 2023-08-01
Payer: COMMERCIAL

## 2023-08-04 ENCOUNTER — TELEPHONE (OUTPATIENT)
Dept: NEUROLOGY | Facility: CLINIC | Age: 33
End: 2023-08-04
Payer: COMMERCIAL

## 2023-08-04 RX ORDER — SODIUM CHLORIDE 9 MG/ML
INJECTION, SOLUTION INTRAVENOUS ONCE AS NEEDED
Status: CANCELLED | OUTPATIENT
Start: 2023-08-04

## 2023-08-04 RX ORDER — METHYLPREDNISOLONE SOD SUCC 125 MG
125 VIAL (EA) INJECTION ONCE AS NEEDED
Status: CANCELLED | OUTPATIENT
Start: 2023-08-04

## 2023-08-04 RX ORDER — EPINEPHRINE 0.3 MG/.3ML
0.3 INJECTION SUBCUTANEOUS ONCE AS NEEDED
Status: CANCELLED
Start: 2023-08-04

## 2023-08-04 RX ORDER — DIPHENHYDRAMINE HYDROCHLORIDE 50 MG/ML
25 INJECTION INTRAMUSCULAR; INTRAVENOUS ONCE AS NEEDED
Status: CANCELLED | OUTPATIENT
Start: 2023-08-04

## 2023-08-04 RX ORDER — ACETAMINOPHEN 500 MG
1000 TABLET ORAL ONCE AS NEEDED
Status: CANCELLED | OUTPATIENT
Start: 2023-08-04

## 2023-08-04 RX ORDER — SODIUM CHLORIDE 0.9 % (FLUSH) 0.9 %
10 SYRINGE (ML) INJECTION
Status: CANCELLED | OUTPATIENT
Start: 2023-08-04

## 2023-08-04 RX ORDER — ONDANSETRON 2 MG/ML
8 INJECTION INTRAMUSCULAR; INTRAVENOUS ONCE AS NEEDED
Status: CANCELLED | OUTPATIENT
Start: 2023-08-04

## 2023-08-04 NOTE — TELEPHONE ENCOUNTER
----- Message from Tara Kennedy sent at 2023  4:01 PM CDT -----  Regarding:  Vyepty therapy plan (  Next Vyepti infusion scheduled 23, can you please renew the therapy plan.    Thank you,   Tara

## 2023-08-04 NOTE — TELEPHONE ENCOUNTER
Contacted by infusion center and was informed that the patient's Vyepti therapy plan expires soon and needs renewal. Vyepti 300mg every 3 months therapy plan renewed per Dr.Khan burleson. OHIP notified. Pending authorization from insurance. Once approved OHIP will contact patient for scheduling. .

## 2023-08-05 DIAGNOSIS — F33.0 MILD EPISODE OF RECURRENT MAJOR DEPRESSIVE DISORDER: ICD-10-CM

## 2023-08-05 DIAGNOSIS — F41.1 GAD (GENERALIZED ANXIETY DISORDER): ICD-10-CM

## 2023-08-05 DIAGNOSIS — G43.E09 CHRONIC MIGRAINE WITH AURA: ICD-10-CM

## 2023-08-05 DIAGNOSIS — G47.00 INSOMNIA, UNSPECIFIED TYPE: ICD-10-CM

## 2023-08-05 RX ORDER — BUSPIRONE HYDROCHLORIDE 15 MG/1
15 TABLET ORAL 3 TIMES DAILY
Qty: 270 TABLET | Refills: 1 | Status: SHIPPED | OUTPATIENT
Start: 2023-08-05 | End: 2023-11-21 | Stop reason: SDUPTHER

## 2023-08-05 RX ORDER — AMITRIPTYLINE HYDROCHLORIDE 25 MG/1
25 TABLET, FILM COATED ORAL NIGHTLY
Qty: 90 TABLET | Refills: 1 | Status: SHIPPED | OUTPATIENT
Start: 2023-08-05 | End: 2024-01-13 | Stop reason: SDUPTHER

## 2023-08-10 ENCOUNTER — OFFICE VISIT (OUTPATIENT)
Dept: PSYCHIATRY | Facility: CLINIC | Age: 33
End: 2023-08-10
Payer: COMMERCIAL

## 2023-08-10 DIAGNOSIS — F41.1 GAD (GENERALIZED ANXIETY DISORDER): Primary | ICD-10-CM

## 2023-08-10 DIAGNOSIS — F33.0 MILD EPISODE OF RECURRENT MAJOR DEPRESSIVE DISORDER: ICD-10-CM

## 2023-08-10 PROCEDURE — 90837 PR PSYCHOTHERAPY W/PATIENT, 60 MIN: ICD-10-PCS | Mod: 95,,, | Performed by: SOCIAL WORKER

## 2023-08-10 PROCEDURE — 90837 PSYTX W PT 60 MINUTES: CPT | Mod: 95,,, | Performed by: SOCIAL WORKER
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
## 2023-08-24 ENCOUNTER — PATIENT MESSAGE (OUTPATIENT)
Dept: PSYCHIATRY | Facility: CLINIC | Age: 33
End: 2023-08-24
Payer: COMMERCIAL

## 2023-08-24 DIAGNOSIS — G47.00 INSOMNIA, UNSPECIFIED TYPE: ICD-10-CM

## 2023-08-24 RX ORDER — ESZOPICLONE 2 MG/1
2 TABLET, FILM COATED ORAL NIGHTLY
Qty: 30 TABLET | Refills: 2 | Status: SHIPPED | OUTPATIENT
Start: 2023-08-24 | End: 2023-09-27 | Stop reason: SDUPTHER

## 2023-09-06 ENCOUNTER — OFFICE VISIT (OUTPATIENT)
Dept: PSYCHIATRY | Facility: CLINIC | Age: 33
End: 2023-09-06
Payer: COMMERCIAL

## 2023-09-06 DIAGNOSIS — F33.1 MODERATE EPISODE OF RECURRENT MAJOR DEPRESSIVE DISORDER: ICD-10-CM

## 2023-09-06 DIAGNOSIS — F41.1 GAD (GENERALIZED ANXIETY DISORDER): Primary | ICD-10-CM

## 2023-09-06 PROCEDURE — 90832 PR PSYCHOTHERAPY W/PATIENT, 30 MIN: ICD-10-PCS | Mod: 95,,, | Performed by: SOCIAL WORKER

## 2023-09-06 PROCEDURE — 90832 PSYTX W PT 30 MINUTES: CPT | Mod: 95,,, | Performed by: SOCIAL WORKER
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
## 2023-09-22 ENCOUNTER — PROCEDURE VISIT (OUTPATIENT)
Dept: NEUROLOGY | Facility: CLINIC | Age: 33
End: 2023-09-22
Payer: COMMERCIAL

## 2023-09-22 VITALS
WEIGHT: 200.75 LBS | DIASTOLIC BLOOD PRESSURE: 85 MMHG | HEART RATE: 89 BPM | BODY MASS INDEX: 32.26 KG/M2 | HEIGHT: 66 IN | SYSTOLIC BLOOD PRESSURE: 121 MMHG

## 2023-09-22 DIAGNOSIS — G43.001 MIGRAINE WITHOUT AURA AND WITH STATUS MIGRAINOSUS, NOT INTRACTABLE: Primary | ICD-10-CM

## 2023-09-22 PROCEDURE — 64615 PR CHEMODENERVATION OF MUSCLE FOR CHRONIC MIGRAINE: ICD-10-PCS | Mod: S$GLB,,, | Performed by: PSYCHIATRY & NEUROLOGY

## 2023-09-22 PROCEDURE — 64615 CHEMODENERV MUSC MIGRAINE: CPT | Mod: S$GLB,,, | Performed by: PSYCHIATRY & NEUROLOGY

## 2023-09-22 NOTE — PROCEDURES
Follow up:   2 weeks of daily HA related to stress and school     Prior note:   Overall doing good  HA initially improved for a few weeks after the amitriptyline.  Now the frequency has returned to chronic migraine.    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   Still has freq HAs     Headache history:   Age of onset -  5   Location - occipital, temples   Nature of pain -  Stabbing, aching   Prodrome - no   Aura -  No   Duration of headache - > 4 hrs   Time to peak intensity - n/a   Pain scale - range of intensity -  9-10/10  Frequency -  15-20/month    Status Migrainosus history - yes   Time of day of most headaches- anytime      Associated symptoms with the headache:   Meningeal symptoms - photophobia, phonophobia, exercise intolerance +   Nausea/vomitting +   Nasal drainage   Visual blurriness   Pallor/flushing  Dizziness   Vertigo  Confusion  Impaired concentration +   Pain worsened with physical activity  +   Neck pain +      Cluster headache symptoms: none   Symptoms of increased intracranial pressure: none  Basilar migraine symptoms: none      Headache Triggers:  Stress, anxiety, emotional overload   H/o SI      Treatment history:  Resolution of headache with sleep - yes   Meds tried:     Failed:   prozac 40   effexor 150   Gabapentin 300 mg   mobic 15   namenda   nurtec   trokemdi   Diamox   imitrex shots  lamictal   BOTOX#1 6/26/23 - helped      10/21/21  Care Timeline     0000    EKG 12-LEAD   1228    Arrived   1308    Comprehensive metabolic panel        CBC auto differential    1321    POCT glucose   1340    CT Head Without Contrast   1341    POCT  urine pregnancy   1400    EKG 12-lead   1422    metoclopramide HCl 10 mg   1424    ketorolac tromethamine 30 mg       diphenhydramine HCl 25 mg   1425    0.9 % sodium chloride 1000 mL   1546    haloperidol lactate 2.5 mg   1553    magnesium sulfate in water 2 g   1826    prochlorperazine edisylate 5 mg   1827    diphenhydramine HCl 25 mg   2027    Discharged         Headache risk factors:   H/o TBI  - concussion at age 16   H/o Meningitis  - no   F/h Aneurysms  - no      Review of Systems   Constitutional: Negative.    HENT: Negative.    Eyes: Negative.    Respiratory: Negative.    Cardiovascular: Negative.    Gastrointestinal: Negative.    Endocrine: Negative.    Genitourinary: Negative.    Musculoskeletal: Negative.    Integumentary:  Negative.   Allergic/Immunologic: Negative.    Neurological: Positive for headaches.   Hematological: Negative.    Psychiatric/Behavioral: Positive for dysphoric mood and sleep disturbance.          Objective:   Physical Exam  Unchanged          Assessment:     Chr Migraine     Impression:   No major branch stenosis/occlusion at the [place].  No aneurysm.   No evidence of venous sinus thrombosis or venous sinus stenosis..     Electronically signed by: Armani Lopez  Date:                                            11/24/2021  Time:                                           16:24     Impression:   No major branch stenosis/occlusion at the [place].  No aneurysm.   No evidence of venous sinus thrombosis or venous sinus stenosis..     Electronically signed by: Armani Lopez  Date:                                            11/24/2021  Time:                                           16:24     Impression:   Normal pre and postcontrast MR imaging appearance of the brain.     Electronically signed by: Armani Lopez  Date:                                            11/24/2021  Time:                                           16:51  Plan:       VYEPTI 300 mg IV (sept 2022 -)    Cont Qulipta 60 mg daily, discussed side effect   Breakthrough headache - etodolac, zomig   Dexamethasone 4 mg BID x 3 days   Multiple alternative treatment options were offered to the patient  cont with psychiatrist   Cont Elavil   Continue Effexor 225mg daily, Buspar 15mg TID, and Klonopin 0.25-0.5mg daily PRN anxiety.      BOTOX treatment response:   Prior to initiating BOTOX, the patient had  18 migraine days per month on average. This meets criteria for chronic migraine.   After starting BOTOX, the patient experienced a reduction in  7 days per month   After starting BOTOX, the patient experienced a reduction in > 100 hours of migraine symptoms per month   After starting BOTOX, the patient experienced a 50% reduction in burden of migraine days per month   Based on this information, BOTOX is medically necessary for the management of the patient's chronic migraine.     BOTOX Injection intervals:   Patient reports a 'wearing off effect' prior to the subsequent BOTOX injections at 12 weeks. This occurs at week 10  Symptoms of this a 'wearing off effect' include - worsening of migraine headache frequency and intensity   Medications used during the 'wearing off effect' period include   Based on this information, it is medically necessary for the patient to receive BOTOX therapy at an interval of every 10 weeks for the management of chronic migraine.    BOTOX was performed as an indicated therapy for intractable chronic migraine headaches given that the patient failed > 3 prophylactic medications    Botulinum Toxin Injection Procedure   Pre-operative diagnosis: Chronic migraine   Post-operative diagnosis: Same   Procedure: Chemical neurolysis   After risks and benefits were explained including bleeding, infection, worsening of pain, damage to the areas being injected, weakness of muscles, loss of muscle control, dysphagia if injecting the head or neck, facial droop if injecting the facial area, painful injection,  allergic or other reaction to the medications being injected, and the failure of the procedure to help the problem, a signed consent was obtained.   The patient was placed in a comfortable area and the sites to be treated were identified.The area to be treated was prepped three times with alcohol and the alcohol allowed to dry. Next, a 30 gauge needle was used to inject the medication in the area to be treated.   Area(s) injected:   Total Botox use: 165 Units   Botox wastage: 35 Units   Injection sites:    muscle bilaterally ( a total of 10 units divided into 2 sites)   Procerus muscle (5 units)   Frontalis muscle bilaterally (a total of 20 units divided into 4 sites)   Temporalis muscle bilaterally (a total of 40 units divided into 8 sites)   Occipitalis muscle bilaterally (a total of 30 units divided into 6 sites + 10 units)   Cervical paraspinal muscles (a total of 20 units divided into 4 sites)   Trapezius muscle bilaterally (a total of 30 units divided into 6 sites)   Complications: none   RTC for the next Botox injection: 10 weeks   Procedures

## 2023-09-27 ENCOUNTER — OFFICE VISIT (OUTPATIENT)
Dept: PSYCHIATRY | Facility: CLINIC | Age: 33
End: 2023-09-27
Payer: COMMERCIAL

## 2023-09-27 ENCOUNTER — PATIENT MESSAGE (OUTPATIENT)
Dept: PSYCHIATRY | Facility: CLINIC | Age: 33
End: 2023-09-27

## 2023-09-27 DIAGNOSIS — F44.7 FUNCTIONAL NEUROLOGICAL SYMPTOM DISORDER WITH MIXED SYMPTOMS: ICD-10-CM

## 2023-09-27 DIAGNOSIS — G47.00 INSOMNIA, UNSPECIFIED TYPE: Primary | ICD-10-CM

## 2023-09-27 DIAGNOSIS — F40.10 SOCIAL ANXIETY DISORDER: ICD-10-CM

## 2023-09-27 DIAGNOSIS — F41.1 GAD (GENERALIZED ANXIETY DISORDER): ICD-10-CM

## 2023-09-27 DIAGNOSIS — G43.001 MIGRAINE WITHOUT AURA AND WITH STATUS MIGRAINOSUS, NOT INTRACTABLE: ICD-10-CM

## 2023-09-27 DIAGNOSIS — F33.1 MODERATE EPISODE OF RECURRENT MAJOR DEPRESSIVE DISORDER: ICD-10-CM

## 2023-09-27 PROCEDURE — 1159F PR MEDICATION LIST DOCUMENTED IN MEDICAL RECORD: ICD-10-PCS | Mod: CPTII,95,, | Performed by: INTERNAL MEDICINE

## 2023-09-27 PROCEDURE — 99214 OFFICE O/P EST MOD 30 MIN: CPT | Mod: 95,,, | Performed by: INTERNAL MEDICINE

## 2023-09-27 PROCEDURE — 1160F PR REVIEW ALL MEDS BY PRESCRIBER/CLIN PHARMACIST DOCUMENTED: ICD-10-PCS | Mod: CPTII,95,, | Performed by: INTERNAL MEDICINE

## 2023-09-27 PROCEDURE — 1159F MED LIST DOCD IN RCRD: CPT | Mod: CPTII,95,, | Performed by: INTERNAL MEDICINE

## 2023-09-27 PROCEDURE — 1160F RVW MEDS BY RX/DR IN RCRD: CPT | Mod: CPTII,95,, | Performed by: INTERNAL MEDICINE

## 2023-09-27 PROCEDURE — 99214 PR OFFICE/OUTPT VISIT, EST, LEVL IV, 30-39 MIN: ICD-10-PCS | Mod: 95,,, | Performed by: INTERNAL MEDICINE

## 2023-09-27 RX ORDER — ESZOPICLONE 3 MG/1
3 TABLET, FILM COATED ORAL NIGHTLY
Qty: 30 TABLET | Refills: 2 | Status: SHIPPED | OUTPATIENT
Start: 2023-09-27 | End: 2023-12-26

## 2023-09-27 NOTE — PROGRESS NOTES
OUTPATIENT PSYCHIATRY RETURN VISIT    ENCOUNTER DATE:  10/7/2023  SITE:  [facility]  LENGTH OF SESSION:  20 minutes    The patient location is:  [place], not in a healthcare facility  Visit type:  audiovisual    Face to Face time with patient:  20 minutes  25 minutes of total time spent on the encounter, which includes face to face time and non-face to face time preparing to see the patient (eg, review of tests), Obtaining and/or reviewing separately obtained history, Documenting clinical information in the electronic or other health record, Independently interpreting results (not separately reported) and communicating results to the patient/family/caregiver, or Care coordination (not separately reported).     Each patient to whom he or she provides medical services by telemedicine is:  (1) informed of the relationship between the physician and patient and the respective role of any other health care provider with respect to management of the patient; and (2) notified that he or she may decline to receive medical services by telemedicine and may withdraw from such care at any time.    CHIEF COMPLAINT:  Insomnia      HISTORY OF PRESENTING ILLNESS:  Rosalina Saavedra is a 33 y.o. female with history of MDD, JESUS, and Social Anxiety Disorder who presents for follow up appointment.      Plan at last appointment on 7/14/2023:  Will change Pamelor back to Elavil at low dose of 25mg.  Will also add Lunesta 2mg qHS for sleep since this was helpful in the past.    Continue Effexor 225mg daily, Buspar 15mg TID, and Klonopin 0.25-0.5mg daily PRN anxiety (currently taking every morning due to significant work stress).    Discussed goal of considering trial stay at grandfather's house.  Discussed with patient informed consent, risks versus benefits, alternative treatments, side effect profile and the inherent unpredictability of individual responses to these treatments.  The patient expresses  understanding of the above and displays the capacity to agree with this current plan.  Continue individual psychotherapy with Maria Luisa Hammond LCSW.    History as told by patient:  Work has been ok.  Better than last year regarding the kids.  But of course still stressful work.  She found her grandfather unconscious and had to call ambulance.  He was very dehydrated.  He is now showing signs of dementia.  They told her he may have  if she hadn't found him.  He was in the hospital for awhile.  He is now in skilled nursing.  They may place him somewhere else.  Has been thinking about this a lot.  More depression and anxiety - migraines have been worse.  Started Botox - plans for every 2 months.  Lunesta was helping her sleep at first but now doesn't really help that much.  Taking Klonopin once every morning.  Gets about 5-6 hours of sleep total a night - wakes up in the night.      Medication side effects:  Denies  Medication compliance:  Yes    PSYCHIATRIC REVIEW OF SYSTEMS:  Trouble with sleep:  Yes  Appetite changes:  Denies  Weight changes:  Gain slowly over time  Lack of energy:  Sometimes  Anhedonia:  Sometimes  Somatic symptoms:  Seizures, migraines  Libido:  Not discussed  Anxiety/panic:  Sometimes  Guilty/hopeless:  Denies  Self-injurious behavior/risky behavior:  Denies  Any drugs:  Denies  Alcohol:  Denies     MEDICAL REVIEW OF SYSTEMS:  Complete review of systems performed covering Constitutional, Musculoskeletal, Neurologic.  All systems negative except for that covered in HPI.    PAST PSYCHIATRIC, MEDICAL, AND SOCIAL HISTORY REVIEWED  The patient's past medical, family and social history have been reviewed and updated as appropriate within the electronic medical record - see encounter notes.    MEDICATIONS:    Current Outpatient Medications:     amitriptyline (ELAVIL) 25 MG tablet, TAKE 1 TABLET BY MOUTH EVERY EVENING, Disp: 90 tablet, Rfl: 1    atogepant (QULIPTA) 30 mg Tab, Take 30 mg by mouth once  daily. (Patient not taking: Reported on 6/26/2023), Disp: 30 tablet, Rfl: 12    atogepant (QULIPTA) 60 mg Tab, Take 60 mg by mouth once daily., Disp: 30 tablet, Rfl: 6    busPIRone (BUSPAR) 15 MG tablet, TAKE 1 TABLET BY MOUTH 3 TIMES DAILY. (Patient taking differently: Take 30 mg by mouth 2 (two) times daily.), Disp: 270 tablet, Rfl: 1    clonazePAM (KLONOPIN) 0.5 MG tablet, TAKE 1 TABLET BY MOUTH EVERY EVENING BEFORE BED. YOU CAN TAKE AN ADDITION 1/2-1 TABLET AS NEEDED DURING THE DAY FOR PANIC ATTACK., Disp: 60 tablet, Rfl: 1    eszopiclone (LUNESTA) 3 mg Tab, Take 1 tablet (3 mg total) by mouth every evening., Disp: 30 tablet, Rfl: 2    etodolac (LODINE) 500 MG tablet, Take 1 tablet (500 mg total) by mouth 2 (two) times daily as needed (migraine)., Disp: 20 tablet, Rfl: 12    ibuprofen (ADVIL,MOTRIN) 800 MG tablet, Take 1 tablet (800 mg total) by mouth 3 (three) times daily as needed for Pain., Disp: 90 tablet, Rfl: 0    promethazine (PHENERGAN) 25 MG tablet, Take 1 tablet (25 mg total) by mouth every 6 (six) hours as needed for Nausea., Disp: 40 tablet, Rfl: 11    rimegepant (NURTEC) 75 mg odt, Take 75 mg by mouth once as needed for Migraine. Place ODT tablet on the tongue; alternatively the ODT tablet may be placed under the tongue, Disp: , Rfl:     sumatriptan (IMITREX STATDOSE) 6 mg/0.5 mL kit, Inject 6 mg into the skin every 2 (two) hours as needed for Migraine., Disp: , Rfl:     venlafaxine (EFFEXOR-XR) 150 MG Cp24, Take 1 capsule (150 mg total) by mouth once daily. Take with 75mg capsule to equal 225mg daily., Disp: 30 capsule, Rfl: 11    venlafaxine (EFFEXOR-XR) 75 MG 24 hr capsule, Take 1 capsule (75 mg total) by mouth once daily. Take with 150mg capsule to equal 225mg daily., Disp: 30 capsule, Rfl: 11    ZOLMitriptan (ZOMIG-ZMT) 5 MG disintegrating tablet, TAKE 1 TABLET BY MOUTH AS NEEDED FOR MIGRAINE., Disp: 9 tablet, Rfl: 12    ALLERGIES:  Review of patient's allergies indicates:   Allergen Reactions  "   Codeine Itching    Morphine Itching       PSYCHIATRIC EXAM:  There were no vitals filed for this visit.    Appearance:  Well groomed, appearing healthy and of stated age  Behavior:  Cooperative, pleasant, no psychomotor agitation or retardation  Speech:  Normal rate, rhythm, prosody, and volume  Mood:  "Ok"  Affect:  Constricted  Thought Process:  Linear, logical, goal directed  Thought Content:  Negative for suicidal ideation, homicidal ideation, delusions or hallucinations.  Associations:  Intact  Memory:  Grossly Intact  Level of Consciousness/Orientation:  Grossly intact  Fund of Knowledge:  Good  Attention:  Good  Language:  Fluent, able to name abstract and concrete objects  Insight:  Good  Judgment:  Intact  Psychomotor signs:  No abnormal movements of face  Gait:  Unable to assess via virtual visit      RELEVANT LABS/STUDIES:    Lab Results   Component Value Date    WBC 5.94 07/28/2022    HGB 11.6 (L) 07/28/2022    HCT 36.5 (L) 07/28/2022    MCV 91 07/28/2022     07/28/2022     BMP  Lab Results   Component Value Date     07/28/2022    K 4.1 07/28/2022     07/28/2022    CO2 20 (L) 07/28/2022    BUN 9 07/28/2022    CREATININE 0.9 07/28/2022    CALCIUM 9.2 07/28/2022    ANIONGAP 10 07/28/2022    ESTGFRAFRICA >60.0 07/28/2022    EGFRNONAA >60.0 07/28/2022     Lab Results   Component Value Date    ALT 12 07/28/2022    AST 15 07/28/2022    ALKPHOS 86 07/28/2022    BILITOT 0.3 07/28/2022     Lab Results   Component Value Date    TSH 0.524 03/26/2021     Lab Results   Component Value Date    HGBA1C 5.0 03/26/2021       IMPRESSION:    Rosalina Saavedra is a 33 y.o. female with history of MDD, JESUS, and Social Anxiety Disorder who presents for follow up appointment.    Status/Progress:  Based on the examination today, the patient's problem(s) is/are inadequately controlled.  New problems have not been presented today.    Risk Parameters:  Patient reports no suicidal ideation  Patient reports no " homicidal ideation  Patient reports no self-injurious behavior  Patient reports no violent behavior      DIAGNOSES:    ICD-10-CM ICD-9-CM   1. Insomnia, unspecified type  G47.00 780.52   2. Moderate episode of recurrent major depressive disorder  F33.1 296.32   3. JESUS (generalized anxiety disorder)  F41.1 300.02   4. Social anxiety disorder  F40.10 300.23   5. Functional neurological symptom disorder with mixed symptoms  F44.7 300.11   6. Migraine without aura and with status migrainosus, not intractable  G43.001 346.12       PLAN:  Increase Lunesta to 3mg qHS.  Continue Effexor 225mg daily, Buspar 15mg TID, Elavil 25mg qHS, and Klonopin 0.25-0.5mg daily PRN anxiety (currently taking every morning due to significant work stress).    Discussed with patient informed consent, risks versus benefits, alternative treatments, side effect profile and the inherent unpredictability of individual responses to these treatments.  The patient expresses understanding of the above and displays the capacity to agree with this current plan.  Continue individual psychotherapy with Maria Luisa Hammond LCSW.    RETURN TO CLINIC:  Follow up in about 4 weeks (around 10/25/2023).

## 2023-10-01 DIAGNOSIS — G43.701 CHRONIC MIGRAINE W/O AURA, NOT INTRACTABLE, W STAT MIGR: ICD-10-CM

## 2023-10-03 RX ORDER — IBUPROFEN 800 MG/1
800 TABLET ORAL 3 TIMES DAILY PRN
Qty: 90 TABLET | Refills: 0 | Status: SHIPPED | OUTPATIENT
Start: 2023-10-03 | End: 2023-12-23 | Stop reason: SDUPTHER

## 2023-10-03 RX ORDER — PROMETHAZINE HYDROCHLORIDE 25 MG/1
25 TABLET ORAL EVERY 6 HOURS PRN
Qty: 40 TABLET | Refills: 11 | Status: SHIPPED | OUTPATIENT
Start: 2023-10-03 | End: 2023-12-23 | Stop reason: SDUPTHER

## 2023-10-07 PROBLEM — F33.9 RECURRENT MAJOR DEPRESSIVE DISORDER: Status: RESOLVED | Noted: 2018-05-02 | Resolved: 2023-10-07

## 2023-10-09 ENCOUNTER — OFFICE VISIT (OUTPATIENT)
Dept: FAMILY MEDICINE | Facility: CLINIC | Age: 33
End: 2023-10-09
Payer: COMMERCIAL

## 2023-10-09 ENCOUNTER — LAB VISIT (OUTPATIENT)
Dept: LAB | Facility: HOSPITAL | Age: 33
End: 2023-10-09
Attending: FAMILY MEDICINE
Payer: COMMERCIAL

## 2023-10-09 VITALS
WEIGHT: 204.56 LBS | HEIGHT: 66 IN | BODY MASS INDEX: 32.88 KG/M2 | SYSTOLIC BLOOD PRESSURE: 106 MMHG | OXYGEN SATURATION: 97 % | DIASTOLIC BLOOD PRESSURE: 86 MMHG | HEART RATE: 105 BPM

## 2023-10-09 DIAGNOSIS — Z00.01 ENCOUNTER FOR GENERAL ADULT MEDICAL EXAMINATION WITH ABNORMAL FINDINGS: Primary | ICD-10-CM

## 2023-10-09 DIAGNOSIS — F41.1 GAD (GENERALIZED ANXIETY DISORDER): ICD-10-CM

## 2023-10-09 DIAGNOSIS — F33.1 MODERATE EPISODE OF RECURRENT MAJOR DEPRESSIVE DISORDER: ICD-10-CM

## 2023-10-09 DIAGNOSIS — G43.001 MIGRAINE WITHOUT AURA AND WITH STATUS MIGRAINOSUS, NOT INTRACTABLE: ICD-10-CM

## 2023-10-09 DIAGNOSIS — Z11.59 NEED FOR HEPATITIS C SCREENING TEST: ICD-10-CM

## 2023-10-09 DIAGNOSIS — Z23 IMMUNIZATION DUE: ICD-10-CM

## 2023-10-09 DIAGNOSIS — Z11.4 ENCOUNTER FOR SCREENING FOR HIV: ICD-10-CM

## 2023-10-09 DIAGNOSIS — E66.09 CLASS 1 OBESITY DUE TO EXCESS CALORIES WITH SERIOUS COMORBIDITY AND BODY MASS INDEX (BMI) OF 33.0 TO 33.9 IN ADULT: ICD-10-CM

## 2023-10-09 LAB
BILIRUB UR QL STRIP: ABNORMAL
CLARITY UR REFRACT.AUTO: CLEAR
COLOR UR AUTO: YELLOW
GLUCOSE UR QL STRIP: NEGATIVE
HGB UR QL STRIP: NEGATIVE
KETONES UR QL STRIP: NEGATIVE
LEUKOCYTE ESTERASE UR QL STRIP: NEGATIVE
NITRITE UR QL STRIP: NEGATIVE
PH UR STRIP: 6 [PH] (ref 5–8)
PROT UR QL STRIP: NEGATIVE
SP GR UR STRIP: 1.03 (ref 1–1.03)
URN SPEC COLLECT METH UR: ABNORMAL

## 2023-10-09 PROCEDURE — 99395 PREV VISIT EST AGE 18-39: CPT | Mod: 25,S$GLB,, | Performed by: FAMILY MEDICINE

## 2023-10-09 PROCEDURE — 99999 PR PBB SHADOW E&M-EST. PATIENT-LVL V: CPT | Mod: PBBFAC,,, | Performed by: FAMILY MEDICINE

## 2023-10-09 PROCEDURE — 3008F BODY MASS INDEX DOCD: CPT | Mod: CPTII,S$GLB,, | Performed by: FAMILY MEDICINE

## 2023-10-09 PROCEDURE — 81003 URINALYSIS AUTO W/O SCOPE: CPT | Performed by: FAMILY MEDICINE

## 2023-10-09 PROCEDURE — 90471 FLU VACCINE (QUAD) GREATER THAN OR EQUAL TO 3YO PRESERVATIVE FREE IM: ICD-10-PCS | Mod: S$GLB,,, | Performed by: FAMILY MEDICINE

## 2023-10-09 PROCEDURE — 90686 IIV4 VACC NO PRSV 0.5 ML IM: CPT | Mod: S$GLB,,, | Performed by: FAMILY MEDICINE

## 2023-10-09 PROCEDURE — 1159F MED LIST DOCD IN RCRD: CPT | Mod: CPTII,S$GLB,, | Performed by: FAMILY MEDICINE

## 2023-10-09 PROCEDURE — 99999 PR PBB SHADOW E&M-EST. PATIENT-LVL V: ICD-10-PCS | Mod: PBBFAC,,, | Performed by: FAMILY MEDICINE

## 2023-10-09 PROCEDURE — 99395 PR PREVENTIVE VISIT,EST,18-39: ICD-10-PCS | Mod: 25,S$GLB,, | Performed by: FAMILY MEDICINE

## 2023-10-09 PROCEDURE — 3079F PR MOST RECENT DIASTOLIC BLOOD PRESSURE 80-89 MM HG: ICD-10-PCS | Mod: CPTII,S$GLB,, | Performed by: FAMILY MEDICINE

## 2023-10-09 PROCEDURE — 1159F PR MEDICATION LIST DOCUMENTED IN MEDICAL RECORD: ICD-10-PCS | Mod: CPTII,S$GLB,, | Performed by: FAMILY MEDICINE

## 2023-10-09 PROCEDURE — 3074F PR MOST RECENT SYSTOLIC BLOOD PRESSURE < 130 MM HG: ICD-10-PCS | Mod: CPTII,S$GLB,, | Performed by: FAMILY MEDICINE

## 2023-10-09 PROCEDURE — 3074F SYST BP LT 130 MM HG: CPT | Mod: CPTII,S$GLB,, | Performed by: FAMILY MEDICINE

## 2023-10-09 PROCEDURE — 3079F DIAST BP 80-89 MM HG: CPT | Mod: CPTII,S$GLB,, | Performed by: FAMILY MEDICINE

## 2023-10-09 PROCEDURE — 90471 IMMUNIZATION ADMIN: CPT | Mod: S$GLB,,, | Performed by: FAMILY MEDICINE

## 2023-10-09 PROCEDURE — 90686 FLU VACCINE (QUAD) GREATER THAN OR EQUAL TO 3YO PRESERVATIVE FREE IM: ICD-10-PCS | Mod: S$GLB,,, | Performed by: FAMILY MEDICINE

## 2023-10-09 PROCEDURE — 1160F PR REVIEW ALL MEDS BY PRESCRIBER/CLIN PHARMACIST DOCUMENTED: ICD-10-PCS | Mod: CPTII,S$GLB,, | Performed by: FAMILY MEDICINE

## 2023-10-09 PROCEDURE — 3008F PR BODY MASS INDEX (BMI) DOCUMENTED: ICD-10-PCS | Mod: CPTII,S$GLB,, | Performed by: FAMILY MEDICINE

## 2023-10-09 PROCEDURE — 1160F RVW MEDS BY RX/DR IN RCRD: CPT | Mod: CPTII,S$GLB,, | Performed by: FAMILY MEDICINE

## 2023-10-09 NOTE — PROGRESS NOTES
Subjective:       Patient ID: Rosalina Saavedra is a 33 y.o. female.    Chief Complaint: Weight Loss    Patient Active Problem List   Diagnosis    Chronic migraine with aura    Menstrual cramps    JESUS (generalized anxiety disorder)    Social anxiety disorder    Impaired mobility and activities of daily living    Fatigue    Medication overuse headache    Decreased range of motion of neck    Poor posture    Weakness of both upper extremities    Migraine without aura and with status migrainosus, not intractable    Moderate episode of recurrent major depressive disorder    Psychogenic nonepileptic seizure    Functional neurological symptom disorder with mixed symptoms    Insomnia    Chronic migraine w/o aura, not intractable, w stat migr      HPI  32 yo female presents  for weight follow up.   Would like to work on weight lost. Noom and more direct food tracking didn't work for patient. Has been doing well with service dog for depression/anxiety. Keeps her going out active and with some routine.   Making strides with counseling and psychiatry. Neurology on board for migraine mgmt.     Review of Systems   All other systems reviewed and are negative.       Objective:     Vitals:    10/09/23 0926   BP: 106/86   Pulse: 105     Physical Exam  Vitals and nursing note reviewed.   Constitutional:       General: She is not in acute distress.     Appearance: Normal appearance. She is not ill-appearing, toxic-appearing or diaphoretic.   HENT:      Head: Normocephalic and atraumatic.   Eyes:      General: No scleral icterus.     Conjunctiva/sclera: Conjunctivae normal.   Cardiovascular:      Rate and Rhythm: Normal rate.      Heart sounds: Normal heart sounds. No murmur heard.  Pulmonary:      Effort: Pulmonary effort is normal. No respiratory distress.      Breath sounds: Normal breath sounds.   Skin:     Coloration: Skin is not pale.   Neurological:      Mental Status: She is alert. Mental status is at baseline.   Psychiatric:          Attention and Perception: Attention and perception normal.         Mood and Affect: Mood and affect normal.         Speech: Speech normal.         Behavior: Behavior normal.         Cognition and Memory: Cognition and memory normal.         Judgment: Judgment normal.       Assessment:       1. Encounter for general adult medical examination with abnormal findings    2. Moderate episode of recurrent major depressive disorder    3. JESUS (generalized anxiety disorder)    4. Migraine without aura and with status migrainosus, not intractable    5. Class 1 obesity due to excess calories with serious comorbidity and body mass index (BMI) of 33.0 to 33.9 in adult    6. Immunization due    7. Need for hepatitis C screening test    8. Encounter for screening for HIV          Plan:   Recent relevant labs results reviewed with patient.       1. Encounter for general adult medical examination with abnormal findings  - Risk and age appropriate anticipatory guidance.  reviewed and updated. Recommendations discussed with patient as appropriate.     2. Moderate episode of recurrent major depressive disorder  3. JESUS (generalized anxiety disorder)  - Continue as per psychiatry and counseling.    4. Migraine without aura and with status migrainosus, not intractable  - Continue as per Neuro    5. Class 1 obesity due to excess calories with serious comorbidity and body mass index (BMI) of 33.0 to 33.9 in adult  -     Ambulatory referral/consult to [facility] ([facility]); Future; Expected date: 10/16/2023  -     [facility] ASSIGN QUESTIONNAIRE SERIES ([facility])  -     [facility] Patient Entered [facility] ([facility])  -     Ambulatory referral/consult to Nutrition Services; Future; Expected date: 10/16/2023  Referrals as above.   Discussed that unlikely GLP1a covered without Type II DM. Not a good candidate for other weight loss medications at this time. Discussed plate method, intuitive eating and ongoing lifestyle modifications  for weight loss.     6. Immunization due  -     Influenza - Quadrivalent *Preferred* (6 months+) (PF)    7. Need for hepatitis C screening test  -     Hepatitis C Antibody; Future; Expected date: 10/09/2023    8. Encounter for screening for HIV  -     HIV 1/2 Ag/Ab (4th Gen); Future; Expected date: 10/09/2023    Patient's questions answered. Plan reviewed with patient at the end of visit. Relevant precautions to chief complaint and reasons to seek further medical care or to contact the office sooner reviewed with patient.     Follow up in about 1 year (around 10/9/2024) for Annual Exam.

## 2023-10-10 PROBLEM — R73.03 PREDIABETES: Status: ACTIVE | Noted: 2023-10-10

## 2023-10-10 PROBLEM — E78.2 MODERATE MIXED HYPERLIPIDEMIA NOT REQUIRING STATIN THERAPY: Status: ACTIVE | Noted: 2023-10-10

## 2023-10-31 ENCOUNTER — OFFICE VISIT (OUTPATIENT)
Dept: PSYCHIATRY | Facility: CLINIC | Age: 33
End: 2023-10-31
Payer: COMMERCIAL

## 2023-10-31 DIAGNOSIS — F44.5 PSYCHOGENIC NONEPILEPTIC SEIZURE: ICD-10-CM

## 2023-10-31 DIAGNOSIS — F33.0 MILD EPISODE OF RECURRENT MAJOR DEPRESSIVE DISORDER: Primary | ICD-10-CM

## 2023-10-31 DIAGNOSIS — F41.1 GAD (GENERALIZED ANXIETY DISORDER): ICD-10-CM

## 2023-10-31 DIAGNOSIS — R53.83 FATIGUE, UNSPECIFIED TYPE: ICD-10-CM

## 2023-10-31 DIAGNOSIS — F40.10 SOCIAL ANXIETY DISORDER: ICD-10-CM

## 2023-10-31 DIAGNOSIS — G47.00 INSOMNIA, UNSPECIFIED TYPE: ICD-10-CM

## 2023-10-31 DIAGNOSIS — R29.818 SUSPECTED SLEEP APNEA: ICD-10-CM

## 2023-10-31 PROCEDURE — 1160F RVW MEDS BY RX/DR IN RCRD: CPT | Mod: CPTII,95,, | Performed by: INTERNAL MEDICINE

## 2023-10-31 PROCEDURE — 1159F PR MEDICATION LIST DOCUMENTED IN MEDICAL RECORD: ICD-10-PCS | Mod: CPTII,95,, | Performed by: INTERNAL MEDICINE

## 2023-10-31 PROCEDURE — 3044F HG A1C LEVEL LT 7.0%: CPT | Mod: CPTII,95,, | Performed by: INTERNAL MEDICINE

## 2023-10-31 PROCEDURE — 99214 OFFICE O/P EST MOD 30 MIN: CPT | Mod: 95,,, | Performed by: INTERNAL MEDICINE

## 2023-10-31 PROCEDURE — 3044F PR MOST RECENT HEMOGLOBIN A1C LEVEL <7.0%: ICD-10-PCS | Mod: CPTII,95,, | Performed by: INTERNAL MEDICINE

## 2023-10-31 PROCEDURE — 1160F PR REVIEW ALL MEDS BY PRESCRIBER/CLIN PHARMACIST DOCUMENTED: ICD-10-PCS | Mod: CPTII,95,, | Performed by: INTERNAL MEDICINE

## 2023-10-31 PROCEDURE — 1159F MED LIST DOCD IN RCRD: CPT | Mod: CPTII,95,, | Performed by: INTERNAL MEDICINE

## 2023-10-31 PROCEDURE — 99214 PR OFFICE/OUTPT VISIT, EST, LEVL IV, 30-39 MIN: ICD-10-PCS | Mod: 95,,, | Performed by: INTERNAL MEDICINE

## 2023-10-31 NOTE — PROGRESS NOTES
OUTPATIENT PSYCHIATRY RETURN VISIT    ENCOUNTER DATE:  11/10/2023  SITE:  [facility], [facility]  LENGTH OF SESSION:  15 minutes    The patient location is:  [place], not in a healthcare facility  Visit type:  audiovisual    Face to Face time with patient:  15 minutes  20 minutes of total time spent on the encounter, which includes face to face time and non-face to face time preparing to see the patient (eg, review of tests), Obtaining and/or reviewing separately obtained history, Documenting clinical information in the electronic or other health record, Independently interpreting results (not separately reported) and communicating results to the patient/family/caregiver, or Care coordination (not separately reported).     Each patient to whom he or she provides medical services by telemedicine is:  (1) informed of the relationship between the physician and patient and the respective role of any other health care provider with respect to management of the patient; and (2) notified that he or she may decline to receive medical services by telemedicine and may withdraw from such care at any time.    CHIEF COMPLAINT:  Depression and Anxiety      HISTORY OF PRESENTING ILLNESS:  Rosalina Saavedra is a 33 y.o. female with history of MDD, JESUS, and Social Anxiety Disorder who presents for follow up appointment.      Plan at last appointment on 9/27/2023:  Increase Lunesta to 3mg qHS.  Continue Effexor 225mg daily, Buspar 15mg TID, Elavil 25mg qHS, and Klonopin 0.25-0.5mg daily PRN anxiety (currently taking every morning due to significant work stress).    Discussed with patient informed consent, risks versus benefits, alternative treatments, side effect profile and the inherent unpredictability of individual responses to these treatments.  The patient expresses understanding of the above and displays the capacity to agree with this current plan.  Continue individual psychotherapy with Maria Luisa Hammond  CHARISW.    History as told by patient:  Planning to start seeing Guerita Stevens.  Has felt more stressed and tired no matter how much sleep she gets.  Even if she sleeps a lot, feels exhausted.  Sleeping better with higher dose of Lunesta.  Her exhaustion doesn't feel like medication-induced.  She does snore.  She has gained weight - combination of stress eating, medication, and not exercising.  Trying to go to [facility] - but so busy.  Has someone who specializes in dissertations helping her complete this.  Had an anxiety attack last Monday and had to miss work.  Bottling up anxiety and then it just came up.  Grandfather still in nursing home.  Mother back and forth about her living at grandfather's house.  Patient says she was ready to go live there.      Medication side effects:  Denies  Medication compliance:  Yes    PSYCHIATRIC REVIEW OF SYSTEMS:  Trouble with sleep:  Chronic  Appetite changes:  Denies  Weight changes:  Gain slowly over time  Lack of energy:  Yes as above  Anhedonia:  Yes  Somatic symptoms:  Seizures, migraines  Libido:  Not discussed  Anxiety/panic:  Yes  Guilty/hopeless:  Denies  Self-injurious behavior/risky behavior:  Denies  Any drugs:  Denies  Alcohol:  Denies     MEDICAL REVIEW OF SYSTEMS:  Complete review of systems performed covering Constitutional, Musculoskeletal, Neurologic.  All systems negative except for that covered in HPI.    PAST PSYCHIATRIC, MEDICAL, AND SOCIAL HISTORY REVIEWED  The patient's past medical, family and social history have been reviewed and updated as appropriate within the electronic medical record - see encounter notes.    MEDICATIONS:    Current Outpatient Medications:     amitriptyline (ELAVIL) 25 MG tablet, TAKE 1 TABLET BY MOUTH EVERY EVENING, Disp: 90 tablet, Rfl: 1    atogepant (QULIPTA) 60 mg Tab, Take 60 mg by mouth once daily., Disp: 30 tablet, Rfl: 6    busPIRone (BUSPAR) 15 MG tablet, TAKE 1 TABLET BY MOUTH 3 TIMES DAILY. (Patient taking differently:  "Take 30 mg by mouth 2 (two) times daily.), Disp: 270 tablet, Rfl: 1    clonazePAM (KLONOPIN) 0.5 MG tablet, TAKE 1 TABLET BY MOUTH EVERY EVENING BEFORE BED. YOU CAN TAKE AN ADDITION 1/2-1 TABLET AS NEEDED DURING THE DAY FOR PANIC ATTACK., Disp: 60 tablet, Rfl: 1    eszopiclone (LUNESTA) 3 mg Tab, Take 1 tablet (3 mg total) by mouth every evening., Disp: 30 tablet, Rfl: 2    etodolac (LODINE) 500 MG tablet, Take 1 tablet (500 mg total) by mouth 2 (two) times daily as needed (migraine)., Disp: 20 tablet, Rfl: 12    ibuprofen (ADVIL,MOTRIN) 800 MG tablet, Take 1 tablet (800 mg total) by mouth 3 (three) times daily as needed for Pain., Disp: 90 tablet, Rfl: 0    promethazine (PHENERGAN) 25 MG tablet, Take 1 tablet (25 mg total) by mouth every 6 (six) hours as needed for Nausea., Disp: 40 tablet, Rfl: 11    rimegepant (NURTEC) 75 mg odt, Take 75 mg by mouth once as needed for Migraine. Place ODT tablet on the tongue; alternatively the ODT tablet may be placed under the tongue, Disp: , Rfl:     venlafaxine (EFFEXOR-XR) 150 MG Cp24, Take 1 capsule (150 mg total) by mouth once daily. Take with 75mg capsule to equal 225mg daily., Disp: 30 capsule, Rfl: 11    venlafaxine (EFFEXOR-XR) 75 MG 24 hr capsule, Take 1 capsule (75 mg total) by mouth once daily. Take with 150mg capsule to equal 225mg daily., Disp: 30 capsule, Rfl: 11    ZOLMitriptan (ZOMIG-ZMT) 5 MG disintegrating tablet, TAKE 1 TABLET BY MOUTH AS NEEDED FOR MIGRAINE., Disp: 9 tablet, Rfl: 12    ALLERGIES:  Review of patient's allergies indicates:   Allergen Reactions    Codeine Itching and Other (See Comments)    Morphine Itching and Other (See Comments)       PSYCHIATRIC EXAM:  There were no vitals filed for this visit.    Appearance:  Well groomed, appearing healthy and of stated age  Behavior:  Cooperative, pleasant, no psychomotor agitation or retardation  Speech:  Normal rate, rhythm, prosody, and volume  Mood:  "Ok"  Affect:  Constricted  Thought Process:  " Linear, logical, goal directed  Thought Content:  Negative for suicidal ideation, homicidal ideation, delusions or hallucinations.  Associations:  Intact  Memory:  Grossly Intact  Level of Consciousness/Orientation:  Grossly intact  Fund of Knowledge:  Good  Attention:  Good  Language:  Fluent, able to name abstract and concrete objects  Insight:  Good  Judgment:  Intact  Psychomotor signs:  No abnormal movements of face  Gait:  Unable to assess via virtual visit      RELEVANT LABS/STUDIES:    Lab Results   Component Value Date    WBC 6.86 10/09/2023    HGB 11.6 (L) 10/09/2023    HCT 38.7 10/09/2023    MCV 92 10/09/2023     10/09/2023     BMP  Lab Results   Component Value Date     10/09/2023    K 4.2 10/09/2023     10/09/2023    CO2 21 (L) 10/09/2023    BUN 14 10/09/2023    CREATININE 0.8 10/09/2023    CALCIUM 9.8 10/09/2023    ANIONGAP 11 10/09/2023    ESTGFRAFRICA >60.0 07/28/2022    EGFRNONAA >60.0 07/28/2022     Lab Results   Component Value Date    ALT 18 10/09/2023    AST 14 10/09/2023    ALKPHOS 93 10/09/2023    BILITOT 0.2 10/09/2023     Lab Results   Component Value Date    TSH 0.539 10/09/2023     Lab Results   Component Value Date    HGBA1C 5.8 (H) 10/09/2023       IMPRESSION:    Rosalina Saavedra is a 33 y.o. female with history of MDD, JESUS, and Social Anxiety Disorder who presents for follow up appointment.    Status/Progress:  Based on the examination today, the patient's problem(s) is/are inadequately controlled.  New problems have been presented today.    Risk Parameters:  Patient reports no suicidal ideation  Patient reports no homicidal ideation  Patient reports no self-injurious behavior  Patient reports no violent behavior      DIAGNOSES:    ICD-10-CM ICD-9-CM   1. Mild episode of recurrent major depressive disorder  F33.0 296.31   2. Fatigue, unspecified type  R53.83 780.79   3. Suspected sleep apnea  R29.818 781.99   4. JESUS (generalized anxiety disorder)  F41.1 300.02   5.  Social anxiety disorder  F40.10 300.23   6. Psychogenic nonepileptic seizure  F44.5 300.11   7. Insomnia, unspecified type  G47.00 780.52         PLAN:  No medication changes today.  I suspect KARIE is causing her fatigue and encouraged her to talk with PCP about this.    Continue Effexor 225mg daily, Buspar 15mg TID, Elavil 25mg qHS, Lunesta 3mg qHS, and Klonopin 0.25-0.5mg daily PRN anxiety.    Discussed with patient informed consent, risks versus benefits, alternative treatments, side effect profile and the inherent unpredictability of individual responses to these treatments.  The patient expresses understanding of the above and displays the capacity to agree with this current plan.  She will be seeing Guerita Stevens LCSW, for therapy.    RETURN TO CLINIC:  Follow up in about 6 weeks (around 12/12/2023).

## 2023-11-21 DIAGNOSIS — F41.1 GAD (GENERALIZED ANXIETY DISORDER): ICD-10-CM

## 2023-11-21 DIAGNOSIS — G43.701 CHRONIC MIGRAINE W/O AURA, NOT INTRACTABLE, W STAT MIGR: ICD-10-CM

## 2023-11-22 ENCOUNTER — TELEPHONE (OUTPATIENT)
Dept: FAMILY MEDICINE | Facility: CLINIC | Age: 33
End: 2023-11-22
Payer: COMMERCIAL

## 2023-11-22 RX ORDER — BUSPIRONE HYDROCHLORIDE 15 MG/1
15 TABLET ORAL 3 TIMES DAILY
Qty: 270 TABLET | Refills: 1 | Status: SHIPPED | OUTPATIENT
Start: 2023-11-22

## 2023-11-30 RX ORDER — ETODOLAC 500 MG/1
500 TABLET, FILM COATED ORAL 2 TIMES DAILY PRN
Qty: 20 TABLET | Refills: 12 | Status: SHIPPED | OUTPATIENT
Start: 2023-11-30

## 2023-11-30 RX ORDER — ATOGEPANT 60 MG/1
1 TABLET ORAL
Qty: 30 TABLET | Refills: 6 | Status: SHIPPED | OUTPATIENT
Start: 2023-11-30

## 2023-12-18 ENCOUNTER — TELEPHONE (OUTPATIENT)
Dept: NEUROLOGY | Facility: CLINIC | Age: 33
End: 2023-12-18
Payer: COMMERCIAL

## 2023-12-18 ENCOUNTER — PROCEDURE VISIT (OUTPATIENT)
Dept: NEUROLOGY | Facility: CLINIC | Age: 33
End: 2023-12-18
Payer: COMMERCIAL

## 2023-12-18 VITALS
BODY MASS INDEX: 32.05 KG/M2 | HEART RATE: 111 BPM | WEIGHT: 199.44 LBS | DIASTOLIC BLOOD PRESSURE: 91 MMHG | SYSTOLIC BLOOD PRESSURE: 124 MMHG | HEIGHT: 66 IN

## 2023-12-18 DIAGNOSIS — F44.7 FUNCTIONAL NEUROLOGICAL SYMPTOM DISORDER WITH MIXED SYMPTOMS: Primary | ICD-10-CM

## 2023-12-18 PROCEDURE — 64615 CHEMODENERV MUSC MIGRAINE: CPT | Mod: S$GLB,,, | Performed by: PSYCHIATRY & NEUROLOGY

## 2023-12-18 PROCEDURE — 64615 PR CHEMODENERVATION OF MUSCLE FOR CHRONIC MIGRAINE: ICD-10-PCS | Mod: S$GLB,,, | Performed by: PSYCHIATRY & NEUROLOGY

## 2023-12-18 NOTE — PROCEDURES
Follow up:   Reports recently new onset symptoms of fatigue, snoring and fragmented sleep.  Also reports an increase in headaches related to more stress and poor sleep    Prior note:   2 weeks of daily HA related to stress and school     Prior note:   Overall doing good  HA initially improved for a few weeks after the amitriptyline.  Now the frequency has returned to chronic migraine.    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     Headache history:   Age of onset -  5   Location - occipital, temples   Nature of pain -  Stabbing, aching   Prodrome - no   Aura -  No   Duration of headache - > 4 hrs   Time to peak intensity - n/a   Pain scale - range of intensity -  9-10/10  Frequency -  15-20/month    Status Migrainosus history - yes   Time of day of most headaches- anytime      Associated symptoms with the headache:   Meningeal symptoms - photophobia, phonophobia, exercise intolerance +   Nausea/vomitting +   Nasal drainage   Visual blurriness   Pallor/flushing  Dizziness   Vertigo  Confusion  Impaired concentration +   Pain worsened with physical activity  +   Neck pain +      Cluster headache symptoms: none   Symptoms of increased intracranial pressure: none  Basilar migraine symptoms: none      Headache Triggers:  Stress, anxiety, emotional overload   H/o SI      Treatment history:  Resolution of headache with sleep - yes   Meds tried:     Failed:   prozac 40   effexor 150   Gabapentin 300 mg   mobic 15   namenda   nurtec   trokemdi   Diamox   imitrex shots  lamictal   BOTOX#1 6/26/23 - helped    BOTOX#2 9/22/23 - helped      10/21/21  Care Timeline     0000    EKG 12-LEAD   1228    Arrived   1308    Comprehensive metabolic panel        CBC auto differential    1321    POCT glucose   1340    CT Head Without Contrast   1341    POCT urine pregnancy   1400    EKG 12-lead   1422    metoclopramide HCl 10 mg   1424    ketorolac tromethamine 30 mg       diphenhydramine HCl 25 mg   1425    0.9 % sodium chloride 1000 mL   1546    haloperidol lactate 2.5 mg   1553    magnesium sulfate in water 2 g   1826    prochlorperazine edisylate 5 mg   1827    diphenhydramine HCl 25 mg   2027    Discharged         Headache risk factors:   H/o TBI  - concussion at age 16   H/o Meningitis  - no   F/h Aneurysms  - no      Review of Systems   Constitutional: Negative.    HENT: Negative.    Eyes: Negative.    Respiratory: Negative.    Cardiovascular: Negative.    Gastrointestinal: Negative.    Endocrine: Negative.    Genitourinary: Negative.    Musculoskeletal: Negative.    Integumentary:  Negative.   Allergic/Immunologic: Negative.    Neurological: Positive for headaches.   Hematological: Negative.    Psychiatric/Behavioral: Positive for dysphoric mood and sleep disturbance.          Objective:   Physical Exam  Unchanged          Assessment:     Chr Migraine     Impression:   No major branch stenosis/occlusion at the [place].  No aneurysm.   No evidence of venous sinus thrombosis or venous sinus stenosis..     Electronically signed by: Armani Lopez  Date:                                            11/24/2021  Time:                                           16:24     Impression:   No major branch stenosis/occlusion at the [place].  No aneurysm.   No evidence of venous sinus thrombosis or venous sinus stenosis..     Electronically signed by: Armani Lopez  Date:                                            11/24/2021  Time:                                           16:24     Impression:   Normal pre and postcontrast MR imaging appearance of the  brain.     Electronically signed by: Armani Lopez  Date:                                            11/24/2021  Time:                                           16:51  Plan:       VYEPTI 300 mg IV (sept 2022 -)   Cont Qulipta 60 mg daily, discussed side effect   Breakthrough headache - etodolac, zomig   Dexamethasone 4 mg BID x 3 days   Multiple alternative treatment options were offered to the patient  cont with psychiatrist   Cont Elavil   Continue Effexor 225mg daily, Buspar 15mg TID, and Klonopin 0.25-0.5mg daily PRN anxiety.      BOTOX treatment response:   Prior to initiating BOTOX, the patient had  18 migraine days per month on average. This meets criteria for chronic migraine.   After starting BOTOX, the patient experienced a reduction in  7 days per month   After starting BOTOX, the patient experienced a reduction in > 100 hours of migraine symptoms per month   After starting BOTOX, the patient experienced a 50% reduction in burden of migraine days per month   Based on this information, BOTOX is medically necessary for the management of the patient's chronic migraine.     BOTOX Injection intervals:   Patient reports a 'wearing off effect' prior to the subsequent BOTOX injections at 12 weeks. This occurs at week 10  Symptoms of this a 'wearing off effect' include - worsening of migraine headache frequency and intensity   Medications used during the 'wearing off effect' period include   Based on this information, it is medically necessary for the patient to receive BOTOX therapy at an interval of every 10 weeks for the management of chronic migraine.    BOTOX was performed as an indicated therapy for intractable chronic migraine headaches given that the patient failed > 3 prophylactic medications    Botulinum Toxin Injection Procedure   Pre-operative diagnosis: Chronic migraine   Post-operative diagnosis: Same   Procedure: Chemical neurolysis   After risks and benefits were explained including bleeding,  infection, worsening of pain, damage to the areas being injected, weakness of muscles, loss of muscle control, dysphagia if injecting the head or neck, facial droop if injecting the facial area, painful injection, allergic or other reaction to the medications being injected, and the failure of the procedure to help the problem, a signed consent was obtained.   The patient was placed in a comfortable area and the sites to be treated were identified.The area to be treated was prepped three times with alcohol and the alcohol allowed to dry. Next, a 30 gauge needle was used to inject the medication in the area to be treated.   Area(s) injected:   Total Botox use: 165 Units   Botox wastage: 35 Units   Injection sites:    muscle bilaterally ( a total of 10 units divided into 2 sites)   Procerus muscle (5 units)   Frontalis muscle bilaterally (a total of 20 units divided into 4 sites)   Temporalis muscle bilaterally (a total of 40 units divided into 8 sites)   Occipitalis muscle bilaterally (a total of 30 units divided into 6 sites + 10 units)   Cervical paraspinal muscles (a total of 20 units divided into 4 sites)   Trapezius muscle bilaterally (a total of 30 units divided into 6 sites)   Complications: none   RTC for the next Botox injection: 10 weeks   Procedures

## 2023-12-19 ENCOUNTER — OFFICE VISIT (OUTPATIENT)
Dept: PSYCHIATRY | Facility: CLINIC | Age: 33
End: 2023-12-19
Payer: COMMERCIAL

## 2023-12-19 DIAGNOSIS — F33.0 MILD EPISODE OF RECURRENT MAJOR DEPRESSIVE DISORDER: ICD-10-CM

## 2023-12-19 DIAGNOSIS — F41.1 GAD (GENERALIZED ANXIETY DISORDER): Primary | ICD-10-CM

## 2023-12-19 DIAGNOSIS — F44.5 PSYCHOGENIC NONEPILEPTIC SEIZURE: ICD-10-CM

## 2023-12-19 DIAGNOSIS — G47.00 INSOMNIA, UNSPECIFIED TYPE: ICD-10-CM

## 2023-12-19 DIAGNOSIS — F40.10 SOCIAL ANXIETY DISORDER: ICD-10-CM

## 2023-12-19 DIAGNOSIS — G43.001 MIGRAINE WITHOUT AURA AND WITH STATUS MIGRAINOSUS, NOT INTRACTABLE: ICD-10-CM

## 2023-12-19 PROCEDURE — 1159F MED LIST DOCD IN RCRD: CPT | Mod: CPTII,95,, | Performed by: INTERNAL MEDICINE

## 2023-12-19 PROCEDURE — 1160F RVW MEDS BY RX/DR IN RCRD: CPT | Mod: CPTII,95,, | Performed by: INTERNAL MEDICINE

## 2023-12-19 PROCEDURE — 99214 OFFICE O/P EST MOD 30 MIN: CPT | Mod: 95,,, | Performed by: INTERNAL MEDICINE

## 2023-12-19 NOTE — PROGRESS NOTES
OUTPATIENT PSYCHIATRY RETURN VISIT    ENCOUNTER DATE:  1/4/2024  SITE:  [facility], [facility]  LENGTH OF SESSION:  15 minutes    The patient location is:  [place], not in a healthcare facility  Visit type:  audiovisual    Face to Face time with patient:  15 minutes  20 minutes of total time spent on the encounter, which includes face to face time and non-face to face time preparing to see the patient (eg, review of tests), Obtaining and/or reviewing separately obtained history, Documenting clinical information in the electronic or other health record, Independently interpreting results (not separately reported) and communicating results to the patient/family/caregiver, or Care coordination (not separately reported).     Each patient to whom he or she provides medical services by telemedicine is:  (1) informed of the relationship between the physician and patient and the respective role of any other health care provider with respect to management of the patient; and (2) notified that he or she may decline to receive medical services by telemedicine and may withdraw from such care at any time.    CHIEF COMPLAINT:  Mood      HISTORY OF PRESENTING ILLNESS:  Rosalina Saavedra is a 33 y.o. female with history of MDD, JESUS, and Social Anxiety Disorder who presents for follow up appointment.      Plan at last appointment on 10/31/2023:  No medication changes today.  I suspect KARIE is causing her fatigue and encouraged her to talk with PCP about this.    Continue Effexor 225mg daily, Buspar 15mg TID, Elavil 25mg qHS, Lunesta 3mg qHS, and Klonopin 0.25-0.5mg daily PRN anxiety.    Discussed with patient informed consent, risks versus benefits, alternative treatments, side effect profile and the inherent unpredictability of individual responses to these treatments.  The patient expresses understanding of the above and displays the capacity to agree with this current plan.  She will be seeing Guerita Stevens LCSW,  "for therapy.    History as told by patient:  Says she is doing "ok."  Had a lot of seizures and migraines for awhile.  Missed a week of work.  Just got the Botox yesterday.  Dr. Dorsey agrees KARIE likely.  Sees PCP next week.  Dr. Dorsey did not change medications.  Feels work is stressing her out the most.  Just feeling overwhelmed with everything she has to do.  Not sure if migraines are triggering the seizures or not.  More recently has noticed the pattern of getting a migraine and then having seizures.  Seeing Guerita Stevens - plans to start every 2 weeks.  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  Medications helping her sleep.  Usually takes Klonopin every morning and then sometimes in the afternoon if super anxious.  Has the most anxiety when she is going to bed - worrying about everything she needs to do.  Things with parents not worse - about the same.      Medication side effects:  Denies  Medication compliance:  Yes    PSYCHIATRIC REVIEW OF SYSTEMS:  Trouble with sleep:  Chronic but improved currently  Appetite changes:  Denies  Weight changes:  Gain slowly over time  Lack of energy:  Yes   Anhedonia:  Sometimes  Somatic symptoms:  Seizures, migraines  Libido:  Not discussed  Anxiety/panic:  Yes as above  Guilty/hopeless:  Denies  Self-injurious behavior/risky behavior:  Denies  Any drugs:  Denies  Alcohol:  Denies     MEDICAL REVIEW OF SYSTEMS:  Complete review of systems performed covering Constitutional, Musculoskeletal, Neurologic.  All systems negative except for that covered in HPI.    PAST PSYCHIATRIC, MEDICAL, AND SOCIAL HISTORY REVIEWED  The patient's past medical, family and social history have been reviewed and updated as appropriate within the electronic medical record - see encounter notes.    MEDICATIONS:    Current Outpatient Medications:     amitriptyline (ELAVIL) 25 MG tablet, TAKE 1 TABLET BY MOUTH EVERY EVENING, Disp: 90 tablet, Rfl: 1    busPIRone (BUSPAR) 15 MG tablet, Take 1 " tablet (15 mg total) by mouth 3 (three) times daily., Disp: 270 tablet, Rfl: 1    clonazePAM (KLONOPIN) 0.5 MG tablet, TAKE 1 TABLET BY MOUTH EVERY EVENING BEFORE BED. YOU CAN TAKE AN ADDITION 1/2-1 TABLET AS NEEDED DURING THE DAY FOR PANIC ATTACK., Disp: 60 tablet, Rfl: 1    etodolac (LODINE) 500 MG tablet, TAKE 1 TABLET (500 MG TOTAL) BY MOUTH 2 (TWO) TIMES DAILY AS NEEDED (MIGRAINE)., Disp: 20 tablet, Rfl: 12    ibuprofen (ADVIL,MOTRIN) 800 MG tablet, Take 1 tablet (800 mg total) by mouth 3 (three) times daily as needed for Pain., Disp: 90 tablet, Rfl: 0    promethazine (PHENERGAN) 25 MG tablet, Take 1 tablet (25 mg total) by mouth every 6 (six) hours as needed for Nausea., Disp: 40 tablet, Rfl: 0    QULIPTA 60 mg Tab, TAKE 1 TABLET BY MOUTH EVERY DAY, Disp: 30 tablet, Rfl: 6    rimegepant (NURTEC) 75 mg odt, Take 75 mg by mouth once as needed for Migraine. Place ODT tablet on the tongue; alternatively the ODT tablet may be placed under the tongue, Disp: , Rfl:     semaglutide (OZEMPIC) 0.25 mg or 0.5 mg (2 mg/3 mL) pen injector, Inject 0.25mg under then skin once weekly for weeks 1-4, then inject 0.5mg under the skin once weekly for weeks 4-8, Disp: 6 mL, Rfl: 0    semaglutide (OZEMPIC) 1 mg/dose (4 mg/3 mL), Inject 1 mg into the skin every 7 days. For weeks 9-12, Disp: 3 mL, Rfl: 0    semaglutide (OZEMPIC) 2 mg/dose (8 mg/3 mL) PnIj, Inject 2 mg into the skin every 7 days. For weeks 13-16 and onward, Disp: 3 mL, Rfl: 2    venlafaxine (EFFEXOR-XR) 150 MG Cp24, Take 1 capsule (150 mg total) by mouth once daily. Take with 75mg capsule to equal 225mg daily., Disp: 30 capsule, Rfl: 11    venlafaxine (EFFEXOR-XR) 75 MG 24 hr capsule, Take 1 capsule (75 mg total) by mouth once daily. Take with 150mg capsule to equal 225mg daily., Disp: 30 capsule, Rfl: 11    ZOLMitriptan (ZOMIG-ZMT) 5 MG disintegrating tablet, TAKE 1 TABLET BY MOUTH AS NEEDED FOR MIGRAINE., Disp: 9 tablet, Rfl: 12    ALLERGIES:  Review of patient's  "allergies indicates:   Allergen Reactions    Codeine Itching and Other (See Comments)    Morphine Itching and Other (See Comments)       PSYCHIATRIC EXAM:  There were no vitals filed for this visit.    Appearance:  Well groomed, appearing healthy and of stated age  Behavior:  Cooperative, pleasant, no psychomotor agitation or retardation  Speech:  Normal rate, rhythm, prosody, and volume  Mood:  "Ok"  Affect:  Euthymic  Thought Process:  Linear, logical, goal directed  Thought Content:  Negative for suicidal ideation, homicidal ideation, delusions or hallucinations.  Associations:  Intact  Memory:  Grossly Intact  Level of Consciousness/Orientation:  Grossly intact  Fund of Knowledge:  Good  Attention:  Good  Language:  Fluent, able to name abstract and concrete objects  Insight:  Good  Judgment:  Intact  Psychomotor signs:  No abnormal movements of face  Gait:  Unable to assess via virtual visit      RELEVANT LABS/STUDIES:    Lab Results   Component Value Date    WBC 6.86 10/09/2023    HGB 11.6 (L) 10/09/2023    HCT 38.7 10/09/2023    MCV 92 10/09/2023     10/09/2023     BMP  Lab Results   Component Value Date     10/09/2023    K 4.2 10/09/2023     10/09/2023    CO2 21 (L) 10/09/2023    BUN 14 10/09/2023    CREATININE 0.8 10/09/2023    CALCIUM 9.8 10/09/2023    ANIONGAP 11 10/09/2023    ESTGFRAFRICA >60.0 07/28/2022    EGFRNONAA >60.0 07/28/2022     Lab Results   Component Value Date    ALT 18 10/09/2023    AST 14 10/09/2023    ALKPHOS 93 10/09/2023    BILITOT 0.2 10/09/2023     Lab Results   Component Value Date    TSH 0.539 10/09/2023     Lab Results   Component Value Date    HGBA1C 5.8 (H) 10/09/2023       IMPRESSION:    Rosalina Saavedra is a 33 y.o. female with history of MDD, JESUS, and Social Anxiety Disorder who presents for follow up appointment.    Status/Progress:  Based on the examination today, the patient's problem(s) is/are  improved but still inadequately controlled .  New problems " have not been presented today.    Risk Parameters:  Patient reports no suicidal ideation  Patient reports no homicidal ideation  Patient reports no self-injurious behavior  Patient reports no violent behavior        DIAGNOSES:    ICD-10-CM ICD-9-CM   1. JESUS (generalized anxiety disorder)  F41.1 300.02   2. Psychogenic nonepileptic seizure  F44.5 300.11   3. Migraine without aura and with status migrainosus, not intractable  G43.001 346.12   4. Mild episode of recurrent major depressive disorder  F33.0 296.31   5. Social anxiety disorder  F40.10 300.23   6. Insomnia, unspecified type  G47.00 780.52           PLAN:  No medication changes today.  She is meeting with PCP about suspected KARIE.  She has also started therapy with Guerita Stevens LCSW - I believe this is an essential part of her treatment.    Continue Effexor 225mg daily, Buspar 15mg TID, Elavil 25mg qHS, Lunesta 3mg qHS, and Klonopin 0.25-0.5mg daily PRN anxiety.    Discussed with patient informed consent, risks versus benefits, alternative treatments, side effect profile and the inherent unpredictability of individual responses to these treatments.  The patient expresses understanding of the above and displays the capacity to agree with this current plan.  Continue individual psychotherapy with Guerita Stevens LCSW.    RETURN TO CLINIC:  Follow up in 3 months (on 3/6/2024). In person

## 2023-12-23 ENCOUNTER — OFFICE VISIT (OUTPATIENT)
Dept: FAMILY MEDICINE | Facility: CLINIC | Age: 33
End: 2023-12-23
Payer: COMMERCIAL

## 2023-12-23 VITALS
BODY MASS INDEX: 32.2 KG/M2 | HEART RATE: 101 BPM | OXYGEN SATURATION: 98 % | WEIGHT: 200.38 LBS | DIASTOLIC BLOOD PRESSURE: 92 MMHG | HEIGHT: 66 IN | SYSTOLIC BLOOD PRESSURE: 126 MMHG

## 2023-12-23 DIAGNOSIS — R63.8 UNABLE TO LOSE WEIGHT: ICD-10-CM

## 2023-12-23 DIAGNOSIS — E78.2 MODERATE MIXED HYPERLIPIDEMIA NOT REQUIRING STATIN THERAPY: ICD-10-CM

## 2023-12-23 DIAGNOSIS — E66.09 CLASS 1 OBESITY DUE TO EXCESS CALORIES WITH SERIOUS COMORBIDITY AND BODY MASS INDEX (BMI) OF 32.0 TO 32.9 IN ADULT: ICD-10-CM

## 2023-12-23 DIAGNOSIS — F40.10 SOCIAL ANXIETY DISORDER: ICD-10-CM

## 2023-12-23 DIAGNOSIS — F41.1 GAD (GENERALIZED ANXIETY DISORDER): ICD-10-CM

## 2023-12-23 DIAGNOSIS — F33.1 MODERATE EPISODE OF RECURRENT MAJOR DEPRESSIVE DISORDER: ICD-10-CM

## 2023-12-23 DIAGNOSIS — R73.03 PREDIABETES: ICD-10-CM

## 2023-12-23 DIAGNOSIS — G43.701 CHRONIC MIGRAINE W/O AURA, NOT INTRACTABLE, W STAT MIGR: ICD-10-CM

## 2023-12-23 DIAGNOSIS — G43.701 CHRONIC MIGRAINE W/O AURA, NOT INTRACTABLE, W STAT MIGR: Primary | ICD-10-CM

## 2023-12-23 DIAGNOSIS — F44.7 FUNCTIONAL NEUROLOGICAL SYMPTOM DISORDER WITH MIXED SYMPTOMS: ICD-10-CM

## 2023-12-23 DIAGNOSIS — R29.818 SUSPECTED SLEEP APNEA: ICD-10-CM

## 2023-12-23 PROCEDURE — 1160F PR REVIEW ALL MEDS BY PRESCRIBER/CLIN PHARMACIST DOCUMENTED: ICD-10-PCS | Mod: CPTII,S$GLB,, | Performed by: FAMILY MEDICINE

## 2023-12-23 PROCEDURE — 1160F RVW MEDS BY RX/DR IN RCRD: CPT | Mod: CPTII,S$GLB,, | Performed by: FAMILY MEDICINE

## 2023-12-23 PROCEDURE — 99215 PR OFFICE/OUTPT VISIT, EST, LEVL V, 40-54 MIN: ICD-10-PCS | Mod: S$GLB,,, | Performed by: FAMILY MEDICINE

## 2023-12-23 PROCEDURE — 3008F BODY MASS INDEX DOCD: CPT | Mod: CPTII,S$GLB,, | Performed by: FAMILY MEDICINE

## 2023-12-23 PROCEDURE — 3080F PR MOST RECENT DIASTOLIC BLOOD PRESSURE >= 90 MM HG: ICD-10-PCS | Mod: CPTII,S$GLB,, | Performed by: FAMILY MEDICINE

## 2023-12-23 PROCEDURE — 1159F PR MEDICATION LIST DOCUMENTED IN MEDICAL RECORD: ICD-10-PCS | Mod: CPTII,S$GLB,, | Performed by: FAMILY MEDICINE

## 2023-12-23 PROCEDURE — 99999 PR PBB SHADOW E&M-EST. PATIENT-LVL IV: CPT | Mod: PBBFAC,,, | Performed by: FAMILY MEDICINE

## 2023-12-23 PROCEDURE — 3044F PR MOST RECENT HEMOGLOBIN A1C LEVEL <7.0%: ICD-10-PCS | Mod: CPTII,S$GLB,, | Performed by: FAMILY MEDICINE

## 2023-12-23 PROCEDURE — 3074F PR MOST RECENT SYSTOLIC BLOOD PRESSURE < 130 MM HG: ICD-10-PCS | Mod: CPTII,S$GLB,, | Performed by: FAMILY MEDICINE

## 2023-12-23 PROCEDURE — 3044F HG A1C LEVEL LT 7.0%: CPT | Mod: CPTII,S$GLB,, | Performed by: FAMILY MEDICINE

## 2023-12-23 PROCEDURE — 3074F SYST BP LT 130 MM HG: CPT | Mod: CPTII,S$GLB,, | Performed by: FAMILY MEDICINE

## 2023-12-23 PROCEDURE — 3080F DIAST BP >= 90 MM HG: CPT | Mod: CPTII,S$GLB,, | Performed by: FAMILY MEDICINE

## 2023-12-23 PROCEDURE — 99999 PR PBB SHADOW E&M-EST. PATIENT-LVL IV: ICD-10-PCS | Mod: PBBFAC,,, | Performed by: FAMILY MEDICINE

## 2023-12-23 PROCEDURE — 99215 OFFICE O/P EST HI 40 MIN: CPT | Mod: S$GLB,,, | Performed by: FAMILY MEDICINE

## 2023-12-23 PROCEDURE — 1159F MED LIST DOCD IN RCRD: CPT | Mod: CPTII,S$GLB,, | Performed by: FAMILY MEDICINE

## 2023-12-23 PROCEDURE — 3008F PR BODY MASS INDEX (BMI) DOCUMENTED: ICD-10-PCS | Mod: CPTII,S$GLB,, | Performed by: FAMILY MEDICINE

## 2023-12-23 RX ORDER — SEMAGLUTIDE 0.68 MG/ML
INJECTION, SOLUTION SUBCUTANEOUS
Qty: 6 ML | Refills: 0 | Status: SHIPPED | OUTPATIENT
Start: 2023-12-23 | End: 2024-02-09

## 2023-12-23 RX ORDER — IBUPROFEN 800 MG/1
800 TABLET ORAL 3 TIMES DAILY PRN
Qty: 90 TABLET | Refills: 0 | Status: CANCELLED | OUTPATIENT
Start: 2023-12-23

## 2023-12-23 RX ORDER — PROMETHAZINE HYDROCHLORIDE 25 MG/1
25 TABLET ORAL EVERY 6 HOURS PRN
Qty: 40 TABLET | Refills: 0 | Status: SHIPPED | OUTPATIENT
Start: 2023-12-23

## 2023-12-23 RX ORDER — IBUPROFEN 800 MG/1
800 TABLET ORAL 3 TIMES DAILY PRN
Qty: 90 TABLET | Refills: 0 | Status: SHIPPED | OUTPATIENT
Start: 2023-12-23 | End: 2024-03-18 | Stop reason: SDUPTHER

## 2023-12-23 RX ORDER — PROMETHAZINE HYDROCHLORIDE 25 MG/1
25 TABLET ORAL EVERY 6 HOURS PRN
Qty: 40 TABLET | Refills: 11 | Status: CANCELLED | OUTPATIENT
Start: 2023-12-23

## 2023-12-23 NOTE — PROGRESS NOTES
Subjective:         Patient ID: Rosalina Saavedra is a 33 y.o. female.    Chief Complaint: Results and Headache    Patient Active Problem List   Diagnosis    Chronic migraine with aura    Menstrual cramps    JESUS (generalized anxiety disorder)    Social anxiety disorder    Impaired mobility and activities of daily living    Fatigue    Medication overuse headache    Decreased range of motion of neck    Poor posture    Weakness of both upper extremities    Migraine without aura and with status migrainosus, not intractable    Moderate episode of recurrent major depressive disorder    Psychogenic nonepileptic seizure    Functional neurological symptom disorder with mixed symptoms    Insomnia    Chronic migraine w/o aura, not intractable, w stat migr    Prediabetes    Moderate mixed hyperlipidemia not requiring statin therapy      ATIYA Romano is a 33 y.o. female who presents today for follow up of chronic medical issues.     Reports that over past months worsening of chronic migraines, more frequent. Also notes some increase in pseudoseizures mentioned by mother.   Patient is plugged in with Neurology, recent botox treatment for HA.  Sees psychiatry regularly and will start counseling.   Mother suspects childhood trauma given patient's lack of expression and some information she's received here and there from patient.     Labs discussed - PreDM and HLD.   Weight gain noted.   181lbs at beginning of 2022 and today 200 lbs  Gradual gain from 130lb lowest in 2018 in last 5 years.   70 lbs over 5 years. Interested in medication to aide with weight loss.     Review of Systems   Constitutional:  Positive for activity change. Negative for unexpected weight change.   HENT:  Negative for hearing loss, rhinorrhea and trouble swallowing.    Eyes:  Negative for discharge and visual disturbance.   Respiratory:  Negative for chest tightness and wheezing.    Cardiovascular:  Negative for chest pain and palpitations.  "  Gastrointestinal:  Negative for blood in stool, constipation, diarrhea and vomiting.   Endocrine: Negative for polydipsia and polyuria.   Genitourinary:  Negative for difficulty urinating, dysuria, hematuria and menstrual problem.   Musculoskeletal:  Positive for neck pain. Negative for arthralgias and joint swelling.   Neurological:  Positive for headaches. Negative for weakness.   Psychiatric/Behavioral:  Positive for dysphoric mood. Negative for confusion.    All other systems reviewed and are negative.         Objective:     Vitals:    12/23/23 1110   BP: (!) 126/92   BP Location: Right arm   Patient Position: Sitting   BP Method: Large (Manual)   Pulse: 101   SpO2: 98%   Weight: 90.9 kg (200 lb 6.4 oz)   Height: 5' 6" (1.676 m)         Physical Exam  Vitals and nursing note reviewed.   Constitutional:       General: She is not in acute distress.     Appearance: Normal appearance. She is not ill-appearing, toxic-appearing or diaphoretic.   HENT:      Head: Normocephalic and atraumatic.   Eyes:      General: No scleral icterus.     Conjunctiva/sclera: Conjunctivae normal.   Cardiovascular:      Rate and Rhythm: Normal rate.   Pulmonary:      Effort: Pulmonary effort is normal. No respiratory distress.   Skin:     Coloration: Skin is not pale.   Neurological:      Mental Status: She is alert. Mental status is at baseline.   Psychiatric:         Attention and Perception: Attention and perception normal.         Mood and Affect: Mood and affect normal.         Speech: Speech normal.         Behavior: Behavior normal.         Cognition and Memory: Cognition and memory normal.         Judgment: Judgment normal.             Assessment:       1. Chronic migraine w/o aura, not intractable, w stat migr    2. Functional neurological symptom disorder with mixed symptoms    3. Prediabetes    4. Suspected sleep apnea    5. Class 1 obesity due to excess calories with serious comorbidity and body mass index (BMI) of 32.0 to " 32.9 in adult    6. Unable to lose weight    7. Moderate mixed hyperlipidemia not requiring statin therapy    8. Social anxiety disorder    9. Moderate episode of recurrent major depressive disorder    10. JESUS (generalized anxiety disorder)        Plan:   Recent relevant labs results reviewed with patient.     Discuss general constellation of symptoms. History of pseudoseizures. Continue per specialists but emphasized interplay between her mental and physical health.   Therapy as planned.   Patient with good engagement and self awareness. Mother supportive.         1. Chronic migraine w/o aura, not intractable, w stat migr  -     ibuprofen (ADVIL,MOTRIN) 800 MG tablet; Take 1 tablet (800 mg total) by mouth 3 (three) times daily as needed for Pain.  Dispense: 90 tablet; Refill: 0  -     promethazine (PHENERGAN) 25 MG tablet; Take 1 tablet (25 mg total) by mouth every 6 (six) hours as needed for Nausea.  Dispense: 40 tablet; Refill: 0  Continue per neurology    2. Functional neurological symptom disorder with mixed symptoms  Ongoing counseling possibly specific trauma counseling may be helpful.     3. Prediabetes  -     semaglutide (OZEMPIC) 2 mg/dose (8 mg/3 mL) PnIj; Inject 2 mg into the skin every 7 days. For weeks 13-16 and onward  Dispense: 3 mL; Refill: 2  -     semaglutide (OZEMPIC) 1 mg/dose (4 mg/3 mL); Inject 1 mg into the skin every 7 days. For weeks 9-12  Dispense: 3 mL; Refill: 0  -     semaglutide (OZEMPIC) 0.25 mg or 0.5 mg(2 mg/1.5 mL) pen injector; Inject 0.25mg SC weekly for weeks 1-4, then inject 0.5mg SC weekly for weeks 4-8  Dispense: 6 mL; Refill: 0  Lifestyle modifications, track food    4. Suspected sleep apnea  -     Ambulatory referral/consult to Sleep Disorders; Future; Expected date: 12/30/2023  -     semaglutide (OZEMPIC) 2 mg/dose (8 mg/3 mL) PnIj; Inject 2 mg into the skin every 7 days. For weeks 13-16 and onward  Dispense: 3 mL; Refill: 2  -     semaglutide (OZEMPIC) 1 mg/dose (4 mg/3  mL); Inject 1 mg into the skin every 7 days. For weeks 9-12  Dispense: 3 mL; Refill: 0  -     semaglutide (OZEMPIC) 0.25 mg or 0.5 mg(2 mg/1.5 mL) pen injector; Inject 0.25mg SC weekly for weeks 1-4, then inject 0.5mg SC weekly for weeks 4-8  Dispense: 6 mL; Refill: 0    5. Class 1 obesity due to excess calories with serious comorbidity and body mass index (BMI) of 32.0 to 32.9 in adult  6. Unable to lose weight  -     semaglutide (OZEMPIC) 2 mg/dose (8 mg/3 mL) PnIj; Inject 2 mg into the skin every 7 days. For weeks 13-16 and onward  Dispense: 3 mL; Refill: 2  -     semaglutide (OZEMPIC) 1 mg/dose (4 mg/3 mL); Inject 1 mg into the skin every 7 days. For weeks 9-12  Dispense: 3 mL; Refill: 0  -     semaglutide (OZEMPIC) 0.25 mg or 0.5 mg(2 mg/1.5 mL) pen injector; Inject 0.25mg SC weekly for weeks 1-4, then inject 0.5mg SC weekly for weeks 4-8  Dispense: 6 mL; Refill: 0  Lifestyle modifications, track food    7. Moderate mixed hyperlipidemia not requiring statin therapy  Lifestyle modifications, track food    8. Social anxiety disorder  9. Moderate episode of recurrent major depressive disorder  10. JESUS (generalized anxiety disorder)  -Per psychiatry. Reviewed most recent note    Patient's questions answered. Plan reviewed with patient at the end of visit. Relevant precautions to chief complaint and reasons to seek further medical care or to contact the office sooner reviewed with patient.     Follow up in about 4 months (around 4/23/2024) for Weight Follow-up, f/u s/p new medication/ titration.        I spent a total of 42 minutes on the day of the visit.  This includes face to face time and non-face to face time preparing to see the patient (eg, review of tests), obtaining and/or reviewing separately obtained history, documenting clinical information in the electronic or other health record, independently interpreting results and communicating results to the patient/family/caregiver, or care coordinator.

## 2023-12-23 NOTE — TELEPHONE ENCOUNTER
No care due was identified.  [facility] Embedded Care Due Messages. Reference number: [id number].   12/23/2023 11:14:56 AM CST

## 2023-12-26 ENCOUNTER — TELEPHONE (OUTPATIENT)
Dept: FAMILY MEDICINE | Facility: CLINIC | Age: 33
End: 2023-12-26
Payer: COMMERCIAL

## 2023-12-29 ENCOUNTER — PATIENT MESSAGE (OUTPATIENT)
Dept: FAMILY MEDICINE | Facility: CLINIC | Age: 33
End: 2023-12-29
Payer: COMMERCIAL

## 2024-01-01 DIAGNOSIS — G43.701 CHRONIC MIGRAINE W/O AURA, NOT INTRACTABLE, W STAT MIGR: ICD-10-CM

## 2024-01-05 ENCOUNTER — PATIENT MESSAGE (OUTPATIENT)
Dept: PSYCHIATRY | Facility: CLINIC | Age: 34
End: 2024-01-05
Payer: COMMERCIAL

## 2024-01-05 DIAGNOSIS — G43.701 CHRONIC MIGRAINE W/O AURA, NOT INTRACTABLE, W STAT MIGR: ICD-10-CM

## 2024-01-05 RX ORDER — ZOLMITRIPTAN 5 MG/1
TABLET, ORALLY DISINTEGRATING ORAL
Qty: 9 TABLET | Refills: 12 | Status: SHIPPED | OUTPATIENT
Start: 2024-01-05

## 2024-01-07 DIAGNOSIS — F41.1 GAD (GENERALIZED ANXIETY DISORDER): ICD-10-CM

## 2024-01-07 DIAGNOSIS — F44.7 FUNCTIONAL NEUROLOGICAL SYMPTOM DISORDER WITH MIXED SYMPTOMS: ICD-10-CM

## 2024-01-07 DIAGNOSIS — F44.5 PSYCHOGENIC NONEPILEPTIC SEIZURE: ICD-10-CM

## 2024-01-07 DIAGNOSIS — G47.00 INSOMNIA, UNSPECIFIED TYPE: ICD-10-CM

## 2024-01-08 RX ORDER — CLONAZEPAM 0.5 MG/1
TABLET ORAL
Qty: 60 TABLET | Refills: 1 | Status: SHIPPED | OUTPATIENT
Start: 2024-01-08 | End: 2024-02-01

## 2024-01-12 RX ORDER — ZOLMITRIPTAN 5 MG/1
TABLET, ORALLY DISINTEGRATING ORAL
Qty: 6 TABLET | Refills: 19 | Status: SHIPPED | OUTPATIENT
Start: 2024-01-12 | End: 2024-01-29 | Stop reason: SDUPTHER

## 2024-01-13 DIAGNOSIS — F41.1 GAD (GENERALIZED ANXIETY DISORDER): ICD-10-CM

## 2024-01-13 DIAGNOSIS — F33.0 MILD EPISODE OF RECURRENT MAJOR DEPRESSIVE DISORDER: ICD-10-CM

## 2024-01-13 DIAGNOSIS — G43.E09 CHRONIC MIGRAINE WITH AURA: ICD-10-CM

## 2024-01-13 DIAGNOSIS — G47.00 INSOMNIA, UNSPECIFIED TYPE: ICD-10-CM

## 2024-01-16 RX ORDER — AMITRIPTYLINE HYDROCHLORIDE 25 MG/1
25 TABLET, FILM COATED ORAL NIGHTLY
Qty: 90 TABLET | Refills: 1 | Status: SHIPPED | OUTPATIENT
Start: 2024-01-16 | End: 2024-03-27

## 2024-01-19 ENCOUNTER — TELEPHONE (OUTPATIENT)
Dept: FAMILY MEDICINE | Facility: CLINIC | Age: 34
End: 2024-01-19
Payer: COMMERCIAL

## 2024-01-19 NOTE — TELEPHONE ENCOUNTER
Pt states her insurance does not cover Ozempic because she is not diabetic, is there any other options for her ?  Please advise

## 2024-01-19 NOTE — TELEPHONE ENCOUNTER
----- Message from Macey Mei sent at 1/19/2024  3:35 PM CST -----    Pt Requesting Call Back    Who called: pt  Who called for pt:   Best call back #: [phone]  Add notes:  pt said she was denied for the ozempic because she doesn't have diabetes type II; says she wants to know if  there's another  (alternative) med she can try

## 2024-01-29 DIAGNOSIS — G43.701 CHRONIC MIGRAINE W/O AURA, NOT INTRACTABLE, W STAT MIGR: ICD-10-CM

## 2024-01-30 ENCOUNTER — OFFICE VISIT (OUTPATIENT)
Dept: PSYCHIATRY | Facility: CLINIC | Age: 34
End: 2024-01-30
Payer: COMMERCIAL

## 2024-01-30 DIAGNOSIS — F40.10 SOCIAL ANXIETY DISORDER: ICD-10-CM

## 2024-01-30 DIAGNOSIS — F33.0 MILD EPISODE OF RECURRENT MAJOR DEPRESSIVE DISORDER: ICD-10-CM

## 2024-01-30 DIAGNOSIS — F41.1 GAD (GENERALIZED ANXIETY DISORDER): Primary | ICD-10-CM

## 2024-01-30 DIAGNOSIS — F44.5 PSYCHOGENIC NONEPILEPTIC SEIZURE: ICD-10-CM

## 2024-01-30 DIAGNOSIS — G47.00 INSOMNIA, UNSPECIFIED TYPE: ICD-10-CM

## 2024-01-30 DIAGNOSIS — F44.7 FUNCTIONAL NEUROLOGICAL SYMPTOM DISORDER WITH MIXED SYMPTOMS: ICD-10-CM

## 2024-01-30 PROCEDURE — 99214 OFFICE O/P EST MOD 30 MIN: CPT | Mod: 95,,, | Performed by: INTERNAL MEDICINE

## 2024-01-30 PROCEDURE — 1160F RVW MEDS BY RX/DR IN RCRD: CPT | Mod: CPTII,95,, | Performed by: INTERNAL MEDICINE

## 2024-01-30 PROCEDURE — 1159F MED LIST DOCD IN RCRD: CPT | Mod: CPTII,95,, | Performed by: INTERNAL MEDICINE

## 2024-01-30 RX ORDER — ZOLMITRIPTAN 5 MG/1
TABLET, ORALLY DISINTEGRATING ORAL
Qty: 6 TABLET | Refills: 12 | Status: SHIPPED | OUTPATIENT
Start: 2024-01-30

## 2024-01-30 RX ORDER — ALPRAZOLAM 0.5 MG/1
0.5 TABLET ORAL 2 TIMES DAILY
Qty: 14 TABLET | Refills: 0 | Status: SHIPPED | OUTPATIENT
Start: 2024-01-30 | End: 2024-02-10 | Stop reason: SDUPTHER

## 2024-01-30 NOTE — PROGRESS NOTES
OUTPATIENT PSYCHIATRY RETURN VISIT    ENCOUNTER DATE:  2/1/2024  SITE:  [facility], [facility]  LENGTH OF SESSION:  20 minutes    The patient location is:  [place], not in a healthcare facility  Visit type:  audiovisual    Face to Face time with patient:  20 minutes  25 minutes of total time spent on the encounter, which includes face to face time and non-face to face time preparing to see the patient (eg, review of tests), Obtaining and/or reviewing separately obtained history, Documenting clinical information in the electronic or other health record, Independently interpreting results (not separately reported) and communicating results to the patient/family/caregiver, or Care coordination (not separately reported).     Each patient to whom he or she provides medical services by telemedicine is:  (1) informed of the relationship between the physician and patient and the respective role of any other health care provider with respect to management of the patient; and (2) notified that he or she may decline to receive medical services by telemedicine and may withdraw from such care at any time.    CHIEF COMPLAINT:  Anxiety and Depression      HISTORY OF PRESENTING ILLNESS:  Rosalina Saavedra is a 34 y.o. female with history of MDD, JESUS, and Social Anxiety Disorder who presents for follow up appointment.      Plan at last appointment on 12/19/2023:  No medication changes today.  She is meeting with PCP about suspected KARIE.  She has also started therapy with Guerita Stevens LCSW - I believe this is an essential part of her treatment.    Continue Effexor 225mg daily, Buspar 15mg TID, Elavil 25mg qHS, Lunesta 3mg qHS, and Klonopin 0.25-0.5mg daily PRN anxiety.    Discussed with patient informed consent, risks versus benefits, alternative treatments, side effect profile and the inherent unpredictability of individual responses to these treatments.  The patient expresses understanding of the above and  displays the capacity to agree with this current plan.  Continue individual psychotherapy with Guerita Stevens LCSW.    History as told by patient:  Won't have sleep study until March.  Seeing Guerita Stevens about once a week.  Not doing very well.  Getting bad migraines every day.  Seizures are getting worse.  Doing more eating again, feeling overall stress.  Felt she was doing better, had lost some weight, but then anxiety got really high.  Had a few panic attacks.  Now depression is coming back.  Feeling frustrated about work.  Feels redundant - the extra work they want them to do.  Feels stuck there until she get her degree.  But getting degree is stressful itself.  Home is about the same.  Still taking Klonopin in the morning - may help some but not as much as it did previously.  Np longer taking Lunesta - it stopped working.  Hard to get to sleep at night but also hard to get up in the morning because of anxiety.  Usually falls asleep about 12 or 1am.  Wakes up 4am or 5am, goes back sleep for a bit, then gets up 5am.  Mind plays everything she needs to do.  Denies SI.    Previous Medication Trials: Ritalin for concentration, Prozac (helpful but stopped helping), Lexapro (?), Wellbutrin (worsened headaches, stopped due to seizures), Lunesta (not helpful even at 3mg)    Medication side effects:  Denies  Medication compliance:  Yes    PSYCHIATRIC REVIEW OF SYSTEMS:  Trouble with sleep:  Yes  Appetite changes:  Increase  Weight changes:  Gain slowly over time  Lack of energy:  Yes   Anhedonia:  Yes due to anxiety  Somatic symptoms:  Seizures, migraines  Libido:  Not discussed  Anxiety/panic:  Yes as above  Guilty/hopeless:  Denies  Self-injurious behavior/risky behavior:  Denies  Any drugs:  Denies  Alcohol:  Denies     MEDICAL REVIEW OF SYSTEMS:  Complete review of systems performed covering Constitutional, Musculoskeletal, Neurologic.  All systems negative except for that covered in HPI.    PAST PSYCHIATRIC,  MEDICAL, AND SOCIAL HISTORY REVIEWED  The patient's past medical, family and social history have been reviewed and updated as appropriate within the electronic medical record - see encounter notes.    MEDICATIONS:    Current Outpatient Medications:     ALPRAZolam (XANAX) 0.5 MG tablet, Take 1 tablet (0.5 mg total) by mouth 2 (two) times a day., Disp: 14 tablet, Rfl: 0    amitriptyline (ELAVIL) 25 MG tablet, Take 1 tablet (25 mg total) by mouth every evening., Disp: 90 tablet, Rfl: 1    busPIRone (BUSPAR) 15 MG tablet, Take 1 tablet (15 mg total) by mouth 3 (three) times daily., Disp: 270 tablet, Rfl: 1    clonazePAM (KLONOPIN) 0.5 MG tablet, TAKE 1 TABLET BY MOUTH IN THE EVENING BEFORE BED. YOU CAN TAKE AN ADDITION 1/2 TO 1 TABLET AS NEEDED DURING DAY FOR PANIC ATTACK, Disp: 60 tablet, Rfl: 1    etodolac (LODINE) 500 MG tablet, TAKE 1 TABLET (500 MG TOTAL) BY MOUTH 2 (TWO) TIMES DAILY AS NEEDED (MIGRAINE)., Disp: 20 tablet, Rfl: 12    ibuprofen (ADVIL,MOTRIN) 800 MG tablet, Take 1 tablet (800 mg total) by mouth 3 (three) times daily as needed for Pain., Disp: 90 tablet, Rfl: 0    promethazine (PHENERGAN) 25 MG tablet, Take 1 tablet (25 mg total) by mouth every 6 (six) hours as needed for Nausea., Disp: 40 tablet, Rfl: 0    QULIPTA 60 mg Tab, TAKE 1 TABLET BY MOUTH EVERY DAY, Disp: 30 tablet, Rfl: 6    rimegepant (NURTEC) 75 mg odt, Take 75 mg by mouth once as needed for Migraine. Place ODT tablet on the tongue; alternatively the ODT tablet may be placed under the tongue, Disp: , Rfl:     semaglutide (OZEMPIC) 0.25 mg or 0.5 mg (2 mg/3 mL) pen injector, Inject 0.25mg under then skin once weekly for weeks 1-4, then inject 0.5mg under the skin once weekly for weeks 4-8, Disp: 6 mL, Rfl: 0    semaglutide (OZEMPIC) 1 mg/dose (4 mg/3 mL), Inject 1 mg into the skin every 7 days. For weeks 9-12, Disp: 3 mL, Rfl: 0    semaglutide (OZEMPIC) 2 mg/dose (8 mg/3 mL) PnIj, Inject 2 mg into the skin every 7 days. For weeks 13-16  "and onward, Disp: 3 mL, Rfl: 2    venlafaxine (EFFEXOR-XR) 150 MG Cp24, Take 1 capsule (150 mg total) by mouth once daily. Take with 75mg capsule to equal 225mg daily., Disp: 30 capsule, Rfl: 11    venlafaxine (EFFEXOR-XR) 75 MG 24 hr capsule, Take 1 capsule (75 mg total) by mouth once daily. Take with 150mg capsule to equal 225mg daily., Disp: 30 capsule, Rfl: 11    ZOLMitriptan (ZOMIG-ZMT) 5 MG disintegrating tablet, TAKE 1 TABLET BY MOUTH AS NEEDED FOR MIGRAINE., Disp: 9 tablet, Rfl: 12    ZOLMitriptan (ZOMIG-ZMT) 5 MG disintegrating tablet, DISSOLVE 1 TABLET BY MOUTH AS NEEDED FOR MIGRAINE., Disp: 6 tablet, Rfl: 12    ALLERGIES:  Review of patient's allergies indicates:   Allergen Reactions    Codeine Itching and Other (See Comments)    Morphine Itching and Other (See Comments)       PSYCHIATRIC EXAM:  There were no vitals filed for this visit.    Appearance:  Well groomed, appearing healthy and of stated age  Behavior:  Cooperative, pleasant, no psychomotor agitation or retardation  Speech:  Normal rate, rhythm, prosody, and volume  Mood:  "Not good"  Affect:  Congruent, constricted  Thought Process:  Linear, logical, goal directed  Thought Content:  Negative for suicidal ideation, homicidal ideation, delusions or hallucinations.  Associations:  Intact  Memory:  Grossly Intact  Level of Consciousness/Orientation:  Grossly intact  Fund of Knowledge:  Good  Attention:  Good  Language:  Fluent, able to name abstract and concrete objects  Insight:  Good  Judgment:  Intact  Psychomotor signs:  No abnormal movements of face  Gait:  Unable to assess via virtual visit      RELEVANT LABS/STUDIES:    Lab Results   Component Value Date    WBC 6.86 10/09/2023    HGB 11.6 (L) 10/09/2023    HCT 38.7 10/09/2023    MCV 92 10/09/2023     10/09/2023     BMP  Lab Results   Component Value Date     10/09/2023    K 4.2 10/09/2023     10/09/2023    CO2 21 (L) 10/09/2023    BUN 14 10/09/2023    CREATININE 0.8 " 10/09/2023    CALCIUM 9.8 10/09/2023    ANIONGAP 11 10/09/2023    ESTGFRAFRICA >60.0 07/28/2022    EGFRNONAA >60.0 07/28/2022     Lab Results   Component Value Date    ALT 18 10/09/2023    AST 14 10/09/2023    ALKPHOS 93 10/09/2023    BILITOT 0.2 10/09/2023     Lab Results   Component Value Date    TSH 0.539 10/09/2023     Lab Results   Component Value Date    HGBA1C 5.8 (H) 10/09/2023       IMPRESSION:    Rosalina Saavedra is a 34 y.o. female with history of MDD, JESUS, and Social Anxiety Disorder who presents for follow up appointment.    Status/Progress:  Based on the examination today, the patient's problem(s) is/are inadequately controlled.  New problems have not been presented today.    Risk Parameters:  Patient reports no suicidal ideation  Patient reports no homicidal ideation  Patient reports no self-injurious behavior  Patient reports no violent behavior      DIAGNOSES:    ICD-10-CM ICD-9-CM   1. JESUS (generalized anxiety disorder)  F41.1 300.02   2. Social anxiety disorder  F40.10 300.23   3. Psychogenic nonepileptic seizure  F44.5 300.11   4. Functional neurological symptom disorder with mixed symptoms  F44.7 300.11   5. Mild episode of recurrent major depressive disorder  F33.0 296.31   6. Insomnia, unspecified type  G47.00 780.52       PLAN:  Worsening anxiety that then seems to be worsening depression.  Will first try changing Klonopin to Xanax 0.5mg BID x 1 week to see if this starts improving her symptoms.  She will send me an update in 1 week.  Consider changing Effexor to Lexapro or Zoloft versus addition of Abilify at next appointment.    Continue Effexor 225mg daily, Buspar 15mg TID, Elavil 25mg qHS.  She is no longer taking Lunesta.  Discussed with patient informed consent, risks versus benefits, alternative treatments, side effect profile and the inherent unpredictability of individual responses to these treatments.  The patient expresses understanding of the above and displays the capacity to  agree with this current plan.  Continue individual psychotherapy with Guerita Stevens LCSW.    RETURN TO CLINIC:  Follow up in 5 weeks (on 3/6/2024). In person

## 2024-01-31 ENCOUNTER — TELEPHONE (OUTPATIENT)
Dept: NEUROLOGY | Facility: CLINIC | Age: 34
End: 2024-01-31
Payer: COMMERCIAL

## 2024-01-31 ENCOUNTER — TELEPHONE (OUTPATIENT)
Dept: FAMILY MEDICINE | Facility: CLINIC | Age: 34
End: 2024-01-31
Payer: COMMERCIAL

## 2024-01-31 NOTE — TELEPHONE ENCOUNTER
----- Message from Diana Esparza sent at 1/31/2024  3:31 PM CST -----  Regarding: Appt Reschedule Request  Contact: [phone]  Pt is calling to get botox injection rescheduled that is on 3/21/24. 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 Please give pt a call to get this rescheduled.

## 2024-02-01 ENCOUNTER — TELEPHONE (OUTPATIENT)
Dept: NEUROLOGY | Facility: CLINIC | Age: 34
End: 2024-02-01
Payer: COMMERCIAL

## 2024-02-01 NOTE — TELEPHONE ENCOUNTER
Gave the patient a call to let her know that her appointment was rescheduled to 3/22 at 4 pm per her request.

## 2024-02-02 ENCOUNTER — TELEPHONE (OUTPATIENT)
Dept: NEUROLOGY | Facility: CLINIC | Age: 34
End: 2024-02-02
Payer: COMMERCIAL

## 2024-02-06 ENCOUNTER — PATIENT MESSAGE (OUTPATIENT)
Dept: PSYCHIATRY | Facility: CLINIC | Age: 34
End: 2024-02-06
Payer: COMMERCIAL

## 2024-02-06 ENCOUNTER — PATIENT MESSAGE (OUTPATIENT)
Dept: NEUROLOGY | Facility: CLINIC | Age: 34
End: 2024-02-06
Payer: COMMERCIAL

## 2024-02-08 DIAGNOSIS — F41.1 GAD (GENERALIZED ANXIETY DISORDER): ICD-10-CM

## 2024-02-08 DIAGNOSIS — F40.10 SOCIAL ANXIETY DISORDER: ICD-10-CM

## 2024-02-08 DIAGNOSIS — F44.5 PSYCHOGENIC NONEPILEPTIC SEIZURE: ICD-10-CM

## 2024-02-08 RX ORDER — ALPRAZOLAM 0.5 MG/1
0.5 TABLET ORAL 2 TIMES DAILY
Qty: 14 TABLET | Refills: 0 | Status: CANCELLED | OUTPATIENT
Start: 2024-02-08 | End: 2024-03-09

## 2024-02-09 ENCOUNTER — OFFICE VISIT (OUTPATIENT)
Dept: FAMILY MEDICINE | Facility: CLINIC | Age: 34
End: 2024-02-09
Payer: COMMERCIAL

## 2024-02-09 VITALS — HEIGHT: 66 IN | BODY MASS INDEX: 32.95 KG/M2 | WEIGHT: 205 LBS

## 2024-02-09 DIAGNOSIS — F41.1 GAD (GENERALIZED ANXIETY DISORDER): ICD-10-CM

## 2024-02-09 DIAGNOSIS — R73.03 PREDIABETES: Primary | ICD-10-CM

## 2024-02-09 DIAGNOSIS — E66.09 CLASS 1 OBESITY DUE TO EXCESS CALORIES WITH SERIOUS COMORBIDITY AND BODY MASS INDEX (BMI) OF 33.0 TO 33.9 IN ADULT: ICD-10-CM

## 2024-02-09 DIAGNOSIS — G43.001 MIGRAINE WITHOUT AURA AND WITH STATUS MIGRAINOSUS, NOT INTRACTABLE: ICD-10-CM

## 2024-02-09 DIAGNOSIS — F33.0 MILD EPISODE OF RECURRENT MAJOR DEPRESSIVE DISORDER: ICD-10-CM

## 2024-02-09 DIAGNOSIS — F44.7 FUNCTIONAL NEUROLOGICAL SYMPTOM DISORDER WITH MIXED SYMPTOMS: ICD-10-CM

## 2024-02-09 DIAGNOSIS — E78.2 MODERATE MIXED HYPERLIPIDEMIA NOT REQUIRING STATIN THERAPY: ICD-10-CM

## 2024-02-09 DIAGNOSIS — R63.8 UNABLE TO LOSE WEIGHT: ICD-10-CM

## 2024-02-09 PROBLEM — E66.811 CLASS 1 OBESITY DUE TO EXCESS CALORIES WITH SERIOUS COMORBIDITY AND BODY MASS INDEX (BMI) OF 33.0 TO 33.9 IN ADULT: Status: ACTIVE | Noted: 2024-02-09

## 2024-02-09 PROCEDURE — 99214 OFFICE O/P EST MOD 30 MIN: CPT | Mod: 95,,, | Performed by: FAMILY MEDICINE

## 2024-02-09 PROCEDURE — 1159F MED LIST DOCD IN RCRD: CPT | Mod: CPTII,95,, | Performed by: FAMILY MEDICINE

## 2024-02-09 PROCEDURE — 3008F BODY MASS INDEX DOCD: CPT | Mod: CPTII,95,, | Performed by: FAMILY MEDICINE

## 2024-02-09 PROCEDURE — 1160F RVW MEDS BY RX/DR IN RCRD: CPT | Mod: CPTII,95,, | Performed by: FAMILY MEDICINE

## 2024-02-09 RX ORDER — SEMAGLUTIDE 0.25 MG/.5ML
0.25 INJECTION, SOLUTION SUBCUTANEOUS
Qty: 2 ML | Refills: 0 | Status: SHIPPED | OUTPATIENT
Start: 2024-02-09 | End: 2024-02-28

## 2024-02-09 NOTE — TELEPHONE ENCOUNTER
----- Message from Whitley Aranda sent at 2/9/2024 10:35 AM CST -----  Regarding: Incident  Mother is calling, on behalf of he patient, regarding an incident last night where the patient revealed a personality split.  She says she recorded the incident and the patent is still in that episode.      She is requesting a returned call to [phone].    Thank you.

## 2024-02-09 NOTE — PROGRESS NOTES
Subjective:         Patient ID: Rosalina Saavedra is a 34 y.o. female.    Chief Complaint: Obesity    Patient Active Problem List   Diagnosis    Chronic migraine with aura    Menstrual cramps    JESUS (generalized anxiety disorder)    Social anxiety disorder    Impaired mobility and activities of daily living    Fatigue    Medication overuse headache    Decreased range of motion of neck    Poor posture    Weakness of both upper extremities    Migraine without aura and with status migrainosus, not intractable    Mild episode of recurrent major depressive disorder    Psychogenic nonepileptic seizure    Functional neurological symptom disorder with mixed symptoms    Insomnia    Chronic migraine w/o aura, not intractable, w stat migr    Prediabetes    Moderate mixed hyperlipidemia not requiring statin therapy    Class 1 obesity due to excess calories with serious comorbidity and body mass index (BMI) of 33.0 to 33.9 in adult      HPI    Rosalina is a 34 y.o. female who presents today for discussion of weight loss medication. Ongoing weight gain with mental health meds.   Ozempic not covered.     Topamax taken in past for other indication without noted weight loss.    Review of Systems   Constitutional:  Positive for activity change and unexpected weight change.   HENT:  Negative for hearing loss, rhinorrhea and trouble swallowing.    Eyes:  Negative for discharge and visual disturbance.   Respiratory:  Negative for chest tightness and wheezing.    Cardiovascular:  Positive for palpitations. Negative for chest pain.   Gastrointestinal:  Negative for blood in stool, constipation, diarrhea and vomiting.   Endocrine: Negative for polydipsia and polyuria.   Genitourinary:  Negative for difficulty urinating, dysuria, hematuria and menstrual problem.   Musculoskeletal:  Positive for neck pain. Negative for arthralgias and joint swelling.   Neurological:  Positive for headaches. Negative for weakness.   Psychiatric/Behavioral:   "Positive for dysphoric mood. Negative for confusion.    All other systems reviewed and are negative.       Objective:     Vitals:    02/09/24 0727   Weight: 93 kg (205 lb)   Height: 5' 6" (1.676 m)         Physical Exam  Constitutional:       General: She is not in acute distress.     Appearance: She is well-developed. She is not diaphoretic.      Comments: Exam performed as able, but limited due to the inherent nature of telemedicine visit.    HENT:      Head: Normocephalic and atraumatic.   Eyes:      Conjunctiva/sclera: Conjunctivae normal.   Pulmonary:      Effort: No respiratory distress.      Comments: No audible wheezing noted   Breathing as noted over telemedicine appears normal with no distress  Skin:     Comments: No visible rash noted   Neurological:      Mental Status: She is alert and oriented to person, place, and time.   Psychiatric:         Behavior: Behavior normal.         Thought Content: Thought content normal.         Judgment: Judgment normal.          Assessment:       1. Prediabetes    2. Class 1 obesity due to excess calories with serious comorbidity and body mass index (BMI) of 33.0 to 33.9 in adult    3. Unable to lose weight    4. Moderate mixed hyperlipidemia not requiring statin therapy    5. JESUS (generalized anxiety disorder)    6. Mild episode of recurrent major depressive disorder    7. Functional neurological symptom disorder with mixed symptoms    8. Migraine without aura and with status migrainosus, not intractable          Plan:   Recent relevant labs results reviewed with patient.     Discussed current overall coverage scene of GLP1a for non type II DM indication. Patient can contact insurance for more information. Will see if Wegovy covered vs Ozempic.   Given patient names of GLP1a to inquire about coverage criteria. Nutritionist referral. Consider Metformin but discussed that lower total weight loss outcome noted than GLP1a. Not candidate for Adipex.       1. Prediabetes  -   "   semaglutide, weight loss, (WEGOVY) 0.25 mg/0.5 mL PnIj; Inject 0.25 mg into the skin every 7 days. X 4 weeks then 0.5 mg weekly x 4 weeks  Dispense: 8 each; Refill: 0  -     Hepatic Function Panel; Future; Expected date: 02/09/2024  -     Renal Function Panel; Future; Expected date: 02/09/2024  -     Lipid Panel; Future; Expected date: 02/09/2024  -     TSH; Future; Expected date: 02/09/2024  -     Hemoglobin A1C; Future; Expected date: 02/09/2024  -     CBC Auto Differential; Future; Expected date: 02/09/2024  -     Ambulatory Referral/Consult to Lifestyle Nutrition; Future; Expected date: 02/16/2024    2. Class 1 obesity due to excess calories with serious comorbidity and body mass index (BMI) of 33.0 to 33.9 in adult  -     semaglutide, weight loss, (WEGOVY) 0.25 mg/0.5 mL PnIj; Inject 0.25 mg into the skin every 7 days. X 4 weeks then 0.5 mg weekly x 4 weeks  Dispense: 8 each; Refill: 0  -     Lipid Panel; Future; Expected date: 02/09/2024  -     TSH; Future; Expected date: 02/09/2024  -     Hemoglobin A1C; Future; Expected date: 02/09/2024  -     Ambulatory Referral/Consult to Lifestyle Nutrition; Future; Expected date: 02/16/2024    3. Unable to lose weight  -     semaglutide, weight loss, (WEGOVY) 0.25 mg/0.5 mL PnIj; Inject 0.25 mg into the skin every 7 days. X 4 weeks then 0.5 mg weekly x 4 weeks  Dispense: 8 each; Refill: 0    4. Moderate mixed hyperlipidemia not requiring statin therapy  Comments:  Abnormal and elevated in last set of results. Ongoing dietary changes, recheck prior to next annual  Orders:  -     Lipid Panel; Future; Expected date: 02/09/2024    5. JESUS (generalized anxiety disorder)  6. Mild episode of recurrent major depressive disorder  7. Functional neurological symptom disorder with mixed symptoms  - Per psychiatry/psychology. Well plugged in and supported by parent.   Uncontrolled recently with some pseudoseizures per patient.     8. Migraine without aura and with status  migrainosus, not intractable  Comments:  Per Neurology    Patient's questions answered. Plan reviewed with patient at the end of visit. Relevant precautions to chief complaint and reasons to seek further medical care or to contact the office sooner reviewed with patient.     Follow up in about 8 months (around 10/9/2024) for Annual Exam (prelabs).        The [place] location is: [place], [place]  The chief complaint leading to consultation is: meds    Visit type: audiovisual    Face to Face time with patient: 15  20 minutes of total time spent on the encounter, which includes face to face time and non-face to face time preparing to see the patient (eg, review of tests), Obtaining and/or reviewing separately obtained history, Documenting clinical information in the electronic or other health record, Independently interpreting results (not separately reported) and communicating results to the patient/family/caregiver, or Care coordination (not separately reported).         Each patient to whom he or she provides medical services by telemedicine is:  (1) informed of the relationship between the physician and patient and the respective role of any other health care provider with respect to management of the patient; and (2) notified that he or she may decline to receive medical services by telemedicine and may withdraw from such care at any time.    Notes:

## 2024-02-09 NOTE — TELEPHONE ENCOUNTER
"2/9/24 telephone encounter:  Spoke with mother regarding worrisome episode last night.  Says family was watching TV last and saw picture of Tian Gamble mother and patient couldn't stop laughing for several minutes.  Then all of a sudden starting talking in another voice and started cursing and yelling."  Then started going off on dad - calling him dumb and stupid.  Then started talking to herself in the 3rd person saying "you are fat and ugly."  Lasted for at least an hour and a half.  Patient started saying that the only person who loves her is her grandmother - started crying about her being dead.  "I love her too, she hates her dad."  Has never seen her have an episode like this.  It was very scary - it was like another person took over patient's body.  Once she came out of it, she fell asleep on the sofa.  Had severe headache and has been sleeping since the incident.  Patient had had a bad day at work - student went off on her.  She was upset and called her mother earlier in the day.  Mother will let me know when patient wakes up in case I can see her for appointment today.  "

## 2024-02-10 RX ORDER — ALPRAZOLAM 0.5 MG/1
0.5 TABLET ORAL 2 TIMES DAILY
Qty: 60 TABLET | Refills: 0 | Status: SHIPPED | OUTPATIENT
Start: 2024-02-10 | End: 2024-03-10 | Stop reason: SDUPTHER

## 2024-02-11 ENCOUNTER — PATIENT MESSAGE (OUTPATIENT)
Dept: PSYCHIATRY | Facility: CLINIC | Age: 34
End: 2024-02-11
Payer: COMMERCIAL

## 2024-02-12 NOTE — TELEPHONE ENCOUNTER
"Spoke with patient by phone about incident last Thursday night.  Does not remember everything.  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  Kids weren't supposed to bring backpacks so kids were mostly playing games, etc.  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  She was very upset about this.  That evening she saw something she thought was funny - she couldn't stop laughing (this had happened twice before since the seizures have started).  Then felt her body was frozen but was still laughing.  Then couldn't control her body at all - felt like it was seizing up and spastic.  Then realized she was on her back and couldn't move.  Remembers she started talking in 3rd person about herself - she said "you know she doesn't like you" to her father.  Was calling him names over and over.  Doesn't remember much else.  Remembers her mother talking to her and her saying "I love my mommy" over and over - says this is super odd because she doesn't call her mother mommy.  Felt like it was someone else talking and not herself.  Mother said "you know I love you too."  Then patient started crying and talking about how much she missed her grandmother.  Then she got sever pain in her ears, father changed channel, and the other person (herself) got mad about it and started yelling at her father.  Looking back it felt like her dad was stepping over her and just didn't care.  Didn't feel like it was her yelling at him though, felt like it was someone else.  The person was saying things that patient doesn't feel like she knows.  Since then having horrible headaches but the pain is different - sudden stabbing pains in her brain, sharp pain.  Comes in waves, lasts 5-20 minutes.  Definitely in the front and back of her head.  Triptan is helping some but still has not gone away.  Patient very bothered by incident - felt she had no " control over it - like it was someone else speaking in 3rd person.  Denies ever having anything like this (other than laughing incidents above).  Denies every experiencing AVH, paranoia, or delusions.  Denies SI or HI.  Appointment scheduled for Wednesday.

## 2024-02-14 ENCOUNTER — OFFICE VISIT (OUTPATIENT)
Dept: PSYCHIATRY | Facility: CLINIC | Age: 34
End: 2024-02-14
Payer: COMMERCIAL

## 2024-02-14 ENCOUNTER — PATIENT MESSAGE (OUTPATIENT)
Dept: FAMILY MEDICINE | Facility: CLINIC | Age: 34
End: 2024-02-14
Payer: COMMERCIAL

## 2024-02-14 DIAGNOSIS — F44.7 FUNCTIONAL NEUROLOGICAL SYMPTOM DISORDER WITH MIXED SYMPTOMS: ICD-10-CM

## 2024-02-14 DIAGNOSIS — F44.5 PSYCHOGENIC NONEPILEPTIC SEIZURE: ICD-10-CM

## 2024-02-14 DIAGNOSIS — G43.701 CHRONIC MIGRAINE W/O AURA, NOT INTRACTABLE, W STAT MIGR: ICD-10-CM

## 2024-02-14 DIAGNOSIS — F41.1 GAD (GENERALIZED ANXIETY DISORDER): ICD-10-CM

## 2024-02-14 DIAGNOSIS — F44.9 DISSOCIATIVE EPISODES: Primary | ICD-10-CM

## 2024-02-14 DIAGNOSIS — R53.83 FATIGUE, UNSPECIFIED TYPE: ICD-10-CM

## 2024-02-14 DIAGNOSIS — G47.00 INSOMNIA, UNSPECIFIED TYPE: ICD-10-CM

## 2024-02-14 DIAGNOSIS — F33.1 MODERATE EPISODE OF RECURRENT MAJOR DEPRESSIVE DISORDER: ICD-10-CM

## 2024-02-14 PROCEDURE — 99214 OFFICE O/P EST MOD 30 MIN: CPT | Mod: 95,,, | Performed by: INTERNAL MEDICINE

## 2024-02-14 PROCEDURE — 1160F RVW MEDS BY RX/DR IN RCRD: CPT | Mod: CPTII,95,, | Performed by: INTERNAL MEDICINE

## 2024-02-14 PROCEDURE — 1159F MED LIST DOCD IN RCRD: CPT | Mod: CPTII,95,, | Performed by: INTERNAL MEDICINE

## 2024-02-14 RX ORDER — ARIPIPRAZOLE 2 MG/1
2 TABLET ORAL DAILY
Qty: 30 TABLET | Refills: 2 | Status: SHIPPED | OUTPATIENT
Start: 2024-02-14 | End: 2024-02-22 | Stop reason: SDUPTHER

## 2024-02-14 NOTE — PROGRESS NOTES
OUTPATIENT PSYCHIATRY RETURN VISIT    ENCOUNTER DATE:  2/17/2024  SITE:  [facility], [facility]  LENGTH OF SESSION:  22 minutes    The patient location is:  [place], not in a healthcare facility  Visit type:  audiovisual    Face to Face time with patient:  22 minutes  30 minutes of total time spent on the encounter, which includes face to face time and non-face to face time preparing to see the patient (eg, review of tests), Obtaining and/or reviewing separately obtained history, Documenting clinical information in the electronic or other health record, Independently interpreting results (not separately reported) and communicating results to the patient/family/caregiver, or Care coordination (not separately reported).     Each patient to whom he or she provides medical services by telemedicine is:  (1) informed of the relationship between the physician and patient and the respective role of any other health care provider with respect to management of the patient; and (2) notified that he or she may decline to receive medical services by telemedicine and may withdraw from such care at any time.    CHIEF COMPLAINT:  Dissociation      HISTORY OF PRESENTING ILLNESS:  Rosalina Saavedra is a 34 y.o. female with history of MDD, JESUS, and Social Anxiety Disorder who presents for follow up appointment.      Plan at last appointment on 1/30/2024:  Worsening anxiety that then seems to be worsening depression.  Will first try changing Klonopin to Xanax 0.5mg BID x 1 week to see if this starts improving her symptoms.  She will send me an update in 1 week.  Consider changing Effexor to Lexapro or Zoloft versus addition of Abilify at next appointment.    Continue Effexor 225mg daily, Buspar 15mg TID, Elavil 25mg qHS.  She is no longer taking Lunesta.  Discussed with patient informed consent, risks versus benefits, alternative treatments, side effect profile and the inherent unpredictability of individual responses  "to these treatments.  The patient expresses understanding of the above and displays the capacity to agree with this current plan.  Continue individual psychotherapy with Guerita Stevens LCSW.    2/9/24 telephone encounter with mother:   Spoke with mother regarding worrisome episode last night.  Says family was watching TV last and saw picture of Tian Gamble mother and patient couldn't stop laughing for several minutes.  Then all of a sudden starting talking in another voice and started cursing and yelling."  Then started going off on dad - calling him dumb and stupid.  Then started talking to herself in the 3rd person saying "you are fat and ugly."  Lasted for at least an hour and a half.  Patient started saying that the only person who loves her is her grandmother - started crying about her being dead.  "I love her too, she hates her dad."  Has never seen her have an episode like this.  It was very scary - it was like another person took over patient's body.  Once she came out of it, she fell asleep on the sofa.  Had severe headache and has been sleeping since the incident.  Patient had had a bad day at work - student went off on her.  She was upset and called her mother earlier in the day.  Mother will let me know when patient wakes up in case I can see her for appointment today.     2/12/24 telephone encounter with patient:  Spoke with patient by phone about incident last Thursday night.  Does not remember everything.  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  Kids weren't supposed to bring backpacks so kids were mostly playing games, etc.  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  She was very upset about this.  That evening she saw something she thought was funny - she couldn't stop laughing (this had happened twice before since the seizures have started).  Then felt her body was frozen but was " "still laughing.  Then couldn't control her body at all - felt like it was seizing up and spastic.  Then realized she was on her back and couldn't move.  Remembers she started talking in 3rd person about herself - she said "you know she doesn't like you" to her father.  Was calling him names over and over.  Doesn't remember much else.  Remembers her mother talking to her and her saying "I love my mommy" over and over - says this is super odd because she doesn't call her mother mommy.  Felt like it was someone else talking and not herself.  Mother said "you know I love you too."  Then patient started crying and talking about how much she missed her grandmother.  Then she got sever pain in her ears, father changed channel, and the other person (herself) got mad about it and started yelling at her father.  Looking back it felt like her dad was stepping over her and just didn't care.  Didn't feel like it was her yelling at him though, felt like it was someone else.  The person was saying things that patient doesn't feel like she knows.  Since then having horrible headaches but the pain is different - sudden stabbing pains in her brain, sharp pain.  Comes in waves, lasts 5-20 minutes.  Definitely in the front and back of her head.  Triptan is helping some but still has not gone away.  Patient very bothered by incident - felt she had no control over it - like it was someone else speaking in 3rd person.  Denies ever having anything like this (other than laughing incidents above).  Denies every experiencing AVH, paranoia, or delusions.  Denies SI or HI.  Appointment scheduled for Wednesday.      History as told by patient:  Has still been having really bad headaches.  Today is the best so far but still has it.  Conversation with mother went ok.  Mother expressed that she doesn't show that she loves her - patient isn't sure what to do about that.  She says patient said "You're stupid" during the episode but patient doesn't " remember that - wonders if its because mother stayed with dad after the cheating.  Most of the negative comments were about her dad.  She is having memory issues - things she doesn't remember doing in the last few days since the episode.  And if she tries to recall things her head hurts.  Xanax is definitely helping with anxiety.  At first made her sleepy but not anymore.  Tried 0.25mg but wasn't as helpful.  Has actually been sleeping well since the episode.  Had a dream about talking to her grandmother on the phone that felt very real.  Had run out of Xanax when episode happened.  Denies SI, HI, or AVH.    Previous Medication Trials: Ritalin for concentration, Prozac (helpful but stopped helping), Lexapro (?), Wellbutrin (worsened headaches, stopped due to seizures), Lunesta (not helpful even at 3mg)    Medication side effects:  Denies  Medication compliance:  Yes    PSYCHIATRIC REVIEW OF SYSTEMS:  Trouble with sleep:  Improved as above  Appetite changes:  Not discussed today  Weight changes:  Gain slowly over time  Lack of energy:  Yes   Anhedonia:  Yes  Somatic symptoms:  Seizures, migraines  Libido:  Not discussed  Anxiety/panic:  Improved with change to Xanax  Guilty/hopeless:  Some guilt since episode  Self-injurious behavior/risky behavior:  Denies  Any drugs:  Denies  Alcohol:  Denies     MEDICAL REVIEW OF SYSTEMS:  Complete review of systems performed covering Constitutional, Musculoskeletal, Neurologic.  All systems negative except for that covered in HPI.    PAST PSYCHIATRIC, MEDICAL, AND SOCIAL HISTORY REVIEWED  The patient's past medical, family and social history have been reviewed and updated as appropriate within the electronic medical record - see encounter notes.    MEDICATIONS:    Current Outpatient Medications:     ALPRAZolam (XANAX) 0.5 MG tablet, Take 1 tablet (0.5 mg total) by mouth 2 (two) times a day., Disp: 60 tablet, Rfl: 0    amitriptyline (ELAVIL) 25 MG tablet, Take 1 tablet (25 mg  total) by mouth every evening., Disp: 90 tablet, Rfl: 1    ARIPiprazole (ABILIFY) 2 MG Tab, Take 1 tablet (2 mg total) by mouth once daily., Disp: 30 tablet, Rfl: 2    busPIRone (BUSPAR) 15 MG tablet, Take 1 tablet (15 mg total) by mouth 3 (three) times daily., Disp: 270 tablet, Rfl: 1    etodolac (LODINE) 500 MG tablet, TAKE 1 TABLET (500 MG TOTAL) BY MOUTH 2 (TWO) TIMES DAILY AS NEEDED (MIGRAINE)., Disp: 20 tablet, Rfl: 12    ibuprofen (ADVIL,MOTRIN) 800 MG tablet, Take 1 tablet (800 mg total) by mouth 3 (three) times daily as needed for Pain., Disp: 90 tablet, Rfl: 0    promethazine (PHENERGAN) 25 MG tablet, Take 1 tablet (25 mg total) by mouth every 6 (six) hours as needed for Nausea., Disp: 40 tablet, Rfl: 0    QULIPTA 60 mg Tab, TAKE 1 TABLET BY MOUTH EVERY DAY, Disp: 30 tablet, Rfl: 6    semaglutide, weight loss, (WEGOVY) 0.25 mg/0.5 mL PnIj, Inject 0.25 mg into the skin every 7 days. X 4 weeks then 0.5 mg weekly x 4 weeks, Disp: 2 mL, Rfl: 0    venlafaxine (EFFEXOR-XR) 150 MG Cp24, Take 1 capsule (150 mg total) by mouth once daily. Take with 75mg capsule to equal 225mg daily., Disp: 30 capsule, Rfl: 11    venlafaxine (EFFEXOR-XR) 75 MG 24 hr capsule, Take 1 capsule (75 mg total) by mouth once daily. Take with 150mg capsule to equal 225mg daily., Disp: 30 capsule, Rfl: 11    ZOLMitriptan (ZOMIG-ZMT) 5 MG disintegrating tablet, TAKE 1 TABLET BY MOUTH AS NEEDED FOR MIGRAINE., Disp: 9 tablet, Rfl: 12    ZOLMitriptan (ZOMIG-ZMT) 5 MG disintegrating tablet, DISSOLVE 1 TABLET BY MOUTH AS NEEDED FOR MIGRAINE., Disp: 6 tablet, Rfl: 12    ALLERGIES:  Review of patient's allergies indicates:   Allergen Reactions    Codeine Itching and Other (See Comments)    Morphine Itching and Other (See Comments)       PSYCHIATRIC EXAM:  There were no vitals filed for this visit.    Appearance:  Well groomed, appearing healthy and of stated age  Behavior:  Cooperative, pleasant, no psychomotor agitation or retardation  Speech:   "Normal rate, rhythm, prosody, and volume  Mood:  "Ok"  Affect:  Constricted  Thought Process:  Linear, logical, goal directed  Thought Content:  Negative for suicidal ideation, homicidal ideation, delusions or hallucinations.  Associations:  Intact  Memory:  Grossly Intact  Level of Consciousness/Orientation:  Grossly intact  Fund of Knowledge:  Good  Attention:  Good  Language:  Fluent, able to name abstract and concrete objects  Insight:  Good  Judgment:  Intact  Psychomotor signs:  No abnormal movements of face  Gait:  Unable to assess via virtual visit      RELEVANT LABS/STUDIES:    Lab Results   Component Value Date    WBC 6.86 10/09/2023    HGB 11.6 (L) 10/09/2023    HCT 38.7 10/09/2023    MCV 92 10/09/2023     10/09/2023     BMP  Lab Results   Component Value Date     10/09/2023    K 4.2 10/09/2023     10/09/2023    CO2 21 (L) 10/09/2023    BUN 14 10/09/2023    CREATININE 0.8 10/09/2023    CALCIUM 9.8 10/09/2023    ANIONGAP 11 10/09/2023    ESTGFRAFRICA >60.0 07/28/2022    EGFRNONAA >60.0 07/28/2022     Lab Results   Component Value Date    ALT 18 10/09/2023    AST 14 10/09/2023    ALKPHOS 93 10/09/2023    BILITOT 0.2 10/09/2023     Lab Results   Component Value Date    TSH 0.539 10/09/2023     Lab Results   Component Value Date    HGBA1C 5.8 (H) 10/09/2023       IMPRESSION:    Rosalina Saavedra is a 34 y.o. female with history of MDD, JESUS, and Social Anxiety Disorder who presents for follow up appointment.    Status/Progress:  Based on the examination today, the patient's problem(s) is/are inadequately controlled.  New problems have been presented today.    Risk Parameters:  Patient reports no suicidal ideation  Patient reports no homicidal ideation  Patient reports no self-injurious behavior  Patient reports no violent behavior      DIAGNOSES:    ICD-10-CM ICD-9-CM   1. Dissociative episode  F44.9 300.15   2. Functional neurological symptom disorder with mixed symptoms  F44.7 300.11   3. " Psychogenic nonepileptic seizure  F44.5 300.11   4. Moderate episode of recurrent major depressive disorder  F33.1 296.32   5. JESUS (generalized anxiety disorder)  F41.1 300.02   6. Chronic migraine w/o aura, not intractable, w stat migr  G43.701 346.72   7. Insomnia, unspecified type  G47.00 780.52   8. Fatigue, unspecified type  R53.83 780.79       PLAN:  Start Abilify 2mg daily for depression.  She will send me an update in 1 week.  Continue Xanax 0.5mg BID since this is helping her anxiety (she had run out of this when she had her dissociative episode).       Continue Effexor 225mg daily, Buspar 15mg TID, Elavil 25mg qHS.    Message sent to Dr. Dorsey about recent episode, including new headaches.    Discussed with patient informed consent, risks versus benefits, alternative treatments, side effect profile and the inherent unpredictability of individual responses to these treatments.  The patient expresses understanding of the above and displays the capacity to agree with this current plan.  Continue individual psychotherapy with Guerita Stevens LCSW.  I will plan to reach out to her.    RETURN TO CLINIC:  Follow up in about 2 weeks (around 2/28/2024).

## 2024-02-22 ENCOUNTER — OFFICE VISIT (OUTPATIENT)
Dept: PSYCHIATRY | Facility: CLINIC | Age: 34
End: 2024-02-22
Payer: COMMERCIAL

## 2024-02-22 DIAGNOSIS — F44.7 FUNCTIONAL NEUROLOGICAL SYMPTOM DISORDER WITH MIXED SYMPTOMS: ICD-10-CM

## 2024-02-22 DIAGNOSIS — F33.1 MODERATE EPISODE OF RECURRENT MAJOR DEPRESSIVE DISORDER: Primary | ICD-10-CM

## 2024-02-22 DIAGNOSIS — F44.5 PSYCHOGENIC NONEPILEPTIC SEIZURE: ICD-10-CM

## 2024-02-22 DIAGNOSIS — G47.00 INSOMNIA, UNSPECIFIED TYPE: ICD-10-CM

## 2024-02-22 DIAGNOSIS — F41.1 GAD (GENERALIZED ANXIETY DISORDER): ICD-10-CM

## 2024-02-22 PROCEDURE — 1159F MED LIST DOCD IN RCRD: CPT | Mod: CPTII,95,, | Performed by: INTERNAL MEDICINE

## 2024-02-22 PROCEDURE — 99214 OFFICE O/P EST MOD 30 MIN: CPT | Mod: 95,,, | Performed by: INTERNAL MEDICINE

## 2024-02-22 PROCEDURE — 1160F RVW MEDS BY RX/DR IN RCRD: CPT | Mod: CPTII,95,, | Performed by: INTERNAL MEDICINE

## 2024-02-22 RX ORDER — ARIPIPRAZOLE 5 MG/1
5 TABLET ORAL DAILY
Qty: 30 TABLET | Refills: 2 | Status: SHIPPED | OUTPATIENT
Start: 2024-02-22 | End: 2024-05-18

## 2024-02-22 NOTE — PROGRESS NOTES
OUTPATIENT PSYCHIATRY RETURN VISIT    ENCOUNTER DATE:  2/27/2024  SITE:  [facility], [facility]  LENGTH OF SESSION:  22 minutes    The patient location is:  [place], not in a healthcare facility  Visit type:  audiovisual    Face to Face time with patient:  22 minutes  30 minutes of total time spent on the encounter, which includes face to face time and non-face to face time preparing to see the patient (eg, review of tests), Obtaining and/or reviewing separately obtained history, Documenting clinical information in the electronic or other health record, Independently interpreting results (not separately reported) and communicating results to the patient/family/caregiver, or Care coordination (not separately reported).     Each patient to whom he or she provides medical services by telemedicine is:  (1) informed of the relationship between the physician and patient and the respective role of any other health care provider with respect to management of the patient; and (2) notified that he or she may decline to receive medical services by telemedicine and may withdraw from such care at any time.    CHIEF COMPLAINT:  Mood      HISTORY OF PRESENTING ILLNESS:  Rosalina Saavedra is a 34 y.o. female with history of MDD, JESUS, and Social Anxiety Disorder who presents for follow up appointment.      Plan at last appointment on 2/14/2024:  Start Abilify 2mg daily for depression.  She will send me an update in 1 week.  Continue Xanax 0.5mg BID since this is helping her anxiety (she had run out of this when she had her dissociative episode).       Continue Effexor 225mg daily, Buspar 15mg TID, Elavil 25mg qHS.    Message sent to Dr. Dorsey about recent episode, including new headaches.    Discussed with patient informed consent, risks versus benefits, alternative treatments, side effect profile and the inherent unpredictability of individual responses to these treatments.  The patient expresses understanding of  the above and displays the capacity to agree with this current plan.  Continue individual psychotherapy with Guerita Stevens LCSW.  I will plan to reach out to her.    History as told by patient:  Says mood has been a little better on Abilify.  Not feeling as depressed or anxious.  Just feels more positive in general.  2 days ago had less of a headache but yesterday had another seizure (froze, couldn't move, some jerking, some vocalization).  No more dissociative episodes.  Sleep is ok - sometimes hard to get to sleep but sleeping better once she falls asleep.       Previous Medication Trials: Ritalin for concentration, Prozac (helpful but stopped helping), Lexapro (?), Wellbutrin (worsened headaches, stopped due to seizures), Lunesta (not helpful even at 3mg)    Medication side effects:  Denies  Medication compliance:  Yes    PSYCHIATRIC REVIEW OF SYSTEMS:  Trouble with sleep:  Sometimes  Appetite changes:  Not discussed today  Weight changes:  Gain slowly over time  Lack of energy:  Sometimes  Anhedonia:  Sometimes  Somatic symptoms:  Seizures, migraines  Libido:  Not discussed  Anxiety/panic:  Yes but improving  Guilty/hopeless:  Denies  Self-injurious behavior/risky behavior:  Denies  Any drugs:  Denies  Alcohol:  Denies     MEDICAL REVIEW OF SYSTEMS:  Complete review of systems performed covering Constitutional, Musculoskeletal, Neurologic.  All systems negative except for that covered in HPI.    PAST PSYCHIATRIC, MEDICAL, AND SOCIAL HISTORY REVIEWED  The patient's past medical, family and social history have been reviewed and updated as appropriate within the electronic medical record - see encounter notes.    MEDICATIONS:    Current Outpatient Medications:     ALPRAZolam (XANAX) 0.5 MG tablet, Take 1 tablet (0.5 mg total) by mouth 2 (two) times a day., Disp: 60 tablet, Rfl: 0    amitriptyline (ELAVIL) 25 MG tablet, Take 1 tablet (25 mg total) by mouth every evening., Disp: 90 tablet, Rfl: 1    ARIPiprazole  "(ABILIFY) 5 MG Tab, Take 1 tablet (5 mg total) by mouth once daily., Disp: 30 tablet, Rfl: 2    busPIRone (BUSPAR) 15 MG tablet, Take 1 tablet (15 mg total) by mouth 3 (three) times daily., Disp: 270 tablet, Rfl: 1    etodolac (LODINE) 500 MG tablet, TAKE 1 TABLET (500 MG TOTAL) BY MOUTH 2 (TWO) TIMES DAILY AS NEEDED (MIGRAINE)., Disp: 20 tablet, Rfl: 12    ibuprofen (ADVIL,MOTRIN) 800 MG tablet, Take 1 tablet (800 mg total) by mouth 3 (three) times daily as needed for Pain., Disp: 90 tablet, Rfl: 0    promethazine (PHENERGAN) 25 MG tablet, Take 1 tablet (25 mg total) by mouth every 6 (six) hours as needed for Nausea., Disp: 40 tablet, Rfl: 0    QULIPTA 60 mg Tab, TAKE 1 TABLET BY MOUTH EVERY DAY, Disp: 30 tablet, Rfl: 6    semaglutide, weight loss, (WEGOVY) 0.25 mg/0.5 mL PnIj, Inject 0.25 mg into the skin every 7 days. X 4 weeks then 0.5 mg weekly x 4 weeks, Disp: 2 mL, Rfl: 0    venlafaxine (EFFEXOR-XR) 150 MG Cp24, Take 1 capsule (150 mg total) by mouth once daily. Take with 75mg capsule to equal 225mg daily., Disp: 30 capsule, Rfl: 11    venlafaxine (EFFEXOR-XR) 75 MG 24 hr capsule, Take 1 capsule (75 mg total) by mouth once daily. Take with 150mg capsule to equal 225mg daily., Disp: 30 capsule, Rfl: 11    ZOLMitriptan (ZOMIG-ZMT) 5 MG disintegrating tablet, TAKE 1 TABLET BY MOUTH AS NEEDED FOR MIGRAINE., Disp: 9 tablet, Rfl: 12    ZOLMitriptan (ZOMIG-ZMT) 5 MG disintegrating tablet, DISSOLVE 1 TABLET BY MOUTH AS NEEDED FOR MIGRAINE., Disp: 6 tablet, Rfl: 12    ALLERGIES:  Review of patient's allergies indicates:   Allergen Reactions    Codeine Itching and Other (See Comments)    Morphine Itching and Other (See Comments)       PSYCHIATRIC EXAM:  There were no vitals filed for this visit.    Appearance:  Well groomed, appearing healthy and of stated age  Behavior:  Cooperative, pleasant, no psychomotor agitation or retardation  Speech:  Normal rate, rhythm, prosody, and volume  Mood:  "A little better"  Affect:  " Euthymic  Thought Process:  Linear, logical, goal directed  Thought Content:  Negative for suicidal ideation, homicidal ideation, delusions or hallucinations.  Associations:  Intact  Memory:  Grossly Intact  Level of Consciousness/Orientation:  Grossly intact  Fund of Knowledge:  Good  Attention:  Good  Language:  Fluent, able to name abstract and concrete objects  Insight:  Good  Judgment:  Intact  Psychomotor signs:  No abnormal movements of face  Gait:  Unable to assess via virtual visit      RELEVANT LABS/STUDIES:    Lab Results   Component Value Date    WBC 6.86 10/09/2023    HGB 11.6 (L) 10/09/2023    HCT 38.7 10/09/2023    MCV 92 10/09/2023     10/09/2023     BMP  Lab Results   Component Value Date     10/09/2023    K 4.2 10/09/2023     10/09/2023    CO2 21 (L) 10/09/2023    BUN 14 10/09/2023    CREATININE 0.8 10/09/2023    CALCIUM 9.8 10/09/2023    ANIONGAP 11 10/09/2023    ESTGFRAFRICA >60.0 07/28/2022    EGFRNONAA >60.0 07/28/2022     Lab Results   Component Value Date    ALT 18 10/09/2023    AST 14 10/09/2023    ALKPHOS 93 10/09/2023    BILITOT 0.2 10/09/2023     Lab Results   Component Value Date    TSH 0.539 10/09/2023     Lab Results   Component Value Date    HGBA1C 5.8 (H) 10/09/2023       IMPRESSION:    Rosalina Saavedra is a 34 y.o. female with history of MDD, JESUS, and Social Anxiety Disorder who presents for follow up appointment.    Status/Progress:  Based on the examination today, the patient's problem(s) is/are  improving but still inadequately controlled .  New problems have not been presented today.    Risk Parameters:  Patient reports no suicidal ideation  Patient reports no homicidal ideation  Patient reports no self-injurious behavior  Patient reports no violent behavior      DIAGNOSES:    ICD-10-CM ICD-9-CM   1. Moderate episode of recurrent major depressive disorder  F33.1 296.32   2. JESUS (generalized anxiety disorder)  F41.1 300.02   3. Functional neurological symptom  disorder with mixed symptoms  F44.7 300.11   4. Psychogenic nonepileptic seizure  F44.5 300.11   5. Insomnia, unspecified type  G47.00 780.52       PLAN:  Increase Abilify to 5mg daily for mood.       Continue Effexor 225mg daily, Buspar 15mg TID, Xanax 0.5mg BID, and Elavil 25mg qHS.    Encouraged patient to reach out to Dr. Dorsey as well.  Discussed with patient informed consent, risks versus benefits, alternative treatments, side effect profile and the inherent unpredictability of individual responses to these treatments.  The patient expresses understanding of the above and displays the capacity to agree with this current plan.  Continue individual psychotherapy with Guerita Stevens LCSW.      RETURN TO CLINIC:  Follow up in about 2 weeks (around 3/7/2024).

## 2024-02-23 ENCOUNTER — PATIENT MESSAGE (OUTPATIENT)
Dept: PSYCHIATRY | Facility: CLINIC | Age: 34
End: 2024-02-23
Payer: COMMERCIAL

## 2024-02-28 ENCOUNTER — OFFICE VISIT (OUTPATIENT)
Dept: NEUROLOGY | Facility: CLINIC | Age: 34
End: 2024-02-28
Payer: COMMERCIAL

## 2024-02-28 VITALS
HEART RATE: 119 BPM | WEIGHT: 204.94 LBS | BODY MASS INDEX: 32.94 KG/M2 | HEIGHT: 66 IN | SYSTOLIC BLOOD PRESSURE: 134 MMHG | DIASTOLIC BLOOD PRESSURE: 91 MMHG

## 2024-02-28 DIAGNOSIS — G43.001 MIGRAINE WITHOUT AURA AND WITH STATUS MIGRAINOSUS, NOT INTRACTABLE: ICD-10-CM

## 2024-02-28 DIAGNOSIS — G47.00 INSOMNIA, UNSPECIFIED TYPE: ICD-10-CM

## 2024-02-28 DIAGNOSIS — F41.1 GAD (GENERALIZED ANXIETY DISORDER): ICD-10-CM

## 2024-02-28 DIAGNOSIS — F32.A DEPRESSION, UNSPECIFIED DEPRESSION TYPE: ICD-10-CM

## 2024-02-28 DIAGNOSIS — F40.10 SOCIAL ANXIETY DISORDER: ICD-10-CM

## 2024-02-28 DIAGNOSIS — F44.7 FUNCTIONAL NEUROLOGICAL SYMPTOM DISORDER WITH MIXED SYMPTOMS: Primary | ICD-10-CM

## 2024-02-28 PROBLEM — R56.9 SEIZURES: Status: ACTIVE | Noted: 2024-02-28

## 2024-02-28 PROCEDURE — 99215 OFFICE O/P EST HI 40 MIN: CPT | Mod: S$GLB,,,

## 2024-02-28 PROCEDURE — 99417 PROLNG OP E/M EACH 15 MIN: CPT | Mod: S$GLB,,,

## 2024-02-28 PROCEDURE — 3075F SYST BP GE 130 - 139MM HG: CPT | Mod: CPTII,S$GLB,,

## 2024-02-28 PROCEDURE — 99999 PR PBB SHADOW E&M-EST. PATIENT-LVL V: CPT | Mod: PBBFAC,,,

## 2024-02-28 PROCEDURE — 3080F DIAST BP >= 90 MM HG: CPT | Mod: CPTII,S$GLB,,

## 2024-02-28 PROCEDURE — 3008F BODY MASS INDEX DOCD: CPT | Mod: CPTII,S$GLB,,

## 2024-02-28 RX ORDER — TOPIRAMATE 50 MG/1
50 TABLET, FILM COATED ORAL NIGHTLY
Qty: 90 TABLET | Refills: 3 | Status: SHIPPED | OUTPATIENT
Start: 2024-02-28 | End: 2025-02-27

## 2024-02-28 NOTE — PATIENT INSTRUCTIONS
You have many risk factors for a functional neurological disorder. In this type of disorder, the nervous system has difficulty interpreting external stimuli and can easily become overstimulated.  This often occurs in bright, loud, or emotionally charged situations.  Usually patients have insight that an episode may be about to happen.  When you feel an event coming on, please go to a dark, quiet place with no other people to let your system reset.  Some people only need 5 minutes to get back to baseline while others may need several hours or some sleep.  There is a very good book that discusses this diagnosis in detail:  The Body Keeps the Score by Josefina Wang.  Although not perfect, it may provide some insight into what is going on.  I would love to hear what you think about what the author has to say. Please take a look and let me know your thoughts (good or bad, agreeing or disagreeing) over the portal.      For migraines, we will try adding a low dose of topiramate again. Try taking a half tablet (25mg) nightly before bed for 1 week then increase to 1 full tablet (50mg) nightly and we can see how things go from there. Please write a headache diary with special attention to headache symptoms, durations, triggers, medications used and bring this to the next clinic visit. For posterior head and neck pain, try a rolled towel underneath your neck while you sleep for comfort. Ice/heat/massage. Physical Therapy for neck muscle spasms. They will call you to schedule. Try yoga to help take some of the strain off your back and to work on strength and flexibility. For pain, you can take over-the-counter ibuprofen (max dose 400-600 mg every 8 hr)/naproxen (max dose 500 mg every 12 hr) or an acetaminophen formulation like Excedrin, Tylenol, or Goody's powder (max dose 1 g every 8 hr). It is important that you do not take any of these medications more than one full day every 4 days (7 days a month).  If you take them  more frequently than that, it can contribute to a medication overuse headache as well as other issues such as a GI upset and/or kidney/liver injury.           Jaci Bynum PA-C   Neurology-Epilepsy  [facility]

## 2024-02-28 NOTE — PROGRESS NOTES
[facility]  Epilepsy Clinic Progress Note    [facility] - NEUROLOGY [address]TH [place] [facility], [place]    Date: 2/28/24  Patient Name: Rosalina Saavedra   MRN: [id number]   PCP: Madhavi Basilio  Referring Provider: No ref. provider found    Assessment:   Rosalina Saavedra is a 34 y.o. female presenting in follow up for evaluation of episodes consisting of abnormal movements, decreased responsiveness, and speech difficulty. EMU admission 7/2022 captured typical events w/ no EEG correlate consistent w/ dx of a functional neurologic disorder. No evidence of underlying epilepsy throughout admission -> antiseizure medications subsequently discontinued. She presents in clinic today due to recent prolonged functional episodes that were concerning to her and her parents. She has also been experiencing migraines, previously treated w/ topiramate. Discussed functional neurologic disorders extensively which resonated w/ pt along w/ helpful coping mechanisms. Encouraged continuing to work w/ psychiatry and therapist. Referral to physical therapy + reinitiate low dose topiramate 50mg qhs for headaches. Follow up in epilepsy clinic as needed with issues. Advised to reach out over the portal with any concerns. Patient is comfortable with plan. All questions and concerns are addressed at this time.   Plan:     Problem List Items Addressed This Visit          Neuro    Migraine without aura and with status migrainosus, not intractable    Relevant Medications    topiramate (TOPAMAX) 50 MG tablet    Other Relevant Orders    Ambulatory referral/consult to Physical/Occupational Therapy       Psychiatric    JESUS (generalized anxiety disorder)    Social anxiety disorder    Functional neurological symptom disorder with mixed symptoms - Primary       Other    Insomnia     Other Visit Diagnoses       Depression, unspecified depression type              I completed education on seizure first aid and safety. Given  "episodes of decreased awareness, I recommended precautions with regards to avoiding unsupervised water recreational activity or bathing in tubs, climbing or working at heights, operation of heavy or dangerous machinery, caution around fire and sources of high heat, as well as any other activity which could put a patient at danger in case of a seizure.  I also reviewed the [facility] law and recommended that the patient not drive  for 6 months in the event of breakthrough seizures.    As the patient is a female of childbearing age, I have counseled the patient on issues pertaining to pregnancy and epilepsy and recommended folic acid supplementation. I have reviewed and recommended the AAN guideline of folic acid supplementation prior to and during pregnancy.      Jaci Bynum PA-C   Neurology-Epilepsy  [facility]    Collaborating physician, Dr. Geoff Dorsey, was available during today's encounter.     I spent approximately 78 minutes on the day of this encounter preparing to see the patient, obtaining and reviewing history and results, performing a medically appropriate exam, counseling and educating the patient/family/caregiver, documenting clinical information, coordinating care, and ordering medications, tests, procedures, and referrals.    Patient note was created using MModal Dictation.  Any errors in syntax or even information may not have been identified and edited on initial review prior to signing this note.  Subjective:     Interval Events/ROS 2/28/2024:    Accompanied by mom who also contributes to the history.     Current ASM/SEs: none  Breakthrough seizures/events: recently had some scary "seizures", very stressed recently, events every day, multiple times per day, freezing spells, uncontrollable laughing episodes, spazzed out contortions, significant event 2 weeks ago that really scared her, doesn't recall everything, vocalizations, cursed out dad, first started w/ random yelling, " "then generalized convulsions, then freezing/staring/legs locked up, fell to the floor, couldn't move, hard to breath, maintained awareness/aware of everything, felt like 3 separate people were inside of her taking turns talking, dialogue out loud with herself, mom reports pt said things like "nobody likes you because you're fat and ugly" then would change to "I'm smart, I know I'm smart", entire event lasted an hour or more, very exhausted and drained afterward     Folic acid: no  Sleep: chronic insomnia  Mood: chronic anxiety and depression, racing/ruminating thoughts, working w/ psychiatrist Dr. 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 Otherwise, no fever, no cold symptoms, no changes in vision, no new weakness, no chest pain, no shortness of breath, no nausea, no vomiting, no diarrhea, no constipation, no tingling/numbness, no problems walking.    Recent Labs   Lab 07/28/22  1503   Lamotrigine Lvl 5.8   Oxcarbazepine 3 L       Interval Events/ROS 7/12/2022:    Patient presents in follow up for evaluation of daily episodes consisting of abnormal movements, decreased responsiveness, and speech difficulty. Accompanied by mom who also contributes to the history. Last event was 7/10/2022. Episodes persist despite medication regimen of lamotrigine 200mg BID and oxcarbazepine 300mg BID though patient does note frequent missed doses. Episodes may last anywhere from 2-10 minutes. Reports these events began happening 10/2021, then seemed to resolve for some time, but returned 02/2022 and have been occurring nearly daily since then. Patient describes they usually occur upon waking up in the morning, or when transitioning from sleep to wakefulness. Her body feels "stuck", can't get up, can't speak sentences but is able to make noises, vision is "scrambled" then becomes " exhausted afterward. Denies tongue bite or UI. Mom shows a video of one event during which the patient is lying in bed, makes occasional grunting noises in response to mom saying patient's name, eyes are closed, and video ends by patient hitting the phone out of mom's hand. Patient states she was able to hear her mom during this episode. Events are often triggered by stress or feeling overheated. Hx of anxiety and depression. 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 Otherwise, no fever, no cold symptoms, no headache, no changes in vision, no new weakness, no chest pain, no shortness of breath, no nausea, no vomiting, no diarrhea, no constipation, no tingling/numbness, no problems walking. Patient also accompanied today by service dog.     HPI:   Ms. Rosalina Saavedra is a 34 y.o. female who presents with a chief complaint of reported seizures.  The patient presents today with her mother who contributes to the history.  Together they report that the patient experienced an abrupt onset of seizure-like episodes beginning in January 2022.  The patient endorses 3 event types including episodes of her vision going completely black while remaining aware of her surroundings, episodes of inability to control her body and unresponsiveness while remaining aware, and extremity jerking.  She has been followed by an outside neurologist, Dr. Crain, who has tried her on numerous seizure medications that have not control the episodes.  She has undergone routine and ambulatory EEGs which were normal per patient and did not capture typical events.  Her mother does believe that stress may be contributing to the episodes.      They provided video today of the episodes.  Episodes are characterized by the patient exhibiting robotic type movements and exhibiting her feet stuck to the floor before falling to the ground.  She lies on the ground shaking her arms and legs while crying before standing in a robotic fashion.  During the  "entire event, the patient makes a variety of cartoon-like noises such as wee oo", "hansel hansel hansel" and "beep boop."    Seizure Type: Suspected PNEE  Seizure Etiology:  Suspected PNEE  Current AEDs: Lamictal 150 mg BID    The patient is accompanied by family who contribute to the history. This patient has 3 types of seizure as described below. The patient reports having seizures for months The patient reports to have worse seizure control. The seizure frequency is variable. The last seizure was yesterday . The patient does not report side effects from seizure medication.     Seizure Type 1:   Seizure Description: everything goes "black in her vision", remains aware  Aura: No warning  Associated Symptoms:  none  Seizure Frequency: Up to 2x a day every 3-4 days  Last seizure: January 2022    Seizure Type 2:   Seizure Description: Screams, shakes all over, can't open mouth, eyes lock closed and can't open them, lasts 5-15 minutes, confused and tired  After, remains aware after  Aura: Burning in back of neck  Associated Symptoms:  none  Seizure Frequency: Daily, 6-7 times per day  Last seizure: Early Feb 2022    Seizure Type 3:   Seizure Description: Intermittent limb and head jerks  Aura: None  Associated Symptoms: none  Seizure Frequency: Every day  Last seizure: Today    Handedness: right  Seizure Onset Age: 32  Seizure/ Epilepsy Risk Factors: none  Birth/Developmental History: normal birth history and Normal developmental history  Seizure Triggers/ Provoking Features: stress  Previous Seizure Medications: brivaracetam (Briviact, BRV) , lacosamide (Vimpat, LCS), lamotrigine (Lamictal, LTG), oxcarbazepine (Trileptal OXC),, topiramate (Topamax, TPM) and clonazepam (Klonopin, CZP)  Recent Med Changes: None  Other Treatments: None  Prior Episodes of Status: None  Psychiatric/Behavioral Comorbitidies: Anxiety and depression, social anxieyt  Surgical Candidacy: None    Prior Studies:  EEG : At [facility], routine and ambulatory- " both normal per patient  vEEG/ EMU evaluation: Not done  MRI of brain: January 2022, Done at [facility]- normal per patient   AED levels:   Not done  CT/CTA Scan:   Not done  PET Scan:  Not done  Neuropsychological Evaluation:  Not done  DEXA Scan:  Not done  Other studies:  Not done    PAST MEDICAL HISTORY:  Past Medical History:   Diagnosis Date    Anxiety     Depression     Migraine     Migraine headache        PAST SURGICAL HISTORY:  Past Surgical History:   Procedure Laterality Date    KNEE ARTHROSCOPY      TIBIA FRACTURE SURGERY         CURRENT MEDS:  Current Outpatient Medications   Medication Sig Dispense Refill    ALPRAZolam (XANAX) 0.5 MG tablet Take 1 tablet (0.5 mg total) by mouth 2 (two) times a day. 60 tablet 0    amitriptyline (ELAVIL) 25 MG tablet Take 1 tablet (25 mg total) by mouth every evening. 90 tablet 1    ARIPiprazole (ABILIFY) 5 MG Tab Take 1 tablet (5 mg total) by mouth once daily. 30 tablet 2    busPIRone (BUSPAR) 15 MG tablet Take 1 tablet (15 mg total) by mouth 3 (three) times daily. 270 tablet 1    etodolac (LODINE) 500 MG tablet TAKE 1 TABLET (500 MG TOTAL) BY MOUTH 2 (TWO) TIMES DAILY AS NEEDED (MIGRAINE). 20 tablet 12    ibuprofen (ADVIL,MOTRIN) 800 MG tablet Take 1 tablet (800 mg total) by mouth 3 (three) times daily as needed for Pain. 90 tablet 0    promethazine (PHENERGAN) 25 MG tablet Take 1 tablet (25 mg total) by mouth every 6 (six) hours as needed for Nausea. 40 tablet 0    QULIPTA 60 mg Tab TAKE 1 TABLET BY MOUTH EVERY DAY 30 tablet 6    venlafaxine (EFFEXOR-XR) 150 MG Cp24 Take 1 capsule (150 mg total) by mouth once daily. Take with 75mg capsule to equal 225mg daily. 30 capsule 11    venlafaxine (EFFEXOR-XR) 75 MG 24 hr capsule Take 1 capsule (75 mg total) by mouth once daily. Take with 150mg capsule to equal 225mg daily. 30 capsule 11    ZOLMitriptan (ZOMIG-ZMT) 5 MG disintegrating tablet TAKE 1 TABLET BY MOUTH AS NEEDED FOR MIGRAINE. 9 tablet 12    ZOLMitriptan (ZOMIG-ZMT) 5 MG  "disintegrating tablet DISSOLVE 1 TABLET BY MOUTH AS NEEDED FOR MIGRAINE. 6 tablet 12    semaglutide, weight loss, (WEGOVY) 0.25 mg/0.5 mL PnIj Inject 0.25 mg into the skin every 7 days. X 4 weeks then 0.5 mg weekly x 4 weeks (Patient not taking: Reported on 2/28/2024) 2 mL 0     No current facility-administered medications for this visit.       ALLERGIES:  Review of patient's allergies indicates:   Allergen Reactions    Codeine Itching and Other (See Comments)    Morphine Itching and Other (See Comments)       FAMILY HISTORY:  Family History   Problem Relation Age of Onset    Diabetes Mother     Breast cancer Mother 61    Diabetes Maternal Grandmother     Hypertension Maternal Grandmother     Breast cancer Other 71        moms paternal grand mother       SOCIAL HISTORY:  Social History     Tobacco Use    Smoking status: Never    Smokeless tobacco: Never   Substance Use Topics    Alcohol use: No    Drug use: No       Review of Systems:  12 system review of systems is negative except for the symptoms mentioned in HPI.     Objective:     Vitals:    02/28/24 1519   BP: (!) 134/91   BP Location: Right arm   Patient Position: Sitting   Pulse: (!) 119   Weight: 92.9 kg (204 lb 14.7 oz)   Height: 5' 6" (1.676 m)     General: NAD, well nourished   Eyes: no tearing, discharge, no erythema   ENT: moist mucous membranes of the oral cavity, nares patent    Neck: muscle spasms   Cardiovascular: Warm and well perfused  Lungs: Normal work of breathing, normal chest wall excursions  Skin: No rash, lesions, or breakdown on exposed skin  Psychiatry: Mood and affect are appropriate   Abdomen: soft, non tender, non distended  Extremeties: No cyanosis, clubbing or edema.    Neurological   MENTAL STATUS: Alert and oriented to person, place, and time. Attention and concentration within normal limits. Speech without dysarthria, able to name and repeat without difficulty. Recent and remote memory within normal limits   CRANIAL NERVES: Visual " fields intact. EOMI. Facial sensation intact. Face symmetrical. Hearing grossly intact. Full shoulder shrug bilaterally. Tongue protrudes midline   SENSORY: Sensation is intact to light touch throughout.   MOTOR: Normal bulk and tone.  5/5 deltoid, biceps, triceps, interosseous, hand  bilaterally. 5/5 iliopsoas, knee extension/flexion, foot dorsi/plantarflexion bilaterally.    CEREBELLAR/COORDINATION/GAIT: Gait steady. Finger to nose intact. Normal rapid alternating movements.

## 2024-03-06 ENCOUNTER — OFFICE VISIT (OUTPATIENT)
Dept: PSYCHIATRY | Facility: CLINIC | Age: 34
End: 2024-03-06
Payer: COMMERCIAL

## 2024-03-06 VITALS
WEIGHT: 199.44 LBS | DIASTOLIC BLOOD PRESSURE: 79 MMHG | BODY MASS INDEX: 32.19 KG/M2 | HEART RATE: 117 BPM | SYSTOLIC BLOOD PRESSURE: 123 MMHG

## 2024-03-06 DIAGNOSIS — F33.1 MODERATE EPISODE OF RECURRENT MAJOR DEPRESSIVE DISORDER: ICD-10-CM

## 2024-03-06 DIAGNOSIS — F44.5 PSYCHOGENIC NONEPILEPTIC SEIZURE: ICD-10-CM

## 2024-03-06 DIAGNOSIS — F44.7 FUNCTIONAL NEUROLOGICAL SYMPTOM DISORDER WITH MIXED SYMPTOMS: Primary | ICD-10-CM

## 2024-03-06 DIAGNOSIS — G43.001 MIGRAINE WITHOUT AURA AND WITH STATUS MIGRAINOSUS, NOT INTRACTABLE: ICD-10-CM

## 2024-03-06 DIAGNOSIS — G47.00 INSOMNIA, UNSPECIFIED TYPE: ICD-10-CM

## 2024-03-06 DIAGNOSIS — F41.1 GAD (GENERALIZED ANXIETY DISORDER): ICD-10-CM

## 2024-03-06 PROCEDURE — 99999 PR PBB SHADOW E&M-EST. PATIENT-LVL III: CPT | Mod: PBBFAC,,, | Performed by: INTERNAL MEDICINE

## 2024-03-06 PROCEDURE — 3008F BODY MASS INDEX DOCD: CPT | Mod: CPTII,S$GLB,, | Performed by: INTERNAL MEDICINE

## 2024-03-06 PROCEDURE — 99214 OFFICE O/P EST MOD 30 MIN: CPT | Mod: S$GLB,,, | Performed by: INTERNAL MEDICINE

## 2024-03-06 PROCEDURE — 3078F DIAST BP <80 MM HG: CPT | Mod: CPTII,S$GLB,, | Performed by: INTERNAL MEDICINE

## 2024-03-06 PROCEDURE — 3074F SYST BP LT 130 MM HG: CPT | Mod: CPTII,S$GLB,, | Performed by: INTERNAL MEDICINE

## 2024-03-06 PROCEDURE — 1160F RVW MEDS BY RX/DR IN RCRD: CPT | Mod: CPTII,S$GLB,, | Performed by: INTERNAL MEDICINE

## 2024-03-06 PROCEDURE — 1159F MED LIST DOCD IN RCRD: CPT | Mod: CPTII,S$GLB,, | Performed by: INTERNAL MEDICINE

## 2024-03-06 NOTE — PROGRESS NOTES
OUTPATIENT PSYCHIATRY RETURN VISIT    ENCOUNTER DATE:  3/6/2024  SITE:  [facility]  LENGTH OF SESSION:  25 minutes    CHIEF COMPLAINT:  Mood      HISTORY OF PRESENTING ILLNESS:  Rosalina Saavedra is a 34 y.o. female with history of MDD, JESUS, and Social Anxiety Disorder who presents for follow up appointment.      Plan at last appointment on 2/22/2024:  Increase Abilify to 5mg daily for mood.       Continue Effexor 225mg daily, Buspar 15mg TID, Xanax 0.5mg BID, and Elavil 25mg qHS.    Encouraged patient to reach out to Dr. Dorsey as well.  Discussed with patient informed consent, risks versus benefits, alternative treatments, side effect profile and the inherent unpredictability of individual responses to these treatments.  The patient expresses understanding of the above and displays the capacity to agree with this current plan.  Continue individual psychotherapy with Guerita Stevens LCSW.      History as told by patient:  Has been getting up earlier about 5am so she isn't late to work.  Seizures usually in the morning which makes her late.  Stopped taking Elavil about 2 weeks ago.  Didn't feel like it was helping.  Yesterday did have episode of sharp shooting pain headaches again.  Migraines haven't been as frequent but frequent enough to still be a lot.  Memory has been worse.  Has not affected work tasks.  Mood has been better on Abilify.  Having fewer seizures too.  Sleep is ok on the weekends.  During the week may stay up and do things/work too late.  Doesn't feel as exhausted during the day as she did previously.  Mostly migraines and anxiety bothering her now.  Seizures and depression lessoned.  Xanax has been more helpful for anxiety.      Previous Medication Trials: Ritalin for concentration, Prozac (helpful but stopped helping), Lexapro (?), Wellbutrin (worsened headaches, stopped due to seizures), Lunesta (not helpful even at 3mg)    Medication side effects:  Denies  Medication  compliance:  Stopped Elavil 2 weeks ago    PSYCHIATRIC REVIEW OF SYSTEMS:  Trouble with sleep:  Sometimes  Appetite changes:  Not discussed today  Weight changes:  Gain slowly over time  Lack of energy:  Sometimes  Anhedonia:  Sometimes  Somatic symptoms:  Seizures, migraines  Libido:  Not discussed  Anxiety/panic:  Yes but improving  Guilty/hopeless:  Denies  Self-injurious behavior/risky behavior:  Denies  Any drugs:  Denies  Alcohol:  Denies     MEDICAL REVIEW OF SYSTEMS:  Complete review of systems performed covering Constitutional, Musculoskeletal, Neurologic.  All systems negative except for that covered in HPI.    PAST PSYCHIATRIC, MEDICAL, AND SOCIAL HISTORY REVIEWED  The patient's past medical, family and social history have been reviewed and updated as appropriate within the electronic medical record - see encounter notes.    MEDICATIONS:    Current Outpatient Medications:     ALPRAZolam (XANAX) 0.5 MG tablet, Take 1 tablet (0.5 mg total) by mouth 2 (two) times a day., Disp: 60 tablet, Rfl: 0    amitriptyline (ELAVIL) 25 MG tablet, Take 1 tablet (25 mg total) by mouth every evening., Disp: 90 tablet, Rfl: 1    ARIPiprazole (ABILIFY) 5 MG Tab, Take 1 tablet (5 mg total) by mouth once daily., Disp: 30 tablet, Rfl: 2    busPIRone (BUSPAR) 15 MG tablet, Take 1 tablet (15 mg total) by mouth 3 (three) times daily., Disp: 270 tablet, Rfl: 1    etodolac (LODINE) 500 MG tablet, TAKE 1 TABLET (500 MG TOTAL) BY MOUTH 2 (TWO) TIMES DAILY AS NEEDED (MIGRAINE)., Disp: 20 tablet, Rfl: 12    ibuprofen (ADVIL,MOTRIN) 800 MG tablet, Take 1 tablet (800 mg total) by mouth 3 (three) times daily as needed for Pain., Disp: 90 tablet, Rfl: 0    promethazine (PHENERGAN) 25 MG tablet, Take 1 tablet (25 mg total) by mouth every 6 (six) hours as needed for Nausea., Disp: 40 tablet, Rfl: 0    QULIPTA 60 mg Tab, TAKE 1 TABLET BY MOUTH EVERY DAY, Disp: 30 tablet, Rfl: 6    topiramate (TOPAMAX) 50 MG tablet, Take 1 tablet (50 mg total)  "by mouth every evening., Disp: 90 tablet, Rfl: 3    venlafaxine (EFFEXOR-XR) 150 MG Cp24, Take 1 capsule (150 mg total) by mouth once daily. Take with 75mg capsule to equal 225mg daily., Disp: 30 capsule, Rfl: 11    venlafaxine (EFFEXOR-XR) 75 MG 24 hr capsule, Take 1 capsule (75 mg total) by mouth once daily. Take with 150mg capsule to equal 225mg daily., Disp: 30 capsule, Rfl: 11    ZOLMitriptan (ZOMIG-ZMT) 5 MG disintegrating tablet, TAKE 1 TABLET BY MOUTH AS NEEDED FOR MIGRAINE., Disp: 9 tablet, Rfl: 12    ZOLMitriptan (ZOMIG-ZMT) 5 MG disintegrating tablet, DISSOLVE 1 TABLET BY MOUTH AS NEEDED FOR MIGRAINE., Disp: 6 tablet, Rfl: 12    ALLERGIES:  Review of patient's allergies indicates:   Allergen Reactions    Codeine Itching and Other (See Comments)    Morphine Itching and Other (See Comments)       PSYCHIATRIC EXAM:  Vitals:    03/06/24 1525   BP: 123/79   Pulse: (!) 117   Weight: 90.5 kg (199 lb 6.5 oz)       Appearance:  Well groomed, appearing healthy and of stated age  Behavior:  Cooperative, pleasant, no psychomotor agitation or retardation  Speech:  Normal rate, rhythm, prosody, and volume  Mood:  "Better today"  Affect:  Euthymic, smiling and laughing at times  Thought Process:  Linear, logical, goal directed  Thought Content:  Negative for suicidal ideation, homicidal ideation, delusions or hallucinations.  Associations:  Intact  Memory:  Grossly Intact  Level of Consciousness/Orientation:  Grossly intact  Fund of Knowledge:  Good  Attention:  Good  Language:  Fluent, able to name abstract and concrete objects  Insight:  Good  Judgment:  Intact  Psychomotor signs:  No involuntary movements or tremor  Gait:  Normal      RELEVANT LABS/STUDIES:    Lab Results   Component Value Date    WBC 6.86 10/09/2023    HGB 11.6 (L) 10/09/2023    HCT 38.7 10/09/2023    MCV 92 10/09/2023     10/09/2023     BMP  Lab Results   Component Value Date     10/09/2023    K 4.2 10/09/2023     10/09/2023    " CO2 21 (L) 10/09/2023    BUN 14 10/09/2023    CREATININE 0.8 10/09/2023    CALCIUM 9.8 10/09/2023    ANIONGAP 11 10/09/2023    ESTGFRAFRICA >60.0 07/28/2022    EGFRNONAA >60.0 07/28/2022     Lab Results   Component Value Date    ALT 18 10/09/2023    AST 14 10/09/2023    ALKPHOS 93 10/09/2023    BILITOT 0.2 10/09/2023     Lab Results   Component Value Date    TSH 0.539 10/09/2023     Lab Results   Component Value Date    HGBA1C 5.8 (H) 10/09/2023       IMPRESSION:    Rosalina Saavedra is a 34 y.o. female with history of MDD, JESUS, and Social Anxiety Disorder who presents for follow up appointment.    Status/Progress:  Based on the examination today, the patient's problem(s) is/are  improving but still inadequately controlled .  New problems have not been presented today.    Risk Parameters:  Patient reports no suicidal ideation  Patient reports no homicidal ideation  Patient reports no self-injurious behavior  Patient reports no violent behavior      DIAGNOSES:    ICD-10-CM ICD-9-CM   1. Functional neurological symptom disorder with mixed symptoms  F44.7 300.11   2. Psychogenic nonepileptic seizure  F44.5 300.11   3. JESUS (generalized anxiety disorder)  F41.1 300.02   4. Migraine without aura and with status migrainosus, not intractable  G43.001 346.12   5. Moderate episode of recurrent major depressive disorder  F33.1 296.32   6. Insomnia, unspecified type  G47.00 780.52         PLAN:  No medication changes today.  Depression improving with initiation of Abilify.   Continue Abilify 5mg daily, Effexor 225mg daily, Buspar 15mg TID, and Xanax 0.5mg BID.  She stopped taking Elavil because she felt it wasn't helping.      Discussed with patient informed consent, risks versus benefits, alternative treatments, side effect profile and the inherent unpredictability of individual responses to these treatments.  The patient expresses understanding of the above and displays the capacity to agree with this current plan.  Continue  individual psychotherapy with Guerita Stevens LCSW.  Plan for residential treatment this summer.    RETURN TO CLINIC:  Follow up in about 2 weeks (around 3/20/2024).

## 2024-03-10 ENCOUNTER — PATIENT MESSAGE (OUTPATIENT)
Dept: PSYCHIATRY | Facility: CLINIC | Age: 34
End: 2024-03-10
Payer: COMMERCIAL

## 2024-03-10 DIAGNOSIS — F40.10 SOCIAL ANXIETY DISORDER: ICD-10-CM

## 2024-03-10 DIAGNOSIS — F41.1 GAD (GENERALIZED ANXIETY DISORDER): ICD-10-CM

## 2024-03-10 DIAGNOSIS — F44.5 PSYCHOGENIC NONEPILEPTIC SEIZURE: ICD-10-CM

## 2024-03-11 RX ORDER — ALPRAZOLAM 0.5 MG/1
0.5 TABLET ORAL 2 TIMES DAILY
Qty: 60 TABLET | Refills: 5 | Status: SHIPPED | OUTPATIENT
Start: 2024-03-11 | End: 2024-03-27 | Stop reason: SDUPTHER

## 2024-03-14 ENCOUNTER — TELEPHONE (OUTPATIENT)
Dept: NEUROLOGY | Facility: CLINIC | Age: 34
End: 2024-03-14
Payer: COMMERCIAL

## 2024-03-14 NOTE — TELEPHONE ENCOUNTER
----- Message from Nory Veliz sent at 3/14/2024  8:59 AM CDT -----  Regarding: R/S Botox  Contact: Pt @ [phone]  Pt is calling to speak to someone in the office to r/s her appt that she is currently scheduled for; no available appts in Epic. Please call to advise. Thanks.         Patient's DX: Botox         Additional Info:   Pt prefers Friday at 3:00 or 3:30p

## 2024-03-15 ENCOUNTER — TELEPHONE (OUTPATIENT)
Dept: ORTHOPEDICS | Facility: CLINIC | Age: 34
End: 2024-03-15
Payer: COMMERCIAL

## 2024-03-15 ENCOUNTER — TELEPHONE (OUTPATIENT)
Dept: NEUROLOGY | Facility: CLINIC | Age: 34
End: 2024-03-15
Payer: COMMERCIAL

## 2024-03-15 DIAGNOSIS — M79.641 RIGHT HAND PAIN: Primary | ICD-10-CM

## 2024-03-15 NOTE — TELEPHONE ENCOUNTER
Called pt to attempt to reschedule her 3/28 apt per pt request. Pt decided to keep her apt on 3/28 at 3pm. This is a Botox apt.

## 2024-03-18 ENCOUNTER — OFFICE VISIT (OUTPATIENT)
Dept: ORTHOPEDICS | Facility: CLINIC | Age: 34
End: 2024-03-18
Payer: COMMERCIAL

## 2024-03-18 ENCOUNTER — HOSPITAL ENCOUNTER (OUTPATIENT)
Dept: RADIOLOGY | Facility: HOSPITAL | Age: 34
Discharge: HOME | End: 2024-03-18
Attending: ORTHOPAEDIC SURGERY
Payer: COMMERCIAL

## 2024-03-18 VITALS — BODY MASS INDEX: 31.89 KG/M2 | WEIGHT: 198.44 LBS | HEIGHT: 66 IN

## 2024-03-18 DIAGNOSIS — G56.01 CARPAL TUNNEL SYNDROME OF RIGHT WRIST: Primary | ICD-10-CM

## 2024-03-18 DIAGNOSIS — G43.701 CHRONIC MIGRAINE W/O AURA, NOT INTRACTABLE, W STAT MIGR: ICD-10-CM

## 2024-03-18 DIAGNOSIS — M79.641 RIGHT HAND PAIN: ICD-10-CM

## 2024-03-18 PROCEDURE — 20526 THER INJECTION CARP TUNNEL: CPT | Mod: RT,S$GLB,, | Performed by: ORTHOPAEDIC SURGERY

## 2024-03-18 PROCEDURE — 1159F MED LIST DOCD IN RCRD: CPT | Mod: CPTII,S$GLB,, | Performed by: ORTHOPAEDIC SURGERY

## 2024-03-18 PROCEDURE — 99203 OFFICE O/P NEW LOW 30 MIN: CPT | Mod: 25,S$GLB,, | Performed by: ORTHOPAEDIC SURGERY

## 2024-03-18 PROCEDURE — 73130 X-RAY EXAM OF HAND: CPT | Mod: TC,PN,RT

## 2024-03-18 PROCEDURE — 73130 X-RAY EXAM OF HAND: CPT | Mod: 26,RT,, | Performed by: RADIOLOGY

## 2024-03-18 PROCEDURE — 3008F BODY MASS INDEX DOCD: CPT | Mod: CPTII,S$GLB,, | Performed by: ORTHOPAEDIC SURGERY

## 2024-03-18 PROCEDURE — 99999 PR PBB SHADOW E&M-EST. PATIENT-LVL III: CPT | Mod: PBBFAC,,, | Performed by: ORTHOPAEDIC SURGERY

## 2024-03-18 RX ORDER — IBUPROFEN 800 MG/1
800 TABLET ORAL 2 TIMES DAILY WITH MEALS
Qty: 60 TABLET | Refills: 1 | Status: SHIPPED | OUTPATIENT
Start: 2024-03-18

## 2024-03-18 RX ORDER — TRIAMCINOLONE ACETONIDE 40 MG/ML
20 INJECTION, SUSPENSION INTRA-ARTICULAR; INTRAMUSCULAR
Status: COMPLETED | OUTPATIENT
Start: 2024-03-18 | End: 2024-03-18

## 2024-03-18 RX ADMIN — TRIAMCINOLONE ACETONIDE 20 MG: 40 INJECTION, SUSPENSION INTRA-ARTICULAR; INTRAMUSCULAR at 03:03

## 2024-03-18 NOTE — PROGRESS NOTES
Subjective:      Patient ID: Rosalina Saavedra is a 34 y.o. female.    Chief Complaint: Injury of the Right Hand      HPI  Rosalina Saavedra is a  34 y.o. female presenting today for right hand and arm pain including numbness tingling of the fingers.  There was not a history of trauma.  Onset of symptoms began 2-3 weeks ago   No history of trauma   No neck pain reported   .      Review of patient's allergies indicates:   Allergen Reactions    Codeine Itching and Other (See Comments)    Morphine Itching and Other (See Comments)         Current Outpatient Medications   Medication Sig Dispense Refill    ALPRAZolam (XANAX) 0.5 MG tablet Take 1 tablet (0.5 mg total) by mouth 2 (two) times a day. 60 tablet 5    amitriptyline (ELAVIL) 25 MG tablet Take 1 tablet (25 mg total) by mouth every evening. 90 tablet 1    ARIPiprazole (ABILIFY) 5 MG Tab Take 1 tablet (5 mg total) by mouth once daily. 30 tablet 2    busPIRone (BUSPAR) 15 MG tablet Take 1 tablet (15 mg total) by mouth 3 (three) times daily. 270 tablet 1    etodolac (LODINE) 500 MG tablet TAKE 1 TABLET (500 MG TOTAL) BY MOUTH 2 (TWO) TIMES DAILY AS NEEDED (MIGRAINE). 20 tablet 12    promethazine (PHENERGAN) 25 MG tablet Take 1 tablet (25 mg total) by mouth every 6 (six) hours as needed for Nausea. 40 tablet 0    QULIPTA 60 mg Tab TAKE 1 TABLET BY MOUTH EVERY DAY 30 tablet 6    topiramate (TOPAMAX) 50 MG tablet Take 1 tablet (50 mg total) by mouth every evening. 90 tablet 3    venlafaxine (EFFEXOR-XR) 150 MG Cp24 Take 1 capsule (150 mg total) by mouth once daily. Take with 75mg capsule to equal 225mg daily. 30 capsule 11    venlafaxine (EFFEXOR-XR) 75 MG 24 hr capsule Take 1 capsule (75 mg total) by mouth once daily. Take with 150mg capsule to equal 225mg daily. 30 capsule 11    ZOLMitriptan (ZOMIG-ZMT) 5 MG disintegrating tablet TAKE 1 TABLET BY MOUTH AS NEEDED FOR MIGRAINE. 9 tablet 12    ZOLMitriptan (ZOMIG-ZMT) 5 MG disintegrating tablet DISSOLVE 1 TABLET BY MOUTH AS  "NEEDED FOR MIGRAINE. 6 tablet 12    ibuprofen (ADVIL,MOTRIN) 800 MG tablet Take 1 tablet (800 mg total) by mouth 2 (two) times daily with meals. 60 tablet 1     No current facility-administered medications for this visit.       Past Medical History:   Diagnosis Date    Anxiety     Depression     Migraine     Migraine headache        Past Surgical History:   Procedure Laterality Date    KNEE ARTHROSCOPY      TIBIA FRACTURE SURGERY         Review of Systems:  ROS    OBJECTIVE:     PHYSICAL EXAM:  Height: 5' 6" (167.6 cm) Weight: 90 kg (198 lb 6.6 oz)  Vitals:    03/18/24 1516   Weight: 90 kg (198 lb 6.6 oz)   Height: 5' 6" (1.676 m)   PainSc: 10-Worst pain ever     Well developed, well nourished female in no acute distress  Alert and oriented x 3  HEENT- Normal exam  Lungs- Clear to auscultation  Heart- Regular rate and rhythm  Abdomen- Soft nontender  Extremity exam- examination right arm there is some mild tenderness in the right forearm   Positive Tinel sign at the wrist   Range of motion fingers full   strength decreased   No atrophy    RADIOGRAPHS:  AP lateral x-ray right hand show no significant abnormalities  Comments: I have personally reviewed the imaging and I agree with the above radiologist's report.    ASSESSMENT/PLAN:     IMPRESSION:  Possible right carpal tunnel syndrome    PLAN:  I explained the nature of the problem to the patient   It is possible that she is having neuropathy from the upper arm or neck so I have ordered nerve conduction study of the right arm   I recommended a wrist brace for nighttime use   Also recommended injection today   After pause for time-out identified the right wrist injected carpal tunnel with combination Kenalog 20 mg and xylocaine 0.5 cc  Tolerated the procedure well without complication   Follow up after the nerve test is complete  Continue Motrin twice a day with food       - We talked at length about the anatomy and pathophysiology of   Encounter Diagnoses   Name " Primary?    Carpal tunnel syndrome of right wrist Yes    Chronic migraine w/o aura, not intractable, w stat migr            Disclaimer: This note has been generated using voice-recognition software. There may be typographical errors that have been missed during proof-reading.

## 2024-03-20 ENCOUNTER — OFFICE VISIT (OUTPATIENT)
Dept: PSYCHIATRY | Facility: CLINIC | Age: 34
End: 2024-03-20
Payer: COMMERCIAL

## 2024-03-20 DIAGNOSIS — F44.7 FUNCTIONAL NEUROLOGICAL SYMPTOM DISORDER WITH MIXED SYMPTOMS: ICD-10-CM

## 2024-03-20 DIAGNOSIS — G43.001 MIGRAINE WITHOUT AURA AND WITH STATUS MIGRAINOSUS, NOT INTRACTABLE: ICD-10-CM

## 2024-03-20 DIAGNOSIS — F41.1 GAD (GENERALIZED ANXIETY DISORDER): Primary | ICD-10-CM

## 2024-03-20 DIAGNOSIS — G47.00 INSOMNIA, UNSPECIFIED TYPE: ICD-10-CM

## 2024-03-20 DIAGNOSIS — F33.0 MILD EPISODE OF RECURRENT MAJOR DEPRESSIVE DISORDER: ICD-10-CM

## 2024-03-20 DIAGNOSIS — F44.5 PSYCHOGENIC NONEPILEPTIC SEIZURE: ICD-10-CM

## 2024-03-20 DIAGNOSIS — F40.10 SOCIAL ANXIETY DISORDER: ICD-10-CM

## 2024-03-20 PROCEDURE — 1159F MED LIST DOCD IN RCRD: CPT | Mod: CPTII,95,, | Performed by: INTERNAL MEDICINE

## 2024-03-20 PROCEDURE — 99214 OFFICE O/P EST MOD 30 MIN: CPT | Mod: 95,,, | Performed by: INTERNAL MEDICINE

## 2024-03-20 PROCEDURE — 1160F RVW MEDS BY RX/DR IN RCRD: CPT | Mod: CPTII,95,, | Performed by: INTERNAL MEDICINE

## 2024-03-20 NOTE — PROGRESS NOTES
OUTPATIENT PSYCHIATRY RETURN VISIT    ENCOUNTER DATE:  3/27/2024  SITE:  [facility]  LENGTH OF SESSION:  20 minutes    The patient location is:  [place], not in a healthcare facility  Visit type:  audiovisual    Face to Face time with patient:  20 minutes  25 minutes of total time spent on the encounter, which includes face to face time and non-face to face time preparing to see the patient (eg, review of tests), Obtaining and/or reviewing separately obtained history, Documenting clinical information in the electronic or other health record, Independently interpreting results (not separately reported) and communicating results to the patient/family/caregiver, or Care coordination (not separately reported).     Each patient to whom he or she provides medical services by telemedicine is:  (1) informed of the relationship between the physician and patient and the respective role of any other health care provider with respect to management of the patient; and (2) notified that he or she may decline to receive medical services by telemedicine and may withdraw from such care at any time.    CHIEF COMPLAINT:  Insomnia      HISTORY OF PRESENTING ILLNESS:  Rosalina Saavedra is a 34 y.o. female with history of MDD, JESUS, and Social Anxiety Disorder who presents for follow up appointment.      Plan at last appointment on 3/6/2024:  No medication changes today.  Depression improving with initiation of Abilify.   Continue Abilify 5mg daily, Effexor 225mg daily, Buspar 15mg TID, and Xanax 0.5mg BID.  She stopped taking Elavil because she felt it wasn't helping.      Discussed with patient informed consent, risks versus benefits, alternative treatments, side effect profile and the inherent unpredictability of individual responses to these treatments.  The patient expresses understanding of the above and displays the capacity to agree with this current plan.  Continue individual psychotherapy with  "Guerita Stevens, TAE.  Plan for residential treatment this summer.    History as told by patient:  Feels like things are going ok.  Main issue is trouble sleeping.  Feels amped up but also tired like she wants to sleep.  Once she does fall asleep, she sleeps well and gets up and is not tired.  Goes about her day, gets home and feels tired but difficulty falling asleep.  She does not know how long it takes her to fall asleep.  Gets in bed around 9pm if not doing work.  Sometimes stays up to get work done though.  Getting average 5-6 hours of sleep for her.  Way more than she is used to.  Not feeling as tired during the day, its more when she gets home from work.  Says has definitely helped her mood - more "pep in her step."  Feels mood is ok for now - it was so so bad previously.  Has not had seizures recently.      Previous Medication Trials: Ritalin for concentration, Prozac (helpful but stopped helping), Lexapro (?), Wellbutrin (worsened headaches, stopped due to seizures), Lunesta (not helpful even at 3mg), Trazodone (minimally helpful at 150mg qHS),     Medication side effects:  Denies  Medication compliance:  Yes    PSYCHIATRIC REVIEW OF SYSTEMS:  Trouble with sleep:  Yes, trouble falling asleep  Appetite changes:  Denies  Weight changes:  Gain slowly over time  Lack of energy:  Improved  Anhedonia:  Improved  Somatic symptoms:  Seizures, migraines  Libido:  Not discussed  Anxiety/panic:  Yes but improving  Guilty/hopeless:  Denies  Self-injurious behavior/risky behavior:  Denies  Any drugs:  Denies  Alcohol:  Denies     MEDICAL REVIEW OF SYSTEMS:  Complete review of systems performed covering Constitutional, Musculoskeletal, Neurologic.  All systems negative except for that covered in HPI.    PAST PSYCHIATRIC, MEDICAL, AND SOCIAL HISTORY REVIEWED  The patient's past medical, family and social history have been reviewed and updated as appropriate within the electronic medical record - see encounter " notes.    MEDICATIONS:    Current Outpatient Medications:     ALPRAZolam (XANAX) 0.5 MG tablet, Take 1 tablet (0.5mg) by mouth in the morning and 2 tablets (1mg) before bed., Disp: 90 tablet, Rfl: 2    ARIPiprazole (ABILIFY) 5 MG Tab, Take 1 tablet (5 mg total) by mouth once daily., Disp: 30 tablet, Rfl: 2    busPIRone (BUSPAR) 15 MG tablet, Take 1 tablet (15 mg total) by mouth 3 (three) times daily., Disp: 270 tablet, Rfl: 1    etodolac (LODINE) 500 MG tablet, TAKE 1 TABLET (500 MG TOTAL) BY MOUTH 2 (TWO) TIMES DAILY AS NEEDED (MIGRAINE)., Disp: 20 tablet, Rfl: 12    ibuprofen (ADVIL,MOTRIN) 800 MG tablet, Take 1 tablet (800 mg total) by mouth 2 (two) times daily with meals., Disp: 60 tablet, Rfl: 1    promethazine (PHENERGAN) 25 MG tablet, Take 1 tablet (25 mg total) by mouth every 6 (six) hours as needed for Nausea., Disp: 40 tablet, Rfl: 0    QULIPTA 60 mg Tab, TAKE 1 TABLET BY MOUTH EVERY DAY, Disp: 30 tablet, Rfl: 6    topiramate (TOPAMAX) 50 MG tablet, Take 1 tablet (50 mg total) by mouth every evening., Disp: 90 tablet, Rfl: 3    venlafaxine (EFFEXOR-XR) 150 MG Cp24, Take 1 capsule (150 mg total) by mouth once daily. Take with 75mg capsule to equal 225mg daily., Disp: 30 capsule, Rfl: 11    venlafaxine (EFFEXOR-XR) 75 MG 24 hr capsule, Take 1 capsule (75 mg total) by mouth once daily. Take with 150mg capsule to equal 225mg daily., Disp: 30 capsule, Rfl: 11    ZOLMitriptan (ZOMIG-ZMT) 5 MG disintegrating tablet, TAKE 1 TABLET BY MOUTH AS NEEDED FOR MIGRAINE., Disp: 9 tablet, Rfl: 12    ZOLMitriptan (ZOMIG-ZMT) 5 MG disintegrating tablet, DISSOLVE 1 TABLET BY MOUTH AS NEEDED FOR MIGRAINE., Disp: 6 tablet, Rfl: 12    ALLERGIES:  Review of patient's allergies indicates:   Allergen Reactions    Codeine Itching and Other (See Comments)    Morphine Itching and Other (See Comments)       PSYCHIATRIC EXAM:  There were no vitals filed for this visit.      Appearance:  Well groomed, appearing healthy and of stated  "age  Behavior:  Cooperative, pleasant, no psychomotor agitation or retardation  Speech:  Normal rate, rhythm, prosody, and volume  Mood:  "Ok"  Affect:  Euthymic, smiling   Thought Process:  Linear, logical, goal directed  Thought Content:  Negative for suicidal ideation, homicidal ideation, delusions or hallucinations.  Associations:  Intact  Memory:  Grossly Intact  Level of Consciousness/Orientation:  Grossly intact  Fund of Knowledge:  Good  Attention:  Good  Language:  Fluent, able to name abstract and concrete objects  Insight:  Good  Judgment:  Intact  Psychomotor signs:  No involuntary movements of face  Gait:  Unable to assess via virtual visit      RELEVANT LABS/STUDIES:    Lab Results   Component Value Date    WBC 6.86 10/09/2023    HGB 11.6 (L) 10/09/2023    HCT 38.7 10/09/2023    MCV 92 10/09/2023     10/09/2023     BMP  Lab Results   Component Value Date     10/09/2023    K 4.2 10/09/2023     10/09/2023    CO2 21 (L) 10/09/2023    BUN 14 10/09/2023    CREATININE 0.8 10/09/2023    CALCIUM 9.8 10/09/2023    ANIONGAP 11 10/09/2023    ESTGFRAFRICA >60.0 07/28/2022    EGFRNONAA >60.0 07/28/2022     Lab Results   Component Value Date    ALT 18 10/09/2023    AST 14 10/09/2023    ALKPHOS 93 10/09/2023    BILITOT 0.2 10/09/2023     Lab Results   Component Value Date    TSH 0.539 10/09/2023     Lab Results   Component Value Date    HGBA1C 5.8 (H) 10/09/2023       IMPRESSION:    Rosalina Saavedra is a 34 y.o. female with history of MDD, JESUS, and Social Anxiety Disorder who presents for follow up appointment.    Status/Progress:  Based on the examination today, the patient's problem(s) is/are  improving but still inadequately controlled .  New problems have not been presented today.    Risk Parameters:  Patient reports no suicidal ideation  Patient reports no homicidal ideation  Patient reports no self-injurious behavior  Patient reports no violent behavior      DIAGNOSES:    ICD-10-CM ICD-9-CM "   1. JESUS (generalized anxiety disorder)  F41.1 300.02   2. Functional neurological symptom disorder with mixed symptoms  F44.7 300.11   3. Migraine without aura and with status migrainosus, not intractable  G43.001 346.12   4. Psychogenic nonepileptic seizure  F44.5 300.11   5. Mild episode of recurrent major depressive disorder  F33.0 296.31   6. Insomnia, unspecified type  G47.00 780.52   7. Social anxiety disorder  F40.10 300.23       PLAN:  Increase Xanax to 0.5mg in the morning and 1mg in the evening.    Continue Abilify 5mg daily, Effexor 225mg daily, Buspar 15mg TID, and Xanax 0.5mg BID.  Significant improvement in depression with addition of Abilify.  Discussed with patient informed consent, risks versus benefits, alternative treatments, side effect profile and the inherent unpredictability of individual responses to these treatments.  The patient expresses understanding of the above and displays the capacity to agree with this current plan.  Continue individual psychotherapy with Guerita Stevens LCSW.      RETURN TO CLINIC:  Follow up in about 4 weeks (around 4/17/2024).

## 2024-03-27 RX ORDER — VENLAFAXINE HYDROCHLORIDE 75 MG/1
75 CAPSULE, EXTENDED RELEASE ORAL DAILY
Qty: 30 CAPSULE | Refills: 11 | Status: SHIPPED | OUTPATIENT
Start: 2024-03-27 | End: 2025-03-27

## 2024-03-27 RX ORDER — VENLAFAXINE HYDROCHLORIDE 150 MG/1
150 CAPSULE, EXTENDED RELEASE ORAL DAILY
Qty: 30 CAPSULE | Refills: 11 | Status: SHIPPED | OUTPATIENT
Start: 2024-03-27 | End: 2025-03-27

## 2024-03-27 RX ORDER — ALPRAZOLAM 0.5 MG/1
TABLET ORAL
Qty: 90 TABLET | Refills: 2 | Status: SHIPPED | OUTPATIENT
Start: 2024-03-27 | End: 2024-04-08 | Stop reason: SDUPTHER

## 2024-03-28 ENCOUNTER — PROCEDURE VISIT (OUTPATIENT)
Dept: NEUROLOGY | Facility: CLINIC | Age: 34
End: 2024-03-28
Payer: COMMERCIAL

## 2024-03-28 VITALS
SYSTOLIC BLOOD PRESSURE: 122 MMHG | HEART RATE: 98 BPM | DIASTOLIC BLOOD PRESSURE: 85 MMHG | BODY MASS INDEX: 32.65 KG/M2 | HEIGHT: 66 IN | WEIGHT: 203.13 LBS

## 2024-03-28 DIAGNOSIS — G43.701 CHRONIC MIGRAINE W/O AURA, NOT INTRACTABLE, W STAT MIGR: ICD-10-CM

## 2024-03-28 DIAGNOSIS — G43.001 MIGRAINE WITHOUT AURA AND WITH STATUS MIGRAINOSUS, NOT INTRACTABLE: Primary | ICD-10-CM

## 2024-03-28 PROCEDURE — 64615 CHEMODENERV MUSC MIGRAINE: CPT | Mod: S$GLB,,, | Performed by: PSYCHIATRY & NEUROLOGY

## 2024-04-01 DIAGNOSIS — M54.12 CERVICAL RADICULITIS: Primary | ICD-10-CM

## 2024-04-02 ENCOUNTER — OFFICE VISIT (OUTPATIENT)
Dept: NEUROSURGERY | Facility: CLINIC | Age: 34
End: 2024-04-02
Payer: COMMERCIAL

## 2024-04-02 ENCOUNTER — TELEPHONE (OUTPATIENT)
Dept: NEUROLOGY | Facility: CLINIC | Age: 34
End: 2024-04-02
Payer: COMMERCIAL

## 2024-04-02 VITALS
BODY MASS INDEX: 32.68 KG/M2 | TEMPERATURE: 98 F | SYSTOLIC BLOOD PRESSURE: 127 MMHG | DIASTOLIC BLOOD PRESSURE: 90 MMHG | WEIGHT: 202.5 LBS | HEART RATE: 83 BPM

## 2024-04-02 DIAGNOSIS — M54.2 CERVICALGIA: Primary | ICD-10-CM

## 2024-04-02 DIAGNOSIS — M54.12 CERVICAL RADICULITIS: ICD-10-CM

## 2024-04-02 DIAGNOSIS — R29.898 WEAKNESS OF BOTH UPPER EXTREMITIES: ICD-10-CM

## 2024-04-02 DIAGNOSIS — M54.2 NECK PAIN: ICD-10-CM

## 2024-04-02 PROCEDURE — 1160F RVW MEDS BY RX/DR IN RCRD: CPT | Mod: CPTII,S$GLB,, | Performed by: NURSE PRACTITIONER

## 2024-04-02 PROCEDURE — 3080F DIAST BP >= 90 MM HG: CPT | Mod: CPTII,S$GLB,, | Performed by: NURSE PRACTITIONER

## 2024-04-02 PROCEDURE — 99999 PR PBB SHADOW E&M-EST. PATIENT-LVL IV: CPT | Mod: PBBFAC,,, | Performed by: NURSE PRACTITIONER

## 2024-04-02 PROCEDURE — 1159F MED LIST DOCD IN RCRD: CPT | Mod: CPTII,S$GLB,, | Performed by: NURSE PRACTITIONER

## 2024-04-02 PROCEDURE — 99204 OFFICE O/P NEW MOD 45 MIN: CPT | Mod: S$GLB,,, | Performed by: NURSE PRACTITIONER

## 2024-04-02 PROCEDURE — 3074F SYST BP LT 130 MM HG: CPT | Mod: CPTII,S$GLB,, | Performed by: NURSE PRACTITIONER

## 2024-04-02 PROCEDURE — 3008F BODY MASS INDEX DOCD: CPT | Mod: CPTII,S$GLB,, | Performed by: NURSE PRACTITIONER

## 2024-04-02 NOTE — PROGRESS NOTES
Neurosurgery  History & Physical    SUBJECTIVE:     History of Present Illness: Rosalina Saavedra is a 34 y.o. female being seen in clinic as a referral from Dr. Odell to further evaluate concerns for cervical stenosis. States that for the past 3 weeks she has struggled with weakness in the hands R>L, numbness and tingling in the 1st-3rd digits, and shoulder pain that radiated upward into the neck. Denies trauma or inciting event. Rates the pain as a 9/10.  Denies b/b dysfunction, saddle anesthesia, or gait instability.      Review of patient's allergies indicates:   Allergen Reactions    Codeine Itching and Other (See Comments)    Morphine Itching and Other (See Comments)       Current Outpatient Medications   Medication Sig Dispense Refill    ALPRAZolam (XANAX) 0.5 MG tablet Take 1 tablet (0.5mg) by mouth in the morning and 2 tablets (1mg) before bed. 90 tablet 2    ARIPiprazole (ABILIFY) 5 MG Tab Take 1 tablet (5 mg total) by mouth once daily. 30 tablet 2    busPIRone (BUSPAR) 15 MG tablet Take 1 tablet (15 mg total) by mouth 3 (three) times daily. 270 tablet 1    etodolac (LODINE) 500 MG tablet TAKE 1 TABLET (500 MG TOTAL) BY MOUTH 2 (TWO) TIMES DAILY AS NEEDED (MIGRAINE). 20 tablet 12    ibuprofen (ADVIL,MOTRIN) 800 MG tablet Take 1 tablet (800 mg total) by mouth 2 (two) times daily with meals. 60 tablet 1    promethazine (PHENERGAN) 25 MG tablet Take 1 tablet (25 mg total) by mouth every 6 (six) hours as needed for Nausea. 40 tablet 0    QULIPTA 60 mg Tab TAKE 1 TABLET BY MOUTH EVERY DAY 30 tablet 6    topiramate (TOPAMAX) 50 MG tablet Take 1 tablet (50 mg total) by mouth every evening. 90 tablet 3    venlafaxine (EFFEXOR-XR) 150 MG Cp24 Take 1 capsule (150 mg total) by mouth once daily. Take with 75mg capsule to equal 225mg daily. 30 capsule 11    venlafaxine (EFFEXOR-XR) 75 MG 24 hr capsule Take 1 capsule (75 mg total) by mouth once daily. Take with 150mg capsule to equal 225mg daily. 30 capsule 11     ZOLMitriptan (ZOMIG-ZMT) 5 MG disintegrating tablet TAKE 1 TABLET BY MOUTH AS NEEDED FOR MIGRAINE. 9 tablet 12    ZOLMitriptan (ZOMIG-ZMT) 5 MG disintegrating tablet DISSOLVE 1 TABLET BY MOUTH AS NEEDED FOR MIGRAINE. 6 tablet 12     No current facility-administered medications for this visit.       Past Medical History:   Diagnosis Date    Anxiety     Depression     Migraine     Migraine headache      Past Surgical History:   Procedure Laterality Date    KNEE ARTHROSCOPY      TIBIA FRACTURE SURGERY       Family History       Problem Relation (Age of Onset)    Breast cancer Mother (61), Other (71)    Diabetes Mother, Maternal Grandmother    Hypertension Maternal Grandmother          Social History     Socioeconomic History    Marital status: Single   Tobacco Use    Smoking status: Never    Smokeless tobacco: Never   Substance and Sexual Activity    Alcohol use: No    Drug use: No    Sexual activity: Never     Birth control/protection: None   Other Topics Concern    Are you pregnant or think you may be? No    Breast-feeding No   Social History Narrative    Patient was born and raised in [place]. She was raised by her biological parents. She has one older sister. She denies any early trauma. She has been Bachelors degree from [employer] in Psychology. She has her Masters in Secondary Education and English from [employer]. She is currently working on her PhD in curriculum instruction at [employer] in [place]. 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 She is currently single and does not have any children. She is living with her parents in [place]. She denies any legal history. She does not use tobacco or alcohol. She denies any illicit drug use. She denies any rehab or detox history for substance abuse.      Social Determinants of Health     Financial Resource Strain: Medium Risk (12/19/2023)    Overall Financial Resource Strain (CARDIA)     Difficulty of Paying Living Expenses: Somewhat  hard   Food Insecurity: No Food Insecurity (12/19/2023)    Hunger Vital Sign     Worried About Running Out of Food in the Last Year: Never true     Ran Out of Food in the Last Year: Never true   Transportation Needs: No Transportation Needs (12/19/2023)    PRAPARE - Transportation     Lack of Transportation (Medical): No     Lack of Transportation (Non-Medical): No   Physical Activity: Inactive (12/19/2023)    Exercise Vital Sign     Days of Exercise per Week: 0 days     Minutes of Exercise per Session: 0 min   Stress: Stress Concern Present (12/19/2023)    [place] [employer] - Occupational Stress Questionnaire     Feeling of Stress : Very much   Social Connections: Unknown (12/19/2023)    Social Connection and Isolation Panel [NHANES]     Frequency of Communication with Friends and Family: Never     Frequency of Social Gatherings with Friends and Family: Never     Active Member of Clubs or Organizations: No     Attends Club or Organization Meetings: Never     Marital Status: Never    Housing Stability: Low Risk  (12/19/2023)    Housing Stability Vital Sign     Unable to Pay for Housing in the Last Year: No     Number of Places Lived in the Last Year: 0     Unstable Housing in the Last Year: No       Review of Systems    OBJECTIVE:     Vital Signs  Temp: 97.7 °F (36.5 °C)  Pulse: 83  BP: (!) 127/90  Pain Score:   9  Weight: 91.8 kg (202 lb 7.9 oz)  Body mass index is 32.68 kg/m².      Neurosurgery Physical Exam  General: well developed, well nourished, no distress.   Head: normocephalic, atraumatic  Neurologic: Alert and oriented. Thought content appropriate.  GCS: Motor: 6/Verbal: 5/Eyes: 4 GCS Total: 15  Mental Status: Awake, Alert, Oriented x 4  Language: No aphasia  Speech: No dysarthria  Cranial nerves: face symmetric, tongue midline, CN II-XII grossly intact.   Eyes: pupils equal, round, reactive to light with accomodation, EOMI.   Pulmonary: normal respirations, no signs of  respiratory distress  Abdomen: soft, non-distended  Skin: Skin is warm, dry and intact.  Sensory: intact to light touch throughout  Motor Strength:Moves all extremities spontaneously with good tone.    Strength  Deltoids Triceps Biceps Wrist Extension Wrist Flexion Hand    Upper: R 5/5 5/5 5/5 5/5 5/5 5/5    L 5/5 5/5 5/5 5/5 5/5 5/5     HF KE KF DF PF EHL   Lower: R 5/5 5/5 5/5 5/5 5/5 5/5    L 5/5 5/5 5/5 5/5 5/5 5/5     Reflexes:   Asher's: Positive bilaterally     Cerebellar:   Gait stable, fluid.   Tandem Gait: No difficulty  Able to walk on heels & toes     Cervical:   ROM: Pain limited with flexion, extension, lateral rotation and ear-to-shoulder bend.      Diagnostic Results:  There is no new imaging to review for this encounter.      ASSESSMENT/PLAN:   Rosalina Saavedra is a 34 y.o. female seen in clinic as a referral from Dr. Odell to further evaluate concerns for cervical stenosis contributing to her radicular complaints. I have ordered an MRI cervical spine to evaluate for stenosis. Dynamic x-ray for instability. I would like the patient to follow-up in clinic in 2-4 weeks to discuss the image findings. She was encouraged to proceed with the EMG as well.  I have encouraged her to contact the clinic with any questions, concerns, or adverse clinical changes. She verbalized understanding.      ALEXX Gleason-RACHNA  Neurosurgery  [facility].      Note dictated with voice recognition software, please excuse any grammatical errors.

## 2024-04-06 ENCOUNTER — NURSE TRIAGE (OUTPATIENT)
Dept: ADMINISTRATIVE | Facility: CLINIC | Age: 34
End: 2024-04-06
Payer: COMMERCIAL

## 2024-04-06 NOTE — TELEPHONE ENCOUNTER
Reason for Disposition   Caller requesting a CONTROLLED substance prescription refill (e.g., narcotics, ADHD medicines)     Go to ED if unable to wait till office opens.    Additional Information   Negative: New-onset or worsening symptoms, see that guideline (e.g., diarrhea, runny nose, sore throat)   Negative: Medicine question not related to refill or renewal   Negative: Caller (e.g., patient or pharmacist) requesting information about a new medicine   Negative: Caller requesting information unrelated to medicine   Negative: Prescription request for new medicine (not a refill)   Negative: [1] Prescription refill request for ESSENTIAL medicine (i.e., likelihood of harm to patient if not taken) AND [2] triager unable to refill per department policy   Negative: [1] Prescription not at pharmacy AND [2] was prescribed by PCP recently  (Exception: Triager has access to EMR and prescription is recorded there. Go to Home Care and confirm for pharmacy.)   Negative: [1] Pharmacy calling with prescription questions AND [2] triager unable to answer question    Protocols used: Medication Refill and Renewal Call-A-  Pt's mother states Dr. Worley told her to increase her Xanax. States pt is now out and the pharmacy saying it's too soon to give refills. Advised caller the Provider on call does not order, refill, or transfer any controlled drugs.     Mother states pt will have anxiety and disassociation if she does not have the medication. Advised to bring pt to the ED if unable to wait until the office opens or if pt has symptoms. Caller verbalized understanding.

## 2024-04-06 NOTE — NURSING
EMU NURSING INTERVIEW  Pt admitted to EMU room 906, EMU team notified.  Onset - When did your seizures start? 2021/2022  Aura - Do you experience an aura? No  Symptoms - What symptoms do you experience? Vision goes blank, being unresponsive but retains awareness, sometimes screams, full body shaking/limb jerking with head jerking.  Triggers - Do you have any Triggers? Stress   Do you bite your tongue? No  Do become incontinent of bladder or bowels? No  Have you been told your BP or oxygen drops during an event? Unknown    Bed locked, bed alarm on and call light in reach. Educated to call staff before ambulating. Educated to use event button when patient feels like they will have an event or when an event is suspected. Pt and family verbalize understanding.   Lupis Kerr Keanu  66 y.o. female    Chief Complaint   Patient presents with    ALOC     Pt arrives via EMS with GCS of 3. Son found pt altered in room. Per EMS, pt hypertensive >200systolic and GCS 3 on scene. Pt ventilated with BVM en route. Intubated in ER emergently on arrival. Pt remained GCS 3 with unequal pupils.     Initially EMS reported some ST elevation- pt activated as CODE STEMI but cancelled on arrival after EKG    Vitals:    04/05/24 2325   BP: 91/55   Pulse: 61   Resp: (!) 26   SpO2: 99%

## 2024-04-07 ENCOUNTER — PATIENT MESSAGE (OUTPATIENT)
Dept: PSYCHIATRY | Facility: CLINIC | Age: 34
End: 2024-04-07
Payer: COMMERCIAL

## 2024-04-07 DIAGNOSIS — F41.1 GAD (GENERALIZED ANXIETY DISORDER): ICD-10-CM

## 2024-04-07 DIAGNOSIS — F44.5 PSYCHOGENIC NONEPILEPTIC SEIZURE: ICD-10-CM

## 2024-04-07 DIAGNOSIS — F40.10 SOCIAL ANXIETY DISORDER: ICD-10-CM

## 2024-04-07 NOTE — TELEPHONE ENCOUNTER
Updated Xanax prescription of 0.5mg in the morning and 1mg at night was already sent to pharmacy on 3/27/24.  Portal message sent to patient about this today.

## 2024-04-08 RX ORDER — ALPRAZOLAM 0.5 MG/1
TABLET ORAL
Qty: 90 TABLET | Refills: 2 | Status: SHIPPED | OUTPATIENT
Start: 2024-04-08

## 2024-04-09 ENCOUNTER — PATIENT MESSAGE (OUTPATIENT)
Dept: NEUROSURGERY | Facility: CLINIC | Age: 34
End: 2024-04-09
Payer: COMMERCIAL

## 2024-04-10 ENCOUNTER — TELEPHONE (OUTPATIENT)
Dept: NEUROSURGERY | Facility: CLINIC | Age: 34
End: 2024-04-10
Payer: COMMERCIAL

## 2024-04-23 NOTE — PROCEDURES
Follow up:   Reports recently new onset symptoms of fatigue, snoring and fragmented sleep.  Also reports an increase in headaches related to more stress and poor sleep    Prior note:   2 weeks of daily HA related to stress and school     Prior note:   Overall doing good  HA initially improved for a few weeks after the amitriptyline.  Now the frequency has returned to chronic migraine.    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   Still has freq HAs     Headache history:   Age of onset -  5   Location - occipital, temples   Nature of pain -  Stabbing, aching   Prodrome - no   Aura -  No   Duration of headache - > 4 hrs   Time to peak intensity - n/a   Pain scale - range of intensity -  9-10/10  Frequency -  15-20/month    Status Migrainosus history - yes   Time of day of most headaches- anytime      Associated symptoms with the headache:   Meningeal symptoms - photophobia, phonophobia, exercise intolerance +   Nausea/vomitting +   Nasal drainage   Visual blurriness   Pallor/flushing  Dizziness   Vertigo  Confusion  Impaired concentration +   Pain worsened with physical activity  +   Neck pain +      Cluster headache symptoms: none   Symptoms of increased intracranial pressure: none  Basilar migraine symptoms: none      Headache Triggers:  Stress, anxiety, emotional overload   H/o SI      Treatment history:  Resolution of headache with sleep - yes   Meds tried:     Failed:   prozac 40   effexor 150   Gabapentin 300 mg   mobic 15   namenda   nurtec   trokemdi   Diamox   imitrex shots  lamictal   BOTOX#1 6/26/23 - helped    BOTOX#2 9/22/23 - helped   BOTOX#3  12/18/23 - helped     10/21/21  Care Timeline     0000    EKG 12-LEAD   1228    Arrived   1308    Comprehensive metabolic panel        CBC auto differential    1321    POCT glucose   1340    CT Head Without Contrast   1341    POCT urine pregnancy   1400    EKG 12-lead   1422    metoclopramide HCl 10 mg   1424    ketorolac tromethamine 30 mg       diphenhydramine HCl 25 mg   1425    0.9 % sodium chloride 1000 mL   1546    haloperidol lactate 2.5 mg   1553    magnesium sulfate in water 2 g   1826    prochlorperazine edisylate 5 mg   1827    diphenhydramine HCl 25 mg   2027    Discharged         Headache risk factors:   H/o TBI  - concussion at age 16   H/o Meningitis  - no   F/h Aneurysms  - no      Review of Systems   Constitutional: Negative.    HENT: Negative.    Eyes: Negative.    Respiratory: Negative.    Cardiovascular: Negative.    Gastrointestinal: Negative.    Endocrine: Negative.    Genitourinary: Negative.    Musculoskeletal: Negative.    Integumentary:  Negative.   Allergic/Immunologic: Negative.    Neurological: Positive for headaches.   Hematological: Negative.    Psychiatric/Behavioral: Positive for dysphoric mood and sleep disturbance.          Objective:   Physical Exam  Unchanged          Assessment:     Chr Migraine     Impression:   No major branch stenosis/occlusion at the [place].  No aneurysm.   No evidence of venous sinus thrombosis or venous sinus stenosis..     Electronically signed by: Armani Lopez  Date:                                            11/24/2021  Time:                                           16:24     Impression:   No major branch stenosis/occlusion at the [place].  No aneurysm.   No evidence of venous sinus thrombosis or venous sinus stenosis..     Electronically signed by: Armani Lopez  Date:                                            11/24/2021  Time:                                           16:24     Impression:   Normal pre and postcontrast MR imaging  appearance of the brain.     Electronically signed by: Armani Lopez  Date:                                            11/24/2021  Time:                                           16:51  Plan:       VYEPTI 300 mg IV (sept 2022 -)   Cont Qulipta 60 mg daily, discussed side effect   Breakthrough headache - etodolac, zomig   Dexamethasone 4 mg BID x 3 days   Multiple alternative treatment options were offered to the patient  cont with psychiatrist   Cont Elavil   Continue Effexor 225mg daily, Buspar 15mg TID, and Klonopin 0.25-0.5mg daily PRN anxiety.      BOTOX treatment response:   Prior to initiating BOTOX, the patient had  18 migraine days per month on average. This meets criteria for chronic migraine.   After starting BOTOX, the patient experienced a reduction in  7 days per month   After starting BOTOX, the patient experienced a reduction in > 100 hours of migraine symptoms per month   After starting BOTOX, the patient experienced a 50% reduction in burden of migraine days per month   Based on this information, BOTOX is medically necessary for the management of the patient's chronic migraine.     BOTOX Injection intervals:   Patient reports a 'wearing off effect' prior to the subsequent BOTOX injections at 12 weeks. This occurs at week 10  Symptoms of this a 'wearing off effect' include - worsening of migraine headache frequency and intensity   Medications used during the 'wearing off effect' period include   Based on this information, it is medically necessary for the patient to receive BOTOX therapy at an interval of every 10 weeks for the management of chronic migraine.    BOTOX was performed as an indicated therapy for intractable chronic migraine headaches given that the patient failed > 3 prophylactic medications    Botulinum Toxin Injection Procedure   Pre-operative diagnosis: Chronic migraine   Post-operative diagnosis: Same   Procedure: Chemical neurolysis   After risks and benefits were explained  including bleeding, infection, worsening of pain, damage to the areas being injected, weakness of muscles, loss of muscle control, dysphagia if injecting the head or neck, facial droop if injecting the facial area, painful injection, allergic or other reaction to the medications being injected, and the failure of the procedure to help the problem, a signed consent was obtained.   The patient was placed in a comfortable area and the sites to be treated were identified.The area to be treated was prepped three times with alcohol and the alcohol allowed to dry. Next, a 30 gauge needle was used to inject the medication in the area to be treated.   Area(s) injected:   Total Botox use: 165 Units   Botox wastage: 35 Units   Injection sites:    muscle bilaterally ( a total of 10 units divided into 2 sites)   Procerus muscle (5 units)   Frontalis muscle bilaterally (a total of 20 units divided into 4 sites)   Temporalis muscle bilaterally (a total of 40 units divided into 8 sites)   Occipitalis muscle bilaterally (a total of 30 units divided into 6 sites + 10 units)   Cervical paraspinal muscles (a total of 20 units divided into 4 sites)   Trapezius muscle bilaterally (a total of 30 units divided into 6 sites)   Complications: none   RTC for the next Botox injection: 10 weeks   Procedures

## 2024-04-29 ENCOUNTER — HOSPITAL ENCOUNTER (OUTPATIENT)
Dept: RADIOLOGY | Facility: HOSPITAL | Age: 34
Discharge: HOME | End: 2024-04-29
Attending: NURSE PRACTITIONER
Payer: COMMERCIAL

## 2024-04-29 DIAGNOSIS — M54.2 CERVICALGIA: ICD-10-CM

## 2024-04-29 DIAGNOSIS — R29.898 WEAKNESS OF BOTH UPPER EXTREMITIES: ICD-10-CM

## 2024-04-29 PROCEDURE — 72141 MRI NECK SPINE W/O DYE: CPT | Mod: 26,,, | Performed by: RADIOLOGY

## 2024-04-29 PROCEDURE — 72141 MRI NECK SPINE W/O DYE: CPT | Mod: TC

## 2024-04-30 ENCOUNTER — TELEPHONE (OUTPATIENT)
Dept: NEUROSURGERY | Facility: CLINIC | Age: 34
End: 2024-04-30
Payer: COMMERCIAL

## 2024-04-30 NOTE — TELEPHONE ENCOUNTER
Plan was given mri and xray result with next plan of care (pain management) and informed to f/u with Dr. Odell for emg results pt verbalized understanding.

## 2024-05-01 ENCOUNTER — PATIENT MESSAGE (OUTPATIENT)
Dept: PSYCHIATRY | Facility: CLINIC | Age: 34
End: 2024-05-01

## 2024-05-01 DIAGNOSIS — G47.00 INSOMNIA, UNSPECIFIED TYPE: Primary | ICD-10-CM

## 2024-05-07 PROBLEM — M53.82 IMPAIRED RANGE OF MOTION OF CERVICAL SPINE: Status: ACTIVE | Noted: 2021-10-27

## 2024-05-07 PROBLEM — R29.898 IMPAIRED STRENGTH OF UPPER EXTREMITY: Status: ACTIVE | Noted: 2024-05-07

## 2024-05-10 RX ORDER — DOXEPIN HYDROCHLORIDE 10 MG/1
10 CAPSULE ORAL NIGHTLY
Qty: 30 CAPSULE | Refills: 2 | Status: SHIPPED | OUTPATIENT
Start: 2024-05-10 | End: 2025-05-10

## 2024-05-13 ENCOUNTER — TELEPHONE (OUTPATIENT)
Dept: OBSTETRICS AND GYNECOLOGY | Facility: CLINIC | Age: 34
End: 2024-05-13
Payer: COMMERCIAL

## 2024-05-16 ENCOUNTER — OFFICE VISIT (OUTPATIENT)
Dept: PSYCHIATRY | Facility: CLINIC | Age: 34
End: 2024-05-16
Payer: COMMERCIAL

## 2024-05-16 DIAGNOSIS — G47.01 INSOMNIA DUE TO MEDICAL CONDITION: ICD-10-CM

## 2024-05-16 DIAGNOSIS — F33.41 RECURRENT MAJOR DEPRESSIVE DISORDER, IN PARTIAL REMISSION: ICD-10-CM

## 2024-05-16 DIAGNOSIS — F41.1 GAD (GENERALIZED ANXIETY DISORDER): Primary | ICD-10-CM

## 2024-05-16 DIAGNOSIS — F44.7 FUNCTIONAL NEUROLOGICAL SYMPTOM DISORDER WITH MIXED SYMPTOMS: ICD-10-CM

## 2024-05-16 PROCEDURE — 1159F MED LIST DOCD IN RCRD: CPT | Mod: CPTII,95,, | Performed by: INTERNAL MEDICINE

## 2024-05-16 PROCEDURE — 1160F RVW MEDS BY RX/DR IN RCRD: CPT | Mod: CPTII,95,, | Performed by: INTERNAL MEDICINE

## 2024-05-16 PROCEDURE — 99214 OFFICE O/P EST MOD 30 MIN: CPT | Mod: 95,,, | Performed by: INTERNAL MEDICINE

## 2024-05-16 NOTE — PROGRESS NOTES
OUTPATIENT PSYCHIATRY RETURN VISIT    ENCOUNTER DATE:  5/16/2024  SITE:  [facility]  LENGTH OF SESSION:  25 minutes    The patient location is:  [place], not in a healthcare facility  Visit type:  audiovisual, then converted to audio only due to poor connection    Face to Face time with patient:  25 minutes  30 minutes of total time spent on the encounter, which includes face to face time and non-face to face time preparing to see the patient (eg, review of tests), Obtaining and/or reviewing separately obtained history, Documenting clinical information in the electronic or other health record, Independently interpreting results (not separately reported) and communicating results to the patient/family/caregiver, or Care coordination (not separately reported).     Each patient to whom he or she provides medical services by telemedicine is:  (1) informed of the relationship between the physician and patient and the respective role of any other health care provider with respect to management of the patient; and (2) notified that he or she may decline to receive medical services by telemedicine and may withdraw from such care at any time.    CHIEF COMPLAINT:  Anxiety and Stress      HISTORY OF PRESENTING ILLNESS:  Rosalina Saavedra is a 34 y.o. female with history of MDD, JESUS, and Social Anxiety Disorder who presents for follow up appointment.      Plan at last appointment on 3/20/2024:  Increase Xanax to 0.5mg in the morning and 1mg in the evening.    Continue Abilify 5mg daily, Effexor 225mg daily, Buspar 15mg TID, and Xanax 0.5mg BID.  Significant improvement in depression with addition of Abilify.  Discussed with patient informed consent, risks versus benefits, alternative treatments, side effect profile and the inherent unpredictability of individual responses to these treatments.  The patient expresses understanding of the above and displays the capacity to agree with this current  "plan.  Continue individual psychotherapy with Gueritacamelia Stevens LCSW.    *Doxepin 10mg qHS started on 5/10/24.    History as told by patient:  Says she has been "Ok."  Stressors are grandfather's death, work stress, and sister.   Sister hates Daron and that causes them not to be able to have family time.  If he is around, she refuses to be around them.  So they can't see the kids anymore.  They planned a trip to [place] but she doesn't want Daron to come so they may not see her.  Daron and her father are very close.  Relationship with father seems to be getting better.  Talked with Guerita about her dad needs to be needed.  Her dad is really making an effort to help her.  Gets out for summer in 1.5 weeks.  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  Wants her to do some presentations in front of some principals and master teachers with her.  To show everyone how good the materials are.  She hopes this could lead to some sort of promotion.  Sleep is a lot better with Doxepin.  She hasn't been waking up in the night.  She is really happy with that.  Has had anxiety - cousin committed suicide but they just found his body.  This was tough.  Takes Buspar 30mg BID.  Anxiety goes up and down according to stressors.  Depression has leveled out.  Has not had a seizure in a very long time.      Previous Medication Trials: Ritalin for concentration, Prozac (helpful but stopped helping), Lexapro (?), Wellbutrin (worsened headaches, stopped due to seizures), Lunesta (not helpful even at 3mg), Trazodone (minimally helpful at 150mg qHS),     Medication side effects:  Denies  Medication compliance:  Yes    PSYCHIATRIC REVIEW OF SYSTEMS:  Trouble with sleep:  Improved  Appetite changes:  Denies  Weight changes:  Gain slowly over time  Lack of energy:  Improved  Anhedonia:  Improved  Somatic symptoms:  Denies  Libido:  Not discussed  Anxiety/panic:  Yes, chronic  Guilty/hopeless:  " Denies  Self-injurious behavior/risky behavior:  Denies  Any drugs:  Denies  Alcohol:  Denies     MEDICAL REVIEW OF SYSTEMS:  Complete review of systems performed covering Constitutional, Musculoskeletal, Neurologic.  All systems negative except for that covered in HPI.    PAST PSYCHIATRIC, MEDICAL, AND SOCIAL HISTORY REVIEWED  The patient's past medical, family and social history have been reviewed and updated as appropriate within the electronic medical record - see encounter notes.    MEDICATIONS:    Current Outpatient Medications:     ALPRAZolam (XANAX) 0.5 MG tablet, Take 1 tablet (0.5mg) by mouth in the morning and 2 tablets (1mg) before bed., Disp: 90 tablet, Rfl: 2    ARIPiprazole (ABILIFY) 5 MG Tab, Take 1 tablet (5 mg total) by mouth once daily., Disp: 30 tablet, Rfl: 2    busPIRone (BUSPAR) 15 MG tablet, Take 1 tablet (15 mg total) by mouth 3 (three) times daily., Disp: 270 tablet, Rfl: 1    doxepin (SINEQUAN) 10 MG capsule, Take 1 capsule (10 mg total) by mouth every evening., Disp: 30 capsule, Rfl: 2    etodolac (LODINE) 500 MG tablet, TAKE 1 TABLET (500 MG TOTAL) BY MOUTH 2 (TWO) TIMES DAILY AS NEEDED (MIGRAINE)., Disp: 20 tablet, Rfl: 12    ibuprofen (ADVIL,MOTRIN) 800 MG tablet, Take 1 tablet (800 mg total) by mouth 2 (two) times daily with meals., Disp: 60 tablet, Rfl: 1    promethazine (PHENERGAN) 25 MG tablet, Take 1 tablet (25 mg total) by mouth every 6 (six) hours as needed for Nausea., Disp: 40 tablet, Rfl: 0    QULIPTA 60 mg Tab, TAKE 1 TABLET BY MOUTH EVERY DAY, Disp: 30 tablet, Rfl: 6    topiramate (TOPAMAX) 50 MG tablet, Take 1 tablet (50 mg total) by mouth every evening., Disp: 90 tablet, Rfl: 3    venlafaxine (EFFEXOR-XR) 150 MG Cp24, Take 1 capsule (150 mg total) by mouth once daily. Take with 75mg capsule to equal 225mg daily., Disp: 30 capsule, Rfl: 11    venlafaxine (EFFEXOR-XR) 75 MG 24 hr capsule, Take 1 capsule (75 mg total) by mouth once daily. Take with 150mg capsule to equal  "225mg daily., Disp: 30 capsule, Rfl: 11    ZOLMitriptan (ZOMIG-ZMT) 5 MG disintegrating tablet, TAKE 1 TABLET BY MOUTH AS NEEDED FOR MIGRAINE., Disp: 9 tablet, Rfl: 12    ZOLMitriptan (ZOMIG-ZMT) 5 MG disintegrating tablet, DISSOLVE 1 TABLET BY MOUTH AS NEEDED FOR MIGRAINE., Disp: 6 tablet, Rfl: 12    ALLERGIES:  Review of patient's allergies indicates:   Allergen Reactions    Codeine Itching and Other (See Comments)    Morphine Itching and Other (See Comments)       PSYCHIATRIC EXAM:  There were no vitals filed for this visit.  Appearance:  Well groomed, appearing healthy and of stated age  Behavior:  Cooperative, pleasant, no psychomotor agitation or retardation  Speech:  Normal rate, rhythm, prosody, and volume  Mood:  "Ok"  Affect:  Euthymic  Thought Process:  Linear, logical, goal directed  Thought Content:  Negative for suicidal ideation, homicidal ideation, delusions or hallucinations.  Associations:  Intact  Memory:  Grossly Intact  Level of Consciousness/Orientation:  Grossly intact  Fund of Knowledge:  Good  Attention:  Good  Language:  Fluent, able to name abstract and concrete objects  Insight:  Good  Judgment:  Intact  Psychomotor signs:  No involuntary movements of face  Gait:  Unable to assess via virtual visit      RELEVANT LABS/STUDIES:    Lab Results   Component Value Date    WBC 6.86 10/09/2023    HGB 11.6 (L) 10/09/2023    HCT 38.7 10/09/2023    MCV 92 10/09/2023     10/09/2023     BMP  Lab Results   Component Value Date     10/09/2023    K 4.2 10/09/2023     10/09/2023    CO2 21 (L) 10/09/2023    BUN 14 10/09/2023    CREATININE 0.8 10/09/2023    CALCIUM 9.8 10/09/2023    ANIONGAP 11 10/09/2023    ESTGFRAFRICA >60.0 07/28/2022    EGFRNONAA >60.0 07/28/2022     Lab Results   Component Value Date    ALT 18 10/09/2023    AST 14 10/09/2023    ALKPHOS 93 10/09/2023    BILITOT 0.2 10/09/2023     Lab Results   Component Value Date    TSH 0.539 10/09/2023     Lab Results   Component " Value Date    HGBA1C 5.8 (H) 10/09/2023       IMPRESSION:    Rosalina Saavedra is a 34 y.o. female with history of MDD, JESUS, and Social Anxiety Disorder who presents for follow up appointment.    Status/Progress:  Based on the examination today, the patient's problem(s) is/are adequately but not ideally controlled.  New problems have not been presented today.    Risk Parameters:  Patient reports no suicidal ideation  Patient reports no homicidal ideation  Patient reports no self-injurious behavior  Patient reports no violent behavior      DIAGNOSES:    ICD-10-CM ICD-9-CM   1. JESUS (generalized anxiety disorder)  F41.1 300.02   2. Functional neurological symptom disorder with mixed symptoms  F44.7 300.11   3. Recurrent major depressive disorder, in partial remission  F33.41 296.35   4. Insomnia due to medical condition  G47.01 327.01       PLAN:  No medication changes today.  She will continue to work on chronic anxiety in therapy.  Depression has significantly improved on Abilify.    Continue Abilify 5mg daily, Effexor 225mg daily, Buspar 30mg BID, Doxepin 10mg qHS, and Xanax 0.5mg/1mg.   Discussed with patient informed consent, risks versus benefits, alternative treatments, side effect profile and the inherent unpredictability of individual responses to these treatments.  The patient expresses understanding of the above and displays the capacity to agree with this current plan.  Continue individual psychotherapy with Guerita Stevens LCSW.      RETURN TO CLINIC:  Follow up in about 4 weeks (around 6/13/2024).

## 2024-05-18 DIAGNOSIS — F33.1 MODERATE EPISODE OF RECURRENT MAJOR DEPRESSIVE DISORDER: ICD-10-CM

## 2024-05-18 RX ORDER — ARIPIPRAZOLE 5 MG/1
5 TABLET ORAL
Qty: 90 TABLET | Refills: 3 | Status: SHIPPED | OUTPATIENT
Start: 2024-05-18

## 2024-05-27 ENCOUNTER — OFFICE VISIT (OUTPATIENT)
Dept: NEUROLOGY | Facility: CLINIC | Age: 34
End: 2024-05-27
Payer: COMMERCIAL

## 2024-05-27 DIAGNOSIS — G43.E09 CHRONIC MIGRAINE WITH AURA WITHOUT STATUS MIGRAINOSUS, NOT INTRACTABLE: ICD-10-CM

## 2024-05-27 DIAGNOSIS — F41.1 GAD (GENERALIZED ANXIETY DISORDER): ICD-10-CM

## 2024-05-27 DIAGNOSIS — F44.7 FUNCTIONAL NEUROLOGICAL SYMPTOM DISORDER WITH MIXED SYMPTOMS: Primary | ICD-10-CM

## 2024-05-27 PROCEDURE — 99215 OFFICE O/P EST HI 40 MIN: CPT | Mod: 95,,,

## 2024-05-27 RX ORDER — TIZANIDINE 2 MG/1
2 TABLET ORAL NIGHTLY PRN
Qty: 30 TABLET | Refills: 1 | Status: SHIPPED | OUTPATIENT
Start: 2024-05-27 | End: 2024-06-19

## 2024-05-27 RX ORDER — TOPIRAMATE 100 MG/1
100 TABLET, FILM COATED ORAL NIGHTLY
Qty: 90 TABLET | Refills: 3 | Status: SHIPPED | OUTPATIENT
Start: 2024-05-27 | End: 2025-05-27

## 2024-05-27 NOTE — PROGRESS NOTES
[facility]  Epilepsy Clinic Progress Note    [facility] - NEUROLOGY 7TH FL  [facility], [place]    Date: 5/27/24  Patient Name: Rosalina Saavedra   MRN: [id number]   PCP: Madhavi Basilio  Referring Provider: No ref. provider found    The patient location is: Home  The chief complaint leading to consultation is: Epilepsy  Visit type: Virtual visit with synchronous audio and video  Total time spent with patient: 18 minutes  Each patient to whom he or she provides medical services by telemedicine is:  (1) informed of the relationship between the physician and patient and the respective role of any other health care provider with respect to management of the patient; and (2) notified that he or she may decline to receive medical services by telemedicine and may withdraw from such care at any time.    Assessment:   Rosalina Saavedra is a 34 y.o. female presenting in follow up for evaluation of episodes consisting of abnormal movements, decreased responsiveness, and speech difficulty. EMU admission 7/2022 captured typical events w/ no EEG correlate consistent w/ dx of a functional neurologic disorder. No evidence of underlying epilepsy throughout admission -> antiseizure medications subsequently discontinued. No functional episodes since last visit. Encouraged continuing to work w/ psychiatry and therapist. Physical therapy + increase topiramate to 150mg qhs over the course of two weeks for migraines. Trial of tizanidine 2mg qhs PRN for neck muscle spasms. Follow up in epilepsy clinic in 3 months or sooner with issues. Advised to reach out over the portal with any concerns. Patient is comfortable with plan. All questions and concerns are addressed at this time.  Plan:     Problem List Items Addressed This Visit          Neuro    Chronic migraine with aura    Relevant Medications    topiramate (TOPAMAX) 100 MG tablet    tiZANidine (ZANAFLEX) 2 MG tablet       Psychiatric    JESUS  (generalized anxiety disorder)    Functional neurological symptom disorder with mixed symptoms - Primary     I completed education on seizure first aid and safety. Given episodes of decreased awareness, I recommended precautions with regards to avoiding unsupervised water recreational activity or bathing in tubs, climbing or working at heights, operation of heavy or dangerous machinery, caution around fire and sources of high heat, as well as any other activity which could put a patient at danger in case of a seizure.  I also reviewed the [facility] law and recommended that the patient not drive  for 6 months in the event of breakthrough seizures.    As the patient is a female of childbearing age, I have counseled the patient on issues pertaining to pregnancy and epilepsy and recommended folic acid supplementation. I have reviewed and recommended the AAN guideline of folic acid supplementation prior to and during pregnancy.      Jaci Bynum PA-C   Neurology-Epilepsy  [facility]    Collaborating physician, Dr. Jaspal Stevens, was available during today's encounter.     I spent approximately 40 minutes on the day of this encounter preparing to see the patient, obtaining and reviewing history and results, performing a medically appropriate exam, counseling and educating the patient/family/caregiver, documenting clinical information, coordinating care, and ordering medications, tests, procedures, and referrals.    Patient note was created using MModal Dictation.  Any errors in syntax or even information may not have been identified and edited on initial review prior to signing this note.  Subjective:     Interval Events/ROS 5/27/2024:    Current ASM/SEs: topiramate 50mg qhs (headaches); no SE   Breakthrough seizures/events: none  Folic acid: no  Sleep: fair  Mood: working w/ psychiatrist Dr. 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 Gradual weight gain. Neck pain described as burning/aching sensation occurring for the past 2 months, c-spine MRI normal (4/29/2024). Muscles are tight in her neck & shoulders. Otherwise, no fever, no cold symptoms, no changes in vision, no new weakness, no chest pain, no shortness of breath, no nausea, no vomiting, no diarrhea, no constipation, no problems walking.    Recent Labs   Lab 07/28/22  1503   Lamotrigine Lvl 5.8   Oxcarbazepine 3 L       Interval Events/ROS 2/28/2024:    Accompanied by mom who also contributes to the history.     Current ASM/SEs: none  Breakthrough seizures/events: recently had some scary "seizures", very stressed recently, events every day, multiple times per day, freezing spells, uncontrollable laughing episodes, spazzed out contortions, significant event 2 weeks ago that really scared her, doesn't recall everything, vocalizations, cursed out dad, first started w/ random yelling, then generalized convulsions, then freezing/staring/legs locked up, fell to the floor, couldn't move, hard to breath, maintained awareness/aware of everything, felt like 3 separate people were inside of her taking turns talking, dialogue out loud with herself, mom reports pt said things like "nobody likes you because you're fat and ugly" then would change to "I'm smart, I know I'm smart", entire event lasted an hour or more, very exhausted and drained afterward     Folic acid: no  Sleep: chronic insomnia  Mood: chronic anxiety and depression, racing/ruminating thoughts, working w/ psychiatrist Dr. 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 Otherwise, no fever, no cold symptoms, no changes in vision, no new weakness, no chest pain, no shortness of breath, no nausea, no vomiting, no " "diarrhea, no constipation, no tingling/numbness, no problems walking.      Interval Events/ROS 7/12/2022:    Patient presents in follow up for evaluation of daily episodes consisting of abnormal movements, decreased responsiveness, and speech difficulty. Accompanied by mom who also contributes to the history. Last event was 7/10/2022. Episodes persist despite medication regimen of lamotrigine 200mg BID and oxcarbazepine 300mg BID though patient does note frequent missed doses. Episodes may last anywhere from 2-10 minutes. Reports these events began happening 10/2021, then seemed to resolve for some time, but returned 02/2022 and have been occurring nearly daily since then. Patient describes they usually occur upon waking up in the morning, or when transitioning from sleep to wakefulness. Her body feels "stuck", can't get up, can't speak sentences but is able to make noises, vision is "scrambled" then becomes exhausted afterward. Denies tongue bite or UI. Mom shows a video of one event during which the patient is lying in bed, makes occasional grunting noises in response to mom saying patient's name, eyes are closed, and video ends by patient hitting the phone out of mom's hand. Patient states she was able to hear her mom during this episode. Events are often triggered by stress or feeling overheated. Hx of anxiety and depression. 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 Otherwise, no fever, no cold symptoms, no headache, no changes in vision, no new weakness, no chest pain, no shortness of breath, no nausea, no vomiting, no diarrhea, no constipation, no tingling/numbness, no problems walking. Patient also accompanied today by service dog.     HPI:   Ms. Rosalina Saavedra is a 34 y.o. female who presents with a chief complaint of reported seizures.  The patient presents today with her mother who contributes to the history.  Together they report that the patient experienced an abrupt onset of seizure-like " "episodes beginning in January 2022.  The patient endorses 3 event types including episodes of her vision going completely black while remaining aware of her surroundings, episodes of inability to control her body and unresponsiveness while remaining aware, and extremity jerking.  She has been followed by an outside neurologist, Dr. Crain, who has tried her on numerous seizure medications that have not control the episodes.  She has undergone routine and ambulatory EEGs which were normal per patient and did not capture typical events.  Her mother does believe that stress may be contributing to the episodes.      They provided video today of the episodes.  Episodes are characterized by the patient exhibiting robotic type movements and exhibiting her feet stuck to the floor before falling to the ground.  She lies on the ground shaking her arms and legs while crying before standing in a robotic fashion.  During the entire event, the patient makes a variety of cartoon-like noises such as wee oo", "hansel hansel hansel" and "beep boop."    Seizure Type: Suspected PNEE  Seizure Etiology:  Suspected PNEE  Current AEDs: Lamictal 150 mg BID    The patient is accompanied by family who contribute to the history. This patient has 3 types of seizure as described below. The patient reports having seizures for months The patient reports to have worse seizure control. The seizure frequency is variable. The last seizure was yesterday . The patient does not report side effects from seizure medication.     Seizure Type 1:   Seizure Description: everything goes "black in her vision", remains aware  Aura: No warning  Associated Symptoms:  none  Seizure Frequency: Up to 2x a day every 3-4 days  Last seizure: January 2022    Seizure Type 2:   Seizure Description: Screams, shakes all over, can't open mouth, eyes lock closed and can't open them, lasts 5-15 minutes, confused and tired  After, remains aware after  Aura: Burning in back of " neck  Associated Symptoms:  none  Seizure Frequency: Daily, 6-7 times per day  Last seizure: Early Feb 2022    Seizure Type 3:   Seizure Description: Intermittent limb and head jerks  Aura: None  Associated Symptoms: none  Seizure Frequency: Every day  Last seizure: Today    Handedness: right  Seizure Onset Age: 32  Seizure/ Epilepsy Risk Factors: none  Birth/Developmental History: normal birth history and Normal developmental history  Seizure Triggers/ Provoking Features: stress  Previous Seizure Medications: brivaracetam (Briviact, BRV) , lacosamide (Vimpat, LCS), lamotrigine (Lamictal, LTG), oxcarbazepine (Trileptal OXC),, topiramate (Topamax, TPM) and clonazepam (Klonopin, CZP)  Recent Med Changes: None  Other Treatments: None  Prior Episodes of Status: None  Psychiatric/Behavioral Comorbitidies: Anxiety and depression, social anxieyt  Surgical Candidacy: None    Prior Studies:  EEG : At [facility], routine and ambulatory- both normal per patient  vEEG/ EMU evaluation: Not done  MRI of brain: January 2022, Done at [facility]- normal per patient   AED levels:   Not done  CT/CTA Scan:   Not done  PET Scan:  Not done  Neuropsychological Evaluation:  Not done  DEXA Scan:  Not done  Other studies:  Not done    PAST MEDICAL HISTORY:  Past Medical History:   Diagnosis Date    Anxiety     Depression     Migraine     Migraine headache        PAST SURGICAL HISTORY:  Past Surgical History:   Procedure Laterality Date    KNEE ARTHROSCOPY      TIBIA FRACTURE SURGERY         CURRENT MEDS:  Current Outpatient Medications   Medication Sig Dispense Refill    ALPRAZolam (XANAX) 0.5 MG tablet Take 1 tablet (0.5mg) by mouth in the morning and 2 tablets (1mg) before bed. 90 tablet 2    ARIPiprazole (ABILIFY) 5 MG Tab TAKE 1 TABLET BY MOUTH EVERY DAY 90 tablet 3    busPIRone (BUSPAR) 15 MG tablet Take 1 tablet (15 mg total) by mouth 3 (three) times daily. 270 tablet 1    doxepin (SINEQUAN) 10 MG capsule Take 1 capsule (10 mg total) by mouth every  evening. 30 capsule 2    etodolac (LODINE) 500 MG tablet TAKE 1 TABLET (500 MG TOTAL) BY MOUTH 2 (TWO) TIMES DAILY AS NEEDED (MIGRAINE). 20 tablet 12    ibuprofen (ADVIL,MOTRIN) 800 MG tablet Take 1 tablet (800 mg total) by mouth 2 (two) times daily with meals. 60 tablet 1    promethazine (PHENERGAN) 25 MG tablet Take 1 tablet (25 mg total) by mouth every 6 (six) hours as needed for Nausea. 40 tablet 0    QULIPTA 60 mg Tab TAKE 1 TABLET BY MOUTH EVERY DAY 30 tablet 6    topiramate (TOPAMAX) 50 MG tablet Take 1 tablet (50 mg total) by mouth every evening. 90 tablet 3    venlafaxine (EFFEXOR-XR) 150 MG Cp24 Take 1 capsule (150 mg total) by mouth once daily. Take with 75mg capsule to equal 225mg daily. 30 capsule 11    venlafaxine (EFFEXOR-XR) 75 MG 24 hr capsule Take 1 capsule (75 mg total) by mouth once daily. Take with 150mg capsule to equal 225mg daily. 30 capsule 11    ZOLMitriptan (ZOMIG-ZMT) 5 MG disintegrating tablet TAKE 1 TABLET BY MOUTH AS NEEDED FOR MIGRAINE. 9 tablet 12    ZOLMitriptan (ZOMIG-ZMT) 5 MG disintegrating tablet DISSOLVE 1 TABLET BY MOUTH AS NEEDED FOR MIGRAINE. 6 tablet 12     No current facility-administered medications for this visit.       ALLERGIES:  Review of patient's allergies indicates:   Allergen Reactions    Codeine Itching and Other (See Comments)    Morphine Itching and Other (See Comments)       FAMILY HISTORY:  Family History   Problem Relation Name Age of Onset    Diabetes Mother      Breast cancer Mother  61    Diabetes Maternal Grandmother      Hypertension Maternal Grandmother      Breast cancer Other  71        moms paternal grand mother       SOCIAL HISTORY:  Social History     Tobacco Use    Smoking status: Never    Smokeless tobacco: Never   Substance Use Topics    Alcohol use: No    Drug use: No       Review of Systems:  12 system review of systems is negative except for the symptoms mentioned in HPI.     Objective:     There were no vitals filed for this  visit.    General: NAD, well nourished   Eyes: no tearing, discharge, no erythema   ENT: moist mucous membranes of the oral cavity, nares patent    Neck: muscle spasms   Cardiovascular: appears well perfused  Lungs: Normal work of breathing, normal chest wall excursions  Skin: No rash, lesions, or breakdown on exposed skin  Psychiatry: Mood and affect are appropriate   Abdomen: non distended  Extremeties: Not examined     Neurological   MENTAL STATUS: Alert and oriented to person, place, and time. Attention and concentration within normal limits. Speech without dysarthria, able to name and repeat without difficulty. Recent and remote memory within normal limits   CRANIAL NERVES: EOMI. Face symmetrical. Hearing grossly intact. Full shoulder shrug bilaterally. Tongue protrudes midline   SENSORY: no subjective deficits   MOTOR: free range of motion of extremities antigravity x4, gross motor movements smooth  CEREBELLAR/COORDINATION/GAIT: remains seated

## 2024-05-27 NOTE — PATIENT INSTRUCTIONS
Here is the plan we discussed today:  - increase topiramate to 100mg nightly for 2 weeks  - if tolerating after 2 weeks, increase to 150mg nightly   - you can try taking tizanidine (muscle relaxer) 1-2 tablets nightly as needed for muscle spasms/neck pain, if this medication does not work for you then you do not need to take it     Remember, with a functional neurologic disorder, the nervous system has difficulty interpreting external stimuli and can easily become overstimulated.  This often occurs in bright, loud, or emotionally charged situations.  Usually patients have insight that an episode may be about to happen.  When you feel an event coming on, please go to a dark, quiet place with no other people to let your system reset.  Some people only need 5 minutes to get back to baseline while others may need several hours or some sleep.  There is a very good book that discusses this diagnosis in detail:  The Body Keeps the Score by Josefina Wang.  Although not perfect, it may provide some insight into what is going on.  I would love to hear what you think about what the author has to say. Please take a look and let me know your thoughts (good or bad, agreeing or disagreeing) over the portal.  There is also a website that discusses this condition in detail: https://fndhope.org/.     Per [place] law, no episodes of loss of consciousness for 6 months before driving.  Avoid dangerous situations.  For example, no baths/pools alone, no heights, no power tools.  Wear a bike helmet.  If breakthrough seizures occur that are different in character, frequency, or duration from normal episodes, please patient portal me or call the office and we will decide the next steps. If multiple seizures occur in a row without return back to baseline, 911 needs to be called.     Return to clinic in 3 months or sooner with issues.  Please patient portal with any questions or concerns.    Jaci Bynum PA-C    Neurology-Epilepsy  [facility]

## 2024-05-29 ENCOUNTER — TELEPHONE (OUTPATIENT)
Dept: NEUROSURGERY | Facility: CLINIC | Age: 34
End: 2024-05-29
Payer: COMMERCIAL

## 2024-05-29 NOTE — TELEPHONE ENCOUNTER
Pt was advised that plan of care was to completed emg ,pain management ,and to f/u with Dr. Odell pt verbalized pt was also advised pt appointment was cancelled .

## 2024-05-30 ENCOUNTER — OFFICE VISIT (OUTPATIENT)
Dept: SLEEP MEDICINE | Facility: CLINIC | Age: 34
End: 2024-05-30
Attending: PSYCHIATRY & NEUROLOGY
Payer: COMMERCIAL

## 2024-05-30 DIAGNOSIS — R29.818 SUSPECTED SLEEP APNEA: ICD-10-CM

## 2024-05-30 DIAGNOSIS — G47.30 SLEEP APNEA, UNSPECIFIED TYPE: Primary | ICD-10-CM

## 2024-05-30 PROCEDURE — 99204 OFFICE O/P NEW MOD 45 MIN: CPT | Mod: 95,,, | Performed by: PSYCHIATRY & NEUROLOGY

## 2024-05-30 NOTE — PROGRESS NOTES
The patient location is: home  The chief complaint leading to consultation is: sleep disorder  Visit type: audiovisual  Each patient to whom he or she provides medical services by telemedicine is:  (1) informed of the relationship between the physician and patient and the respective role of any other health care provider with respect to management of the patient; and (2) notified that he or she may decline to receive medical services by telemedicine and may withdraw from such care at any time.  31 minutes of total time spent on the encounter, which includes face to face time and non-face to face time preparing to see the patient (eg, review of tests), Obtaining and/or reviewing separately obtained history, Documenting clinical information in the electronic or other health record, Independently interpreting results (not separately reported) and communicating results to the patient/family/caregiver, or Care coordination (not separately reported).     + difficulty with falling and staying asleep and sleep quality.      Rosalina Saavedra is a 34 y.o. female is here to be evaluated for a sleep disorder; referred by Madhavi Basilio MD.    The patient reports snoring, interrupted sleep, non refreshing sleep, excessive daytime sleepiness, and excessive daytime fatigue since several months ago.  Recent weight gain (60); thinks it's due to change in her medications. Abilify has been listed on her chart.    Rosalina Saavedra denied  gasping for air, choking , and witnessed apneas.    The patient does not feel rested upon awakening. Takes naps.     The patient  reports morning headaches (also suffers from chronic headache) and reports occasional  dry mouth on awakening.   Rosalina Saavedra denies  nasal congestion.    The patient  reports difficulty with falling and staying asleep. Recently started on Doxepin 10 mg - losing effect.    Rosalina Saavedra  reports occasional symptoms concerning for parasomnia except for occasional  somniloquy.  The patient  denies auxiliary symptoms of narcolepsy including sleep onset paralysis, hypnagogic hallucinations, sleep attacks and cataplexy.    Rosalina Saavedra reports occasional symptoms concerning for RLS; nocturnal leg movements have not been noticed.   The patient does not experience sleep related leg cramps.         Medications pertinent to sleep  disorders taken currently: Doxepin 10 mg - recently started ; Buspar, Abilify  Previous  medications taken  for sleep disorders:  -    Sleep studies  no          5/30/2024     8:35 AM   EPWORTH SLEEPINESS SCALE TOTAL SCORE    Total score 14             5/30/2024     8:35 AM   EPWORTH SLEEPINESS SCALE   Sitting and reading 1   Watching TV 3   Sitting, inactive in a public place (e.g. a theatre or a meeting) 2   As a passenger in a car for an hour without a break 3   Lying down to rest in the afternoon when circumstances permit 3   Sitting and talking to someone 1   Sitting quietly after a lunch without alcohol 1   In a car, while stopped for a few minutes in traffic 0   Total score 14           5/10/2024   PHQ-9 Depression Patient Health Questionnaire   Over the last two weeks how often have you been bothered by little interest or pleasure in doing things 1   Over the last two weeks how often have you been bothered by feeling down, depressed or hopeless 1   Over the last two weeks how often have you been bothered by trouble falling or staying asleep, or sleeping too much 1   Over the last two weeks how often have you been bothered by feeling tired or having little energy 1   Over the last two weeks how often have you been bothered by a poor appetite or overeating 1   Over the last two weeks how often have you been bothered by feeling bad about yourself - or that you are a failure or have let yourself or your family down 1   Over the last two weeks how often have you been bothered by trouble concentrating on things, such as reading the newspaper or  "watching television 1   Over the last two weeks how often have you been bothered by moving or speaking so slowly that other people could have noticed. 1   Over the last two weeks how often have you been bothered by thoughts that you would be better off dead, or of hurting yourself 1   If you checked off any problems, how difficult have these problems made it for you to do your work, take care of things at home or get along with other people? Somewhat difficult   PHQ-9 Score 9          7/12/2021    12:20 AM 6/25/2021    11:33 AM 6/14/2021     3:03 PM   GAD7   1. Feeling nervous, anxious, or on edge?      2. Not being able to stop or control worrying?      3. Worrying too much about different things?      4. Trouble relaxing?      5. Being so restless that it is hard to sit still?      6. Becoming easily annoyed or irritable?      7. Feeling afraid as if something awful might happen?      8. If you checked off any problems, how difficult have these problems made it for you to do your work, take care of things at home, or get along with other people?      JESUS-7 Score          Information is confidential and restricted. Go to Review Flowsheets to unlock data.           5/30/2024     8:35 AM   EPWORTH SLEEPINESS SCALE   Sitting and reading 1   Watching TV 3   Sitting, inactive in a public place (e.g. a theatre or a meeting) 2   As a passenger in a car for an hour without a break 3   Lying down to rest in the afternoon when circumstances permit 3   Sitting and talking to someone 1   Sitting quietly after a lunch without alcohol 1   In a car, while stopped for a few minutes in traffic 0   Total score 14         5/30/2024     8:40 AM   Sleep Clinic New Patient   What time do you go to bed on a week day? (Give a range) 8:30pm-1200am   What time do you go to bed on a day off? (Give a range) 9"00PM-12:00AM   How long does it take you to fall asleep? (Give a range) 1-3hours   On average, how many times per night do you wake up? " 2-3 times   How long does it take you to fall back into sleep? (Give a range) 30 minutes 2hours   What time do you wake up to start your day on a week day? (Give a range) 5:00am   What time do you wake up to start your day on a day off? (Give a range) 8:00am   What time do you get out of bed? (Give a range) 8:00am   On average, how many hours do you sleep? 4-6   On average, how many naps do you take per day? 1   Rate your sleep quality from 0 to 5 (0-poor, 5-great). 2   Have you experienced:  Weight gain?   How much weight have you lost or gained (in lbs.) in the last year? 60   On average, how many times per night do you go to the bathroom?  1   Have you ever had a sleep study/CPAP machine/surgery for sleep apnea? No   Have you ever had a CPAP machine for sleep apnea? No   Have you ever had surgery for sleep apnea? No           5/30/2024     8:40 AM   Sleep Clinic ROS    Difficulty breathing through the nose?  Yes   Sore throat or dry mouth in the morning? Yes   Irregular or very fast heart beat?  Yes   Shortness of breath?  No   Acid reflux? Yes   Body aches and pains?  Yes   Morning headaches? Yes   Dizziness? No   Mood changes?  Sometimes   Do you exercise?  Sometimes   Do you feel like moving your legs a lot?  Sometimes       DME:         PAST MEDICAL HISTORY:    Active Ambulatory Problems     Diagnosis Date Noted    Chronic migraine with aura 09/03/2013    Menstrual cramps 09/03/2013    JESUS (generalized anxiety disorder) 05/02/2018    Social anxiety disorder 05/02/2018    Impaired mobility and activities of daily living 01/27/2021    Fatigue 04/19/2021    Medication overuse headache 10/15/2021    Impaired range of motion of cervical spine 10/27/2021    Poor posture 10/27/2021    Weakness of both upper extremities 10/27/2021    Migraine without aura and with status migrainosus, not intractable 10/28/2021    Recurrent major depressive disorder, in partial remission 11/13/2021    Psychogenic nonepileptic seizure  01/14/2022    Functional neurological symptom disorder with mixed symptoms 03/20/2022    Insomnia 04/08/2022    Chronic migraine w/o aura, not intractable, w stat migr 04/11/2022    Prediabetes 10/10/2023    Moderate mixed hyperlipidemia not requiring statin therapy 10/10/2023    Class 1 obesity due to excess calories with serious comorbidity and body mass index (BMI) of 33.0 to 33.9 in adult 02/09/2024    Seizures 02/28/2024    Carpal tunnel syndrome of right wrist 03/18/2024    Impaired strength of upper extremity 05/07/2024     Resolved Ambulatory Problems     Diagnosis Date Noted    Recurrent major depressive disorder 05/02/2018    Moderate episode of recurrent major depressive disorder 12/22/2020    Hypersomnia 02/01/2021    ERRONEOUS ENCOUNTER--DISREGARD 10/12/2021     Past Medical History:   Diagnosis Date    Anxiety     Depression     Migraine     Migraine headache                 PAST SURGICAL HISTORY:    Past Surgical History:   Procedure Laterality Date    KNEE ARTHROSCOPY      TIBIA FRACTURE SURGERY           FAMILY HISTORY:                Family History   Problem Relation Name Age of Onset    Diabetes Mother      Breast cancer Mother  61    Diabetes Maternal Grandmother      Hypertension Maternal Grandmother      Breast cancer Other  71        moms paternal grand mother       SOCIAL HISTORY:          Tobacco:   Social History     Tobacco Use   Smoking Status Never   Smokeless Tobacco Never       alcohol use:    Social History     Substance and Sexual Activity   Alcohol Use No                   ALLERGIES:    Review of patient's allergies indicates:   Allergen Reactions    Codeine Itching and Other (See Comments)    Morphine Itching and Other (See Comments)       CURRENT MEDICATIONS:    Current Outpatient Medications   Medication Sig Dispense Refill    ALPRAZolam (XANAX) 0.5 MG tablet Take 1 tablet (0.5mg) by mouth in the morning and 2 tablets (1mg) before bed. 90 tablet 2    ARIPiprazole (ABILIFY) 5 MG  Tab TAKE 1 TABLET BY MOUTH EVERY DAY 90 tablet 3    busPIRone (BUSPAR) 15 MG tablet Take 1 tablet (15 mg total) by mouth 3 (three) times daily. 270 tablet 1    doxepin (SINEQUAN) 10 MG capsule Take 1 capsule (10 mg total) by mouth every evening. 30 capsule 2    etodolac (LODINE) 500 MG tablet TAKE 1 TABLET (500 MG TOTAL) BY MOUTH 2 (TWO) TIMES DAILY AS NEEDED (MIGRAINE). 20 tablet 12    ibuprofen (ADVIL,MOTRIN) 800 MG tablet Take 1 tablet (800 mg total) by mouth 2 (two) times daily with meals. 60 tablet 1    promethazine (PHENERGAN) 25 MG tablet Take 1 tablet (25 mg total) by mouth every 6 (six) hours as needed for Nausea. 40 tablet 0    QULIPTA 60 mg Tab TAKE 1 TABLET BY MOUTH EVERY DAY 30 tablet 6    tiZANidine (ZANAFLEX) 2 MG tablet Take 1 tablet (2 mg total) by mouth nightly as needed (muscle spasms/neck pain). 30 tablet 1    topiramate (TOPAMAX) 100 MG tablet Take 1 tablet (100 mg total) by mouth every evening. (Take 1 tablet nightly for 2 weeks. In 14 days, if tolerating, take with 50mg tablet to equal 150mg nightly.) 90 tablet 3    topiramate (TOPAMAX) 50 MG tablet Take 1 tablet (50 mg total) by mouth every evening. 90 tablet 3    venlafaxine (EFFEXOR-XR) 150 MG Cp24 Take 1 capsule (150 mg total) by mouth once daily. Take with 75mg capsule to equal 225mg daily. 30 capsule 11    venlafaxine (EFFEXOR-XR) 75 MG 24 hr capsule Take 1 capsule (75 mg total) by mouth once daily. Take with 150mg capsule to equal 225mg daily. 30 capsule 11    ZOLMitriptan (ZOMIG-ZMT) 5 MG disintegrating tablet TAKE 1 TABLET BY MOUTH AS NEEDED FOR MIGRAINE. 9 tablet 12    ZOLMitriptan (ZOMIG-ZMT) 5 MG disintegrating tablet DISSOLVE 1 TABLET BY MOUTH AS NEEDED FOR MIGRAINE. 6 tablet 12     No current facility-administered medications for this visit.                      PHYSICAL EXAM:  [facility] 04/10/2024 (Approximate)   GENERAL:overwight;  well groomed.            ASSESSMENT:    Insomnia NEC. Multi-factorial -  sleep disordered breathing  likely plays a role. Doxepin 10 mg - helped initially - losing effect.  2. Sleep Apnea NEC. The patient symptomatically has  snoring, interrupted sleep, non refreshing sleep, excessive daytime sleepiness, and excessive daytime fatigue  with exam findings of crowded airway and elevated BMI (status post 60 pounds weight gain). The patient has medical co-morbidities of depression  which can be worsened by KARIE. This warrants further investigation for possible obstructive sleep apnea.              PLAN:    Diagnostic: Home Sleep Study. The nature of this procedure and its indication was discussed with the patient. We will notify the patient about sleep study resuts via My Chart.  Sleep apnea options have been pre-discussed due to high change of positive  sleep disordered breathing status.   Sleep hygiene was discussed in detail + brochure was provided.        During our discussion today, we talked about the etiology of  KARIE as well as the potential ramifications of untreated sleep apnea, which could include daytime sleepiness, hypertension, heart disease and/or stroke.  We discussed potential treatment options, which could include weight loss, body positioning, continuous positive airway pressure (CPAP), or referral for surgical consideration. Meanwhile, she  is urged to avoid supine sleep, weight gain and alcoholic beverages since all of these can worsen KARIE.     The patient was given open opportunity to ask questions and/or express concerns about treatment plan. Two point patient identifier confirmed.       Precautions: The patient was advised to abstain from driving should he feel sleepy or drowsy.    Follow up: MD after the sleep study has been completed.     ESS ([place] Sleepiness Scale) and other sleep medicine related questionnaires were reviewed with the patient.      46-minute visit. >50% spent counseling patient and coordination of care.  The patient was  cautioned against drowsy driving.

## 2024-05-30 NOTE — PATIENT INSTRUCTIONS
SLEEP LAB ([facility] or [facility]) will contact you to schedulethe sleep study. Their number is [phone] (ext 2). Please call them if you do not hear from them in 2 weeks from now.  The [facility] is located on 7th floor of the [facility]; [facility] is located in [facility] ( 3rd floor [facility]).    SLEEP CLINIC (my assistant) will call you when the sleep study results are ready or I will message you through the portal with the results as we have discussed - if you have not heard from us by 2 weeks from the date of the study, or you can use [facility] to contact me.    Our clinic phone number is [phone] (ext 1)       You are advised to abstain from driving should you feel sleepy or drowsy.  ___________________________          Sleep Hygiene Practices     1. Try going to bed only when you are drowsy.  ?   2. If you are unable to fall asleep or stay asleep, leave your bedroom and engage in a quiet activity elsewhere. Do not permit yourself to fall asleep outside the bedroom. Return to bed when and only when you are sleepy. Repeat this process as often as necessary throughout night.   3. Maintain regular wake-up time, even on days off work & weekends   4. Use your bedroom for sleep and sex   5. Do not watch TV in bed  6. Avoid napping during the daytime. If daytime sleepiness becomes overwhelming, limit nap time to a single nap of less than 1hr, no later than 3pm.   7. Distract your mind. Avoid clock watching. Lying in bed unable to sleep and frustrated needs to be avoided. Try reading or watching a videotape or listening to books on tape. It may be necessary to go into another room to do these.   8. Avoid caffeine within 4-6hrs of bedtime   9. Avoid use of nicotine close to bedtime   10. do not drink alcoholic beverages within 4-6hrs of bedtime   11. While a light snack before bedtime can help promote sound sleep, avoid large meals.   12. Obtain regular exercise, but  avoid strenuous exercise within 4hrs of bedtime   13. Minimize light, noise, and extremes in temperature in the bedroom.   14. Precautions: The patient was advised to abstain from driving should they feel sleepy or drowsy.

## 2024-06-04 ENCOUNTER — TELEPHONE (OUTPATIENT)
Dept: SLEEP MEDICINE | Facility: OTHER | Age: 34
End: 2024-06-04
Payer: COMMERCIAL

## 2024-06-06 ENCOUNTER — TELEPHONE (OUTPATIENT)
Dept: NEUROLOGY | Facility: CLINIC | Age: 34
End: 2024-06-06
Payer: COMMERCIAL

## 2024-06-06 NOTE — PROGRESS NOTES
[facility] - New Patient Evaluation    Referred by: Aaareferral Self   Reason for referral: Occipital neuralgia, unspecified laterality     CC:   Chief Complaint   Patient presents with    Neck Pain    Shoulder Pain         6/7/2024     8:25 AM   Last 3 PDI Scores   Pain Disability Index (PDI) 49       Subjective 06/07/2024:   Rosalina Saavedra is a 34 y.o. female who presents complaining of neck and shoulder pain  with associated headache.  Headaches are bilateral, but worse on the left.  Pain will radiate from the neck to the occipital area and then anteriorly over the scalp.  She notes tightness and pain in the cervical muscles.  She is currently in physical therapy.   She was recently admitted for seizure-like episodes, EEG captured typical events w/ no EEG correlate. She was diagnosed with psychogenic nonepileptic seizures and functional neurologic symptom disorder with mixed symptoms.  She was a follow up with Neurology next month.   Recent MRI of the cervical spine showed no stenosis, foraminal narrowing, disc disease or  other degenerative changes.    Initial Pain Assessment:  Location:  Bilateral headache and neck and shoulder   Onset: 2.5 months  Current Pain Score: 7/10  Daily Pain of Range: 8-9/10  Quality: Aching, Dull, Burning, Throbbing, Tight, Tingling, Deep, Numb, and Hot  Radiation: down to arm and finger tips  Worsened by: exercise, extension, flexion, lifting, and daily activity  Improved by: nothing, massage, medications, physical therapy, and rest     Patient denies night fever/night sweats, urinary incontinence, bowel incontinence, significant weight loss, significant motor weakness, and loss of sensations.      Previous Interventions:  - None     Previous Therapies:  PT/OT: yes - Currently enrolled  Chiropractor:   HEP:  yes  Relevant Surgery: no     Previous Medications:   - Tylenol or NSAIDS: Ibuprofen  - Muscle Relaxants:    - TCAs:   - SNRIs: Effexor  - Topicals:    - Anticonvulsants: Topamax   - Opioids:   - Adjuvants:     Current Pain Medications:   Ibuprofen    Topamax    Review of Systems:  ROS    GENERAL:  No weight loss, malaise or fevers.  HEENT:   No recent changes in vision or hearing  NECK:  No difficulty with swallowing. No stridor.   RESPIRATORY:  Negative for cough, wheezing or shortness of breath, patient denies any recent URI.  CARDIOVASCULAR:  Negative for chest pain, leg swelling or palpitations.  GI:  Negative for abdominal discomfort, blood in stools or black stools or change in bowel habits.  MUSCULOSKELETAL:  See HPI.  SKIN:  Negative for lesions, rash, and itching.  PSYCH:  No mood disorder or recent psychosocial stressors.    HEMATOLOGY/LYMPHOLOGY:  Negative for prolonged bleeding, bruising easily or swollen nodes.  Patient is not currently taking any anti-coagulants  NEURO:   No history of headaches, syncope, paralysis, seizures or tremors.  All other reviewed and negative other than HPI.    History:  Current medications, allergies, medical history, surgical history,   family history, and social history were reviewed in the chart as marked.    Full Medication List:    Current Outpatient Medications:     ALPRAZolam (XANAX) 0.5 MG tablet, Take 1 tablet (0.5mg) by mouth in the morning and 2 tablets (1mg) before bed., Disp: 90 tablet, Rfl: 2    ARIPiprazole (ABILIFY) 5 MG Tab, TAKE 1 TABLET BY MOUTH EVERY DAY, Disp: 90 tablet, Rfl: 3    busPIRone (BUSPAR) 15 MG tablet, Take 1 tablet (15 mg total) by mouth 3 (three) times daily., Disp: 270 tablet, Rfl: 1    doxepin (SINEQUAN) 10 MG capsule, Take 1 capsule (10 mg total) by mouth every evening., Disp: 30 capsule, Rfl: 2    etodolac (LODINE) 500 MG tablet, TAKE 1 TABLET (500 MG TOTAL) BY MOUTH 2 (TWO) TIMES DAILY AS NEEDED (MIGRAINE)., Disp: 20 tablet, Rfl: 12    ibuprofen (ADVIL,MOTRIN) 800 MG tablet, Take 1 tablet (800 mg total) by mouth 2 (two) times daily with meals., Disp: 60 tablet, Rfl: 1     promethazine (PHENERGAN) 25 MG tablet, Take 1 tablet (25 mg total) by mouth every 6 (six) hours as needed for Nausea., Disp: 40 tablet, Rfl: 0    QULIPTA 60 mg Tab, TAKE 1 TABLET BY MOUTH EVERY DAY, Disp: 30 tablet, Rfl: 6    tiZANidine (ZANAFLEX) 2 MG tablet, Take 1 tablet (2 mg total) by mouth nightly as needed (muscle spasms/neck pain)., Disp: 30 tablet, Rfl: 1    topiramate (TOPAMAX) 100 MG tablet, Take 1 tablet (100 mg total) by mouth every evening. (Take 1 tablet nightly for 2 weeks. In 14 days, if tolerating, take with 50mg tablet to equal 150mg nightly.), Disp: 90 tablet, Rfl: 3    topiramate (TOPAMAX) 50 MG tablet, Take 1 tablet (50 mg total) by mouth every evening., Disp: 90 tablet, Rfl: 3    venlafaxine (EFFEXOR-XR) 150 MG Cp24, Take 1 capsule (150 mg total) by mouth once daily. Take with 75mg capsule to equal 225mg daily., Disp: 30 capsule, Rfl: 11    venlafaxine (EFFEXOR-XR) 75 MG 24 hr capsule, Take 1 capsule (75 mg total) by mouth once daily. Take with 150mg capsule to equal 225mg daily., Disp: 30 capsule, Rfl: 11    ZOLMitriptan (ZOMIG-ZMT) 5 MG disintegrating tablet, TAKE 1 TABLET BY MOUTH AS NEEDED FOR MIGRAINE., Disp: 9 tablet, Rfl: 12    ZOLMitriptan (ZOMIG-ZMT) 5 MG disintegrating tablet, DISSOLVE 1 TABLET BY MOUTH AS NEEDED FOR MIGRAINE., Disp: 6 tablet, Rfl: 12     Allergies:  Codeine and Morphine     Medical History:   has a past medical history of Anxiety, Depression, Migraine, and Migraine headache.    Surgical History:   has a past surgical history that includes Knee arthroscopy and Tibia fracture surgery.    Family History:  family history includes Breast cancer (age of onset: 61) in her mother; Breast cancer (age of onset: 71) in an other family member; Diabetes in her maternal grandmother and mother; Hypertension in her maternal grandmother.    Social History:   reports that she has never smoked. She has never used smokeless tobacco. She reports that she does not drink alcohol and does  "not use drugs.    Physical Exam:  Vitals:    24 0815   BP: 110/80   Pulse: 95   Weight: 90.2 kg (198 lb 15.4 oz)   Height: 5' 6" (1.676 m)   PainSc:   7   PainLoc: Neck       GENERAL: Well appearing, in no acute distress, alert and oriented x3.  PSYCH:  Mood and affect appropriate.  SKIN: Skin color, texture, turgor normal, no rashes or lesions.  HEAD/FACE:  Normocephalic, atraumatic. Cranial nerves grossly intact.  NECK: Normal ROM. Supple. + pain with percussion over the greater occipital nerve bilaterally.  Tenderness noted over the bilateral cervical paraspinous muscles and bilateral trapezius muscles. Spurling Negative. No pain with cervical facet loading.   CV: RRR with palpation of the radial artery.  PULM: No evidence of respiratory difficulty, symmetric chest rise.  GI:  Soft and non-distended.  MSK:    Median nerve Tinel's test negative bilaterally. No obvious deformities, edema, or skin discoloration.  No atrophy or tone abnormalities are noted.   NEURO: Bilateral upper and lower extremity coordination and strength is symmetric.  No loss of sensation is noted.  MENTAL STATUS: A x O x 3, good concentration, speech is fluent and goal directed  MOTOR: 5/5 in  bilateral upper extremity muscle groups  GAIT: Normal. Ambulates unassisted.    Imagin24  MRI Cervical Spine Without Contrast  Order: [id number]  Status: Final result       Visible to patient: Yes (seen)       Next appt: 2024 at 08:00 AM in Pain Medicine (Nayely Calixto DO)       Dx: Cervicalgia; Weakness of both upper e...    0 Result Notes  Details    Reading Physician Reading Date Result Priority   Sophia Duncan MD  [phone] 2024 Routine     Narrative & Impression  EXAMINATION:  MRI CERVICAL SPINE WITHOUT CONTRAST     CLINICAL HISTORY:  Neck pain, chronic;.  Cervicalgia     TECHNIQUE:  Multiplanar, multisequence MR images of the cervical spine were acquired without the administration of contrast.   "   COMPARISON:  None.     FINDINGS:  The marrow demonstrates homogeneous signal.  Vertebral body heights are maintained.     Disc spaces are fairly well maintained.     AP alignment is anatomic.     Multilevel degenerative changes detailed below:     C2-3: No focal disc abnormality.  No significant canal or neural foraminal narrowing. .     C3-4: No focal disc abnormality.  No significant canal or neural foraminal narrowing.     C4-5: No focal disc abnormality.  No significant canal or neural foraminal narrowing.     C5-6: No focal disc abnormality.  No significant canal or neural foraminal narrowing.     C6-7: No focal disc abnormality.  No significant canal or neural foraminal narrowing.     C7-T1: No focal disc abnormality.  No significant canal or neural foraminal narrowing.     No significant prevertebral soft tissue edema.     Cervical spinal cord is normal in caliber and signal.     Impression:     Unremarkable cervical spine MRI.  Central canal and neural foramina are maintained at all cervical levels.        Electronically signed by:Sophia Duncan  Date:                                            04/30/2024  Time:                                           08:21           Exam Ended: 04/29/24 16:50 CDT Last Resulted: 04/30/24 08:21 CDT           Imaging 04/29/24:  XR Cervical Spine AP Lateral w/ Flex Ext and Swimmers Flex Ext Without Odontoid  Order: [id number]  Status: Final result       Visible to patient: Yes (seen)       Next appt: 06/07/2024 at 08:00 AM in Pain Medicine (Nayely Calixto DO)       Dx: Cervicalgia; Weakness of both upper e...    0 Result Notes  Details    Reading Physician Reading Date Result Priority   Monica Brown MD  [phone] 4/29/2024 Routine     Narrative & Impression  EXAMINATION:  XR CERVICAL SPINE AP LATERAL W/ FLEX EXT AND SWIMMERS FLEX EXT WITHOU     CLINICAL HISTORY:  Cervicalgia     TECHNIQUE:  Five radiographs obtained     COMPARISON:  January 2021      FINDINGS:  There is satisfactory alignment of vertebral bodies.  The disc spaces are maintained.  No significant osteophyte formation along vertebral endplates.  There is no instability with flexion or extension.     Impression:     No detrimental change or finding to correlate with the clinical history        Electronically signed by:Monica Brown MD  Date:                                            04/29/2024  Time:                                           15:55           Exam Ended: 04/29/24 15:23 CDT Last Resulted: 04/29/24 15:55 CDT               Labs:  BMP  Lab Results   Component Value Date     10/09/2023    K 4.2 10/09/2023     10/09/2023    CO2 21 (L) 10/09/2023    BUN 14 10/09/2023    CREATININE 0.8 10/09/2023    CALCIUM 9.8 10/09/2023    ANIONGAP 11 10/09/2023    EGFRNORACEVR >60.0 10/09/2023     Lab Results   Component Value Date    ALT 18 10/09/2023    AST 14 10/09/2023    ALKPHOS 93 10/09/2023    BILITOT 0.2 10/09/2023     Lab Results   Component Value Date    WBC 6.86 10/09/2023    HGB 11.6 (L) 10/09/2023    HCT 38.7 10/09/2023    MCV 92 10/09/2023     10/09/2023           Assessment:  Problem List Items Addressed This Visit    None  Visit Diagnoses       Occipital neuralgia, unspecified laterality    -  Primary    Myofascial neck pain        Cervicalgia                06/07/2024 - Rosalina Saavedra is a 34 y.o. female who  has a past medical history of Anxiety, Depression, Migraine, and Migraine headache.  By history and examination this patient has  occipital neuralgia and   Cervical myofascial pain.  Cervical MRI was reviewed and was unremarkable. We discussed the underlying diagnoses and multiple treatment options including non-opioid medications, interventional procedures, physical therapy, and home exercise.   Patient was currently in physical therapy.  She has a follow up with Neurology regarding her migraines and other headache symptoms.  I will schedule the patient for  trigger point injections and bilateral occipital nerve blocks next week.  The risks and benefits of each treatment option were discussed and all questions were answered.      Treatment Plan:   Procedures:  I will schedule the patient for trigger point injections and bilateral occipital nerve blocks next week.  PT/OT/HEP: I have stressed the importance of physical activity and a home exercise plan to help with pain and improve health.   Continue with PT as tolerated.  Encouraged patient to continue with exercises at home as well.  Medications:    -   Continue with Topamax per Epilepsy/Neurology   -  Reviewed and consistent with medication use as prescribed.  Imaging:  MRI cervical spine was reviewed.  No additional imaging needed at this time.  Follow Up: RTC next week for injections.    Nayely Calixto,    Interventional Pain Medicine / Anesthesiology    Disclaimer: This note was partly generated using dictation software which may occasionally result in transcription errors.

## 2024-06-07 ENCOUNTER — OFFICE VISIT (OUTPATIENT)
Dept: PAIN MEDICINE | Facility: CLINIC | Age: 34
End: 2024-06-07
Payer: COMMERCIAL

## 2024-06-07 VITALS
HEIGHT: 66 IN | BODY MASS INDEX: 31.97 KG/M2 | HEART RATE: 95 BPM | SYSTOLIC BLOOD PRESSURE: 110 MMHG | WEIGHT: 198.94 LBS | DIASTOLIC BLOOD PRESSURE: 80 MMHG

## 2024-06-07 DIAGNOSIS — M54.2 MYOFASCIAL NECK PAIN: ICD-10-CM

## 2024-06-07 DIAGNOSIS — M54.2 CERVICALGIA: ICD-10-CM

## 2024-06-07 DIAGNOSIS — M54.81 OCCIPITAL NEURALGIA, UNSPECIFIED LATERALITY: Primary | ICD-10-CM

## 2024-06-07 PROCEDURE — 3008F BODY MASS INDEX DOCD: CPT | Mod: CPTII,S$GLB,, | Performed by: STUDENT IN AN ORGANIZED HEALTH CARE EDUCATION/TRAINING PROGRAM

## 2024-06-07 PROCEDURE — 3079F DIAST BP 80-89 MM HG: CPT | Mod: CPTII,S$GLB,, | Performed by: STUDENT IN AN ORGANIZED HEALTH CARE EDUCATION/TRAINING PROGRAM

## 2024-06-07 PROCEDURE — 1159F MED LIST DOCD IN RCRD: CPT | Mod: CPTII,S$GLB,, | Performed by: STUDENT IN AN ORGANIZED HEALTH CARE EDUCATION/TRAINING PROGRAM

## 2024-06-07 PROCEDURE — 99999 PR PBB SHADOW E&M-EST. PATIENT-LVL III: CPT | Mod: PBBFAC,,, | Performed by: STUDENT IN AN ORGANIZED HEALTH CARE EDUCATION/TRAINING PROGRAM

## 2024-06-07 PROCEDURE — 99204 OFFICE O/P NEW MOD 45 MIN: CPT | Mod: S$GLB,,, | Performed by: STUDENT IN AN ORGANIZED HEALTH CARE EDUCATION/TRAINING PROGRAM

## 2024-06-07 PROCEDURE — 3074F SYST BP LT 130 MM HG: CPT | Mod: CPTII,S$GLB,, | Performed by: STUDENT IN AN ORGANIZED HEALTH CARE EDUCATION/TRAINING PROGRAM

## 2024-06-13 ENCOUNTER — PROCEDURE VISIT (OUTPATIENT)
Dept: PAIN MEDICINE | Facility: CLINIC | Age: 34
End: 2024-06-13
Payer: COMMERCIAL

## 2024-06-13 VITALS
WEIGHT: 194.44 LBS | SYSTOLIC BLOOD PRESSURE: 117 MMHG | HEART RATE: 99 BPM | BODY MASS INDEX: 31.25 KG/M2 | HEIGHT: 66 IN | DIASTOLIC BLOOD PRESSURE: 84 MMHG

## 2024-06-13 DIAGNOSIS — M54.2 CERVICALGIA: ICD-10-CM

## 2024-06-13 DIAGNOSIS — M54.81 OCCIPITAL NEURALGIA, UNSPECIFIED LATERALITY: Primary | ICD-10-CM

## 2024-06-13 DIAGNOSIS — M54.2 MYOFASCIAL NECK PAIN: ICD-10-CM

## 2024-06-13 PROCEDURE — 20553 NJX 1/MLT TRIGGER POINTS 3/>: CPT | Mod: 59,S$GLB,, | Performed by: STUDENT IN AN ORGANIZED HEALTH CARE EDUCATION/TRAINING PROGRAM

## 2024-06-13 PROCEDURE — 64405 NJX AA&/STRD GR OCPL NRV: CPT | Mod: 50,S$GLB,, | Performed by: STUDENT IN AN ORGANIZED HEALTH CARE EDUCATION/TRAINING PROGRAM

## 2024-06-13 PROCEDURE — 99214 OFFICE O/P EST MOD 30 MIN: CPT | Mod: 25,S$GLB,, | Performed by: STUDENT IN AN ORGANIZED HEALTH CARE EDUCATION/TRAINING PROGRAM

## 2024-06-13 RX ORDER — TRIAMCINOLONE ACETONIDE 40 MG/ML
40 INJECTION, SUSPENSION INTRA-ARTICULAR; INTRAMUSCULAR
Status: COMPLETED | OUTPATIENT
Start: 2024-06-13 | End: 2024-06-13

## 2024-06-13 RX ADMIN — TRIAMCINOLONE ACETONIDE 20 MG: 40 INJECTION, SUSPENSION INTRA-ARTICULAR; INTRAMUSCULAR at 02:06

## 2024-06-13 NOTE — PROCEDURES
Trigger Point Injection    Performed by: Nayely Calixto DO  Authorized by: Nayely Calixto DO    Cervical Paraspinal:  Bilateral  Rhomboid:  Bilateral  Trapezus:  Bilateral    Consent Done?:  Yes (Written)    Pre-Procedure:   Indications:  Pain and pain relief  Site marked: the procedure site was marked     Timeout: prior to procedure the correct patient, procedure, and site was verified    Prep: patient was prepped and draped in usual sterile fashion     Local anesthesia used?: Yes    Anesthesia:  Local infiltration  Local anesthetic:  Bupivacaine 0.25% without epinephrine  Anesthetic total (ml):  9    Medications: 20 mg triamcinolone acetonide (KENALOG-40) injection 40 mg/mL

## 2024-06-13 NOTE — PROGRESS NOTES
[facility] - New Patient Evaluation    Referred by: No ref. provider found   Reason for referral: Occipital neuralgia, unspecified laterality     CC:   Chief Complaint   Patient presents with    Neck Pain         6/7/2024     8:25 AM   Last 3 PDI Scores   Pain Disability Index (PDI) 49     Interval update 06/13/24:  Patient presents presents today for follow up of her neck pain as well as her headache pain.  Today she was scheduled for bilateral occipital nerve blocks as well as trigger point injections.   She was currently in physical therapy but they do not offer dry needling.  Her mother does have a TENS unit at home which she will try.  Today she rates her pain 8/10.      Subjective 06/07/2024:   Rosalina Saavedra is a 34 y.o. female who presents complaining of neck and shoulder pain  with associated headache.  Headaches are bilateral, but worse on the left.  Pain will radiate from the neck to the occipital area and then anteriorly over the scalp.  She notes tightness and pain in the cervical muscles.  She is currently in physical therapy.   She was recently admitted for seizure-like episodes, EEG captured typical events w/ no EEG correlate. She was diagnosed with psychogenic nonepileptic seizures and functional neurologic symptom disorder with mixed symptoms.  She was a follow up with Neurology next month.   Recent MRI of the cervical spine showed no stenosis, foraminal narrowing, disc disease or  other degenerative changes.    Initial Pain Assessment:  Location:  Bilateral headache and neck and shoulder   Onset: 2.5 months  Current Pain Score: 7/10  Daily Pain of Range: 8-9/10  Quality: Aching, Dull, Burning, Throbbing, Tight, Tingling, Deep, Numb, and Hot  Radiation: down to arm and finger tips  Worsened by: exercise, extension, flexion, lifting, and daily activity  Improved by: nothing, massage, medications, physical therapy, and rest     Patient denies night fever/night sweats,  urinary incontinence, bowel incontinence, significant weight loss, significant motor weakness, and loss of sensations.      Previous Interventions:  - None     Previous Therapies:  PT/OT: yes - Currently enrolled  Chiropractor:   HEP:  yes  Relevant Surgery: no     Previous Medications:   - Tylenol or NSAIDS: Ibuprofen  - Muscle Relaxants:    - TCAs:   - SNRIs: Effexor  - Topicals:   - Anticonvulsants: Topamax   - Opioids:   - Adjuvants:     Current Pain Medications:   Ibuprofen    Topamax    Review of Systems:  ROS    GENERAL:  No weight loss, malaise or fevers.  HEENT:   No recent changes in vision or hearing  NECK:  No difficulty with swallowing. No stridor.   RESPIRATORY:  Negative for cough, wheezing or shortness of breath, patient denies any recent URI.  CARDIOVASCULAR:  Negative for chest pain, leg swelling or palpitations.  GI:  Negative for abdominal discomfort, blood in stools or black stools or change in bowel habits.  MUSCULOSKELETAL:  See HPI.  SKIN:  Negative for lesions, rash, and itching.  PSYCH:  No mood disorder or recent psychosocial stressors.    HEMATOLOGY/LYMPHOLOGY:  Negative for prolonged bleeding, bruising easily or swollen nodes.  Patient is not currently taking any anti-coagulants  NEURO:   No history of headaches, syncope, paralysis, seizures or tremors.  All other reviewed and negative other than HPI.    History:  Current medications, allergies, medical history, surgical history,   family history, and social history were reviewed in the chart as marked.    Full Medication List:    Current Outpatient Medications:     ALPRAZolam (XANAX) 0.5 MG tablet, Take 1 tablet (0.5mg) by mouth in the morning and 2 tablets (1mg) before bed., Disp: 90 tablet, Rfl: 2    ARIPiprazole (ABILIFY) 5 MG Tab, TAKE 1 TABLET BY MOUTH EVERY DAY, Disp: 90 tablet, Rfl: 3    busPIRone (BUSPAR) 15 MG tablet, Take 1 tablet (15 mg total) by mouth 3 (three) times daily., Disp: 270 tablet, Rfl: 1    doxepin (SINEQUAN) 10  MG capsule, Take 1 capsule (10 mg total) by mouth every evening., Disp: 30 capsule, Rfl: 2    etodolac (LODINE) 500 MG tablet, TAKE 1 TABLET (500 MG TOTAL) BY MOUTH 2 (TWO) TIMES DAILY AS NEEDED (MIGRAINE)., Disp: 20 tablet, Rfl: 12    ibuprofen (ADVIL,MOTRIN) 800 MG tablet, Take 1 tablet (800 mg total) by mouth 2 (two) times daily with meals., Disp: 60 tablet, Rfl: 1    promethazine (PHENERGAN) 25 MG tablet, Take 1 tablet (25 mg total) by mouth every 6 (six) hours as needed for Nausea., Disp: 40 tablet, Rfl: 0    QULIPTA 60 mg Tab, TAKE 1 TABLET BY MOUTH EVERY DAY, Disp: 30 tablet, Rfl: 6    tiZANidine (ZANAFLEX) 2 MG tablet, Take 1 tablet (2 mg total) by mouth nightly as needed (muscle spasms/neck pain)., Disp: 30 tablet, Rfl: 1    topiramate (TOPAMAX) 100 MG tablet, Take 1 tablet (100 mg total) by mouth every evening. (Take 1 tablet nightly for 2 weeks. In 14 days, if tolerating, take with 50mg tablet to equal 150mg nightly.), Disp: 90 tablet, Rfl: 3    topiramate (TOPAMAX) 50 MG tablet, Take 1 tablet (50 mg total) by mouth every evening., Disp: 90 tablet, Rfl: 3    venlafaxine (EFFEXOR-XR) 150 MG Cp24, Take 1 capsule (150 mg total) by mouth once daily. Take with 75mg capsule to equal 225mg daily., Disp: 30 capsule, Rfl: 11    venlafaxine (EFFEXOR-XR) 75 MG 24 hr capsule, Take 1 capsule (75 mg total) by mouth once daily. Take with 150mg capsule to equal 225mg daily., Disp: 30 capsule, Rfl: 11    ZOLMitriptan (ZOMIG-ZMT) 5 MG disintegrating tablet, TAKE 1 TABLET BY MOUTH AS NEEDED FOR MIGRAINE., Disp: 9 tablet, Rfl: 12    ZOLMitriptan (ZOMIG-ZMT) 5 MG disintegrating tablet, DISSOLVE 1 TABLET BY MOUTH AS NEEDED FOR MIGRAINE., Disp: 6 tablet, Rfl: 12    Current Facility-Administered Medications:     triamcinolone acetonide injection 40 mg, 40 mg, Intramuscular, 1 time in Clinic/HOD,      Allergies:  Codeine and Morphine     Medical History:   has a past medical history of Anxiety, Depression, Migraine, and Migraine  "headache.    Surgical History:   has a past surgical history that includes Knee arthroscopy and Tibia fracture surgery.    Family History:  family history includes Breast cancer (age of onset: 61) in her mother; Breast cancer (age of onset: 71) in an other family member; Diabetes in her maternal grandmother and mother; Hypertension in her maternal grandmother.    Social History:   reports that she has never smoked. She has never used smokeless tobacco. She reports that she does not drink alcohol and does not use drugs.    Physical Exam:  Vitals:    24 1424   BP: 117/84   Pulse: 99   Weight: 88.2 kg (194 lb 7.1 oz)   Height: 5' 6" (1.676 m)   PainSc:   8   PainLoc: Neck       GENERAL: Well appearing, in no acute distress, alert and oriented x3.  PSYCH:  Mood and affect appropriate.  SKIN: Skin color, texture, turgor normal, no rashes or lesions.  HEAD/FACE:  Normocephalic, atraumatic. Cranial nerves grossly intact.  NECK: Normal ROM. Supple. + pain with percussion over the greater occipital nerve bilaterally.  Tenderness noted over the bilateral cervical paraspinous muscles and bilateral trapezius muscles. Spurling Negative. No pain with cervical facet loading.   CV: RRR with palpation of the radial artery.  PULM: No evidence of respiratory difficulty, symmetric chest rise.  GI:  Soft and non-distended.  MSK:    Median nerve Tinel's test negative bilaterally. No obvious deformities, edema, or skin discoloration.  No atrophy or tone abnormalities are noted.   NEURO: Bilateral upper and lower extremity coordination and strength is symmetric.  No loss of sensation is noted.  MENTAL STATUS: A x O x 3, good concentration, speech is fluent and goal directed  MOTOR: 5/5 in  bilateral upper extremity muscle groups  GAIT: Normal. Ambulates unassisted.    Imagin24  MRI Cervical Spine Without Contrast  Order: [id number]  Status: Final result       Visible to patient: Yes (seen)       Next appt: 2024 at " 08:00 AM in Pain Medicine (Nayely Calixto, )       Dx: Cervicalgia; Weakness of both upper e...    0 Result Notes  Details    Reading Physician Reading Date Result Sophia Kelsey MD  [phone] 4/30/2024 Routine     Narrative & Impression  EXAMINATION:  MRI CERVICAL SPINE WITHOUT CONTRAST     CLINICAL HISTORY:  Neck pain, chronic;.  Cervicalgia     TECHNIQUE:  Multiplanar, multisequence MR images of the cervical spine were acquired without the administration of contrast.     COMPARISON:  None.     FINDINGS:  The marrow demonstrates homogeneous signal.  Vertebral body heights are maintained.     Disc spaces are fairly well maintained.     AP alignment is anatomic.     Multilevel degenerative changes detailed below:     C2-3: No focal disc abnormality.  No significant canal or neural foraminal narrowing. .     C3-4: No focal disc abnormality.  No significant canal or neural foraminal narrowing.     C4-5: No focal disc abnormality.  No significant canal or neural foraminal narrowing.     C5-6: No focal disc abnormality.  No significant canal or neural foraminal narrowing.     C6-7: No focal disc abnormality.  No significant canal or neural foraminal narrowing.     C7-T1: No focal disc abnormality.  No significant canal or neural foraminal narrowing.     No significant prevertebral soft tissue edema.     Cervical spinal cord is normal in caliber and signal.     Impression:     Unremarkable cervical spine MRI.  Central canal and neural foramina are maintained at all cervical levels.        Electronically signed by:Sophia Duncan  Date:                                            04/30/2024  Time:                                           08:21           Exam Ended: 04/29/24 16:50 CDT Last Resulted: 04/30/24 08:21 CDT           Imaging 04/29/24:  XR Cervical Spine AP Lateral w/ Flex Ext and Swimmers Flex Ext Without Odontoid  Order: [id number]  Status: Final result       Visible to patient: Yes  (seen)       Next appt: 06/07/2024 at 08:00 AM in Pain Medicine (Nayely Calixto, )       Dx: Cervicalgia; Weakness of both upper e...    0 Result Notes  Details    Reading Physician Reading Date Result Priority   Monica Brown MD  [phone] 4/29/2024 Routine     Narrative & Impression  EXAMINATION:  XR CERVICAL SPINE AP LATERAL W/ FLEX EXT AND SWIMMERS FLEX EXT WITHOU     CLINICAL HISTORY:  Cervicalgia     TECHNIQUE:  Five radiographs obtained     COMPARISON:  January 2021     FINDINGS:  There is satisfactory alignment of vertebral bodies.  The disc spaces are maintained.  No significant osteophyte formation along vertebral endplates.  There is no instability with flexion or extension.     Impression:     No detrimental change or finding to correlate with the clinical history        Electronically signed by:Monica Brown MD  Date:                                            04/29/2024  Time:                                           15:55           Exam Ended: 04/29/24 15:23 CDT Last Resulted: 04/29/24 15:55 CDT               Labs:  BMP  Lab Results   Component Value Date     10/09/2023    K 4.2 10/09/2023     10/09/2023    CO2 21 (L) 10/09/2023    BUN 14 10/09/2023    CREATININE 0.8 10/09/2023    CALCIUM 9.8 10/09/2023    ANIONGAP 11 10/09/2023    EGFRNORACEVR >60.0 10/09/2023     Lab Results   Component Value Date    ALT 18 10/09/2023    AST 14 10/09/2023    ALKPHOS 93 10/09/2023    BILITOT 0.2 10/09/2023     Lab Results   Component Value Date    WBC 6.86 10/09/2023    HGB 11.6 (L) 10/09/2023    HCT 38.7 10/09/2023    MCV 92 10/09/2023     10/09/2023           Assessment:  Problem List Items Addressed This Visit    None  Visit Diagnoses       Occipital neuralgia, unspecified laterality    -  Primary    Relevant Orders    Ambulatory referral/consult to Physical/Occupational Therapy    Myofascial neck pain        Relevant Orders    Ambulatory referral/consult to  Physical/Occupational Therapy    Cervicalgia        Relevant Orders    Ambulatory referral/consult to Physical/Occupational Therapy              06/07/2024 - Rosalina Saavedra is a 34 y.o. female who  has a past medical history of Anxiety, Depression, Migraine, and Migraine headache.  By history and examination this patient has  occipital neuralgia and   Cervical myofascial pain.  Cervical MRI was reviewed and was unremarkable. We discussed the underlying diagnoses and multiple treatment options including non-opioid medications, interventional procedures, physical therapy, and home exercise.   Patient was currently in physical therapy.  She has a follow up with Neurology regarding her migraines and other headache symptoms.  I will schedule the patient for trigger point injections and bilateral occipital nerve blocks next week.  The risks and benefits of each treatment option were discussed and all questions were answered.      06/13/2024 - Rosalina Saavedra presents today for follow up of her headache, neck and upper shoulder pain.  I will perform trigger point injections as well as bilateral occipital nerve blocks today in clinic.  I am recommending dry needling with physical therapy and trial of a 10s unit at home.    Treatment Plan:   Procedures:  Itrigger point injections and bilateral occipital nerve blocks performed today in clinic.  See procedure notes for additional details.  PT/OT/HEP: I have stressed the importance of physical activity and a home exercise plan to help with pain and improve health.   Continue with physical therapy and home exercise.  Additional referral placed to physical therapy for dry needling.  Recommend trial of TENS unit.  Medications:    -   Continue with Topamax per Epilepsy/Neurology   -  Reviewed and consistent with medication use as prescribed.  Imaging:  MRI cervical spine was reviewed.  No additional imaging needed at this time.  Follow Up:  Postprocedure Virtual visit 2  joe Calixto, DO   Interventional Pain Medicine / Anesthesiology    Disclaimer: This note was partly generated using dictation software which may occasionally result in transcription errors.

## 2024-06-13 NOTE — PROCEDURES
"Nerve Block    Date/Time: 6/13/2024 2:20 PM    Performed by: Nayely Calixto DO  Authorized by: Nayely Calixto DO  Time out: Immediately prior to procedure a "time out" was called to verify the correct patient, procedure, equipment, support staff and site/side marked as required.  Indications: pain relief  Body area: head  Nerve: greater occipital  Laterality: Bilateral.    Patient sedated: no  : Triamcinolone 20 mg.  Preparation: Patient was prepped and draped in the usual sterile fashion.  Patient position: sitting  Needle size: 25 G  Location technique: anatomical landmarks  Local Anesthetic: bupivacaine 0.25% without epinephrine  Anesthetic total: 3 mL  Outcome: pain improved  Patient tolerance: Patient tolerated the procedure well with no immediate complications        "

## 2024-06-14 ENCOUNTER — HOSPITAL ENCOUNTER (OUTPATIENT)
Dept: SLEEP MEDICINE | Facility: HOSPITAL | Age: 34
Discharge: HOME | End: 2024-06-14
Attending: PSYCHIATRY & NEUROLOGY
Payer: COMMERCIAL

## 2024-06-14 DIAGNOSIS — G47.30 SLEEP APNEA, UNSPECIFIED TYPE: ICD-10-CM

## 2024-06-14 PROCEDURE — 95800 SLP STDY UNATTENDED: CPT

## 2024-06-18 ENCOUNTER — TELEPHONE (OUTPATIENT)
Dept: NEUROLOGY | Facility: CLINIC | Age: 34
End: 2024-06-18
Payer: COMMERCIAL

## 2024-06-18 DIAGNOSIS — G43.E09 CHRONIC MIGRAINE WITH AURA WITHOUT STATUS MIGRAINOSUS, NOT INTRACTABLE: Primary | ICD-10-CM

## 2024-06-19 ENCOUNTER — PATIENT MESSAGE (OUTPATIENT)
Dept: NEUROLOGY | Facility: CLINIC | Age: 34
End: 2024-06-19
Payer: COMMERCIAL

## 2024-06-19 DIAGNOSIS — G43.E09 CHRONIC MIGRAINE WITH AURA WITHOUT STATUS MIGRAINOSUS, NOT INTRACTABLE: ICD-10-CM

## 2024-06-19 RX ORDER — TIZANIDINE 2 MG/1
2 TABLET ORAL NIGHTLY PRN
Qty: 90 TABLET | Refills: 1 | Status: SHIPPED | OUTPATIENT
Start: 2024-06-19 | End: 2024-12-16

## 2024-06-20 DIAGNOSIS — G43.701 CHRONIC MIGRAINE W/O AURA, NOT INTRACTABLE, W STAT MIGR: Primary | ICD-10-CM

## 2024-06-27 ENCOUNTER — PATIENT MESSAGE (OUTPATIENT)
Dept: SLEEP MEDICINE | Facility: CLINIC | Age: 34
End: 2024-06-27
Payer: COMMERCIAL

## 2024-06-27 ENCOUNTER — OFFICE VISIT (OUTPATIENT)
Dept: PAIN MEDICINE | Facility: CLINIC | Age: 34
End: 2024-06-27
Payer: COMMERCIAL

## 2024-06-27 DIAGNOSIS — M54.2 CERVICALGIA: ICD-10-CM

## 2024-06-27 DIAGNOSIS — M54.81 OCCIPITAL NEURALGIA, UNSPECIFIED LATERALITY: Primary | ICD-10-CM

## 2024-06-27 DIAGNOSIS — G47.30 SLEEP APNEA, UNSPECIFIED TYPE: Primary | ICD-10-CM

## 2024-06-27 DIAGNOSIS — M54.2 MYOFASCIAL NECK PAIN: ICD-10-CM

## 2024-06-27 PROBLEM — G47.33 OSA (OBSTRUCTIVE SLEEP APNEA): Status: ACTIVE | Noted: 2024-06-27

## 2024-06-27 PROCEDURE — 1160F RVW MEDS BY RX/DR IN RCRD: CPT | Mod: CPTII,95,, | Performed by: NURSE PRACTITIONER

## 2024-06-27 PROCEDURE — 99214 OFFICE O/P EST MOD 30 MIN: CPT | Mod: 95,,, | Performed by: NURSE PRACTITIONER

## 2024-06-27 PROCEDURE — 1159F MED LIST DOCD IN RCRD: CPT | Mod: CPTII,95,, | Performed by: NURSE PRACTITIONER

## 2024-06-27 NOTE — PROGRESS NOTES
[facility]  Patient Evaluation  telemedicine Encounter    Telemedicine Bundle:  This visit was completed during the Coronavirus Crisis to enhance patient safety.  The patient location is: patient's home  The chief complaint leading to consultation is: Neck Pain and Headache  Visit type: Virtual visit with synchronous audio and video  Total time spent with patient: 20 minutes   Each patient to whom he or she provides medical services by telemedicine is:    (1) informed of the relationship between the physician and patient and the respective role of any other health care provider with respect to management of the patient  (2) notified that he or she may decline to receive medical services by telemedicine and may withdraw from such care at any time.      Referred by: No ref. provider found   Reason for referral: * No diagnoses found *     CC:   Chief Complaint   Patient presents with    Neck Pain    Headache         6/7/2024     8:25 AM   Last 3 PDI Scores   Pain Disability Index (PDI) 49     Interval history 06/27/2024;  34-year-old female that presents virtually for a follow-up appointment she is status post bilateral greater occipital and trigger point injections in the cervical paraspinal trapezius and rhomboids on 06/13/2024 by Dr. Calixto that provided her 50% relief of her symptoms.  She does still report mild headaches she requested if she would prefer to daily exercise I recommended that she could and we will encouraged her to continue to move to improve her functionality.  She denies any new pain denies profound weakness denies any bowel bladder dysfunction at this time    Interval update 06/13/24:  Patient presents presents today for follow up of her neck pain as well as her headache pain.  Today she was scheduled for bilateral occipital nerve blocks as well as trigger point injections.   She was currently in physical therapy but they do not offer dry needling.   Her mother does have a TENS unit at home which she will try.  Today she rates her pain 8/10.      Subjective 06/07/2024:   Rosalina Saavedra is a 34 y.o. female who presents complaining of neck and shoulder pain  with associated headache.  Headaches are bilateral, but worse on the left.  Pain will radiate from the neck to the occipital area and then anteriorly over the scalp.  She notes tightness and pain in the cervical muscles.  She is currently in physical therapy.   She was recently admitted for seizure-like episodes, EEG captured typical events w/ no EEG correlate. She was diagnosed with psychogenic nonepileptic seizures and functional neurologic symptom disorder with mixed symptoms.  She was a follow up with Neurology next month.   Recent MRI of the cervical spine showed no stenosis, foraminal narrowing, disc disease or  other degenerative changes.    Initial Pain Assessment:  Location:  Bilateral headache and neck and shoulder   Onset: 2.5 months  Current Pain Score: 7/10  Daily Pain of Range: 8-9/10  Quality: Aching, Dull, Burning, Throbbing, Tight, Tingling, Deep, Numb, and Hot  Radiation: down to arm and finger tips  Worsened by: exercise, extension, flexion, lifting, and daily activity  Improved by: nothing, massage, medications, physical therapy, and rest     Patient denies night fever/night sweats, urinary incontinence, bowel incontinence, significant weight loss, significant motor weakness, and loss of sensations.      Previous Interventions:  -06/13/2024 bilateral greater occipital blocks, a and TPI cervical paraspinal, trapezius and rhomboids-50%    Previous Therapies:  PT/OT: yes - Currently enrolled  Chiropractor:   HEP:  yes  Relevant Surgery: no     Previous Medications:   - Tylenol or NSAIDS: Ibuprofen  - Muscle Relaxants:    - TCAs:   - SNRIs: Effexor  - Topicals:   - Anticonvulsants: Topamax   - Opioids:   - Adjuvants:     Current Pain Medications:   Ibuprofen    Topamax    Review of  Systems:  ROS    GENERAL:  No weight loss, malaise or fevers.  HEENT:   No recent changes in vision or hearing  NECK:  No difficulty with swallowing. No stridor.   RESPIRATORY:  Negative for cough, wheezing or shortness of breath, patient denies any recent URI.  CARDIOVASCULAR:  Negative for chest pain, leg swelling or palpitations.  GI:  Negative for abdominal discomfort, blood in stools or black stools or change in bowel habits.  MUSCULOSKELETAL:  See HPI.  SKIN:  Negative for lesions, rash, and itching.  PSYCH:  No mood disorder or recent psychosocial stressors.    HEMATOLOGY/LYMPHOLOGY:  Negative for prolonged bleeding, bruising easily or swollen nodes.  Patient is not currently taking any anti-coagulants  NEURO:   No history of headaches, syncope, paralysis, seizures or tremors.  All other reviewed and negative other than HPI.    History:  Current medications, allergies, medical history, surgical history,   family history, and social history were reviewed in the chart as marked.    Full Medication List:    Current Outpatient Medications:     ALPRAZolam (XANAX) 0.5 MG tablet, Take 1 tablet (0.5mg) by mouth in the morning and 2 tablets (1mg) before bed., Disp: 90 tablet, Rfl: 2    ARIPiprazole (ABILIFY) 5 MG Tab, TAKE 1 TABLET BY MOUTH EVERY DAY, Disp: 90 tablet, Rfl: 3    busPIRone (BUSPAR) 15 MG tablet, Take 1 tablet (15 mg total) by mouth 3 (three) times daily., Disp: 270 tablet, Rfl: 1    doxepin (SINEQUAN) 10 MG capsule, Take 1 capsule (10 mg total) by mouth every evening., Disp: 30 capsule, Rfl: 2    etodolac (LODINE) 500 MG tablet, TAKE 1 TABLET (500 MG TOTAL) BY MOUTH 2 (TWO) TIMES DAILY AS NEEDED (MIGRAINE)., Disp: 20 tablet, Rfl: 12    ibuprofen (ADVIL,MOTRIN) 800 MG tablet, Take 1 tablet (800 mg total) by mouth 2 (two) times daily with meals., Disp: 60 tablet, Rfl: 1    promethazine (PHENERGAN) 25 MG tablet, Take 1 tablet (25 mg total) by mouth every 6 (six) hours as needed for Nausea., Disp: 40  tablet, Rfl: 0    QULIPTA 60 mg Tab, TAKE 1 TABLET BY MOUTH EVERY DAY, Disp: 30 tablet, Rfl: 6    tiZANidine (ZANAFLEX) 2 MG tablet, Take 1 tablet (2 mg total) by mouth nightly as needed (muscle spasms/neck pain)., Disp: 90 tablet, Rfl: 1    topiramate (TOPAMAX) 100 MG tablet, Take 1 tablet (100 mg total) by mouth every evening. (Take 1 tablet nightly for 2 weeks. In 14 days, if tolerating, take with 50mg tablet to equal 150mg nightly.), Disp: 90 tablet, Rfl: 3    topiramate (TOPAMAX) 50 MG tablet, Take 1 tablet (50 mg total) by mouth every evening., Disp: 90 tablet, Rfl: 3    venlafaxine (EFFEXOR-XR) 150 MG Cp24, Take 1 capsule (150 mg total) by mouth once daily. Take with 75mg capsule to equal 225mg daily., Disp: 30 capsule, Rfl: 11    venlafaxine (EFFEXOR-XR) 75 MG 24 hr capsule, Take 1 capsule (75 mg total) by mouth once daily. Take with 150mg capsule to equal 225mg daily., Disp: 30 capsule, Rfl: 11    ZOLMitriptan (ZOMIG-ZMT) 5 MG disintegrating tablet, TAKE 1 TABLET BY MOUTH AS NEEDED FOR MIGRAINE., Disp: 9 tablet, Rfl: 12    ZOLMitriptan (ZOMIG-ZMT) 5 MG disintegrating tablet, DISSOLVE 1 TABLET BY MOUTH AS NEEDED FOR MIGRAINE., Disp: 6 tablet, Rfl: 12     Allergies:  Codeine and Morphine     Medical History:   has a past medical history of Anxiety, Depression, Migraine, and Migraine headache.    Surgical History:   has a past surgical history that includes Knee arthroscopy and Tibia fracture surgery.    Family History:  family history includes Breast cancer (age of onset: 61) in her mother; Breast cancer (age of onset: 71) in an other family member; Diabetes in her maternal grandmother and mother; Hypertension in her maternal grandmother.    Social History:   reports that she has never smoked. She has never used smokeless tobacco. She reports that she does not drink alcohol and does not use drugs.    Physical Exam:  There were no vitals filed for this visit.  Today's visit  GEN: No acute distress. Calm,  comfortable  HENT: Normocephalic, atraumatic, moist mucous membranes  EYE: Anicteric sclera, non-injected.   CV: Non-diaphoretic.   RESP: Breathing comfortably. Chest expansion symmetric.  PSYCH: Pleasant mood and appropriate affect. Recent and remote memory intact.       GENERAL: Well appearing, in no acute distress, alert and oriented x3.  PSYCH:  Mood and affect appropriate.  SKIN: Skin color, texture, turgor normal, no rashes or lesions.  HEAD/FACE:  Normocephalic, atraumatic. Cranial nerves grossly intact.  NECK: Normal ROM. Supple. + pain with percussion over the greater occipital nerve bilaterally.  Tenderness noted over the bilateral cervical paraspinous muscles and bilateral trapezius muscles. Spurling Negative. No pain with cervical facet loading.   CV: RRR with palpation of the radial artery.  PULM: No evidence of respiratory difficulty, symmetric chest rise.  GI:  Soft and non-distended.  MSK:    Median nerve Tinel's test negative bilaterally. No obvious deformities, edema, or skin discoloration.  No atrophy or tone abnormalities are noted.   NEURO: Bilateral upper and lower extremity coordination and strength is symmetric.  No loss of sensation is noted.  MENTAL STATUS: A x O x 3, good concentration, speech is fluent and goal directed  MOTOR: 5/5 in  bilateral upper extremity muscle groups  GAIT: Normal. Ambulates unassisted.    Imagin24  MRI Cervical Spine Without Contrast  Order: [id number]  Status: Final result       Visible to patient: Yes (seen)       Next appt: 2024 at 08:00 AM in Pain Medicine (Nayely Calixto DO)       Dx: Cervicalgia; Weakness of both upper e...    0 Result Notes  Details    Reading Physician Reading Date Result Priority   Sophia Duncan MD  [phone] 2024 Routine     Narrative & Impression  EXAMINATION:  MRI CERVICAL SPINE WITHOUT CONTRAST     CLINICAL HISTORY:  Neck pain, chronic;.  Cervicalgia     TECHNIQUE:  Multiplanar, multisequence MR  images of the cervical spine were acquired without the administration of contrast.     COMPARISON:  None.     FINDINGS:  The marrow demonstrates homogeneous signal.  Vertebral body heights are maintained.     Disc spaces are fairly well maintained.     AP alignment is anatomic.     Multilevel degenerative changes detailed below:     C2-3: No focal disc abnormality.  No significant canal or neural foraminal narrowing. .     C3-4: No focal disc abnormality.  No significant canal or neural foraminal narrowing.     C4-5: No focal disc abnormality.  No significant canal or neural foraminal narrowing.     C5-6: No focal disc abnormality.  No significant canal or neural foraminal narrowing.     C6-7: No focal disc abnormality.  No significant canal or neural foraminal narrowing.     C7-T1: No focal disc abnormality.  No significant canal or neural foraminal narrowing.     No significant prevertebral soft tissue edema.     Cervical spinal cord is normal in caliber and signal.     Impression:     Unremarkable cervical spine MRI.  Central canal and neural foramina are maintained at all cervical levels.        Electronically signed by:Sophia Duncan  Date:                                            04/30/2024  Time:                                           08:21           Exam Ended: 04/29/24 16:50 CDT Last Resulted: 04/30/24 08:21 CDT           Imaging 04/29/24:  XR Cervical Spine AP Lateral w/ Flex Ext and Swimmers Flex Ext Without Odontoid  Order: [id number]  Status: Final result       Visible to patient: Yes (seen)       Next appt: 06/07/2024 at 08:00 AM in Pain Medicine (Nayely Calixto DO)       Dx: Cervicalgia; Weakness of both upper e...    0 Result Notes  Details    Reading Physician Reading Date Result Priority   Monica Brown MD  [phone] 4/29/2024 Routine     Narrative & Impression  EXAMINATION:  XR CERVICAL SPINE AP LATERAL W/ FLEX EXT AND SWIMMERS FLEX EXT WITHOU     CLINICAL HISTORY:  Cervicalgia      TECHNIQUE:  Five radiographs obtained     COMPARISON:  January 2021     FINDINGS:  There is satisfactory alignment of vertebral bodies.  The disc spaces are maintained.  No significant osteophyte formation along vertebral endplates.  There is no instability with flexion or extension.     Impression:     No detrimental change or finding to correlate with the clinical history        Electronically signed by:Monica Brown MD  Date:                                            04/29/2024  Time:                                           15:55           Exam Ended: 04/29/24 15:23 CDT Last Resulted: 04/29/24 15:55 CDT               Labs:  BMP  Lab Results   Component Value Date     10/09/2023    K 4.2 10/09/2023     10/09/2023    CO2 21 (L) 10/09/2023    BUN 14 10/09/2023    CREATININE 0.8 10/09/2023    CALCIUM 9.8 10/09/2023    ANIONGAP 11 10/09/2023    EGFRNORACEVR >60.0 10/09/2023     Lab Results   Component Value Date    ALT 18 10/09/2023    AST 14 10/09/2023    ALKPHOS 93 10/09/2023    BILITOT 0.2 10/09/2023     Lab Results   Component Value Date    WBC 6.86 10/09/2023    HGB 11.6 (L) 10/09/2023    HCT 38.7 10/09/2023    MCV 92 10/09/2023     10/09/2023           Assessment:  Problem List Items Addressed This Visit    None          06/07/2024 - Rosalina Saavedra is a 34 y.o. female who  has a past medical history of Anxiety, Depression, Migraine, and Migraine headache.  By history and examination this patient has  occipital neuralgia and   Cervical myofascial pain.  Cervical MRI was reviewed and was unremarkable. We discussed the underlying diagnoses and multiple treatment options including non-opioid medications, interventional procedures, physical therapy, and home exercise.   Patient was currently in physical therapy.  She has a follow up with Neurology regarding her migraines and other headache symptoms.  I will schedule the patient for trigger point injections and bilateral occipital nerve  blocks next week.  The risks and benefits of each treatment option were discussed and all questions were answered.      06/13/2024 - Rosalina Saavedra presents today for follow up of her headache, neck and upper shoulder pain.  I will perform trigger point injections as well as bilateral occipital nerve blocks today in clinic.  I am recommending dry needling with physical therapy and trial of a 10s unit at home.    [id number]-ffei-old female with a history of headache, neck and upper shoulder pain.  She was previously provided bilateral greater occipital nerve blocks and TPI eyes by Dr. Calixto which improved her symptoms by 50%.  Based on history I am recommending that she continue physical therapy she also inquired about returning to exercise program encouraged her to do so.  See plan agreed upon below.    Treatment Plan:   Procedures:  I  PT/OT/HEP: I have stressed the importance of physical activity and a home exercise plan to help with pain and improve health.   Continue with physical therapy and home exercise.  Additional referral placed to physical therapy for dry needling.  Recommend trial of TENS unit.  Medications:    -   Continue with Topamax per Epilepsy/Neurology   -  Reviewed and consistent with medication use as prescribed.  Imaging:  MRI cervical spine was reviewed.  No additional imaging needed at this time.  Follow Up:  3-4  months or sooner if needed          Disclaimer: This note was partly generated using dictation software which may occasionally result in transcription errors.

## 2024-06-27 NOTE — PROGRESS NOTES
ESS 14/24. Failed HSt . Will order PSG   2. Sleep Apnea NEC. The patient symptomatically has  snoring, interrupted sleep, non refreshing sleep, excessive daytime sleepiness, and excessive daytime fatigue  with exam findings of crowded airway and elevated BMI (status post 60 pounds weight gain). The patient has medical co-morbidities of depression  which can be worsened by OS

## 2024-07-01 ENCOUNTER — CLINICAL SUPPORT (OUTPATIENT)
Dept: REHABILITATION | Facility: HOSPITAL | Age: 34
End: 2024-07-01
Attending: STUDENT IN AN ORGANIZED HEALTH CARE EDUCATION/TRAINING PROGRAM
Payer: COMMERCIAL

## 2024-07-01 DIAGNOSIS — M54.2 MYOFASCIAL NECK PAIN: ICD-10-CM

## 2024-07-01 DIAGNOSIS — M54.2 CERVICALGIA: ICD-10-CM

## 2024-07-01 DIAGNOSIS — M54.2 NECK PAIN: Primary | ICD-10-CM

## 2024-07-01 DIAGNOSIS — M54.81 OCCIPITAL NEURALGIA, UNSPECIFIED LATERALITY: ICD-10-CM

## 2024-07-01 DIAGNOSIS — M62.81 DECREASED MOTOR STRENGTH: ICD-10-CM

## 2024-07-01 PROCEDURE — 97163 PT EVAL HIGH COMPLEX 45 MIN: CPT | Mod: PN

## 2024-07-01 PROCEDURE — 97112 NEUROMUSCULAR REEDUCATION: CPT | Mod: PN

## 2024-07-01 NOTE — PLAN OF CARE
[facility]  Physical Therapy Initial Evaluation    Name: [facility] Number: [id number]    Therapy Diagnosis:   Encounter Diagnoses   Name Primary?    Occipital neuralgia, unspecified laterality     Myofascial neck pain     Cervicalgia     Neck pain Yes    Decreased motor strength      Physician: Nayely Calixto DO    Physician Orders: PT Eval and Treat   Medical Diagnosis:   M54.81 (ICD-10-CM) - Occipital neuralgia, unspecified laterality   M54.2 (ICD-10-CM) - Myofascial neck pain   M54.2 (ICD-10-CM) - Cervicalgia     Evaluation Date: 7/1/2024  Authorization Period: 12/31/2024  Plan of Care Certification Period: 7/1/2024 to 09/05/2024  Visit # / Visits authorized: 1/TBD  FOTO: 1/5  PTA visits: 0/5    Time In: 1510  Time Out: 1555  Total Billable Time: 40 minutes (1 HCE, 1 NM)  Precautions: functional neurological disorder    Subjective    Rosalina reports to OPPT today with primary complaint of bilateral posterior neck with headaches and bilateral shoulder pains.  She voices a long history of suffering from migraines. Started when she was a child. Primary activating factor: Stress-induced. Has take botox, infusion in the past. Currently taking topomax and ibuprofen to manage. Has 10 migraines per month. 14-16 hours. Severe intensity. Nausea but no vomiting. Temporal, zev orbital (left eye) regions. Sensitive posterior neck when lying down. No aura's. Manages by getting in a cold, dark room and resting. Smell and light sensitive.   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     Pain in neck and shoulder area.  Date of onset: 4 months. Bilateral posterior neck pain; right > left. TrP needling more relief. Also has had occipital neuralgia injections with 50% relief. Denies paresis, paresthesias in upper extremity. Voices daily neck pain. When it progresses, it will lead to a migraine.   Affirms a headache right now.       Past Medical  "History:   Diagnosis Date    Anxiety     Depression     Migraine     Migraine headache      Rosalina Saavedra  has a past surgical history that includes Knee arthroscopy and Tibia fracture surgery.    Rosalina has a current medication list which includes the following prescription(s): alprazolam, aripiprazole, buspirone, doxepin, etodolac, ibuprofen, promethazine, qulipta, tizanidine, topiramate, topiramate, venlafaxine, venlafaxine, zolmitriptan, zolmitriptan, [DISCONTINUED] lamotrigine, and [DISCONTINUED] oxcarbazepine.    Review of patient's allergies indicates:   Allergen Reactions    Codeine Itching and Other (See Comments)    Morphine Itching and Other (See Comments)        Imaging:   Cervical radiographs: Findings: "There is satisfactory alignment of vertebral bodies. The disc spaces are maintained. No significant osteophyte formation along vertebral endplates. There is no instability with flexion or extension."    Cervical MRI: Findings: "Unremarkable cervical spine MRI. Central canal and neural foramina are maintained at all cervical levels."    Prior Therapy:  Yes; for similar pain  Social History:  Lives at home with mother.  Occupation:   8th grade  and .  Also working on her PhD.   Prior Level of Function:  Hx of migraines since she was a child.   Current Level of Function:  See above    Pain:  Current 4/10, worst 9/10, best 4/10   Location: left > right neck pain   Description: Aching, Grabbing, and Tight    Pts goals: 1. To help with her neck pain and headaches.     Objective     Palpation:  TTP right suboccipital area > left. TTP left UT.     Posture:   Broad shoulders, hypertonic UT muscles.     Cervical range of motion:   Flexion 45 (pulling sensation), ext 80, LSB/RSB 30/each (contralateral pulling sensation each), LR 35 (l-sided pain), RR 65    Cervical strength:   NFET:  10 seconds (increased suboccipital and lateral neck pain bilaterally)    Shoulder range of " "motion:    Right: Within normal limits t/o   Left: Within normal limits t/o      Upper Extremity Strength:  LUE  RUE    Shoulder flexion: 4/5 Shoulder flexion: 4/5   Shoulder Abduction: 4/5 Shoulder abduction: 4/5   Shoulder ER 4/5 Shoulder ER 4/5   Shoulder IR 4/5 Shoulder IR 4/5   Elbow flexion: 4/5 Elbow flexion: 4/5   Elbow extension: 4/5 Elbow extension: 4/5   Wrist flexion: 4/5 Wrist flexion: 4/5   Wrist extension: 4/5 Wrist extension: 4/5   Thumb extension 5/5 Thumb extension 5/5   5th digit abduction 5/5 5th digit abduction 5/5     Scapular stabilizers 3-/5 LUE, 4/5 RUE    Cervical special tests:    Spurlings:  NT   Distraction: negative    Cervicothoracic Joint Mobility: normal cervical segmental mobility t/o.   ULTT:  NT  Reflexes:  (-) Saher's bilateral   Sensation:  Normal light touch sensation C4-T2 throughout BUE      CMS Impairment/Limitation/Restriction for FOTO Neck Survey    Therapist reviewed FOTO scores for Rosalina Saavedra on 7/1/2024.   FOTO documents entered into [employer] - see Media section.    Limitation Score: 66% --> 46%  NDI: 64%         TREATMENT   Treatment Time In: 1545  Treatment Time Out: 1555  Total Treatment time separate from Evaluation time:10'    Neuromuscular re-education: for kinesthesia, posture, proprioception: total time 10'  Chin tucks (1 pillow support) 10x  Chin tucks + lifts 3x8-10"     Home Exercises and Patient Education Provided  Education provided re:   - PT diagnosis and recommended treatment course  - home exercise program: chin tucks + lifts  - neck rest breaks  - postural positions with computer work and reading    Written Home Exercises Provided: yes.  Exercises were reviewed and Rosalina was able to demonstrate them prior to the end of the session.   Pt received a written copy of exercises to perform at home. Rosalina demonstrated fair  understanding of the education provided.     Assessment   Rosalina is a 34 y.o. female referred to outpatient Physical Therapy " with a medical diagnosis of 1) M54.81 (ICD-10-CM) - Occipital neuralgia, unspecified laterality, 2) M54.2 (ICD-10-CM) - Myofascial neck pain, 3) M54.2 (ICD-10-CM) - Cervicalgia. Long history of migraines treated medically. Recent diagnosis of psychogenic nonepileptic seizures and functional neurologic symptom disorder with mixed symptoms. 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 Patient now with 3-4 month history presents with posterior neck and suboccipital area pain that radiates into her bilateral UT areas. Recent greater occipital nerve injections with some relief.  Intake suggestive of tension versus cervicogenic source of neck pain with headaches in addition to migraines; mixed etiology.  Clinical examination reveals normal cervical range of motion, marked weakness/endurance deficits of cervical musculature, and poor postural habits. Patient would benefit from skilled therapy services to address the above listed impairments and deficits. Part of referral recommended functional dry needling, but seizure disorder is a contraindication of functional dry needling.       Pt prognosis is Fair.   Pt will benefit from skilled outpatient Physical Therapy to address the deficits stated above and in the chart below, provide pt/family education, and to maximize pt's level of independence.     Plan of care discussed with patient: Yes  Pt's spiritual, cultural and educational needs considered and pt agreeable to plan of care and goals as stated below:     Anticipated Barriers for therapy: biopsychosocial factors.     Medical Necessity is demonstrated by the following  History  Co-morbidities and personal factors that may impact the plan of care Co-morbidities:   psychogenic nonepileptic seizures and functional neurologic symptom disorder with mixed symptoms     Personal Factors:   coping style  lifestyle     high   Examination  Body Structures and Functions,  activity limitations and participation restrictions that may impact the plan of care Body Regions:   head  neck  upper extremities    Body Systems:    strength  transfers  transitions    Participation Restrictions:   See above    Activity limitations:   Learning and applying knowledge  no deficits    General Tasks and Commands  no deficits    Communication  no deficits    Mobility  lifting and carrying objects  walking  driving (bike, car, motorcycle)  reading    Self care  no deficits    Domestic Life  shopping  cooking  doing house work (cleaning house, washing dishes, laundry)  assisting others  Reading, computer work    Interactions/Relationships  no deficits    Life Areas  higher education  employment    Community and Social Life  community life  recreation and leisure         high   Clinical Presentation unstable clinical presentation with unpredictable characteristics high   Decision Making/ Complexity Score: high     Goals:  Short Term Goals: 3 weeks:  1. Patient will demonstrate understanding of and compliance with home exercise program.  2. Patient will demonstrate >20 second hold ability with NFET for improved cervical stability with computer work.   3. Patient will report <4/10 daily average neck pain as measured on NPRS.     Long Term Goals: 8 weeks:  1. Patient will demonstrate >30 second hold ability with NFET for improved cervical stability with computer work.   2. Patient will report >10% improvement in NDI.   3. Patient will report <46% limitation on FOTO survey.   4. Patient will demonstrate >4/5 MMT scapular stabilizer on the left for improved postural support during school work.       Plan   Certification Period: 7/1/2024 to 09/05/2024.    Outpatient Physical Therapy 14 times over the course of 9 weeks to include the following interventions: Electrical Stimulation TENS/IFC, Manual Therapy, Moist Heat/ Ice, Neuromuscular Re-ed, Patient Education, Self Care, Therapeutic Activities, and Therapeutic  Exercise.     Sandor Pappas, PT, DPT, OCS

## 2024-07-02 PROBLEM — M54.2 NECK PAIN: Status: ACTIVE | Noted: 2024-07-02

## 2024-07-02 PROBLEM — M62.81 DECREASED MOTOR STRENGTH: Status: ACTIVE | Noted: 2024-07-02

## 2024-07-03 ENCOUNTER — TELEPHONE (OUTPATIENT)
Dept: SLEEP MEDICINE | Facility: OTHER | Age: 34
End: 2024-07-03
Payer: COMMERCIAL

## 2024-07-05 ENCOUNTER — TELEPHONE (OUTPATIENT)
Dept: SLEEP MEDICINE | Facility: OTHER | Age: 34
End: 2024-07-05
Payer: COMMERCIAL

## 2024-07-06 ENCOUNTER — HOSPITAL ENCOUNTER (OUTPATIENT)
Dept: SLEEP MEDICINE | Facility: HOSPITAL | Age: 34
Discharge: HOME | End: 2024-07-06
Attending: PSYCHIATRY & NEUROLOGY
Payer: COMMERCIAL

## 2024-07-06 DIAGNOSIS — G47.20 SLEEP STAGE DYSFUNCTION: Primary | ICD-10-CM

## 2024-07-06 DIAGNOSIS — G47.30 SLEEP APNEA, UNSPECIFIED TYPE: ICD-10-CM

## 2024-07-06 PROCEDURE — 95810 POLYSOM 6/> YRS 4/> PARAM: CPT

## 2024-07-07 NOTE — PROGRESS NOTES
Patient was educated about the purpose of the wires and what data was being collected. Patient was informed that the technician may need to enter the room during the night to fix leads or make adjustments to the equipment.              Follow up instructions were provided to the patient.

## 2024-07-08 ENCOUNTER — OFFICE VISIT (OUTPATIENT)
Dept: PSYCHIATRY | Facility: CLINIC | Age: 34
End: 2024-07-08
Payer: COMMERCIAL

## 2024-07-08 DIAGNOSIS — F33.42 RECURRENT MAJOR DEPRESSIVE DISORDER, IN FULL REMISSION: ICD-10-CM

## 2024-07-08 DIAGNOSIS — G47.33 OSA (OBSTRUCTIVE SLEEP APNEA): ICD-10-CM

## 2024-07-08 DIAGNOSIS — F41.1 GAD (GENERALIZED ANXIETY DISORDER): Primary | ICD-10-CM

## 2024-07-08 DIAGNOSIS — G47.00 INSOMNIA, UNSPECIFIED TYPE: ICD-10-CM

## 2024-07-08 DIAGNOSIS — F44.5 PSYCHOGENIC NONEPILEPTIC SEIZURE: ICD-10-CM

## 2024-07-08 DIAGNOSIS — F40.10 SOCIAL ANXIETY DISORDER: ICD-10-CM

## 2024-07-08 PROCEDURE — 1159F MED LIST DOCD IN RCRD: CPT | Mod: CPTII,95,, | Performed by: INTERNAL MEDICINE

## 2024-07-08 PROCEDURE — 99214 OFFICE O/P EST MOD 30 MIN: CPT | Mod: 95,,, | Performed by: INTERNAL MEDICINE

## 2024-07-08 PROCEDURE — 1160F RVW MEDS BY RX/DR IN RCRD: CPT | Mod: CPTII,95,, | Performed by: INTERNAL MEDICINE

## 2024-07-08 RX ORDER — DOXEPIN HYDROCHLORIDE 10 MG/1
20 CAPSULE ORAL NIGHTLY
Qty: 180 CAPSULE | Refills: 3 | Status: SHIPPED | OUTPATIENT
Start: 2024-07-08 | End: 2025-07-08

## 2024-07-08 RX ORDER — BUSPIRONE HYDROCHLORIDE 30 MG/1
30 TABLET ORAL 2 TIMES DAILY
Qty: 180 TABLET | Refills: 11 | Status: SHIPPED | OUTPATIENT
Start: 2024-07-08

## 2024-07-08 RX ORDER — ALPRAZOLAM 0.5 MG/1
TABLET ORAL
Qty: 90 TABLET | Refills: 2 | Status: SHIPPED | OUTPATIENT
Start: 2024-07-08

## 2024-07-08 NOTE — PROGRESS NOTES
OUTPATIENT PSYCHIATRY RETURN VISIT    ENCOUNTER DATE:  7/8/2024  SITE:  [facility]  LENGTH OF SESSION:  15 minutes    The patient location is:  [place], not in a healthcare facility  Visit type:  audiovisual    Face to Face time with patient:  15 minutes  20 minutes of total time spent on the encounter, which includes face to face time and non-face to face time preparing to see the patient (eg, review of tests), Obtaining and/or reviewing separately obtained history, Documenting clinical information in the electronic or other health record, Independently interpreting results (not separately reported) and communicating results to the patient/family/caregiver, or Care coordination (not separately reported).     Each patient to whom he or she provides medical services by telemedicine is:  (1) informed of the relationship between the physician and patient and the respective role of any other health care provider with respect to management of the patient; and (2) notified that he or she may decline to receive medical services by telemedicine and may withdraw from such care at any time.    CHIEF COMPLAINT:  Mood      HISTORY OF PRESENTING ILLNESS:  Rosalina Saavedra is a 34 y.o. female with history of MDD, JESUS, and Social Anxiety Disorder who presents for follow up appointment.      Plan at last appointment on 5/16/2024:  No medication changes today.  She will continue to work on chronic anxiety in therapy.  Depression has significantly improved on Abilify.    Continue Abilify 5mg daily, Effexor 225mg daily, Buspar 30mg BID, Doxepin 10mg qHS, and Xanax 0.5mg/1mg.   Discussed with patient informed consent, risks versus benefits, alternative treatments, side effect profile and the inherent unpredictability of individual responses to these treatments.  The patient expresses understanding of the above and displays the capacity to agree with this current plan.  Continue individual psychotherapy  "with Guerita Stevens LCSW.      History as told by patient:  Says she is doing pretty good.  Busy summer - got picked for a lot of things, happy about this.  Working on dissertation and finally very focused.  Had trip to [place] with family and it was really good.  Realized recently that she feels "happy" and was able to tell therapist this.  Has not had seizure in awhile.  Home sleep study was inconclusive so did lab test on Saturday.  Sleep doctor told her to increase Doxepin to 20mg qHS, which has been very helpful.  Things are good with her parents.  Wonders if Effexor is still working - does not feel anxious or depressed but remembers feeling "peppy" right when it was started (more energy she believes).      Previous Medication Trials: Ritalin for concentration, Prozac (helpful but stopped helping), Lexapro (?), Wellbutrin (worsened headaches, stopped due to seizures), Lunesta (not helpful even at 3mg), Trazodone (minimally helpful at 150mg qHS),     Medication side effects:  Denies  Medication compliance:  Yes    PSYCHIATRIC REVIEW OF SYSTEMS:  Trouble with sleep:  Improved  Appetite changes:  Denies  Weight changes:  Gain slowly over time  Lack of energy:  Improved  Anhedonia:  Improved  Somatic symptoms:  Denies  Libido:  Not discussed  Anxiety/panic:  Yes, chronic but significantly improved  Guilty/hopeless:  Denies  Self-injurious behavior/risky behavior:  Denies  Any drugs:  Denies  Alcohol:  Denies     MEDICAL REVIEW OF SYSTEMS:  Complete review of systems performed covering Constitutional, Musculoskeletal, Neurologic.  All systems negative except for that covered in HPI.    PAST PSYCHIATRIC, MEDICAL, AND SOCIAL HISTORY REVIEWED  The patient's past medical, family and social history have been reviewed and updated as appropriate within the electronic medical record - see encounter notes.    MEDICATIONS:    Current Outpatient Medications:     ALPRAZolam (XANAX) 0.5 MG tablet, Take 1 tablet (0.5mg) by mouth " in the morning and 2 tablets (1mg) before bed., Disp: 90 tablet, Rfl: 2    ARIPiprazole (ABILIFY) 5 MG Tab, TAKE 1 TABLET BY MOUTH EVERY DAY, Disp: 90 tablet, Rfl: 3    busPIRone (BUSPAR) 30 MG Tab, Take 1 tablet (30 mg total) by mouth 2 (two) times daily., Disp: 180 tablet, Rfl: 11    doxepin (SINEQUAN) 10 MG capsule, Take 2 capsules (20 mg total) by mouth every evening., Disp: 180 capsule, Rfl: 3    etodolac (LODINE) 500 MG tablet, TAKE 1 TABLET (500 MG TOTAL) BY MOUTH 2 (TWO) TIMES DAILY AS NEEDED (MIGRAINE)., Disp: 20 tablet, Rfl: 12    ibuprofen (ADVIL,MOTRIN) 800 MG tablet, Take 1 tablet (800 mg total) by mouth 2 (two) times daily with meals., Disp: 60 tablet, Rfl: 1    promethazine (PHENERGAN) 25 MG tablet, Take 1 tablet (25 mg total) by mouth every 6 (six) hours as needed for Nausea., Disp: 40 tablet, Rfl: 0    QULIPTA 60 mg Tab, TAKE 1 TABLET BY MOUTH EVERY DAY, Disp: 30 tablet, Rfl: 6    tiZANidine (ZANAFLEX) 2 MG tablet, Take 1 tablet (2 mg total) by mouth nightly as needed (muscle spasms/neck pain)., Disp: 90 tablet, Rfl: 1    topiramate (TOPAMAX) 100 MG tablet, Take 1 tablet (100 mg total) by mouth every evening. (Take 1 tablet nightly for 2 weeks. In 14 days, if tolerating, take with 50mg tablet to equal 150mg nightly.), Disp: 90 tablet, Rfl: 3    topiramate (TOPAMAX) 50 MG tablet, Take 1 tablet (50 mg total) by mouth every evening., Disp: 90 tablet, Rfl: 3    venlafaxine (EFFEXOR-XR) 150 MG Cp24, Take 1 capsule (150 mg total) by mouth once daily. Take with 75mg capsule to equal 225mg daily., Disp: 30 capsule, Rfl: 11    venlafaxine (EFFEXOR-XR) 75 MG 24 hr capsule, Take 1 capsule (75 mg total) by mouth once daily. Take with 150mg capsule to equal 225mg daily., Disp: 30 capsule, Rfl: 11    ZOLMitriptan (ZOMIG-ZMT) 5 MG disintegrating tablet, TAKE 1 TABLET BY MOUTH AS NEEDED FOR MIGRAINE., Disp: 9 tablet, Rfl: 12    ZOLMitriptan (ZOMIG-ZMT) 5 MG disintegrating tablet, DISSOLVE 1 TABLET BY MOUTH AS  "NEEDED FOR MIGRAINE., Disp: 6 tablet, Rfl: 12    ALLERGIES:  Review of patient's allergies indicates:   Allergen Reactions    Codeine Itching and Other (See Comments)    Morphine Itching and Other (See Comments)       PSYCHIATRIC EXAM:  There were no vitals filed for this visit.  Appearance:  Well groomed, appearing healthy and of stated age  Behavior:  Cooperative, pleasant, no psychomotor agitation or retardation  Speech:  Normal rate, rhythm, prosody, and volume  Mood:  "Pretty good"  Affect:  Euthymic  Thought Process:  Linear, logical, goal directed  Thought Content:  Negative for suicidal ideation, homicidal ideation, delusions or hallucinations.  Associations:  Intact  Memory:  Grossly Intact  Level of Consciousness/Orientation:  Grossly intact  Fund of Knowledge:  Good  Attention:  Good  Language:  Fluent, able to name abstract and concrete objects  Insight:  Good  Judgment:  Intact  Psychomotor signs:  No involuntary movements of face  Gait:  Unable to assess via virtual visit      RELEVANT LABS/STUDIES:    Lab Results   Component Value Date    WBC 6.86 10/09/2023    HGB 11.6 (L) 10/09/2023    HCT 38.7 10/09/2023    MCV 92 10/09/2023     10/09/2023     BMP  Lab Results   Component Value Date     10/09/2023    K 4.2 10/09/2023     10/09/2023    CO2 21 (L) 10/09/2023    BUN 14 10/09/2023    CREATININE 0.8 10/09/2023    CALCIUM 9.8 10/09/2023    ANIONGAP 11 10/09/2023    ESTGFRAFRICA >60.0 07/28/2022    EGFRNONAA >60.0 07/28/2022     Lab Results   Component Value Date    ALT 18 10/09/2023    AST 14 10/09/2023    ALKPHOS 93 10/09/2023    BILITOT 0.2 10/09/2023     Lab Results   Component Value Date    TSH 0.539 10/09/2023     Lab Results   Component Value Date    HGBA1C 5.8 (H) 10/09/2023       IMPRESSION:    Rosalina Saavedra is a 34 y.o. female with history of MDD, JESUS, and Social Anxiety Disorder who presents for follow up appointment.    Status/Progress:  Based on the examination today, " the patient's problem(s) is/are improved.  New problems have not been presented today.    Risk Parameters:  Patient reports no suicidal ideation  Patient reports no homicidal ideation  Patient reports no self-injurious behavior  Patient reports no violent behavior      DIAGNOSES:    ICD-10-CM ICD-9-CM   1. JESUS (generalized anxiety disorder)  F41.1 300.02   2. Social anxiety disorder  F40.10 300.23   3. Recurrent major depressive disorder, in full remission  F33.42 296.36   4. Insomnia, unspecified type  G47.00 780.52   5. Psychogenic nonepileptic seizure  F44.5 300.11   6. KARIE (obstructive sleep apnea)  G47.33 327.23         PLAN:  Mood has improved significantly on current regimen.  Still with some fatigue - recent sleep study results pending.  Continue Abilify 5mg daily, Effexor 225mg daily, Buspar 30mg BID, Doxepin 20mg qHS, and Xanax 0.5mg/1mg.   Discussed with patient informed consent, risks versus benefits, alternative treatments, side effect profile and the inherent unpredictability of individual responses to these treatments.  The patient expresses understanding of the above and displays the capacity to agree with this current plan.  Continue individual psychotherapy with Guerita Stevens LCSW.      RETURN TO CLINIC:  Follow up in about 4 weeks (around 8/5/2024).

## 2024-07-09 ENCOUNTER — CLINICAL SUPPORT (OUTPATIENT)
Dept: REHABILITATION | Facility: HOSPITAL | Age: 34
End: 2024-07-09
Payer: COMMERCIAL

## 2024-07-09 DIAGNOSIS — M62.81 DECREASED MOTOR STRENGTH: ICD-10-CM

## 2024-07-09 DIAGNOSIS — M54.2 NECK PAIN: Primary | ICD-10-CM

## 2024-07-09 PROCEDURE — 97112 NEUROMUSCULAR REEDUCATION: CPT | Mod: PN

## 2024-07-09 PROCEDURE — 97530 THERAPEUTIC ACTIVITIES: CPT | Mod: PN

## 2024-07-09 PROCEDURE — 97140 MANUAL THERAPY 1/> REGIONS: CPT | Mod: PN

## 2024-07-09 NOTE — PROGRESS NOTES
"[facility]   Physical Therapy Treatment Note      Name: Rosalina [facility] Number: [id number]    Therapy Diagnosis:   Encounter Diagnoses   Name Primary?    Neck pain Yes    Decreased motor strength      Physician: Nayely Calixto DO    Visit Date: 7/9/2024    Physician Orders: PT Eval and Treat   Medical Diagnosis:   M54.81 (ICD-10-CM) - Occipital neuralgia, unspecified laterality   M54.2 (ICD-10-CM) - Myofascial neck pain   M54.2 (ICD-10-CM) - Cervicalgia      Evaluation Date: 7/1/2024  Authorization Period: 12/31/2024  Plan of Care Certification Period: 7/1/2024 to 09/05/2024  Visit # / Visits authorized: 1/10 (+eval)  FOTO: 2/5  PTA Visit #: 0/5      Time In: 1000  Time Out: 1050  Total Billable Time: 50 minutes (2 NM, 1 MT, 1 TH)  Precautions: functional neurological disorder      Subjective     Patient reports: My neck is more sore today than usual. 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  She was compliant with home exercise program.  Response to previous treatment: Eval only  Functional change: Ongoing     Pain: 4/10  Location: left > right neck pain     Objective      Objective Measures updated at progress report unless specified.     Treatment     Rosalina received the treatments listed below:      Manual therapy techniques:  total time 10 minutes, including:  Grade II/III cervical sideglides throughout.   Light suboccipital release bilateral     Neuromuscular re-education activities to improve: Kinesthetic, Sense, Proprioception, and Posture for 25 minutes. The following activities were included:  Chin tuck (1 pillow) 2x10  Chin tucks + lifts 10x10"  Prone scapular setting 1x12 x 5"/each    Therapeutic activities to improve functional performance for 15 minutes, including:  Prone extension 2x10   Seated bent-over rows 2x10 5#      Patient Education and Home Exercises       Education provided:   - not today.     Written Home Exercises Provided: not today. "     Assessment   A: Rosalina is a 34 y.o. female referred to outpatient Physical Therapy with a medical diagnosis of 1) M54.81 (ICD-10-CM) - Occipital neuralgia, unspecified laterality, 2) M54.2 (ICD-10-CM) - Myofascial neck pain, 3) M54.2 (ICD-10-CM) - Cervicalgia. Long history of migraines treated medically. Recent diagnosis of psychogenic nonepileptic seizures and functional neurologic symptom disorder with mixed symptoms.     Rosalina Is progressing well towards her goals.   Patient prognosis is Good.     Patient will continue to benefit from skilled outpatient physical therapy to address the deficits listed in the problem list box on initial evaluation, provide pt/family education and to maximize pt's level of independence in the home and community environment.     Patient's spiritual, cultural and educational needs considered and pt agreeable to plan of care and goals.     Anticipated barriers to physical therapy: biopsychosocial factors.     Goals:   Short Term Goals: 3 weeks:  1. Patient will demonstrate understanding of and compliance with home exercise program.  2. Patient will demonstrate >20 second hold ability with NFET for improved cervical stability with computer work.   3. Patient will report <4/10 daily average neck pain as measured on NPRS.      Long Term Goals: 8 weeks:  1. Patient will demonstrate >30 second hold ability with NFET for improved cervical stability with computer work.   2. Patient will report >10% improvement in NDI.   3. Patient will report <46% limitation on FOTO survey.   4. Patient will demonstrate >4/5 MMT scapular stabilizer on the left for improved postural support during school work.        Plan   Continue plan of care.  Monitor response to interventions.  Adjust intensity accordingly.       Sandor Pappas, PT, DPT, OCS

## 2024-07-09 NOTE — PROGRESS NOTES
"[facility]   Physical Therapy Treatment Note      Name: Rosalina [facility] Number: [id number]    Therapy Diagnosis:   Encounter Diagnoses   Name Primary?    Neck pain Yes    Decreased motor strength      Physician: Nayely Calixto DO    Visit Date: 7/9/2024    Physician Orders: PT Eval and Treat   Medical Diagnosis:   M54.81 (ICD-10-CM) - Occipital neuralgia, unspecified laterality   M54.2 (ICD-10-CM) - Myofascial neck pain   M54.2 (ICD-10-CM) - Cervicalgia      Evaluation Date: 7/1/2024  Authorization Period: 12/31/2024  Plan of Care Certification Period: 7/1/2024 to 09/05/2024  Visit # / Visits authorized: 1/TBD  FOTO: 1/5  PTA Visit #: 1/5      Time In: 1510  Time Out: 1555  Total Billable Time: 40 minutes (1 HCE, 1 NM)  Precautions: functional neurological disorder      Subjective     Patient reports: My neck is more sore today than usual. 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  She was compliant with home exercise program.  Response to previous treatment: Eval only  Functional change: Ongoing    Pain: 4/10  Location: left > right neck pain     Objective      Objective Measures updated at progress report unless specified.     Treatment     Rosalina received the treatments listed below:      therapeutic exercises to develop strength, endurance, ROM, flexibility, posture, and core stabilization for *** minutes including:    manual therapy techniques: {AMB PT PROGRESS MANUAL THERAPY:57633} were applied to the: *** for *** minutes, including:  ***    neuromuscular re-education activities to improve: {AMB PT PROGRESS NEURO RE-ED:37486} for *** minutes. The following activities were included:  Chin tucks (1 pillow support) 10x  Chin tucks + lifts 3x8-10"   Serratus punch 2 x 10 2#  Javelin series with red weighted ball    therapeutic activities to improve functional performance for ***  minutes, including:  ***    gait training to improve functional mobility and " safety for ***  minutes, including:  ***    direct contact modalities after being cleared for contraindications: {AMB PT PROGRESS DIRECT CONTACT MODES:54485}    supervised modalities after being cleared for contradictions: {AMB PT SUPERVISED MODES:30529}    hot pack for *** minutes to ***.    cold pack for *** minutes to ***.    Patient Education and Home Exercises       Education provided:   - PT diagnosis and recommended treatment course  - home exercise program: chin tucks + lifts  - neck rest breaks  - postural positions with computer work and reading    Written Home Exercises Provided: Patient instructed to cont prior HEP. Exercises were reviewed and Rosalina was able to demonstrate them prior to the end of the session.  Rosalina demonstrated good  understanding of the education provided. See Electronic Medical Record under Patient Instructions for exercises provided during therapy sessions    Assessment     Rosalina is a 34 y.o. female referred to outpatient Physical Therapy with a medical diagnosis of 1) M54.81 (ICD-10-CM) - Occipital neuralgia, unspecified laterality, 2) M54.2 (ICD-10-CM) - Myofascial neck pain, 3) M54.2 (ICD-10-CM) - Cervicalgia. Long history of migraines treated medically. Recent diagnosis of psychogenic nonepileptic seizures and functional neurologic symptom disorder with mixed symptoms.     Rosalina Is progressing well towards her goals.   Patient prognosis is Fair.     Patient will continue to benefit from skilled outpatient physical therapy to address the deficits listed in the problem list box on initial evaluation, provide pt/family education and to maximize pt's level of independence in the home and community environment.     Patient's spiritual, cultural and educational needs considered and pt agreeable to plan of care and goals.     Anticipated barriers to physical therapy: biopsychosocial factors.     Goals:   Short Term Goals: 3 weeks:  1. Patient will demonstrate understanding of  and compliance with home exercise program.  2. Patient will demonstrate >20 second hold ability with NFET for improved cervical stability with computer work.   3. Patient will report <4/10 daily average neck pain as measured on NPRS.      Long Term Goals: 8 weeks:  1. Patient will demonstrate >30 second hold ability with NFET for improved cervical stability with computer work.   2. Patient will report >10% improvement in NDI.   3. Patient will report <46% limitation on FOTO survey.   4. Patient will demonstrate >4/5 MMT scapular stabilizer on the left for improved postural support during school work.     Plan     Certification Period: 7/1/2024 to 09/05/2024.     Kadie Sheikh, PTA

## 2024-07-11 ENCOUNTER — TELEPHONE (OUTPATIENT)
Dept: NEUROLOGY | Facility: CLINIC | Age: 34
End: 2024-07-11
Payer: COMMERCIAL

## 2024-07-11 PROBLEM — G47.20 SLEEP STAGE DYSFUNCTION: Status: ACTIVE | Noted: 2024-06-27

## 2024-07-11 NOTE — PROCEDURES
"Diagnostic Report  [facility] - [address] [place] [address]  Phone: [phone]  Fax: [phone]       Patient Name: KANIKA SÁNCHEZ Study Date: 7/6/2024   YOB: 1990 Patient MRN: [id number]   Age:  34 year     Sex: Female Referring Physician: DIVYA HYMAN MD   Height: 5' 6" Recording Tech: Mahesh Celestin RRT RPSGT   Weight: 194.0 lbs Scoring Tech: Usman Phan RPSGT   BMI:  31.6 AASM:  1B   AHI: 3.1 Interpreting Physician: Nena Hyman MD   RERA index: - Low oxygen sat: 89.0%   RDI: 3.1        Polysomnogram Data: A full night polysomnogram recorded the standard physiologic parameters including EEG, EOG, EMG, EKG, nasal and oral airflow.  Respiratory parameters of chest and abdominal movements were recorded with Pe[place]-Crystal motion transducers.  Oxygen saturation was recorded by pulse oximetry.    Sleep architecture: This is a baseline polysomnogram. At light's out, the patient fell asleep in 5.9 minutes and slept for 94.6% of the time. Total sleep time (TST) was 422.0 minutes. 26.5% of TST was in Stage N1 sleep, 0.0% TST in slow wave sleep, and 2.1% TST in REM sleep. The REM latency was 339.0 minutes. Sleep architecture was significantly disrupted      Respiratory: Moderate to loud snoring was present. The polysomnogram revealed a presence of - obstructive, - central, and - mixed apneas resulting in a Total Apnea index of - events per hour.  There were 22 hypopneas resulting in a Total Hypopnea index of 3.1 events per hour.  The combined Apnea/Hypopnea index was 3.1 events per hour.  There were a total of - RERA events resulting in a Respiratory Disturbance Index (RDI) of 3.1 events per hour.  Mean oxygen saturation was 97.8%.  The lowest oxygen saturation during sleep was 95.0%.  Time spent ?88% oxygen saturation was - minutes (-).The patient did not qualify for a split night study due to an insufficient number of events in the first half of the study.    Motor " "movement / Parasomnia: There were no significant  limb movements of sleep noted. The total limb movement index was 3.1 (1.0 with arousal).     Cardiac: Cardiac rhythm monitoring revealed a sinus rhythm.  The average pulse rate was 87.0 bpm.  The minimum pulse rate was 69.0 bpm while the maximum pulse rate was 112.0 bpm.      Patient perception: On a post-sleep study questionnaire, the patient indicated that sleep was the same in the lab than compared to home.    IMPRESSION:  Moderate to loud snoring/.  No evidense of Obstructive Sleep Apnea  based on AHI criteria.    RECOMMENDATION:  Trial of Breathe Rite strips  Saline nasal rinse  Side position sleep  A trial of a dental appliance to be considered.   ENT referral to be considered  Consider repeating PSG in several months if the patient remains symptomatic for sleep disordered breathing.        I have reviewed the attached data report and the raw data tracings of this study epoch-by-epoch and have determined that the recording quality and scoring of events are sufficient to allow for interpretation and electronically signed by:      Nena Hyman MD 7/6/2024                              [facility]    Diagnostic PSG Report    Patient Name: KANIKA SÁNCHEZ Study Date: 7/6/2024   YOB: 1990 MRN #: [id number]   Age: 34 year SHELLEY #: -   Sex: Female Referring Provider: DIVYA HYMAN MD   Height: 5' 6" Recording Tech: Mahesh Celestin RRT RPSGT   Weight: 194.0 lbs Scoring Tech: Usman Phan RRT RPSGT   BMI: 31.6 Interpreting Physician: Nena Hyman MD   ESS: - Neck Circumference: -     Study Overview    Lights Off: 09:48:44 PM  Count Index   Lights On: 05:14:55 AM Awakenings: 23 3.3   Time in Bed: 446.2 min. Arousals: 144 20.5   Total Sleep Time: 422.0 min. Apneas & Hypopneas: 22 3.1    Sleep Efficiency: 94.6% Limb Movements: 22 3.1   Sleep Latency: 5.9 min. Snores: - -   Wake After Sleep Onset: 18.0 min. Desaturations: 3 0.4  "   REM Latency from Sleep Onset: 339.0 min. Minimum SpO2 TST: 95.0%        Sleep Architecture   % of Time in Bed    Stages Time (mins) % Sleep Time   Wake 24.5    Stage N1 112.0 26.5%   Stage N2 301.0 71.3%   Stage N3 0.0 0.0%   REM 9.0 2.1%         Arousal Summary     NREM REM Sleep Index   Respiratory Arousals 17 - 17 2.4   PLM Arousals 7 - 7 1.0   Isolated Limb Movement Arousals - - - -   Spontaneous Arousals 119 1 120 17.1   Total 143 1 144 20.5       Limb Movement Summary     Count Index   Isolated Limb Movements - -   Periodic Limb Movements (PLMs) 22 3.1   Total Limb Movements 22 3.1         Respiratory Summary     By Sleep Stage By Body Position Total    NREM REM Supine Non-Supine    Time (min) 413.0 9.0 323.0 99.0 422.0           Obstructive Apnea - - - - -   Mixed Apnea - - - - -   Central Apnea - - - - -   Total Apneas - - - - -   Total Apnea Index - - - - -           Hypopnea 22 - 20 2 22   Hypopnea Index 3.2 - 3.7 1.2 3.1           Apnea & Hypopnea 22 - 20 2 22   Apnea & Hypopnea Index 3.2 - 3.7 1.2 3.1           RERAs - - - - -   RERA Index - - - - -           RDI 3.2 - 3.7 1.2 3.1      Scoring Criteria: Hypopneas scored at Choose an item.% desaturation criteria.    Respiratory Event Durations     Apnea Hypopnea    NREM REM NREM REM   Average (seconds) - - 14.6 -   Maximum (seconds) - - 21.7 -       Oxygen Saturation Summary     Wake NREM REM TST TIB   Average SpO2 97.9% 97.8% 98.0% 97.8% 97.8%   Minimum SpO2 89.0% 95.0% 96.0% 95.0% 89.0%   Maximum SpO2 100.0% 100.0% 100.0% 100.0% 100.0%       Oxygen Saturation Distribution    Range (%) Time in range (min) Time in range (%)   90.0 - 100.0 446.5 100.0%   80.0 - 90.0 0.0 0.0%   70.0 - 80.0 - -   60.0 - 70.0 - -   50.0 - 60.0 - -   0.0 - 50.0 - -   Time Spent ?88% SpO2    Range (%) Time in range (min) Time in range (%)   0.0 - 88.0 - -          Count Index   Desaturations 3 0.4      Cardiac Summary     Wake NREM REM Sleep Total   Average Pulse Rate (BPM)  88.9 86.8 89.0 86.8 87.0   Minimum Pulse Rate (BPM) 72.0 69.0 81.0 69.0 69.0   Maximum Pulse Rate (BPM) 112.0 103.0 98.0 103.0 112.0     Pulse Rate Distribution    Range (bpm) Time in range (min) Time in range (%)   0.0 - 40.0 - -   40.0 - 60.0 - -   60.0 - 80.0 45.2 10.1%   80.0 - 100.0 400.6 89.7%   100.0 - 120.0 0.8 0.2%   120.0 - 140.0 - -   140.0 - 200.0 - -     EtCO2 Summary    Stage Min (mmHg) Average (mmHg) Max (mmHg)   Wake - - -   NREM(1+2+3) - - -   REM - - -     Range (mmHg) Time in range (min) Time in range (%)   - - -   - - -   - - -   - - -   - - -     TcCO2 Summary    Stage Min (mmHg) Average (mmHg) Max (mmHg)   Wake - - -   NREM(1+2+3) - - -   REM - - -     Range (mmHg) Time in range (min) Time in range (%)   - - -   - - -   - - -   - - -   - - -     Comments    -

## 2024-07-16 ENCOUNTER — CLINICAL SUPPORT (OUTPATIENT)
Dept: REHABILITATION | Facility: HOSPITAL | Age: 34
End: 2024-07-16
Payer: COMMERCIAL

## 2024-07-16 ENCOUNTER — PROCEDURE VISIT (OUTPATIENT)
Dept: NEUROLOGY | Facility: CLINIC | Age: 34
End: 2024-07-16
Payer: COMMERCIAL

## 2024-07-16 VITALS
BODY MASS INDEX: 32.54 KG/M2 | SYSTOLIC BLOOD PRESSURE: 115 MMHG | WEIGHT: 202.5 LBS | DIASTOLIC BLOOD PRESSURE: 81 MMHG | HEIGHT: 66 IN

## 2024-07-16 DIAGNOSIS — G43.E09 CHRONIC MIGRAINE WITH AURA WITHOUT STATUS MIGRAINOSUS, NOT INTRACTABLE: Primary | ICD-10-CM

## 2024-07-16 DIAGNOSIS — G43.701 CHRONIC MIGRAINE W/O AURA, NOT INTRACTABLE, W STAT MIGR: ICD-10-CM

## 2024-07-16 DIAGNOSIS — G43.001 MIGRAINE WITHOUT AURA AND WITH STATUS MIGRAINOSUS, NOT INTRACTABLE: ICD-10-CM

## 2024-07-16 DIAGNOSIS — M62.81 DECREASED MOTOR STRENGTH: ICD-10-CM

## 2024-07-16 DIAGNOSIS — M54.2 NECK PAIN: Primary | ICD-10-CM

## 2024-07-16 PROCEDURE — 97112 NEUROMUSCULAR REEDUCATION: CPT | Mod: PN,CQ

## 2024-07-16 PROCEDURE — 97530 THERAPEUTIC ACTIVITIES: CPT | Mod: PN,CQ

## 2024-07-16 PROCEDURE — 64615 CHEMODENERV MUSC MIGRAINE: CPT | Mod: S$GLB,,, | Performed by: PSYCHIATRY & NEUROLOGY

## 2024-07-16 PROCEDURE — 97140 MANUAL THERAPY 1/> REGIONS: CPT | Mod: PN,CQ

## 2024-07-16 NOTE — PROCEDURES
Follow up:   Overall doing well     Prior note:   2 weeks of daily HA related to stress and school     Prior note:   Overall doing good  HA initially improved for a few weeks after the amitriptyline.  Now the frequency has returned to chronic migraine.    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   Still has freq HAs     Headache history:   Age of onset -  5   Location - occipital, temples   Nature of pain -  Stabbing, aching   Prodrome - no   Aura -  No   Duration of headache - > 4 hrs   Time to peak intensity - n/a   Pain scale - range of intensity -  9-10/10  Frequency -  15-20/month    Status Migrainosus history - yes   Time of day of most headaches- anytime      Associated symptoms with the headache:   Meningeal symptoms - photophobia, phonophobia, exercise intolerance +   Nausea/vomitting +   Nasal drainage   Visual blurriness   Pallor/flushing  Dizziness   Vertigo  Confusion  Impaired concentration +   Pain worsened with physical activity  +   Neck pain +      Cluster headache symptoms: none   Symptoms of increased intracranial pressure: none  Basilar migraine symptoms: none      Headache Triggers:  Stress, anxiety, emotional overload   H/o SI      Treatment history:  Resolution of headache with sleep - yes   Meds tried:     Failed:   prozac 40   effexor 150   Gabapentin 300 mg   mobic 15   namenda   nurtec   trokemdi   Diamox   imitrex shots  lamictal   BOTOX#1 6/26/23 - helped    BOTOX#2 9/22/23 - helped   BOTOX#3 12/18/23 - helped   BOTOX#4 3/28/24 - helped     10/21/21  Care Timeline     0000    EKG 12-LEAD   1228    Arrived   1308    Comprehensive  metabolic panel        CBC auto differential    1321    POCT glucose   1340    CT Head Without Contrast   1341    POCT urine pregnancy   1400    EKG 12-lead   1422    metoclopramide HCl 10 mg   1424    ketorolac tromethamine 30 mg       diphenhydramine HCl 25 mg   1425    0.9 % sodium chloride 1000 mL   1546    haloperidol lactate 2.5 mg   1553    magnesium sulfate in water 2 g   1826    prochlorperazine edisylate 5 mg   1827    diphenhydramine HCl 25 mg   2027    Discharged         Headache risk factors:   H/o TBI  - concussion at age 16   H/o Meningitis  - no   F/h Aneurysms  - no      Review of Systems   Constitutional: Negative.    HENT: Negative.    Eyes: Negative.    Respiratory: Negative.    Cardiovascular: Negative.    Gastrointestinal: Negative.    Endocrine: Negative.    Genitourinary: Negative.    Musculoskeletal: Negative.    Integumentary:  Negative.   Allergic/Immunologic: Negative.    Neurological: Positive for headaches.   Hematological: Negative.    Psychiatric/Behavioral: Positive for dysphoric mood and sleep disturbance.          Objective:   Physical Exam  Unchanged          Assessment:     Chr Migraine     Impression:   No major branch stenosis/occlusion at the [place].  No aneurysm.   No evidence of venous sinus thrombosis or venous sinus stenosis..     Electronically signed by: Armani Lopez  Date:                                            11/24/2021  Time:                                           16:24     Impression:   No major branch stenosis/occlusion at the [place].  No aneurysm.   No evidence of venous sinus thrombosis or venous sinus stenosis..     Electronically signed by: Armani Lopez  Date:                                            11/24/2021  Time:                                           16:24     Impression:   Normal pre and postcontrast MR imaging appearance of the brain.     Electronically signed by: Armani Lopze  Date:                                             11/24/2021  Time:                                           16:51  Plan:       VYEPTI 300 mg IV (sept 2022 -)   Cont Qulipta 60 mg daily, discussed side effect   Breakthrough headache - etodolac, zomig   Dexamethasone 4 mg BID x 3 days PRN   Multiple alternative treatment options were offered to the patient  cont with psychiatrist   Cont Elavil   Continue Effexor 225mg daily, Buspar 15mg TID, and Klonopin 0.25-0.5mg daily PRN anxiety.      BOTOX treatment response:   Prior to initiating BOTOX, the patient had  18 migraine days per month on average. This meets criteria for chronic migraine.   After starting BOTOX, the patient experienced a reduction in  7 days per month   After starting BOTOX, the patient experienced a reduction in > 100 hours of migraine symptoms per month   After starting BOTOX, the patient experienced a 50% reduction in burden of migraine days per month   Based on this information, BOTOX is medically necessary for the management of the patient's chronic migraine.     BOTOX Injection intervals:   Patient reports a 'wearing off effect' prior to the subsequent BOTOX injections at 12 weeks. This occurs at week 10  Symptoms of this a 'wearing off effect' include - worsening of migraine headache frequency and intensity   Medications used during the 'wearing off effect' period include   Based on this information, it is medically necessary for the patient to receive BOTOX therapy at an interval of every 10 weeks for the management of chronic migraine.    BOTOX was performed as an indicated therapy for intractable chronic migraine headaches given that the patient failed > 3 prophylactic medications    Botulinum Toxin Injection Procedure   Pre-operative diagnosis: Chronic migraine   Post-operative diagnosis: Same   Procedure: Chemical neurolysis   After risks and benefits were explained including bleeding, infection, worsening of pain, damage to the areas being injected, weakness of muscles, loss of  muscle control, dysphagia if injecting the head or neck, facial droop if injecting the facial area, painful injection, allergic or other reaction to the medications being injected, and the failure of the procedure to help the problem, a signed consent was obtained.   The patient was placed in a comfortable area and the sites to be treated were identified.The area to be treated was prepped three times with alcohol and the alcohol allowed to dry. Next, a 30 gauge needle was used to inject the medication in the area to be treated.   Area(s) injected:   Total Botox use: 165 Units   Botox wastage: 35 Units   Injection sites:    muscle bilaterally ( a total of 10 units divided into 2 sites)   Procerus muscle (5 units)   Frontalis muscle bilaterally (a total of 20 units divided into 4 sites)   Temporalis muscle bilaterally (a total of 40 units divided into 8 sites)   Occipitalis muscle bilaterally (a total of 30 units divided into 6 sites + 10 units)   Cervical paraspinal muscles (a total of 20 units divided into 4 sites)   Trapezius muscle bilaterally (a total of 30 units divided into 6 sites)   Complications: none   RTC for the next Botox injection: 10 weeks   Procedures

## 2024-07-16 NOTE — PROGRESS NOTES
"[facility]   Physical Therapy Treatment Note      Name: Rosalina [facility] Number: [id number]    Therapy Diagnosis:   Encounter Diagnoses   Name Primary?    Neck pain Yes    Decreased motor strength        Physician: Nayely Calixto DO    Visit Date: 7/16/2024    Physician Orders: PT Eval and Treat   Medical Diagnosis:   M54.81 (ICD-10-CM) - Occipital neuralgia, unspecified laterality   M54.2 (ICD-10-CM) - Myofascial neck pain   M54.2 (ICD-10-CM) - Cervicalgia      Evaluation Date: 7/1/2024  Authorization Period: 12/31/2024  Plan of Care Certification Period: 7/1/2024 to 09/05/2024  Visit # / Visits authorized: 2/10 (+eval)  FOTO: 3/5  PTA Visit #: 1/5      Time In: 1005  Time Out: 1048  Total Billable Time: 43 minutes (2 NM, 1 MT, 1 TH)  Precautions: functional neurological disorder      Subjective     Patient reports: Headache following last session and for 2 days after. Tried to manage with rest (napping, less work on PhD) and with medication but minimal improvement. Feeling a little better today.   She was compliant with home exercise program.  Response to previous treatment: Eval only  Functional change: Ongoing     Pain: 3-4/10  Location: left > right neck pain     Objective      Objective Measures updated at progress report unless specified.     Treatment     Rosalina received the treatments listed below:      Manual therapy techniques:  total time 10 minutes, including:  Grade II/III cervical sideglides throughout. NP  Light suboccipital release bilateral     Neuromuscular re-education activities to improve: Kinesthetic, Sense, Proprioception, and Posture for 25 minutes. The following activities were included:  Chin tuck (1 pillow) 2x10  Chin tucks + lifts 10x10" NP  Supine cervical iso 5" x 10 rot (sub max)  Prone scapular setting 1x12 x 5"/each    Therapeutic activities to improve functional performance for 8 minutes, including:  Prone extension 2x10   Seated bent-over " rows 2x10 5# NP      Patient Education and Home Exercises       Education provided:   - not today.     Written Home Exercises Provided: not today.     Assessment   A: Rosalina is a 34 y.o. female referred to outpatient Physical Therapy with a medical diagnosis of 1) M54.81 (ICD-10-CM) - Occipital neuralgia, unspecified laterality, 2) M54.2 (ICD-10-CM) - Myofascial neck pain, 3) M54.2 (ICD-10-CM) - Cervicalgia. Long history of migraines treated medically. Recent diagnosis of psychogenic nonepileptic seizures and functional neurologic symptom disorder with mixed symptoms.   Headache for ~2 days post previous treatment session. Mild-to-moderate headache presently. Increased aggravation with chin tucks. Decreased intensity of program today due to presentation. Hot pack during treatment for decreased pain and improved tolerance to session. Minimal improvement post session.     Rosalina Is progressing well towards her goals.   Patient prognosis is Good.     Patient will continue to benefit from skilled outpatient physical therapy to address the deficits listed in the problem list box on initial evaluation, provide pt/family education and to maximize pt's level of independence in the home and community environment.     Patient's spiritual, cultural and educational needs considered and pt agreeable to plan of care and goals.     Anticipated barriers to physical therapy: biopsychosocial factors.     Goals:   Short Term Goals: 3 weeks:  1. Patient will demonstrate understanding of and compliance with home exercise program.  2. Patient will demonstrate >20 second hold ability with NFET for improved cervical stability with computer work.   3. Patient will report <4/10 daily average neck pain as measured on NPRS.      Long Term Goals: 8 weeks:  1. Patient will demonstrate >30 second hold ability with NFET for improved cervical stability with computer work.   2. Patient will report >10% improvement in NDI.   3. Patient will report  <46% limitation on FOTO survey.   4. Patient will demonstrate >4/5 MMT scapular stabilizer on the left for improved postural support during school work.        Plan   Continue plan of care.  Monitor response to interventions.  Adjust intensity accordingly.       Kadie Sheikh, PTA

## 2024-09-12 ENCOUNTER — PATIENT MESSAGE (OUTPATIENT)
Dept: PSYCHIATRY | Facility: CLINIC | Age: 34
End: 2024-09-12

## 2024-09-30 DIAGNOSIS — G43.701 CHRONIC MIGRAINE W/O AURA, NOT INTRACTABLE, W STAT MIGR: ICD-10-CM

## 2024-09-30 RX ORDER — ATOGEPANT 60 MG/1
1 TABLET ORAL DAILY
Qty: 30 TABLET | Refills: 6 | Status: SHIPPED | OUTPATIENT
Start: 2024-09-30

## 2024-10-11 ENCOUNTER — PROCEDURE VISIT (OUTPATIENT)
Dept: PAIN MEDICINE | Facility: CLINIC | Age: 34
End: 2024-10-11
Payer: COMMERCIAL

## 2024-10-11 ENCOUNTER — OFFICE VISIT (OUTPATIENT)
Dept: PSYCHIATRY | Facility: CLINIC | Age: 34
End: 2024-10-11
Payer: COMMERCIAL

## 2024-10-11 VITALS
BODY MASS INDEX: 32.68 KG/M2 | HEART RATE: 81 BPM | HEIGHT: 66 IN | DIASTOLIC BLOOD PRESSURE: 84 MMHG | SYSTOLIC BLOOD PRESSURE: 116 MMHG

## 2024-10-11 DIAGNOSIS — F44.7 FUNCTIONAL NEUROLOGICAL SYMPTOM DISORDER WITH MIXED SYMPTOMS: ICD-10-CM

## 2024-10-11 DIAGNOSIS — F33.41 RECURRENT MAJOR DEPRESSIVE DISORDER, IN PARTIAL REMISSION: ICD-10-CM

## 2024-10-11 DIAGNOSIS — F41.1 GAD (GENERALIZED ANXIETY DISORDER): Primary | ICD-10-CM

## 2024-10-11 DIAGNOSIS — M54.2 CERVICALGIA: ICD-10-CM

## 2024-10-11 DIAGNOSIS — M54.81 OCCIPITAL NEURALGIA, UNSPECIFIED LATERALITY: Primary | ICD-10-CM

## 2024-10-11 DIAGNOSIS — F40.10 SOCIAL ANXIETY DISORDER: ICD-10-CM

## 2024-10-11 DIAGNOSIS — G47.01 INSOMNIA DUE TO MEDICAL CONDITION: ICD-10-CM

## 2024-10-11 DIAGNOSIS — M54.2 MYOFASCIAL NECK PAIN: ICD-10-CM

## 2024-10-11 PROCEDURE — 1159F MED LIST DOCD IN RCRD: CPT | Mod: CPTII,95,, | Performed by: INTERNAL MEDICINE

## 2024-10-11 PROCEDURE — 1160F RVW MEDS BY RX/DR IN RCRD: CPT | Mod: CPTII,95,, | Performed by: INTERNAL MEDICINE

## 2024-10-11 PROCEDURE — 99214 OFFICE O/P EST MOD 30 MIN: CPT | Mod: 95,,, | Performed by: INTERNAL MEDICINE

## 2024-10-11 RX ORDER — TRIAMCINOLONE ACETONIDE 40 MG/ML
40 INJECTION, SUSPENSION INTRA-ARTICULAR; INTRAMUSCULAR
Status: COMPLETED | OUTPATIENT
Start: 2024-10-11 | End: 2024-10-11

## 2024-10-11 RX ADMIN — TRIAMCINOLONE ACETONIDE 20 MG: 40 INJECTION, SUSPENSION INTRA-ARTICULAR; INTRAMUSCULAR at 02:10

## 2024-10-11 NOTE — PROCEDURES
Trigger Point Injection    Performed by: Josias Rosales FNP  Authorized by: Josias Rosales FNP    Cervical Paraspinal:  Bilateral  Rhomboid:  Bilateral  Trapezus:  Bilateral    Consent Done?:  Yes (Written)    Pre-Procedure:   Indications:  Pain relief and pain  Site marked: the procedure site was marked     Timeout: prior to procedure the correct patient, procedure, and site was verified      Local anesthesia used?: Yes    Local anesthetic:  Bupivacaine 0.25% without epinephrine  Anesthetic total (ml):  9    Medications: 20 mg triamcinolone acetonide (KENALOG-40) injection 40 mg/mL

## 2024-10-11 NOTE — PROCEDURES
Occipital Nerve Block    Date/Time: 10/11/2024 2:40 PM    Performed by: Josias Rosales FNP  Authorized by: Josias Rosales FNP    Consent Done?:  Yes (Written)  Indications:  Pain  Timeout: prior to procedure the correct patient, procedure, and site was verified      Local anesthesia used?: Yes    Local anesthetic:  Bupivacaine 0.25% without epinephrine  Anesthetic total (ml):  3      Details:  Needle Size:  25 G  Ultrasonic Guidance for needle placement?: No    Approach:  Posterior (Bilateral Greater Occipital nerve)  Location: head.  Medications:  20 mg triamcinolone acetonide (KENALOG-40) injection 40 mg/mL  Patient tolerance:  Patient tolerated the procedure well with no immediate complications
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
## 2024-10-14 ENCOUNTER — PATIENT MESSAGE (OUTPATIENT)
Dept: NEUROLOGY | Facility: CLINIC | Age: 34
End: 2024-10-14
Payer: COMMERCIAL

## 2024-10-14 ENCOUNTER — TELEPHONE (OUTPATIENT)
Dept: NEUROLOGY | Facility: CLINIC | Age: 34
End: 2024-10-14
Payer: COMMERCIAL

## 2024-10-14 NOTE — TELEPHONE ENCOUNTER
Gave the pt a call to reschedule her Botox apt on 10/16 at 11:40 with Dr. Dorsey . Pt agreed to come on 11/7 at 2 pm.

## 2024-10-14 NOTE — TELEPHONE ENCOUNTER
----- Message from Sagrario sent at 10/11/2024 11:20 AM CDT -----  Regarding: reschedule appt  Contact: pt @ [phone]  Patient is calling to reschedule appointment on 10/16 as that time does not work for her.       Please call to advise further thank you for all you are doing.

## 2024-10-21 RX ORDER — SODIUM CHLORIDE 0.9 % (FLUSH) 0.9 %
10 SYRINGE (ML) INJECTION
OUTPATIENT
Start: 2024-10-21

## 2024-10-21 RX ORDER — SODIUM CHLORIDE 9 MG/ML
INJECTION, SOLUTION INTRAVENOUS ONCE AS NEEDED
OUTPATIENT
Start: 2024-10-21

## 2024-10-21 RX ORDER — ONDANSETRON HYDROCHLORIDE 2 MG/ML
8 INJECTION, SOLUTION INTRAVENOUS ONCE AS NEEDED
OUTPATIENT
Start: 2024-10-21

## 2024-10-21 RX ORDER — EPINEPHRINE 0.3 MG/.3ML
0.3 INJECTION SUBCUTANEOUS ONCE AS NEEDED
Start: 2024-10-21

## 2024-10-21 RX ORDER — DIPHENHYDRAMINE HYDROCHLORIDE 50 MG/ML
25 INJECTION INTRAMUSCULAR; INTRAVENOUS ONCE AS NEEDED
OUTPATIENT
Start: 2024-10-21

## 2024-10-21 RX ORDER — ACETAMINOPHEN 500 MG
1000 TABLET ORAL ONCE AS NEEDED
OUTPATIENT
Start: 2024-10-21

## 2024-10-21 RX ORDER — METHYLPREDNISOLONE SOD SUCC 125 MG
125 VIAL (EA) INJECTION ONCE AS NEEDED
OUTPATIENT
Start: 2024-10-21

## 2024-11-07 ENCOUNTER — PROCEDURE VISIT (OUTPATIENT)
Dept: NEUROLOGY | Facility: CLINIC | Age: 34
End: 2024-11-07
Payer: COMMERCIAL

## 2024-11-07 VITALS
SYSTOLIC BLOOD PRESSURE: 130 MMHG | DIASTOLIC BLOOD PRESSURE: 93 MMHG | WEIGHT: 203.38 LBS | HEART RATE: 94 BPM | BODY MASS INDEX: 32.83 KG/M2

## 2024-11-07 DIAGNOSIS — G43.701 CHRONIC MIGRAINE W/O AURA, NOT INTRACTABLE, W STAT MIGR: Primary | ICD-10-CM
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
## 2024-11-12 ENCOUNTER — PATIENT MESSAGE (OUTPATIENT)
Dept: SLEEP MEDICINE | Facility: CLINIC | Age: 34
End: 2024-11-12
Payer: COMMERCIAL

## 2024-11-13 ENCOUNTER — TELEPHONE (OUTPATIENT)
Dept: NEUROLOGY | Facility: CLINIC | Age: 34
End: 2024-11-13
Payer: COMMERCIAL

## 2024-11-25 ENCOUNTER — OFFICE VISIT (OUTPATIENT)
Dept: PSYCHIATRY | Facility: CLINIC | Age: 34
End: 2024-11-25
Payer: COMMERCIAL

## 2024-11-25 DIAGNOSIS — F33.41 RECURRENT MAJOR DEPRESSIVE DISORDER, IN PARTIAL REMISSION: ICD-10-CM

## 2024-11-25 DIAGNOSIS — F44.5 PSYCHOGENIC NONEPILEPTIC SEIZURE: ICD-10-CM

## 2024-11-25 DIAGNOSIS — F44.7 FUNCTIONAL NEUROLOGICAL SYMPTOM DISORDER WITH MIXED SYMPTOMS: ICD-10-CM

## 2024-11-25 DIAGNOSIS — F40.10 SOCIAL ANXIETY DISORDER: ICD-10-CM

## 2024-11-25 DIAGNOSIS — G47.01 INSOMNIA DUE TO MEDICAL CONDITION: ICD-10-CM

## 2024-11-25 DIAGNOSIS — F41.1 GAD (GENERALIZED ANXIETY DISORDER): Primary | ICD-10-CM

## 2024-11-25 DIAGNOSIS — R53.83 FATIGUE, UNSPECIFIED TYPE: ICD-10-CM

## 2024-11-25 PROCEDURE — 99214 OFFICE O/P EST MOD 30 MIN: CPT | Mod: 95,,, | Performed by: INTERNAL MEDICINE

## 2024-11-25 PROCEDURE — 1160F RVW MEDS BY RX/DR IN RCRD: CPT | Mod: CPTII,95,, | Performed by: INTERNAL MEDICINE

## 2024-11-25 PROCEDURE — 1159F MED LIST DOCD IN RCRD: CPT | Mod: CPTII,95,, | Performed by: INTERNAL MEDICINE

## 2024-11-25 RX ORDER — ALPRAZOLAM 0.5 MG/1
TABLET ORAL
Qty: 90 TABLET | Refills: 5 | Status: SHIPPED | OUTPATIENT
Start: 2024-11-25

## 2024-11-25 NOTE — PROGRESS NOTES
OUTPATIENT PSYCHIATRY RETURN VISIT    ENCOUNTER DATE:  12/4/2024  SITE:  [facility], [facility]  LENGTH OF SESSION:  13 minutes    The patient location is:  [place], not in a healthcare facility  Visit type:  audiovisual    Face to Face time with patient:  13 minutes  20 minutes of total time spent on the encounter, which includes face to face time and non-face to face time preparing to see the patient (eg, review of tests), Obtaining and/or reviewing separately obtained history, Documenting clinical information in the electronic or other health record, Independently interpreting results (not separately reported) and communicating results to the patient/family/caregiver, or Care coordination (not separately reported).     Each patient to whom he or she provides medical services by telemedicine is:  (1) informed of the relationship between the physician and patient and the respective role of any other health care provider with respect to management of the patient; and (2) notified that he or she may decline to receive medical services by telemedicine and may withdraw from such care at any time.    CHIEF COMPLAINT:  Mood      HISTORY OF PRESENTING ILLNESS:  Rosalina Saavedra is a 34 y.o. female with history of MDD, JESUS, and Social Anxiety Disorder who presents for follow up appointment.      Plan at last appointment on 10/11/2024:  Patient plans to try Xanax 0.25mg/0.25mg/1mg instead of 0.5mg/1mg to see if this improves fatigue.  Also sent message to Dr. Hyman about lost sleep study results and next step in diagnosis/treatment of KARIE.  Continue Abilify 5mg daily, Effexor 225mg daily, Buspar 30mg BID, Doxepin 20mg qHS.  Discussed with patient informed consent, risks versus benefits, alternative treatments, side effect profile and the inherent unpredictability of individual responses to these treatments.  The patient expresses understanding of the above and displays the capacity to agree with this  current plan.  Continue individual psychotherapy with Guerita Stevens LCSW.      History as told by patient:  Says she is doing pretty good.  New job is ok but also has another/side job.  Got this one before she got the new job.  So technically has 3 jobs.  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  Then a lot of specific teaching opportunities with presentations.  Trying not to let all of this get there - also has dissertation.  Dissertation is going well.  Tried Abilify at night but didn't go well - had a seizure dissociation episode.  So changed it back to morning.  Effexor is at night now and this combo is a lot better.  Has more energy.  Has not had any episodes.  Still feels like there is room for improvement with energy.  Does not have KARIE on sleep study.  Still likes her medication combination - feels much more stable.  Abilify has given her energy.      Previous Medication Trials: Ritalin for concentration, Prozac (helpful but stopped helping), Lexapro (?), Wellbutrin (worsened headaches, stopped due to seizures), Lunesta (not helpful even at 3mg), Trazodone (minimally helpful at 150mg qHS),     Medication side effects:  ?Fatigue  Medication compliance:  Yes    PSYCHIATRIC REVIEW OF SYSTEMS:  Trouble with sleep:  Improved on medication  Appetite changes:  Denies  Weight changes:  Gain slowly over time  Lack of energy:  Yes but improved some  Anhedonia:  Improved  Somatic symptoms:  Denies  Libido:  Not discussed  Anxiety/panic:  Yes, chronic  Guilty/hopeless:  Denies  Self-injurious behavior/risky behavior:  Denies  Any drugs:  Denies  Alcohol:  Denies     MEDICAL REVIEW OF SYSTEMS:  Complete review of systems performed covering Constitutional, Musculoskeletal, Neurologic.  All systems negative except for that covered in HPI.    PAST PSYCHIATRIC, MEDICAL, AND SOCIAL HISTORY REVIEWED  The patient's past medical,  family and social history have been reviewed and updated as appropriate within the electronic medical record - see encounter notes.    MEDICATIONS:    Current Outpatient Medications:     ALPRAZolam (XANAX) 0.5 MG tablet, Take 1 tablet (0.5mg) by mouth in the morning and 2 tablets (1mg) before bed., Disp: 90 tablet, Rfl: 5    ARIPiprazole (ABILIFY) 5 MG Tab, TAKE 1 TABLET BY MOUTH EVERY DAY, Disp: 90 tablet, Rfl: 3    atogepant (QULIPTA) 60 mg Tab, Take 1 tablet (60 mg total) by mouth once daily., Disp: 30 tablet, Rfl: 6    busPIRone (BUSPAR) 30 MG Tab, Take 1 tablet (30 mg total) by mouth 2 (two) times daily., Disp: 180 tablet, Rfl: 11    doxepin (SINEQUAN) 10 MG capsule, Take 2 capsules (20 mg total) by mouth every evening., Disp: 180 capsule, Rfl: 3    etodolac (LODINE) 500 MG tablet, TAKE 1 TABLET (500 MG TOTAL) BY MOUTH 2 (TWO) TIMES DAILY AS NEEDED (MIGRAINE)., Disp: 20 tablet, Rfl: 12    ibuprofen (ADVIL,MOTRIN) 800 MG tablet, Take 1 tablet (800 mg total) by mouth 2 (two) times daily with meals., Disp: 60 tablet, Rfl: 1    promethazine (PHENERGAN) 25 MG tablet, Take 1 tablet (25 mg total) by mouth every 6 (six) hours as needed for Nausea., Disp: 40 tablet, Rfl: 0    tiZANidine (ZANAFLEX) 2 MG tablet, Take 1 tablet (2 mg total) by mouth nightly as needed (muscle spasms/neck pain)., Disp: 90 tablet, Rfl: 1    topiramate (TOPAMAX) 100 MG tablet, Take 1 tablet (100 mg total) by mouth every evening. (Take 1 tablet nightly for 2 weeks. In 14 days, if tolerating, take with 50mg tablet to equal 150mg nightly.), Disp: 90 tablet, Rfl: 3    topiramate (TOPAMAX) 50 MG tablet, Take 1 tablet (50 mg total) by mouth every evening., Disp: 90 tablet, Rfl: 3    venlafaxine (EFFEXOR-XR) 150 MG Cp24, Take 1 capsule (150 mg total) by mouth once daily. Take with 75mg capsule to equal 225mg daily., Disp: 30 capsule, Rfl: 11    venlafaxine (EFFEXOR-XR) 75 MG 24 hr capsule, Take 1 capsule (75 mg total) by mouth once daily. Take with  "150mg capsule to equal 225mg daily., Disp: 30 capsule, Rfl: 11    ZOLMitriptan (ZOMIG-ZMT) 5 MG disintegrating tablet, TAKE 1 TABLET BY MOUTH AS NEEDED FOR MIGRAINE., Disp: 9 tablet, Rfl: 12    ZOLMitriptan (ZOMIG-ZMT) 5 MG disintegrating tablet, DISSOLVE 1 TABLET BY MOUTH AS NEEDED FOR MIGRAINE., Disp: 6 tablet, Rfl: 12    ALLERGIES:  Review of patient's allergies indicates:   Allergen Reactions    Codeine Itching and Other (See Comments)    Morphine Itching and Other (See Comments)       PSYCHIATRIC EXAM:  There were no vitals filed for this visit.  Appearance:  Well groomed, appearing healthy and of stated age  Behavior:  Cooperative, pleasant, no psychomotor agitation or retardation  Speech:  Normal rate, rhythm, prosody, and volume  Mood:  "Pretty good"  Affect:  Euthymic  Thought Process:  Linear, logical, goal directed  Thought Content:  Negative for suicidal ideation, homicidal ideation, delusions or hallucinations.  Associations:  Intact  Memory:  Grossly Intact  Level of Consciousness/Orientation:  Grossly intact  Fund of Knowledge:  Good  Attention:  Good  Language:  Fluent, able to name abstract and concrete objects  Insight:  Good  Judgment:  Intact  Psychomotor signs:  No involuntary movements of face  Gait:  Unable to assess via virtual visit      RELEVANT LABS/STUDIES:    Lab Results   Component Value Date    WBC 6.86 10/09/2023    HGB 11.6 (L) 10/09/2023    HCT 38.7 10/09/2023    MCV 92 10/09/2023     10/09/2023     BMP  Lab Results   Component Value Date     10/09/2023    K 4.2 10/09/2023     10/09/2023    CO2 21 (L) 10/09/2023    BUN 14 10/09/2023    CREATININE 0.8 10/09/2023    CALCIUM 9.8 10/09/2023    ANIONGAP 11 10/09/2023    ESTGFRAFRICA >60.0 07/28/2022    EGFRNONAA >60.0 07/28/2022     Lab Results   Component Value Date    ALT 18 10/09/2023    AST 14 10/09/2023    ALKPHOS 93 10/09/2023    BILITOT 0.2 10/09/2023     Lab Results   Component Value Date    TSH 0.539 " 10/09/2023     Lab Results   Component Value Date    HGBA1C 5.8 (H) 10/09/2023       IMPRESSION:    Rosalina Saavedra is a 34 y.o. female with history of MDD, JESUS, and Social Anxiety Disorder who presents for follow up appointment.    Status/Progress:  Based on the examination today, the patient's problem(s) is/are adequately but not ideally controlled.  New problems have not been presented today.    Risk Parameters:  Patient reports no suicidal ideation  Patient reports no homicidal ideation  Patient reports no self-injurious behavior  Patient reports no violent behavior      DIAGNOSES:    ICD-10-CM ICD-9-CM   1. JESUS (generalized anxiety disorder)  F41.1 300.02   2. Functional neurological symptom disorder with mixed symptoms  F44.7 300.11   3. Psychogenic nonepileptic seizure  F44.5 300.11   4. Social anxiety disorder  F40.10 300.23   5. Recurrent major depressive disorder, in partial remission  F33.41 296.35   6. Insomnia due to medical condition  G47.01 327.01   7. Fatigue, unspecified type  R53.83 780.79       PLAN:  Patient plans to try Xanax 0.25mg/0.25mg/1mg instead of 0.5mg/1mg to see if this improves fatigue.  If this is not helpful, could try low doses of Buspar during the day with higher dose at night to see if Buspar is causing fatigue.    Continue Abilify 5mg daily, Effexor 225mg daily, Buspar 30mg BID, Doxepin 20mg qHS.  Discussed with patient informed consent, risks versus benefits, alternative treatments, side effect profile and the inherent unpredictability of individual responses to these treatments.  The patient expresses understanding of the above and displays the capacity to agree with this current plan.  Continue individual psychotherapy with Guerita Stevens LCSW.      RETURN TO CLINIC:  Follow up in about 4 weeks (around 12/23/2024).   *In person before March

## 2025-01-02 ENCOUNTER — OFFICE VISIT (OUTPATIENT)
Dept: PSYCHIATRY | Facility: CLINIC | Age: 35
End: 2025-01-02
Payer: COMMERCIAL

## 2025-01-02 DIAGNOSIS — F99 INSOMNIA DUE TO OTHER MENTAL DISORDER: ICD-10-CM

## 2025-01-02 DIAGNOSIS — F33.0 MILD EPISODE OF RECURRENT MAJOR DEPRESSIVE DISORDER: ICD-10-CM

## 2025-01-02 DIAGNOSIS — F51.05 INSOMNIA DUE TO OTHER MENTAL DISORDER: ICD-10-CM

## 2025-01-02 DIAGNOSIS — F41.1 GAD (GENERALIZED ANXIETY DISORDER): Primary | ICD-10-CM

## 2025-01-02 DIAGNOSIS — F44.7 FUNCTIONAL NEUROLOGICAL SYMPTOM DISORDER WITH MIXED SYMPTOMS: ICD-10-CM

## 2025-01-02 NOTE — PROGRESS NOTES
OUTPATIENT PSYCHIATRY RETURN VISIT    ENCOUNTER DATE:  1/2/2025  SITE:  [facility], [facility]  LENGTH OF SESSION:  16 minutes    The patient location is:  [place], not in a healthcare facility  Visit type:  audiovisual    Face to Face time with patient:  16 minutes  20 minutes of total time spent on the encounter, which includes face to face time and non-face to face time preparing to see the patient (eg, review of tests), Obtaining and/or reviewing separately obtained history, Documenting clinical information in the electronic or other health record, Independently interpreting results (not separately reported) and communicating results to the patient/family/caregiver, or Care coordination (not separately reported).     Each patient to whom he or she provides medical services by telemedicine is:  (1) informed of the relationship between the physician and patient and the respective role of any other health care provider with respect to management of the patient; and (2) notified that he or she may decline to receive medical services by telemedicine and may withdraw from such care at any time.    CHIEF COMPLAINT:  Stress      HISTORY OF PRESENTING ILLNESS:  Rosalina Saavedra is a 34 y.o. female with history of MDD, JESUS, and Social Anxiety Disorder who presents for follow up appointment.      Plan at last appointment on 11/25/2024:  Patient plans to try Xanax 0.25mg/0.25mg/1mg instead of 0.5mg/1mg to see if this improves fatigue.  If this is not helpful, could try low doses of Buspar during the day with higher dose at night to see if Buspar is causing fatigue.    Continue Abilify 5mg daily, Effexor 225mg daily, Buspar 30mg BID, Doxepin 20mg qHS.  Discussed with patient informed consent, risks versus benefits, alternative treatments, side effect profile and the inherent unpredictability of individual responses to these treatments.  The patient expresses understanding of the above and displays the  capacity to agree with this current plan.  Continue individual psychotherapy with Guerita Stevens LCSW.      History as told by patient:  Not too happy with her job and everything in general right now.  Trying to find a new job.  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  A cycle of pressure of not being able to do what she wants.  Works on doing things she finds enjoyable.  She will do them and feel ok and then suddenly feels she can get back into a dark place.  Not sure what triggers that.  Feels like it comes out of the blue but not sure if its work related.  Starts dreading going back to work Saturday and Sunday.  Feels like right now she has the dread all week of going back to work.  Monday night she couldn't sleep - it was bad, a lot of negative feelings, feeling trapped.  Plan was to get a lot done with dissertation the last 2 weeks but had some parts done and then did them all over again - a perfectionist.  So when she feels down its about where she is in life.  Dissertation is 1 year off if she was stable but worries it will be longer.  Feels she needs to tolerate job for another year but not sure if she can stay.  Does need the data from her current job.  Currently taking Abilify and Effexor in the morning.  Xanax is still 0.5mg/1mg.  Taking Buspar BID.  Seeing Guerita every week.  Feels like fatigue has improved.      Medication side effects:  Denies  Medication compliance:  Yes    PSYCHIATRIC REVIEW OF SYSTEMS:  Trouble with sleep:  Sometimes due to stress, however, improved on medication  Appetite changes:  Denies  Weight changes:  Gain slowly over time  Lack of energy:  Sometimes but improved  Anhedonia:  Improved  Somatic symptoms:  Denies  Libido:  Not discussed  Anxiety/panic:  Yes, chronic - mostly situational  Guilty/hopeless:  Denies  Self-injurious behavior/risky behavior:  Denies  Any drugs:   Denies  Alcohol:  Denies     MEDICAL REVIEW OF SYSTEMS:  Complete review of systems performed covering Constitutional, Musculoskeletal, Neurologic.  All systems negative except for that covered in HPI.    PAST PSYCHIATRIC, MEDICAL, AND SOCIAL HISTORY REVIEWED  The patient's past medical, family and social history have been reviewed and updated as appropriate within the electronic medical record - see encounter notes.    MEDICATIONS:    Current Outpatient Medications:     ALPRAZolam (XANAX) 0.5 MG tablet, Take 1 tablet (0.5mg) by mouth in the morning and 2 tablets (1mg) before bed., Disp: 90 tablet, Rfl: 5    ARIPiprazole (ABILIFY) 5 MG Tab, TAKE 1 TABLET BY MOUTH EVERY DAY, Disp: 90 tablet, Rfl: 3    atogepant (QULIPTA) 60 mg Tab, Take 1 tablet (60 mg total) by mouth once daily., Disp: 30 tablet, Rfl: 6    busPIRone (BUSPAR) 30 MG Tab, Take 1 tablet (30 mg total) by mouth 2 (two) times daily., Disp: 180 tablet, Rfl: 11    doxepin (SINEQUAN) 10 MG capsule, Take 2 capsules (20 mg total) by mouth every evening., Disp: 180 capsule, Rfl: 3    etodolac (LODINE) 500 MG tablet, TAKE 1 TABLET (500 MG TOTAL) BY MOUTH 2 (TWO) TIMES DAILY AS NEEDED (MIGRAINE)., Disp: 20 tablet, Rfl: 12    ibuprofen (ADVIL,MOTRIN) 800 MG tablet, Take 1 tablet (800 mg total) by mouth 2 (two) times daily with meals., Disp: 60 tablet, Rfl: 1    promethazine (PHENERGAN) 25 MG tablet, Take 1 tablet (25 mg total) by mouth every 6 (six) hours as needed for Nausea., Disp: 40 tablet, Rfl: 0    topiramate (TOPAMAX) 100 MG tablet, Take 1 tablet (100 mg total) by mouth every evening. (Take 1 tablet nightly for 2 weeks. In 14 days, if tolerating, take with 50mg tablet to equal 150mg nightly.), Disp: 90 tablet, Rfl: 3    topiramate (TOPAMAX) 50 MG tablet, Take 1 tablet (50 mg total) by mouth every evening., Disp: 90 tablet, Rfl: 3    venlafaxine (EFFEXOR-XR) 150 MG Cp24, Take 1 capsule (150 mg total) by mouth once daily. Take with 75mg capsule to equal 225mg  "daily., Disp: 30 capsule, Rfl: 11    venlafaxine (EFFEXOR-XR) 75 MG 24 hr capsule, Take 1 capsule (75 mg total) by mouth once daily. Take with 150mg capsule to equal 225mg daily., Disp: 30 capsule, Rfl: 11    ZOLMitriptan (ZOMIG-ZMT) 5 MG disintegrating tablet, TAKE 1 TABLET BY MOUTH AS NEEDED FOR MIGRAINE., Disp: 9 tablet, Rfl: 12    ZOLMitriptan (ZOMIG-ZMT) 5 MG disintegrating tablet, DISSOLVE 1 TABLET BY MOUTH AS NEEDED FOR MIGRAINE., Disp: 6 tablet, Rfl: 12    ALLERGIES:  Review of patient's allergies indicates:   Allergen Reactions    Codeine Itching and Other (See Comments)    Morphine Itching and Other (See Comments)       PSYCHIATRIC EXAM:  There were no vitals filed for this visit.  Appearance:  Well groomed, appearing healthy and of stated age  Behavior:  Cooperative, pleasant, no psychomotor agitation or retardation  Speech:  Normal rate, rhythm, prosody, and volume  Mood:  "Not too happy"  Affect:  Euthymic  Thought Process:  Linear, logical, goal directed  Thought Content:  Negative for suicidal ideation, homicidal ideation, delusions or hallucinations.  Associations:  Intact  Memory:  Grossly Intact  Level of Consciousness/Orientation:  Grossly intact  Fund of Knowledge:  Good  Attention:  Good  Language:  Fluent, able to name abstract and concrete objects  Insight:  Good  Judgment:  Intact  Psychomotor signs:  No involuntary movements of face  Gait:  Unable to assess via virtual visit      RELEVANT LABS/STUDIES:    Lab Results   Component Value Date    WBC 6.86 10/09/2023    HGB 11.6 (L) 10/09/2023    HCT 38.7 10/09/2023    MCV 92 10/09/2023     10/09/2023     BMP  Lab Results   Component Value Date     10/09/2023    K 4.2 10/09/2023     10/09/2023    CO2 21 (L) 10/09/2023    BUN 14 10/09/2023    CREATININE 0.8 10/09/2023    CALCIUM 9.8 10/09/2023    ANIONGAP 11 10/09/2023    ESTGFRAFRICA >60.0 07/28/2022    EGFRNONAA >60.0 07/28/2022     Lab Results   Component Value Date    ALT 18 " 10/09/2023    AST 14 10/09/2023    ALKPHOS 93 10/09/2023    BILITOT 0.2 10/09/2023     Lab Results   Component Value Date    TSH 0.539 10/09/2023     Lab Results   Component Value Date    HGBA1C 5.8 (H) 10/09/2023       IMPRESSION:    Rosalina Saavedra is a 34 y.o. female with history of MDD, JESUS, and Social Anxiety Disorder who presents for follow up appointment.    Status/Progress:  Based on the examination today, the patient's problem(s) is/are inadequately controlled.  New problems have not been presented today.    Risk Parameters:  Patient reports no suicidal ideation  Patient reports no homicidal ideation  Patient reports no self-injurious behavior  Patient reports no violent behavior      DIAGNOSES:    ICD-10-CM ICD-9-CM   1. JESUS (generalized anxiety disorder)  F41.1 300.02   2. Mild episode of recurrent major depressive disorder  F33.0 296.31   3. Functional neurological symptom disorder with mixed symptoms  F44.7 300.11   4. Insomnia due to other mental disorder  F51.05 300.9    F99 327.02         PLAN:  Patient of patient's current stress and unhappiness with life is situational (job, dissertation).  No medication changes today - she will focus on therapy for now.    Continue Abilify 5mg every morning, Effexor 225mg every morning, Buspar 30mg BID, Doxepin 20mg qHS, and Xanax 0.5mg/1mg.    Discussed with patient informed consent, risks versus benefits, alternative treatments, side effect profile and the inherent unpredictability of individual responses to these treatments.  The patient expresses understanding of the above and displays the capacity to agree with this current plan.  Continue individual psychotherapy with Guerita Stevens LCSW.      RETURN TO CLINIC:  Follow up in 2 months (on 3/5/2025). In person

## 2025-02-04 ENCOUNTER — PATIENT MESSAGE (OUTPATIENT)
Dept: PSYCHIATRY | Facility: CLINIC | Age: 35
End: 2025-02-04
Payer: COMMERCIAL

## 2025-02-12 ENCOUNTER — OFFICE VISIT (OUTPATIENT)
Dept: PSYCHIATRY | Facility: CLINIC | Age: 35
End: 2025-02-12
Payer: COMMERCIAL

## 2025-02-12 DIAGNOSIS — F41.1 GAD (GENERALIZED ANXIETY DISORDER): ICD-10-CM

## 2025-02-12 DIAGNOSIS — F51.05 INSOMNIA DUE TO OTHER MENTAL DISORDER: ICD-10-CM

## 2025-02-12 DIAGNOSIS — F44.7 FUNCTIONAL NEUROLOGICAL SYMPTOM DISORDER WITH MIXED SYMPTOMS: ICD-10-CM

## 2025-02-12 DIAGNOSIS — F33.1 MODERATE EPISODE OF RECURRENT MAJOR DEPRESSIVE DISORDER: Primary | ICD-10-CM

## 2025-02-12 DIAGNOSIS — F99 INSOMNIA DUE TO OTHER MENTAL DISORDER: ICD-10-CM

## 2025-02-12 RX ORDER — ARIPIPRAZOLE 2 MG/1
2 TABLET ORAL DAILY
Qty: 30 TABLET | Refills: 11 | Status: SHIPPED | OUTPATIENT
Start: 2025-02-12 | End: 2026-02-12

## 2025-02-12 NOTE — PROGRESS NOTES
OUTPATIENT PSYCHIATRY RETURN VISIT    ENCOUNTER DATE:  2/18/2025  SITE:  [facility], [facility]  LENGTH OF SESSION:  20 minutes    The patient location is:  [place], not in a healthcare facility  Visit type:  audiovisual    Face to Face time with patient:  20 minutes  25 minutes of total time spent on the encounter, which includes face to face time and non-face to face time preparing to see the patient (eg, review of tests), Obtaining and/or reviewing separately obtained history, Documenting clinical information in the electronic or other health record, Independently interpreting results (not separately reported) and communicating results to the patient/family/caregiver, or Care coordination (not separately reported).     Each patient to whom he or she provides medical services by telemedicine is:  (1) informed of the relationship between the physician and patient and the respective role of any other health care provider with respect to management of the patient; and (2) notified that he or she may decline to receive medical services by telemedicine and may withdraw from such care at any time.    CHIEF COMPLAINT:  Depression and Anxiety      HISTORY OF PRESENTING ILLNESS:  Rosalina Saavedra is a 35 y.o. female with history of MDD, JESUS, and Social Anxiety Disorder who presents for follow up appointment.      Plan at last appointment on 1/2/2025:  Patient of patient's current stress and unhappiness with life is situational (job, dissertation).  No medication changes today - she will focus on therapy for now.    Continue Abilify 5mg every morning, Effexor 225mg every morning, Buspar 30mg BID, Doxepin 20mg qHS, and Xanax 0.5mg/1mg.    Discussed with patient informed consent, risks versus benefits, alternative treatments, side effect profile and the inherent unpredictability of individual responses to these treatments.  The patient expresses understanding of the above and displays the capacity to agree  with this current plan.  Continue individual psychotherapy with Guerita Stevens LCSW.      History as told by patient:  Didn't go to work today - wasn't feeling well.  Has had bad migraine and exhausted.  Has been putting everything on dissertation - main escape route form current job.  Found out that previous advisor didn't tell her there is a time limit on her dissertation.  Sometimes she will feel ok and then she will spiral and feel really down all of a sudden and dark for no reason.  And then can't get out of it.  It will last for hours or until the next day.  Will hear a voice - thinks its her own voice - will tell her all the bad things about herself, all the bad characteristics about herself.  Has had negative thoughts about herself since high school or college.  Was really hard on herself as a kid - grades, basketball, put a lot of pressure on herself.  She and Guerita working on positive talk, self-affirmations.      Medication side effects:  Denies  Medication compliance:  Yes    PSYCHIATRIC REVIEW OF SYSTEMS:  Trouble with sleep:  Sometimes due to stress, however, improved on medication  Appetite changes:  Denies  Weight changes:  Gain slowly over time  Lack of energy:  Sometimes   Anhedonia:  Yes  Somatic symptoms:  Denies  Libido:  Not discussed  Anxiety/panic:  Yes, chronic   Guilty/hopeless:  Denies  Self-injurious behavior/risky behavior:  Denies  Any drugs:  Denies  Alcohol:  Denies     MEDICAL REVIEW OF SYSTEMS:  Complete review of systems performed covering Constitutional, Musculoskeletal, Neurologic.  All systems negative except for that covered in HPI.    PAST PSYCHIATRIC, MEDICAL, AND SOCIAL HISTORY REVIEWED  The patient's past medical, family and social history have been reviewed and updated as appropriate within the electronic medical record - see encounter notes.    MEDICATIONS:    Current Outpatient Medications:     ALPRAZolam (XANAX) 0.5 MG tablet, Take 1 tablet (0.5mg) by mouth in the  morning and 2 tablets (1mg) before bed., Disp: 90 tablet, Rfl: 5    ARIPiprazole (ABILIFY) 2 MG Tab, Take 1 tablet (2 mg total) by mouth once daily. Take with 5mg tablet to equal 7mg daily., Disp: 30 tablet, Rfl: 11    ARIPiprazole (ABILIFY) 5 MG Tab, TAKE 1 TABLET BY MOUTH EVERY DAY, Disp: 90 tablet, Rfl: 3    atogepant (QULIPTA) 60 mg Tab, Take 1 tablet (60 mg total) by mouth once daily., Disp: 30 tablet, Rfl: 6    busPIRone (BUSPAR) 30 MG Tab, Take 1 tablet (30 mg total) by mouth 2 (two) times daily., Disp: 180 tablet, Rfl: 11    doxepin (SINEQUAN) 10 MG capsule, Take 2 capsules (20 mg total) by mouth every evening., Disp: 180 capsule, Rfl: 3    etodolac (LODINE) 500 MG tablet, TAKE 1 TABLET (500 MG TOTAL) BY MOUTH 2 (TWO) TIMES DAILY AS NEEDED (MIGRAINE)., Disp: 20 tablet, Rfl: 12    ibuprofen (ADVIL,MOTRIN) 800 MG tablet, Take 1 tablet (800 mg total) by mouth 2 (two) times daily with meals., Disp: 60 tablet, Rfl: 1    promethazine (PHENERGAN) 25 MG tablet, Take 1 tablet (25 mg total) by mouth every 6 (six) hours as needed for Nausea., Disp: 40 tablet, Rfl: 0    topiramate (TOPAMAX) 100 MG tablet, Take 1 tablet (100 mg total) by mouth every evening. (Take 1 tablet nightly for 2 weeks. In 14 days, if tolerating, take with 50mg tablet to equal 150mg nightly.), Disp: 90 tablet, Rfl: 3    topiramate (TOPAMAX) 50 MG tablet, Take 1 tablet (50 mg total) by mouth every evening., Disp: 90 tablet, Rfl: 3    venlafaxine (EFFEXOR-XR) 150 MG Cp24, Take 1 capsule (150 mg total) by mouth once daily. Take with 75mg capsule to equal 225mg daily., Disp: 30 capsule, Rfl: 11    venlafaxine (EFFEXOR-XR) 75 MG 24 hr capsule, Take 1 capsule (75 mg total) by mouth once daily. Take with 150mg capsule to equal 225mg daily., Disp: 30 capsule, Rfl: 11    ZOLMitriptan (ZOMIG-ZMT) 5 MG disintegrating tablet, TAKE 1 TABLET BY MOUTH AS NEEDED FOR MIGRAINE., Disp: 9 tablet, Rfl: 12    ZOLMitriptan (ZOMIG-ZMT) 5 MG disintegrating tablet,  "DISSOLVE 1 TABLET BY MOUTH AS NEEDED FOR MIGRAINE., Disp: 6 tablet, Rfl: 12    ALLERGIES:  Review of patient's allergies indicates:   Allergen Reactions    Codeine Itching and Other (See Comments)    Morphine Itching and Other (See Comments)       PSYCHIATRIC EXAM:  There were no vitals filed for this visit.  Appearance:  Well groomed, appearing healthy and of stated age  Behavior:  Cooperative, pleasant, no psychomotor agitation or retardation  Speech:  Normal rate, rhythm, prosody, and volume  Mood:  "Not very good"  Affect:  Constricted  Thought Process:  Linear, logical, goal directed  Thought Content:  Negative for suicidal ideation, homicidal ideation, delusions or hallucinations.  Associations:  Intact  Memory:  Grossly Intact  Level of Consciousness/Orientation:  Grossly intact  Fund of Knowledge:  Good  Attention:  Good  Language:  Fluent, able to name abstract and concrete objects  Insight:  Good  Judgment:  Intact  Psychomotor signs:  No involuntary movements of face  Gait:  Unable to assess via virtual visit      RELEVANT LABS/STUDIES:    Lab Results   Component Value Date    WBC 6.86 10/09/2023    HGB 11.6 (L) 10/09/2023    HCT 38.7 10/09/2023    MCV 92 10/09/2023     10/09/2023     BMP  Lab Results   Component Value Date     10/09/2023    K 4.2 10/09/2023     10/09/2023    CO2 21 (L) 10/09/2023    BUN 14 10/09/2023    CREATININE 0.8 10/09/2023    CALCIUM 9.8 10/09/2023    ANIONGAP 11 10/09/2023    ESTGFRAFRICA >60.0 07/28/2022    EGFRNONAA >60.0 07/28/2022     Lab Results   Component Value Date    ALT 18 10/09/2023    AST 14 10/09/2023    ALKPHOS 93 10/09/2023    BILITOT 0.2 10/09/2023     Lab Results   Component Value Date    TSH 0.539 10/09/2023     Lab Results   Component Value Date    HGBA1C 5.8 (H) 10/09/2023       IMPRESSION:    Rosalina Saavedra is a 35 y.o. female with history of MDD, JESUS, and Social Anxiety Disorder who presents for follow up appointment.    Status/Progress:  " Based on the examination today, the patient's problem(s) is/are inadequately controlled.  New problems have not been presented today.    Risk Parameters:  Patient reports no suicidal ideation  Patient reports no homicidal ideation  Patient reports no self-injurious behavior  Patient reports no violent behavior      DIAGNOSES:    ICD-10-CM ICD-9-CM   1. Moderate episode of recurrent major depressive disorder  F33.1 296.32   2. Functional neurological symptom disorder with mixed symptoms  F44.7 300.11   3. JESUS (generalized anxiety disorder)  F41.1 300.02   4. Insomnia due to other mental disorder  F51.05 300.9    F99 327.02       PLAN:  Increase Abilify to 7mg daily for depression.    Continue Effexor 225mg every morning for depression and anxiety.  Continue Buspar 30mg BID and Xanax 0.5mg/1mg for anxiety.  Continue Doxepin 20mg qHS for insomnia.  Discussed with patient informed consent, risks versus benefits, alternative treatments, side effect profile and the inherent unpredictability of individual responses to these treatments.  The patient expresses understanding of the above and displays the capacity to agree with this current plan.  Continue individual psychotherapy with Guerita Stevens LCSW.  I am reaching out to Guerita to discuss recommendation for DBT groups or IOP in the evenings.      RETURN TO CLINIC:  Follow up in 3 weeks (on 3/5/2025). In person

## 2025-02-18 ENCOUNTER — PATIENT MESSAGE (OUTPATIENT)
Dept: PSYCHIATRY | Facility: CLINIC | Age: 35
End: 2025-02-18
Payer: COMMERCIAL

## 2025-03-05 ENCOUNTER — OFFICE VISIT (OUTPATIENT)
Dept: PSYCHIATRY | Facility: CLINIC | Age: 35
End: 2025-03-05
Payer: COMMERCIAL

## 2025-03-05 VITALS
WEIGHT: 208.56 LBS | DIASTOLIC BLOOD PRESSURE: 84 MMHG | HEART RATE: 101 BPM | BODY MASS INDEX: 33.66 KG/M2 | SYSTOLIC BLOOD PRESSURE: 123 MMHG

## 2025-03-05 DIAGNOSIS — F44.7 FUNCTIONAL NEUROLOGICAL SYMPTOM DISORDER WITH MIXED SYMPTOMS: ICD-10-CM

## 2025-03-05 DIAGNOSIS — F41.1 GAD (GENERALIZED ANXIETY DISORDER): Primary | ICD-10-CM

## 2025-03-05 DIAGNOSIS — F51.05 INSOMNIA DUE TO OTHER MENTAL DISORDER: ICD-10-CM

## 2025-03-05 DIAGNOSIS — F99 INSOMNIA DUE TO OTHER MENTAL DISORDER: ICD-10-CM

## 2025-03-05 DIAGNOSIS — F33.0 MILD EPISODE OF RECURRENT MAJOR DEPRESSIVE DISORDER: ICD-10-CM

## 2025-03-05 PROCEDURE — 99999 PR PBB SHADOW E&M-EST. PATIENT-LVL III: CPT | Mod: PBBFAC,,, | Performed by: INTERNAL MEDICINE

## 2025-03-05 PROCEDURE — 3074F SYST BP LT 130 MM HG: CPT | Mod: CPTII,S$GLB,, | Performed by: INTERNAL MEDICINE

## 2025-03-05 PROCEDURE — 1159F MED LIST DOCD IN RCRD: CPT | Mod: CPTII,S$GLB,, | Performed by: INTERNAL MEDICINE

## 2025-03-05 PROCEDURE — 99214 OFFICE O/P EST MOD 30 MIN: CPT | Mod: S$GLB,,, | Performed by: INTERNAL MEDICINE

## 2025-03-05 PROCEDURE — 3008F BODY MASS INDEX DOCD: CPT | Mod: CPTII,S$GLB,, | Performed by: INTERNAL MEDICINE

## 2025-03-05 PROCEDURE — 3079F DIAST BP 80-89 MM HG: CPT | Mod: CPTII,S$GLB,, | Performed by: INTERNAL MEDICINE

## 2025-03-05 PROCEDURE — 90833 PSYTX W PT W E/M 30 MIN: CPT | Mod: S$GLB,,, | Performed by: INTERNAL MEDICINE

## 2025-03-05 PROCEDURE — 1160F RVW MEDS BY RX/DR IN RCRD: CPT | Mod: CPTII,S$GLB,, | Performed by: INTERNAL MEDICINE

## 2025-03-05 NOTE — PROGRESS NOTES
OUTPATIENT PSYCHIATRY RETURN VISIT    ENCOUNTER DATE:  3/10/2025  SITE:  [facility], [facility]  LENGTH OF SESSION:  30 minutes    CHIEF COMPLAINT:  Mood      HISTORY OF PRESENTING ILLNESS:  Rosalina Saavedra is a 35 y.o. female with history of MDD, JESUS, and Social Anxiety Disorder who presents for follow up appointment.      Plan at last appointment on 2/12/2025:  Increase Abilify to 7mg daily for depression.    Continue Effexor 225mg every morning for depression and anxiety.  Continue Buspar 30mg BID and Xanax 0.5mg/1mg for anxiety.  Continue Doxepin 20mg qHS for insomnia.  Discussed with patient informed consent, risks versus benefits, alternative treatments, side effect profile and the inherent unpredictability of individual responses to these treatments.  The patient expresses understanding of the above and displays the capacity to agree with this current plan.  Continue individual psychotherapy with Guerita Stevens LCSW.  I am reaching out to Guerita to discuss recommendation for DBT groups or IOP in the evenings.      History as told by patient:  Extension until Spring of 2026.  A lot of stress at work.  She and Guerita working on stress management.  Focusing on Daron and things for him.  Audio sleep books to help her fall asleep - hasn't helped yet.  Working on affirmations - focusing on positive thoughts.  Doing these in the middle of the day and multiple times per day.  Has set alartm to remember affirmations and this really helps.  Still very stressed but has gotten better as well.  Increase in medication has helped.  Stress eating a lot.  Has gained weight.  Stress feeling like heart racing, sweating, a lot of worrying, sometimes not being able to focus.  Some sleep issues although medication is helping.  Feeling exhausted when she wakes up too.  Does think a lot of it is job stress but also life stress and needing to finish dissertation.  A lot of work load.  Used to exercise all the time.   Has to be at work before 7am.  Gets up at latest 5:30am.  Leaves house by 6am.  School ends 2:40pm.  Takes almost an hour to get back home.  Gets home about 3:45pm.  Has another job for the [employer] so has other work until 5:30pm.  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  Trying to push herself to graduate by the fall.      Psychotherapy:  Target symptoms: depression, anxiety , work stress  Why chosen therapy is appropriate versus another modality: relevant to diagnosis, patient responds to this modality  Outcome monitoring methods: self-report, observation  Therapeutic intervention type: supportive psychotherapy  Topics discussed/themes: work stress, building skills sets for symptom management, symptom recognition  The patient's response to the intervention is accepting. The patient's progress toward treatment goals is good.   Duration of intervention: 20 minutes.    Medication side effects:  Denies  Medication compliance:  Yes    PSYCHIATRIC REVIEW OF SYSTEMS:  Trouble with sleep:  Sometimes due to stress, however, improved on medication  Appetite changes:  Denies  Weight changes:  Gain slowly over time  Lack of energy:  Sometimes   Anhedonia:  Denies  Somatic symptoms:  Denies  Libido:  Not discussed  Anxiety/panic:  Stress  Guilty/hopeless:  Denies  Self-injurious behavior/risky behavior:  Denies  Any drugs:  Denies  Alcohol:  Denies     MEDICAL REVIEW OF SYSTEMS:  Complete review of systems performed covering Constitutional, Musculoskeletal, Neurologic.  All systems negative except for that covered in HPI.    PAST PSYCHIATRIC, MEDICAL, AND SOCIAL HISTORY REVIEWED  The patient's past medical, family and social history have been reviewed and updated as appropriate within the electronic medical record - see encounter notes.    MEDICATIONS:    Current Outpatient Medications:     ALPRAZolam (XANAX) 0.5 MG tablet, Take 1 tablet (0.5mg) by mouth in the morning and 2 tablets (1mg) before bed., Disp: 90 tablet,  Rfl: 5    ARIPiprazole (ABILIFY) 2 MG Tab, Take 1 tablet (2 mg total) by mouth once daily. Take with 5mg tablet to equal 7mg daily., Disp: 30 tablet, Rfl: 11    ARIPiprazole (ABILIFY) 5 MG Tab, TAKE 1 TABLET BY MOUTH EVERY DAY, Disp: 90 tablet, Rfl: 3    atogepant (QULIPTA) 60 mg Tab, Take 1 tablet (60 mg total) by mouth once daily., Disp: 30 tablet, Rfl: 6    busPIRone (BUSPAR) 30 MG Tab, Take 1 tablet (30 mg total) by mouth 2 (two) times daily., Disp: 180 tablet, Rfl: 11    doxepin (SINEQUAN) 10 MG capsule, Take 2 capsules (20 mg total) by mouth every evening., Disp: 180 capsule, Rfl: 3    etodolac (LODINE) 500 MG tablet, TAKE 1 TABLET (500 MG TOTAL) BY MOUTH 2 (TWO) TIMES DAILY AS NEEDED (MIGRAINE)., Disp: 20 tablet, Rfl: 12    ibuprofen (ADVIL,MOTRIN) 800 MG tablet, Take 1 tablet (800 mg total) by mouth 2 (two) times daily with meals., Disp: 60 tablet, Rfl: 1    promethazine (PHENERGAN) 25 MG tablet, Take 1 tablet (25 mg total) by mouth every 6 (six) hours as needed for Nausea., Disp: 40 tablet, Rfl: 0    topiramate (TOPAMAX) 100 MG tablet, Take 1 tablet (100 mg total) by mouth every evening. (Take 1 tablet nightly for 2 weeks. In 14 days, if tolerating, take with 50mg tablet to equal 150mg nightly.), Disp: 90 tablet, Rfl: 3    venlafaxine (EFFEXOR-XR) 150 MG Cp24, Take 1 capsule (150 mg total) by mouth once daily. Take with 75mg capsule to equal 225mg daily., Disp: 30 capsule, Rfl: 11    venlafaxine (EFFEXOR-XR) 75 MG 24 hr capsule, Take 1 capsule (75 mg total) by mouth once daily. Take with 150mg capsule to equal 225mg daily., Disp: 30 capsule, Rfl: 11    ZOLMitriptan (ZOMIG-ZMT) 5 MG disintegrating tablet, TAKE 1 TABLET BY MOUTH AS NEEDED FOR MIGRAINE., Disp: 9 tablet, Rfl: 12    ZOLMitriptan (ZOMIG-ZMT) 5 MG disintegrating tablet, DISSOLVE 1 TABLET BY MOUTH AS NEEDED FOR MIGRAINE., Disp: 6 tablet, Rfl: 12    ALLERGIES:  Review of patient's allergies indicates:   Allergen Reactions    Codeine Itching and  "Other (See Comments)    Morphine Itching and Other (See Comments)       PSYCHIATRIC EXAM:  Vitals:    03/05/25 1437   BP: 123/84   Pulse: 101   Weight: 94.6 kg (208 lb 8.9 oz)     Appearance:  Well groomed, appearing healthy and of stated age  Behavior:  Cooperative, pleasant, no psychomotor agitation or retardation  Speech:  Normal rate, rhythm, prosody, and volume  Mood:  "Ok"  Affect:  Euthymic  Thought Process:  Linear, logical, goal directed  Thought Content:  Negative for suicidal ideation, homicidal ideation, delusions or hallucinations.  Associations:  Intact  Memory:  Grossly Intact  Level of Consciousness/Orientation:  Grossly intact  Fund of Knowledge:  Good  Attention:  Good  Language:  Fluent, able to name abstract and concrete objects  Insight:  Good  Judgment:  Intact  Psychomotor signs:  No involuntary movements or tremor  Gait:  Normal      RELEVANT LABS/STUDIES:    Lab Results   Component Value Date    WBC 6.86 10/09/2023    HGB 11.6 (L) 10/09/2023    HCT 38.7 10/09/2023    MCV 92 10/09/2023     10/09/2023     BMP  Lab Results   Component Value Date     10/09/2023    K 4.2 10/09/2023     10/09/2023    CO2 21 (L) 10/09/2023    BUN 14 10/09/2023    CREATININE 0.8 10/09/2023    CALCIUM 9.8 10/09/2023    ANIONGAP 11 10/09/2023    ESTGFRAFRICA >60.0 07/28/2022    EGFRNONAA >60.0 07/28/2022     Lab Results   Component Value Date    ALT 18 10/09/2023    AST 14 10/09/2023    ALKPHOS 93 10/09/2023    BILITOT 0.2 10/09/2023     Lab Results   Component Value Date    TSH 0.539 10/09/2023     Lab Results   Component Value Date    HGBA1C 5.8 (H) 10/09/2023       IMPRESSION:    Rosalina Saavedra is a 35 y.o. female with history of MDD, JESUS, and Social Anxiety Disorder who presents for follow up appointment.    Status/Progress:  Based on the examination today, the patient's problem(s) is/are adequately but not ideally controlled.  New problems have not been presented today.    Risk " Parameters:  Patient reports no suicidal ideation  Patient reports no homicidal ideation  Patient reports no self-injurious behavior  Patient reports no violent behavior      DIAGNOSES:    ICD-10-CM ICD-9-CM   1. JESUS (generalized anxiety disorder)  F41.1 300.02   2. Mild episode of recurrent major depressive disorder  F33.0 296.31   3. Functional neurological symptom disorder with mixed symptoms  F44.7 300.11   4. Insomnia due to other mental disorder  F51.05 300.9    F99 327.02         PLAN:  Symptoms are relatively stable other than work stress.  Discussed ways to manage stress, however her lack of time is prohibiting her from doing that currently.  She is working on relaxation techniques and affirmations in therapy which are helpful.    Continue Effexor 225mg every morning for depression and anxiety.  Continue Abilify 7mg daily for augmentation for depression.    Continue Buspar 30mg BID and Xanax 0.5mg/1mg for anxiety.  Continue Doxepin 20mg qHS for insomnia.  Discussed with patient informed consent, risks versus benefits, alternative treatments, side effect profile and the inherent unpredictability of individual responses to these treatments.  The patient expresses understanding of the above and displays the capacity to agree with this current plan.  Continue individual psychotherapy with Guerita Stevens LCSW.      RETURN TO CLINIC:  Follow up in about 4 weeks (around 4/2/2025).

## 2025-03-07 ENCOUNTER — TELEPHONE (OUTPATIENT)
Dept: NEUROLOGY | Facility: CLINIC | Age: 35
End: 2025-03-07
Payer: COMMERCIAL

## 2025-03-07 NOTE — TELEPHONE ENCOUNTER
Called pt to inform them that the BOTOX appt I scheduled them for on 3/20 at 9am is a ghost appt to get pt authorization from insurance and not their real botox appt. Informed pt I will call them back once appt is authorized to schedule their real botox appt per Dr. Dorsey's schedule.

## 2025-03-10 ENCOUNTER — TELEPHONE (OUTPATIENT)
Dept: NEUROLOGY | Facility: CLINIC | Age: 35
End: 2025-03-10
Payer: COMMERCIAL

## 2025-03-10 ENCOUNTER — PATIENT MESSAGE (OUTPATIENT)
Dept: FAMILY MEDICINE | Facility: CLINIC | Age: 35
End: 2025-03-10
Payer: COMMERCIAL

## 2025-03-12 ENCOUNTER — TELEPHONE (OUTPATIENT)
Dept: NEUROLOGY | Facility: CLINIC | Age: 35
End: 2025-03-12
Payer: COMMERCIAL

## 2025-03-12 NOTE — TELEPHONE ENCOUNTER
Called pt to offer earlier botox appt on 3/14 at 3pm. Pt declined appt stating they wanted to stick with their original date

## 2025-03-20 ENCOUNTER — TELEPHONE (OUTPATIENT)
Dept: NEUROLOGY | Facility: CLINIC | Age: 35
End: 2025-03-20
Payer: COMMERCIAL

## 2025-03-20 ENCOUNTER — PROCEDURE VISIT (OUTPATIENT)
Dept: NEUROLOGY | Facility: CLINIC | Age: 35
End: 2025-03-20
Payer: COMMERCIAL

## 2025-03-20 VITALS
BODY MASS INDEX: 32.95 KG/M2 | HEIGHT: 66 IN | SYSTOLIC BLOOD PRESSURE: 128 MMHG | DIASTOLIC BLOOD PRESSURE: 85 MMHG | WEIGHT: 205 LBS | HEART RATE: 97 BPM

## 2025-03-20 DIAGNOSIS — G43.701 CHRONIC MIGRAINE W/O AURA, NOT INTRACTABLE, W STAT MIGR: ICD-10-CM

## 2025-03-20 DIAGNOSIS — G43.001 MIGRAINE WITHOUT AURA AND WITH STATUS MIGRAINOSUS, NOT INTRACTABLE: Primary | ICD-10-CM

## 2025-03-20 PROCEDURE — 64615 CHEMODENERV MUSC MIGRAINE: CPT | Mod: S$GLB,,, | Performed by: PSYCHIATRY & NEUROLOGY

## 2025-03-20 NOTE — PROCEDURES
Follow up:   Overall doing well     Prior note:   2 weeks of daily HA related to stress and school     Prior note:   Overall doing good  HA initially improved for a few weeks after the amitriptyline.  Now the frequency has returned to chronic migraine.    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   Still has freq HAs     Headache history:   Age of onset -  5   Location - occipital, temples   Nature of pain -  Stabbing, aching   Prodrome - no   Aura -  No   Duration of headache - > 4 hrs   Time to peak intensity - n/a   Pain scale - range of intensity -  9-10/10  Frequency -  15-20/month    Status Migrainosus history - yes   Time of day of most headaches- anytime      Associated symptoms with the headache:   Meningeal symptoms - photophobia, phonophobia, exercise intolerance +   Nausea/vomitting +   Nasal drainage   Visual blurriness   Pallor/flushing  Dizziness   Vertigo  Confusion  Impaired concentration +   Pain worsened with physical activity  +   Neck pain +      Cluster headache symptoms: none   Symptoms of increased intracranial pressure: none  Basilar migraine symptoms: none      Headache Triggers:  Stress, anxiety, emotional overload   H/o SI      Treatment history:  Resolution of headache with sleep - yes   Meds tried:     Failed:   prozac 40   effexor 150   Gabapentin 300 mg   mobic 15   namenda   nurtec   trokemdi   Diamox   imitrex shots  lamictal   BOTOX#1 6/26/23 - helped    BOTOX#2 9/22/23 - helped   BOTOX#3 12/18/23 - helped   BOTOX#4 3/28/24 - helped   BOTOX#5 6/16/24 - helped   BOTOX#6 11/7/24 - helped     10/21/21  Care Timeline     0000     EKG 12-LEAD   1228    Arrived   1308    Comprehensive metabolic panel        CBC auto differential    1321    POCT glucose   1340    CT Head Without Contrast   1341    POCT urine pregnancy   1400    EKG 12-lead   1422    metoclopramide HCl 10 mg   1424    ketorolac tromethamine 30 mg       diphenhydramine HCl 25 mg   1425    0.9 % sodium chloride 1000 mL   1546    haloperidol lactate 2.5 mg   1553    magnesium sulfate in water 2 g   1826    prochlorperazine edisylate 5 mg   1827    diphenhydramine HCl 25 mg   2027    Discharged         Headache risk factors:   H/o TBI  - concussion at age 16   H/o Meningitis  - no   F/h Aneurysms  - no      Review of Systems   Constitutional: Negative.    HENT: Negative.    Eyes: Negative.    Respiratory: Negative.    Cardiovascular: Negative.    Gastrointestinal: Negative.    Endocrine: Negative.    Genitourinary: Negative.    Musculoskeletal: Negative.    Integumentary:  Negative.   Allergic/Immunologic: Negative.    Neurological: Positive for headaches.   Hematological: Negative.    Psychiatric/Behavioral: Positive for dysphoric mood and sleep disturbance.          Objective:   Physical Exam  Unchanged          Assessment:     Chr Migraine     Impression:   No major branch stenosis/occlusion at the Elim IRA Christensen.  No aneurysm.   No evidence of venous sinus thrombosis or venous sinus stenosis..     Electronically signed by: Armani Lopez  Date:                                            11/24/2021  Time:                                           16:24     Impression:   No major branch stenosis/occlusion at the Elim IRA Christensen.  No aneurysm.   No evidence of venous sinus thrombosis or venous sinus stenosis..     Electronically signed by: Armani Lopez  Date:                                            11/24/2021  Time:                                           16:24     Impression:   Normal pre and postcontrast MR imaging appearance of the brain.     Electronically signed by: Armani  John  Date:                                            11/24/2021  Time:                                           16:51  Plan:       VYEPTI 300 mg IV (sept 2022 -)   Cont Qulipta 60 mg daily, discussed side effect   Breakthrough headache - etodolac, zomig   Dexamethasone 4 mg BID x 3 days PRN   Multiple alternative treatment options were offered to the patient  cont with psychiatrist   Cont Elavil   Continue Effexor 225mg daily, Buspar 15mg TID, and Klonopin 0.25-0.5mg daily PRN anxiety.      BOTOX treatment response:   Prior to initiating BOTOX, the patient had  18 migraine days per month on average. This meets criteria for chronic migraine.   After starting BOTOX, the patient experienced a reduction in  7 days per month   After starting BOTOX, the patient experienced a reduction in > 100 hours of migraine symptoms per month   After starting BOTOX, the patient experienced a 50% reduction in burden of migraine days per month   Based on this information, BOTOX is medically necessary for the management of the patient's chronic migraine.     BOTOX Injection intervals:   Patient reports a 'wearing off effect' prior to the subsequent BOTOX injections at 12 weeks. This occurs at week 10  Symptoms of this a 'wearing off effect' include - worsening of migraine headache frequency and intensity   Medications used during the 'wearing off effect' period include   Based on this information, it is medically necessary for the patient to receive BOTOX therapy at an interval of every 10 weeks for the management of chronic migraine.    BOTOX was performed as an indicated therapy for intractable chronic migraine headaches given that the patient failed > 3 prophylactic medications    Botulinum Toxin Injection Procedure   Pre-operative diagnosis: Chronic migraine   Post-operative diagnosis: Same   Procedure: Chemical neurolysis   After risks and benefits were explained including bleeding, infection, worsening of pain, damage to the  areas being injected, weakness of muscles, loss of muscle control, dysphagia if injecting the head or neck, facial droop if injecting the facial area, painful injection, allergic or other reaction to the medications being injected, and the failure of the procedure to help the problem, a signed consent was obtained.   The patient was placed in a comfortable area and the sites to be treated were identified.The area to be treated was prepped three times with alcohol and the alcohol allowed to dry. Next, a 30 gauge needle was used to inject the medication in the area to be treated.   Area(s) injected:   Total Botox use: 165 Units   Botox wastage: 35 Units   Injection sites:    muscle bilaterally ( a total of 10 units divided into 2 sites)   Procerus muscle (5 units)   Frontalis muscle bilaterally (a total of 20 units divided into 4 sites)   Temporalis muscle bilaterally (a total of 40 units divided into 8 sites)   Occipitalis muscle bilaterally (a total of 30 units divided into 6 sites + 10 units)   Cervical paraspinal muscles (a total of 20 units divided into 4 sites)   Trapezius muscle bilaterally (a total of 30 units divided into 6 sites)   Complications: none   RTC for the next Botox injection: 10 weeks   Procedures

## 2025-03-25 ENCOUNTER — CLINICAL SUPPORT (OUTPATIENT)
Dept: PSYCHIATRY | Facility: CLINIC | Age: 35
End: 2025-03-25
Payer: COMMERCIAL

## 2025-03-25 DIAGNOSIS — Z00.8 ENCOUNTER FOR PSYCHOLOGICAL EVALUATION: Primary | ICD-10-CM

## 2025-03-25 PROCEDURE — 99999 PR PBB SHADOW E&M-EST. PATIENT-LVL I: CPT | Mod: PBBFAC,,,

## 2025-04-02 ENCOUNTER — OFFICE VISIT (OUTPATIENT)
Dept: PSYCHIATRY | Facility: CLINIC | Age: 35
End: 2025-04-02
Payer: COMMERCIAL

## 2025-04-02 DIAGNOSIS — F51.05 INSOMNIA DUE TO OTHER MENTAL DISORDER: ICD-10-CM

## 2025-04-02 DIAGNOSIS — F99 INSOMNIA DUE TO OTHER MENTAL DISORDER: ICD-10-CM

## 2025-04-02 DIAGNOSIS — F41.1 GAD (GENERALIZED ANXIETY DISORDER): ICD-10-CM

## 2025-04-02 DIAGNOSIS — F33.2 SEVERE EPISODE OF RECURRENT MAJOR DEPRESSIVE DISORDER, WITHOUT PSYCHOTIC FEATURES: Primary | ICD-10-CM

## 2025-04-02 DIAGNOSIS — F44.7 FUNCTIONAL NEUROLOGICAL SYMPTOM DISORDER WITH MIXED SYMPTOMS: ICD-10-CM

## 2025-04-02 NOTE — PROGRESS NOTES
OUTPATIENT PSYCHIATRY RETURN VISIT    ENCOUNTER DATE:  4/2/2025  SITE:  [facility]  LENGTH OF SESSION:  35 minutes    The patient location is:  [place], not in a healthcare facility  Visit type:  audiovisual, then audio only    Face to Face time with patient:  35 minutes  50 minutes of total time spent on the encounter, which includes face to face time and non-face to face time preparing to see the patient (eg, review of tests), Obtaining and/or reviewing separately obtained history, Documenting clinical information in the electronic or other health record, Independently interpreting results (not separately reported) and communicating results to the patient/family/caregiver, or Care coordination (not separately reported).     Each patient to whom he or she provides medical services by telemedicine is:  (1) informed of the relationship between the physician and patient and the respective role of any other health care provider with respect to management of the patient; and (2) notified that he or she may decline to receive medical services by telemedicine and may withdraw from such care at any time.      CHIEF COMPLAINT:  Depression      HISTORY OF PRESENTING ILLNESS:  Rosalina Saavedra is a 35 y.o. female with history of MDD, JESUS, and Social Anxiety Disorder who presents for follow up appointment.  Mother sitting in the room with patient during appointment today.    Plan at last appointment on 3/5/2025:  Symptoms are relatively stable other than work stress.  Discussed ways to manage stress, however her lack of time is prohibiting her from doing that currently.  She is working on relaxation techniques and affirmations in therapy which are helpful.    Continue Effexor 225mg every morning for depression and anxiety.  Continue Abilify 7mg daily for augmentation for depression.    Continue Buspar 30mg BID and Xanax 0.5mg/1mg for anxiety.  Continue Doxepin 20mg qHS for insomnia.  Discussed  "with patient informed consent, risks versus benefits, alternative treatments, side effect profile and the inherent unpredictability of individual responses to these treatments.  The patient expresses understanding of the above and displays the capacity to agree with this current plan.  Continue individual psychotherapy with Guerita Stevens LCSW.      *Mother sent portal message today stating she was worried about patient because she said she wanted to die in setting of work stress.  Emergent virtual appointment scheduled for today.    History as told by patient:  Everything has been piling up on her.  Met with new professor for dissertation - told her he wasn't sure that the time she asked for was enough time to complete it.  Also pressure from the school/principal - first told she didn't want her back there even though she was doing a good job.  Then told her she could come back just for another year - 2 other teachers don't like her she was told.  Then told she just wants her to do social studies and science next year.  Now the principal doesn't want to talk to her anymore and nothing she does is right.  Right now it feels like nothing is going right.  Feels like nothing is worth fighting for anymore.  For dissertation needs the data for the IRB and needs to be in the [place] to get this.  All of a sudden at work nothing is right according to principal.  Lately just feels "life doesn't want me."  She doesn't feel its worth fighting for.  Feels significant guilt and like a failure.  Working on chapter 3 of dissertation - feels like if she stops now its a waste.  Lately she has been thinking every day about whether she wants to live or not.  She fights with herself about it because doesn't want to hurt other people but feels so hopeless and doesn't want to be here.  Looked up suicide hotline yesterday.      Medication side effects:  Denies  Medication compliance:  Yes    PSYCHIATRIC REVIEW OF SYSTEMS:  Trouble with " sleep:  Yes due to stress  Appetite changes:  Not discussed  Weight changes:  Gain slowly over time  Lack of energy:  Yes  Anhedonia:  Yes  Somatic symptoms:  Not discussed  Libido:  Not discussed  Anxiety/panic:  Yes  Guilty/hopeless:  Yes  Self-injurious behavior/risky behavior:  Denies  Any drugs:  Denies  Alcohol:  Denies     MEDICAL REVIEW OF SYSTEMS:  Complete review of systems performed covering Constitutional, Musculoskeletal, Neurologic.  All systems negative except for that covered in HPI.    PAST PSYCHIATRIC, MEDICAL, AND SOCIAL HISTORY REVIEWED  The patient's past medical, family and social history have been reviewed and updated as appropriate within the electronic medical record - see encounter notes.    MEDICATIONS:    Current Outpatient Medications:     ALPRAZolam (XANAX) 0.5 MG tablet, Take 1 tablet (0.5mg) by mouth in the morning and 2 tablets (1mg) before bed., Disp: 90 tablet, Rfl: 5    ARIPiprazole (ABILIFY) 2 MG Tab, Take 1 tablet (2 mg total) by mouth once daily. Take with 5mg tablet to equal 7mg daily., Disp: 30 tablet, Rfl: 11    ARIPiprazole (ABILIFY) 5 MG Tab, TAKE 1 TABLET BY MOUTH EVERY DAY, Disp: 90 tablet, Rfl: 3    atogepant (QULIPTA) 60 mg Tab, Take 1 tablet (60 mg total) by mouth once daily., Disp: 30 tablet, Rfl: 6    busPIRone (BUSPAR) 30 MG Tab, Take 1 tablet (30 mg total) by mouth 2 (two) times daily., Disp: 180 tablet, Rfl: 11    doxepin (SINEQUAN) 10 MG capsule, Take 2 capsules (20 mg total) by mouth every evening., Disp: 180 capsule, Rfl: 3    etodolac (LODINE) 500 MG tablet, TAKE 1 TABLET (500 MG TOTAL) BY MOUTH 2 (TWO) TIMES DAILY AS NEEDED (MIGRAINE)., Disp: 20 tablet, Rfl: 12    ibuprofen (ADVIL,MOTRIN) 800 MG tablet, Take 1 tablet (800 mg total) by mouth 2 (two) times daily with meals., Disp: 60 tablet, Rfl: 1    promethazine (PHENERGAN) 25 MG tablet, Take 1 tablet (25 mg total) by mouth every 6 (six) hours as needed for Nausea., Disp: 40 tablet, Rfl: 0    topiramate  "(TOPAMAX) 100 MG tablet, Take 1 tablet (100 mg total) by mouth every evening. (Take 1 tablet nightly for 2 weeks. In 14 days, if tolerating, take with 50mg tablet to equal 150mg nightly.), Disp: 90 tablet, Rfl: 3    venlafaxine (EFFEXOR-XR) 75 MG 24 hr capsule, Take 1 capsule (75 mg total) by mouth once daily. Take with 150mg capsule to equal 225mg daily., Disp: 30 capsule, Rfl: 11    ZOLMitriptan (ZOMIG-ZMT) 5 MG disintegrating tablet, TAKE 1 TABLET BY MOUTH AS NEEDED FOR MIGRAINE., Disp: 9 tablet, Rfl: 12    ZOLMitriptan (ZOMIG-ZMT) 5 MG disintegrating tablet, DISSOLVE 1 TABLET BY MOUTH AS NEEDED FOR MIGRAINE., Disp: 6 tablet, Rfl: 12    ALLERGIES:  Review of patient's allergies indicates:   Allergen Reactions    Codeine Itching and Other (See Comments)    Morphine Itching and Other (See Comments)       PSYCHIATRIC EXAM:  There were no vitals filed for this visit.    Appearance:  Well groomed, appearing healthy and of stated age  Behavior:  Cooperative, +psychomotor retardation  Speech:  Slow, soft  Mood:  "I don't want to be here"  Affect:  Constricted  Thought Process:  Linear, logical, goal directed  Thought Content:  +Suicidal ideation.  Negative for homicidal ideation, delusions or hallucinations.  Associations:  Intact  Memory:  Grossly Intact  Level of Consciousness/Orientation:  Grossly intact  Fund of Knowledge:  Good  Attention:  Good  Language:  Fluent, able to name abstract and concrete objects  Insight:  Fair  Judgment:  Impaired due to severe depression  Psychomotor signs:  No involuntary movements or tremor of face  Gait:  Unable to assess via virtual visit      RELEVANT LABS/STUDIES:    Lab Results   Component Value Date    WBC 6.86 10/09/2023    HGB 11.6 (L) 10/09/2023    HCT 38.7 10/09/2023    MCV 92 10/09/2023     10/09/2023     BMP  Lab Results   Component Value Date     10/09/2023    K 4.2 10/09/2023     10/09/2023    CO2 21 (L) 10/09/2023    BUN 14 10/09/2023    CREATININE " 0.8 10/09/2023    CALCIUM 9.8 10/09/2023    ANIONGAP 11 10/09/2023    ESTGFRAFRICA >60.0 07/28/2022    EGFRNONAA >60.0 07/28/2022     Lab Results   Component Value Date    ALT 18 10/09/2023    AST 14 10/09/2023    ALKPHOS 93 10/09/2023    BILITOT 0.2 10/09/2023     Lab Results   Component Value Date    TSH 0.539 10/09/2023     Lab Results   Component Value Date    HGBA1C 5.8 (H) 10/09/2023       IMPRESSION:    Rosalina Saavedra is a 35 y.o. female with history of MDD, JESUS, and Social Anxiety Disorder who presents for follow up appointment.    Status/Progress:  Based on the examination today, the patient's problem(s) is/are inadequately controlled.  New problems have been presented today.    Risk Parameters:  Patient reports recent suicidal ideation  Patient reports no homicidal ideation  Patient reports no self-injurious behavior  Patient reports no violent behavior    DIAGNOSES:    ICD-10-CM ICD-9-CM   1. Severe episode of recurrent major depressive disorder, without psychotic features  F33.2 296.33   2. JESUS (generalized anxiety disorder)  F41.1 300.02   3. Functional neurological symptom disorder with mixed symptoms  F44.7 300.11   4. Insomnia due to other mental disorder  F51.05 300.9    F99 327.02       PLAN:  After plan was initially made for admission, mother contacted me with concern from both herself and patient regarding what inpatient hospitalization would entail.  They are worried the conditions and restrictions would worsen her condition.  Had long conversations with both mother and patient regarding treatment options and safety planning.  Patient is able to contract for safety that she will not harm herself.  She will be in the care of her mother and patient is feeling hopeful about starting IOP.  Mother will collect all medications in the house and give patient her scheduled medications.  Mother has confirmed there are no guns at home.  I also spoke with patient's therapist, Guerita, who agrees with  current plan.  Patient will see Guerita tomorrow at 3pm for appointment and me again on Friday at 1pm for appointment.  I will speak with [facility] staff tomorrow regarding patient starting BMU as soon as possible.  I will also plan to fill out FMLA for patient's job.    Continue Effexor 225mg every morning for depression and anxiety.  Continue Abilify 7mg daily for augmentation for depression.    Continue Buspar 30mg BID and Xanax 0.5mg/1mg for anxiety.  Continue Doxepin 20mg qHS for insomnia.  Discussed with patient informed consent, risks versus benefits, alternative treatments, side effect profile and the inherent unpredictability of individual responses to these treatments.  The patient expresses understanding of the above and displays the capacity to agree with this current plan.  Continue individual psychotherapy with Guerita Stevens LCSW.      RETURN TO CLINIC:  Follow up in 9 days (on 4/11/2025).

## 2025-04-04 ENCOUNTER — OFFICE VISIT (OUTPATIENT)
Dept: PSYCHIATRY | Facility: CLINIC | Age: 35
End: 2025-04-04
Payer: COMMERCIAL

## 2025-04-04 ENCOUNTER — PATIENT MESSAGE (OUTPATIENT)
Dept: PSYCHIATRY | Facility: CLINIC | Age: 35
End: 2025-04-04
Payer: COMMERCIAL

## 2025-04-04 DIAGNOSIS — F33.1 MODERATE EPISODE OF RECURRENT MAJOR DEPRESSIVE DISORDER: ICD-10-CM

## 2025-04-04 DIAGNOSIS — F33.2 SEVERE EPISODE OF RECURRENT MAJOR DEPRESSIVE DISORDER, WITHOUT PSYCHOTIC FEATURES: Primary | ICD-10-CM

## 2025-04-04 DIAGNOSIS — F41.1 GAD (GENERALIZED ANXIETY DISORDER): ICD-10-CM

## 2025-04-04 DIAGNOSIS — F51.05 INSOMNIA DUE TO OTHER MENTAL DISORDER: ICD-10-CM

## 2025-04-04 DIAGNOSIS — F44.7 FUNCTIONAL NEUROLOGICAL SYMPTOM DISORDER WITH MIXED SYMPTOMS: ICD-10-CM

## 2025-04-04 DIAGNOSIS — F44.5 PSYCHOGENIC NONEPILEPTIC SEIZURE: ICD-10-CM

## 2025-04-04 DIAGNOSIS — F99 INSOMNIA DUE TO OTHER MENTAL DISORDER: ICD-10-CM

## 2025-04-04 RX ORDER — ARIPIPRAZOLE 10 MG/1
10 TABLET ORAL DAILY
Qty: 30 TABLET | Refills: 11 | Status: SHIPPED | OUTPATIENT
Start: 2025-04-04

## 2025-04-04 NOTE — PROGRESS NOTES
OUTPATIENT PSYCHIATRY RETURN VISIT    ENCOUNTER DATE:  4/9/2025  SITE:  [facility]  LENGTH OF SESSION:  30 minutes    The patient location is:  [place], not in a healthcare facility  Visit type:  audiovisual    Face to Face time with patient:  30 minutes  40 minutes of total time spent on the encounter, which includes face to face time and non-face to face time preparing to see the patient (eg, review of tests), Obtaining and/or reviewing separately obtained history, Documenting clinical information in the electronic or other health record, Independently interpreting results (not separately reported) and communicating results to the patient/family/caregiver, or Care coordination (not separately reported).     Each patient to whom he or she provides medical services by telemedicine is:  (1) informed of the relationship between the physician and patient and the respective role of any other health care provider with respect to management of the patient; and (2) notified that he or she may decline to receive medical services by telemedicine and may withdraw from such care at any time.      CHIEF COMPLAINT:  Depression and Anxiety      HISTORY OF PRESENTING ILLNESS:  Rosalina Saavedra is a 35 y.o. female with history of MDD, JESUS, and Social Anxiety Disorder who presents for follow up appointment.      Plan at last appointment on 4/2/2025:  After plan was initially made for admission, mother contacted me with concern from both herself and patient regarding what inpatient hospitalization would entail.  They are worried the conditions and restrictions would worsen her condition.  Had long conversations with both mother and patient regarding treatment options and safety planning.  Patient is able to contract for safety that she will not harm herself.  She will be in the care of her mother and patient is feeling hopeful about starting IOP.  Mother will collect all medications in the house and give  "patient her scheduled medications.  Mother has confirmed there are no guns at home.  I also spoke with patient's therapist, Guerita, who agrees with current plan.  Patient will see Guerita tomorrow at 3pm for appointment and me again on Friday at 1pm for appointment.  I will speak with [facility] staff tomorrow regarding patient starting BMU as soon as possible.  I will also plan to fill out FMLA for patient's job.    Continue Effexor 225mg every morning for depression and anxiety.  Continue Abilify 7mg daily for augmentation for depression.    Continue Buspar 30mg BID and Xanax 0.5mg/1mg for anxiety.  Continue Doxepin 20mg qHS for insomnia.  Discussed with patient informed consent, risks versus benefits, alternative treatments, side effect profile and the inherent unpredictability of individual responses to these treatments.  The patient expresses understanding of the above and displays the capacity to agree with this current plan.  Continue individual psychotherapy with Guerita Stevens LCSW.      History as told by patient:  Mother took papers to school for principal to sign.  Feeling very anxious and worried - mostly about work and dissertation plan.  Has not felt suicidal since last appointment.  More anxious now.  "What am I going to do?"  "Is the paperwork going to be approved?"  Will need to go into transfer portal and find out where she could be with a job for next year.  Talked to professor yesterday about dissertation.  Wants to ask for proposal in the fall before asking for extension.  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  Mother asked if she wants to go back to work and that put her in a bad place again - made her question how she is handling all of this.      Psychotherapy:  Target symptoms: depression, anxiety , work stress  Why chosen therapy is appropriate versus another modality: relevant to diagnosis, patient responds to this modality  Outcome monitoring methods: self-report, " observation  Therapeutic intervention type: supportive psychotherapy  Topics discussed/themes: work stress, building skills sets for symptom management, symptom recognition  The patient's response to the intervention is accepting. The patient's progress toward treatment goals is fair.   Duration of intervention: 20 minutes.    Medication side effects:  Denies  Medication compliance:  Yes    PSYCHIATRIC REVIEW OF SYSTEMS:  Trouble with sleep:  Sometimes due to stress  Appetite changes:  Not discussed  Weight changes:  Gain slowly over time  Lack of energy:  Yes  Anhedonia:  Yes  Somatic symptoms:  Not discussed  Libido:  Not discussed  Anxiety/panic:  Yes  Guilty/hopeless:  Yes  Self-injurious behavior/risky behavior:  Denies  Any drugs:  Denies  Alcohol:  Denies     MEDICAL REVIEW OF SYSTEMS:  Complete review of systems performed covering Constitutional, Musculoskeletal, Neurologic.  All systems negative except for that covered in HPI.    PAST PSYCHIATRIC, MEDICAL, AND SOCIAL HISTORY REVIEWED  The patient's past medical, family and social history have been reviewed and updated as appropriate within the electronic medical record - see encounter notes.    MEDICATIONS:    Current Outpatient Medications:     ALPRAZolam (XANAX) 0.5 MG tablet, Take 1 tablet (0.5mg) by mouth in the morning and 2 tablets (1mg) before bed., Disp: 90 tablet, Rfl: 5    ARIPiprazole (ABILIFY) 10 MG Tab, Take 1 tablet (10 mg total) by mouth once daily., Disp: 30 tablet, Rfl: 11    atogepant (QULIPTA) 60 mg Tab, Take 1 tablet (60 mg total) by mouth once daily., Disp: 30 tablet, Rfl: 6    busPIRone (BUSPAR) 30 MG Tab, Take 1 tablet (30 mg total) by mouth 2 (two) times daily., Disp: 180 tablet, Rfl: 11    doxepin (SINEQUAN) 10 MG capsule, Take 2 capsules (20 mg total) by mouth every evening., Disp: 180 capsule, Rfl: 3    etodolac (LODINE) 500 MG tablet, TAKE 1 TABLET (500 MG TOTAL) BY MOUTH 2 (TWO) TIMES DAILY AS NEEDED (MIGRAINE)., Disp: 20  "tablet, Rfl: 12    ibuprofen (ADVIL,MOTRIN) 800 MG tablet, Take 1 tablet (800 mg total) by mouth 2 (two) times daily with meals., Disp: 60 tablet, Rfl: 1    promethazine (PHENERGAN) 25 MG tablet, Take 1 tablet (25 mg total) by mouth every 6 (six) hours as needed for Nausea., Disp: 40 tablet, Rfl: 0    topiramate (TOPAMAX) 100 MG tablet, Take 1 tablet (100 mg total) by mouth every evening. (Take 1 tablet nightly for 2 weeks. In 14 days, if tolerating, take with 50mg tablet to equal 150mg nightly.), Disp: 90 tablet, Rfl: 3    venlafaxine (EFFEXOR-XR) 75 MG 24 hr capsule, Take 1 capsule (75 mg total) by mouth once daily. Take with 150mg capsule to equal 225mg daily., Disp: 30 capsule, Rfl: 11    ZOLMitriptan (ZOMIG-ZMT) 5 MG disintegrating tablet, TAKE 1 TABLET BY MOUTH AS NEEDED FOR MIGRAINE., Disp: 9 tablet, Rfl: 12    ZOLMitriptan (ZOMIG-ZMT) 5 MG disintegrating tablet, DISSOLVE 1 TABLET BY MOUTH AS NEEDED FOR MIGRAINE., Disp: 6 tablet, Rfl: 12    ALLERGIES:  Review of patient's allergies indicates:   Allergen Reactions    Codeine Itching and Other (See Comments)    Morphine Itching and Other (See Comments)       PSYCHIATRIC EXAM:  There were no vitals filed for this visit.    Appearance:  Well groomed, appearing healthy and of stated age  Behavior:  Cooperative, +psychomotor retardation  Speech:  Slow, soft  Mood:  "Anxious"  Affect:  Constricted  Thought Process:  Linear, logical, goal directed  Thought Content:  +Suicidal ideation.  Negative for homicidal ideation, delusions or hallucinations.  Associations:  Intact  Memory:  Grossly Intact  Level of Consciousness/Orientation:  Grossly intact  Fund of Knowledge:  Good  Attention:  Good  Language:  Fluent, able to name abstract and concrete objects  Insight:  Fair  Judgment:  Impaired due to severe depression  Psychomotor signs:  No involuntary movements or tremor of face  Gait:  Unable to assess via virtual visit      RELEVANT LABS/STUDIES:    Lab Results "   Component Value Date    WBC 6.86 10/09/2023    HGB 11.6 (L) 10/09/2023    HCT 38.7 10/09/2023    MCV 92 10/09/2023     10/09/2023     BMP  Lab Results   Component Value Date     10/09/2023    K 4.2 10/09/2023     10/09/2023    CO2 21 (L) 10/09/2023    BUN 14 10/09/2023    CREATININE 0.8 10/09/2023    CALCIUM 9.8 10/09/2023    ANIONGAP 11 10/09/2023    ESTGFRAFRICA >60.0 07/28/2022    EGFRNONAA >60.0 07/28/2022     Lab Results   Component Value Date    ALT 18 10/09/2023    AST 14 10/09/2023    ALKPHOS 93 10/09/2023    BILITOT 0.2 10/09/2023     Lab Results   Component Value Date    TSH 0.539 10/09/2023     Lab Results   Component Value Date    HGBA1C 5.8 (H) 10/09/2023       IMPRESSION:    Rosalina Saavedra is a 35 y.o. female with history of MDD, JESUS, and Social Anxiety Disorder who presents for follow up appointment.    Status/Progress:  Based on the examination today, the patient's problem(s) is/are inadequately controlled.  New problems have not been presented today.    Risk Parameters:  Patient reports no suicidal ideation  Patient reports no homicidal ideation  Patient reports no self-injurious behavior  Patient reports no violent behavior    DIAGNOSES:    ICD-10-CM ICD-9-CM   1. Severe episode of recurrent major depressive disorder, without psychotic features  F33.2 296.33   2. Moderate episode of recurrent major depressive disorder  F33.1 296.32   3. JESUS (generalized anxiety disorder)  F41.1 300.02   4. Psychogenic nonepileptic seizure  F44.5 300.11   5. Functional neurological symptom disorder with mixed symptoms  F44.7 300.11   6. Insomnia due to other mental disorder  F51.05 300.9    F99 327.02         PLAN:  Increase Abilify to 10mg daily since this has been most helpful for her depression in the past.  She is aware this is not FDA approved for anxiety but hopes it will help these symptoms as well.    Continue Effexor 225mg every morning for depression and anxiety.  Continue Buspar  30mg BID and Xanax 0.5mg/1mg for anxiety.  Continue Doxepin 20mg qHS for insomnia.  Discussed with patient informed consent, risks versus benefits, alternative treatments, side effect profile and the inherent unpredictability of individual responses to these treatments.  The patient expresses understanding of the above and displays the capacity to agree with this current plan.  Continue individual psychotherapy with Guerita Stevens LCSW.  She will be starting the BMU on Tuesday.    RETURN TO CLINIC:  Follow up in about 2 weeks (around 4/18/2025). After discharge from BMU.

## 2025-04-08 ENCOUNTER — HOSPITAL ENCOUNTER (OUTPATIENT)
Dept: PSYCHIATRY | Facility: HOSPITAL | Age: 35
Discharge: HOME | End: 2025-04-08
Attending: STUDENT IN AN ORGANIZED HEALTH CARE EDUCATION/TRAINING PROGRAM
Payer: COMMERCIAL

## 2025-04-08 DIAGNOSIS — F33.2 SEVERE EPISODE OF RECURRENT MAJOR DEPRESSIVE DISORDER, WITHOUT PSYCHOTIC FEATURES: Primary | ICD-10-CM

## 2025-04-08 DIAGNOSIS — F41.1 GAD (GENERALIZED ANXIETY DISORDER): ICD-10-CM

## 2025-04-08 PROCEDURE — 90853 GROUP PSYCHOTHERAPY: CPT | Performed by: SOCIAL WORKER

## 2025-04-08 PROCEDURE — 90853 GROUP PSYCHOTHERAPY: CPT | Mod: ,,, | Performed by: PSYCHOLOGIST

## 2025-04-08 PROCEDURE — 90853 GROUP PSYCHOTHERAPY: CPT | Mod: ,,,

## 2025-04-08 PROCEDURE — 90792 PSYCH DIAG EVAL W/MED SRVCS: CPT | Mod: ,,, | Performed by: PSYCHIATRY & NEUROLOGY

## 2025-04-08 NOTE — PROGRESS NOTES
Group Psychotherapy (PhD/LCSW)    Site: [facility]    Clinical status of patient: Intensive Outpatient Program (IOP)     Date: 4/8/2025    Group Focus: Interpersonal Effectiveness    Length of service:  [id number] - 45-50 minutes    Number of patients in attendance: 11    Referred by: [facility] ([facility])    Target symptoms: Depression and Anxiety    Patient's response to treatment: Active Listening, Self-Disclosure    Progress toward goals: Progressing adequately    Interval History: Session focus was Interpersonal Effectiveness: DEAR MAN.  Patients were encouraged to exercise skillfulness during interactions by using this framework.    Diagnosis:     ICD-10-CM ICD-9-CM   1. Severe episode of recurrent major depressive disorder, without psychotic features  F33.2 296.33   2. JESUS (generalized anxiety disorder)  F41.1 300.02       Plan: Continue in [facility]
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
## 2025-04-08 NOTE — MEDICAL/APP STUDENT
"Qulipta 60mg PO once daily migraine without status migrainosus - Geoff Dorsey 4/11/22   Abnormal lipid panel 10/9/23    Polysomnogram: No KARIE; mod to loud snoring - ada powers (7/11/2024)    Neurology: Jaspal francisco (3/21/2022) - clinical concern for psychogenic nonepileptic based on video EEG; "EMU admission 7/2022 captured typical events w/ no EEG correlate consistent w/ dx of a functional neurologic disorder. " By Jaci GILMORE (2/28/24)    Slow weight change over the years:  21 - BMI 18.8; 53 Kg;   29 - 63.5 kg (1.676 m): 4/5/2019  31 - 73.9 kg 10/15/2021  32 - 87.5 kg 7/29/22  31 -  91.3 kg 12/2/22  33 - 96.1 kg 5/26/23  34 - 93 kg 2/9/24  34 - 91.8 kg 4/2/24  35 - 94.7 kg 2/14/25    "

## 2025-04-08 NOTE — PROGRESS NOTES
"Group Psychotherapy (PhD/LCSW)     Site: [facility]     Clinical status of patient: Intensive Outpatient Program (IOP)     Date: 4/8/2025     Group Focus: Psychodynamic Group Psychotherapy     Length of service: [id number] - 45-50 minutes     Number of patients in attendance: 4     Referred by: [facility]     Target symptoms: Depression and Anxiety     Patient's response to treatment: Active Listening, Self-disclosure, and Feedback given to another patient     Progress toward goals: Progressing well     Interval History: Group members welcomed this patient to the group. Patient briefly introduced herself and discussed presenting concerns.  Discussion focused on navigating grief, healing, self-compassion, and forming connections with others. Group members provided personal examples of their recent experiences and efforts to make small changes "one day at a time"  by applying skills learned in the program.      Diagnosis:     ICD-10-CM ICD-9-CM   1. Severe episode of recurrent major depressive disorder, without psychotic features  F33.2 296.33   2. JESUS (generalized anxiety disorder)  F41.1 300.02        Plan: Continue in [facility] program  "
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
## 2025-04-09 ENCOUNTER — HOSPITAL ENCOUNTER (OUTPATIENT)
Dept: PSYCHIATRY | Facility: HOSPITAL | Age: 35
Discharge: HOME | End: 2025-04-09
Attending: STUDENT IN AN ORGANIZED HEALTH CARE EDUCATION/TRAINING PROGRAM
Payer: COMMERCIAL

## 2025-04-09 DIAGNOSIS — F33.2 SEVERE EPISODE OF RECURRENT MAJOR DEPRESSIVE DISORDER, WITHOUT PSYCHOTIC FEATURES: Primary | ICD-10-CM

## 2025-04-09 DIAGNOSIS — F41.1 GAD (GENERALIZED ANXIETY DISORDER): ICD-10-CM

## 2025-04-09 PROCEDURE — 90853 GROUP PSYCHOTHERAPY: CPT | Mod: ,,, | Performed by: PSYCHOLOGIST

## 2025-04-09 PROCEDURE — 90853 GROUP PSYCHOTHERAPY: CPT

## 2025-04-09 NOTE — PROGRESS NOTES
Group Psychotherapy (PhD/LCSW)     Site: [facility]     Clinical status of patient: Intensive Outpatient Program (IOP)     Date: 4/9/2025     Group Focus: Psychodynamic Group Psychotherapy     Length of service: 69483 - 45-50 minutes     Number of patients in attendance: 6     Referred by: [facility]     Target symptoms: Depression and Anxiety     Patient's response to treatment: Active Listening, Self-disclosure, and Feedback given to another patient     Progress toward goals: Progressing well     Interval History: Group was focused on skills to increase motivation throughout the day, finding connection with others, applying techniques to decrease anxious thinking, and previous patterns of bottling up emotions.       Diagnosis:     ICD-10-CM ICD-9-CM   1. Severe episode of recurrent major depressive disorder, without psychotic features  F33.2 296.33   2. JESUS (generalized anxiety disorder)  F41.1 300.02        Plan: Continue in [facility] program

## 2025-04-09 NOTE — PROGRESS NOTES
Group Psychotherapy (PhD/LCSW)    Site: [facility]    Clinical status of patient: Intensive Outpatient Program (IOP)     Date: 4/9/2025    Group Focus: Mindfulness    Length of service: 40715 - 45-50 minutes    Number of patients in attendance: 13    Referred by: [facility]    Target symptoms: Depression and Anxiety    Patient's response to treatment: Active Listening and Self-disclosure    Progress toward goals: Progressing adequately    Interval History: Session focus was Mindfulness: Mindfulness 'How' Skills. Patient was introduced to mindfulness 'how' skills of non-judgmentally, one-mindfulness, and effectiveness. Patient identified the value of each skill, how to use each skill, and practiced the use of each skill in session.     Diagnosis:     ICD-10-CM ICD-9-CM   1. Severe episode of recurrent major depressive disorder, without psychotic features  F33.2 296.33   2. JESUS (generalized anxiety disorder)  F41.1 300.02       Plan: Continue in [facility]

## 2025-04-09 NOTE — TREATMENT PLAN
"[facility]  MENTAL WELLNESS PROGRAM  INTERDISCIPLINARY TREATMENT PLAN  INTENSIVE OUTPATIENT     INTERDISCIPLINARY  TREATMENT TEAM:    Mason Velasco M.D., Psychiatrist    Lexi Rendon M.D., Psychiatrist      Candie Guerrero, Ph.D., Clinical Psychologist    Sacha Hudson LPN, Licensed Practical Nurse    Delfina Baez, [facility], Licensed Master Social Worker    Gracie Payton W, Registered           Resident:Gagan Boateng MD    (Signatures scanned into record separately).      ESTIMATED LOS:  2 weeks      The patient has reviewed the treatment plan with staff and has signed the "Patient Responsibilities" form.    (Patient signature scanned into record separately).       TREATMENT PLAN    DIAGNOSIS:     1.  Major depressive disorder, recurrent, severe    2.  Generalized anxiety disorder, severe    3.  Social anxiety disorder    4.  Insomnia, unspecified      Patient Education Needs/Barriers to Learning (i.e., Language, Reading, Comprehension): None         Support/Advocacy Services/Needs (i.e., Financial, Transportation, Medications): None         Community Resources (i.e., Alcoholics Anonymous, Al Anon): None     Patients Identified Goals:  Think about what I'd like to do outside of work and school   2.   Think about positive thinking instead of negative thinking   3.  4.    Coping Skills:  Breathing techniques  2.   Training my dog         Strengths:  I am a nice person   2.   I am smart       Limitations:  No outside social life   2.   Always worrying and negative self talk         Goals and Objectives:  1. Goal: Attend and participate in all groups   Objective measure: Progress notes indicating active listening,    self-disclosure, feedback   Time frame to reach goal: Each day    2. Goal: Medication management   Objective measure: Physician progress note indicating improved medication status   Time frame to reach goal: By discharge    3. Goal: Reduce depression   Objective " measure: Physician progress note indicating depression is improved   Time frame to reach goal: By discharge    4.  Goal: Reduce anxiety   Objective measure: Physician progress note indicating anxiety is improved   Time frame to reach goal: By discharge    5. Goal: Develop stress coping skills   Objective measure: Patient self-report of improved coping   Time frame to reach goal: By discharge        Group Interventions:    Psychodynamic Group Psychotherapy - 1 hour, 5 times per week  Goals: 1. Utilize group empathy and support for problem solving; 2. Apply stress management, communication, and assertiveness skills to personal issues; 3. Discuss mental health symptoms and explore strategies for coping; 4. Discuss ways to change lifestyle to maintain better emotional health.    Communication Skills - 1 hour, 2 times per week  Goals: 1. Learn rules of effective communication; 2. Improve listening skills; 3. Practice clear communication.    Nutrition and Health - 1 hour, 1 time per week  Goals: 1. Explore impact that nutrition has on health; 2. Identify positive nutritional choices; 3. Explore how nutrition relates to stress tolerance.    Personal Growth - 1 hour, 2 times per week  Goals: 1. Discuss the development of self; 2. Create personal awareness and insight; 3. Explore a variety of psycho-educational techniques.    Promoting Healthy Lifestyles - 1 hour, 1 time per week  Goals: 1. Understand the Biopsychosocial Model of Health; 2. Develop insight into how mental health can impact other dimensions of health; 3. Develop appropriate health promotion strategies.    Acceptance and Commitment Therapy (ACT)- 1 hour, 1 time per week  Goals: 1. Learn an action-oriented psychotherapy called ACT; 2. Focus on identifying, challenging, and clarifying values systems and beliefs; 3. Explore how values oriented actions can lead to emotional well-being.    Relationship Dynamics - 1 hour, 1 time per week  Goals: 1. Learn about  factors that shape relationships; 2. Understand the central role of relationships in personal well-being; 3. Learn how to improve all relationships.    Relaxation Training - 1 hour, 3 times per week  Goals: 1. Learn about and implement various techniques for releasing physical tension from the body.    Spirituality - 1 hour, 1 time per week  Goals: 1. Discuss and reflect on the process of seeking peace and comfort; 2. Identify healthy ways of exploring spirituality.    Stress Management - 1 hour, 4 times per week  Goals: 1. Identify types and levels of stress; 2. Identify and change maladaptive beliefs and behaviors; 3. Identify and practice techniques of stress management.    DBT- 1 hour, 4 times per week  Goals: 1. Discuss emotion regulation and Interpersonal Effectiveness Skills and practice mindfulness; 2. Identify and change maladaptive beliefs and behaviors; Identify and practice techniques of DBT and mindfulness.

## 2025-04-09 NOTE — PLAN OF CARE
04/09/25 1411   Activity/Group Therapy Checklist   Group Other (Comments)  (Vulnerability/Shame)   Attendance Attended   Follows Direction Followed directions   Group Interactions/Observations Interacted appropriately;Sharing;Supportive;Alert   Affect/Mood Range Normal range   Affect/Mood Display Appropriate   Goal Progression Progressing     In today's group, patients explored the topics of vulnerability and shame. Pts watched the "[employer] TedTalk and discussed their impressions and reactions to their perception of vulnerability before and after watching the video. Disccsued potential triggers for shame and how to sit with and navigate through discomfort. Pts were provided with a worksheet activity focused on selecting values and aligning them with behaviors that help support those values. This activity helps patients to develop protective factors that will be helpful after discharge from the program.

## 2025-04-10 ENCOUNTER — HOSPITAL ENCOUNTER (OUTPATIENT)
Dept: PSYCHIATRY | Facility: HOSPITAL | Age: 35
Discharge: HOME | End: 2025-04-10
Attending: STUDENT IN AN ORGANIZED HEALTH CARE EDUCATION/TRAINING PROGRAM
Payer: COMMERCIAL

## 2025-04-10 DIAGNOSIS — F33.2 SEVERE EPISODE OF RECURRENT MAJOR DEPRESSIVE DISORDER, WITHOUT PSYCHOTIC FEATURES: Primary | ICD-10-CM

## 2025-04-10 DIAGNOSIS — F41.1 GAD (GENERALIZED ANXIETY DISORDER): ICD-10-CM

## 2025-04-10 PROCEDURE — 90853 GROUP PSYCHOTHERAPY: CPT | Mod: ,,, | Performed by: PSYCHOLOGIST

## 2025-04-10 PROCEDURE — 90853 GROUP PSYCHOTHERAPY: CPT

## 2025-04-10 PROCEDURE — 90853 GROUP PSYCHOTHERAPY: CPT | Mod: ,,,

## 2025-04-10 NOTE — PROGRESS NOTES
Group Psychotherapy (PhD/LCSW)    Site: [facility]    Clinical status of patient: Intensive Outpatient Program (IOP)     Date: 4/10/2025    Group Focus: Emotion Regulation    Length of service: 90306 - 45-50 minutes    Number of patients in attendance: 14    Referred by: [facility]    Target symptoms: Depression and Anxiety    Patient's response to treatment: Active Listening, Self-Disclosure    Progress toward goals: Progressing adequately    Interval History: Session focus was Emotion Regulation: Understanding Emotions.  Patients were encouraged to understand what their emotions do for them (motivate them to action, communicate to themselves and others).  Check the Facts was introduced.    Diagnosis:     ICD-10-CM ICD-9-CM   1. Severe episode of recurrent major depressive disorder, without psychotic features  F33.2 296.33   2. JESUS (generalized anxiety disorder)  F41.1 300.02       Plan: Continue in [facility]

## 2025-04-10 NOTE — PROGRESS NOTES
Group Psychotherapy (PhD/LCSW)     Site: [facility]     Clinical status of patient: Intensive Outpatient Program (IOP)     Date: 4/10/2025     Group Focus: Psychodynamic Group Psychotherapy     Length of service: 19616 - 45-50 minutes     Number of patients in attendance: 7     Referred by: [facility]     Target symptoms: Depression and Anxiety     Patient's response to treatment: Active Listening, Self-disclosure, and Feedback given to another patient     Progress toward goals: Progressing well     Interval History:  Group members exchanged encouraging words with a patient as they completed the program. Group was focused on learning how to create daily structure outside of the program, identifying activities of interest, the impact of music on culture, and steps to problem solving when faced with multiple options.       Diagnosis:     ICD-10-CM ICD-9-CM   1. Severe episode of recurrent major depressive disorder, without psychotic features  F33.2 296.33   2. JESUS (generalized anxiety disorder)  F41.1 300.02        Plan: Continue in [facility]

## 2025-04-10 NOTE — PLAN OF CARE
"   04/10/25 1448   Activity/Group Therapy Checklist   Group Other (Comments)  (Creative Therapy)   Attendance Attended   Follows Direction Followed directions   Group Interactions/Observations Interacted appropriately;Alert;Sharing;Supportive   Affect/Mood Range Normal range   Affect/Mood Display Appropriate   Goal Progression Progressing      facilitated self care creative session and dicussed with patients how creative therapy can be therapeutic to resolving anxiety and other stress-related issues. SW discussed activity: " Dance Therapy". SW discussed "Dance Therapy Movement" and the benefits of it. SW discussed two specific dance movements : Mirroring and Free flow dance. Pts were able to practice both techniques   "

## 2025-04-11 ENCOUNTER — HOSPITAL ENCOUNTER (OUTPATIENT)
Dept: PSYCHIATRY | Facility: HOSPITAL | Age: 35
Discharge: HOME | End: 2025-04-11
Attending: STUDENT IN AN ORGANIZED HEALTH CARE EDUCATION/TRAINING PROGRAM
Payer: COMMERCIAL

## 2025-04-11 PROCEDURE — 99232 SBSQ HOSP IP/OBS MODERATE 35: CPT | Mod: ,,, | Performed by: PSYCHIATRY & NEUROLOGY

## 2025-04-11 PROCEDURE — 90853 GROUP PSYCHOTHERAPY: CPT

## 2025-04-11 NOTE — PLAN OF CARE
04/11/25 1445   Activity/Group Therapy Checklist   Group Other (Comments)  (Processing Group)   Attendance Attended   Follows Direction Followed directions   Group Interactions/Observations Interacted appropriately;Alert;Sharing;Supportive   Affect/Mood Range Normal range   Affect/Mood Display Appropriate   Goal Progression Progressing

## 2025-04-11 NOTE — PLAN OF CARE
04/11/25 1255   Activity/Group Therapy Checklist   Group Meditation/Relaxation   Attendance Attended   Follows Direction Followed directions   Group Interactions/Observations Interacted appropriately;Sharing;Supportive;Alert   Affect/Mood Range Normal range   Affect/Mood Display Appropriate   Goal Progression Progressing
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
## 2025-04-11 NOTE — PROGRESS NOTES
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 She shared she has tried to implement boundaries with the students but she has no help or anyone backing her up when she tries to implement the consequences for not following boundaries.     Diagnosis:MDD      Plan: Continue [facility] program   [facility]  EMERGENCY DEPARTMENT ENCOUNTER       Pt Name: Holly Garcia  MRN: [id number]  Birthdate 1956  Date of evaluation: 4/11/2025  Provider: AMEYA Alvarez CNP   PCP: Unknown, Provider  Note Started:  11:11 AM EDT 4/11/25     CHIEF COMPLAINT       Chief Complaint   Patient presents with    Urinary Catheter        HISTORY OF PRESENT ILLNESS: 1 or more elements      History From: Patient  HPI Limitations: None     Holly Garcia is a 68 y.o. female past medical history of multiple sclerosis, neurogenic bladder, chronic indwelling Lizarraga presents for evaluation after her Lizarraga catheter fell out last night.  Patient states that she normally wears an 18 Belgian, a 20 Belgian was placed after her recent hospitalization which was uncomfortable.  She requested for the Lizarraga to be changed to 18 Belgian which was done last night by the home health nurse.  Patient states that the Lizarraga came out with balloon intact.  She is complaining of suprapubic pressure, however has had incontinent episodes since then.     Nursing Notes were all reviewed and agreed with or any disagreements were addressed in the HPI.     REVIEW OF SYSTEMS      Review of Systems     Positives and Pertinent negatives as per HPI.    PAST HISTORY     Past Medical History:  Past Medical History:   Diagnosis Date    MANDI (acute kidney injury) 11/12/2024    Anemia     Constipation     Headache     Leg pain     Multiple sclerosis (HCC)     Murmur     Muscle atrophy     Restless leg 3/12/2016       Past Surgical History:  Past Surgical History:   Procedure Laterality Date    COLONOSCOPY N/A 2/16/2022    DIAGNOSTIC COLONOSCOPY to Mid-transverse Colon performed by Merrill Martinez MD at [facility] ENDOSCOPY    COLONOSCOPY N/A 10/25/2018    COLONOSCOPY, DIAGNOSTIC with bx performed by Merrill Martinez MD at [facility] ENDOSCOPY    CYSTOSCOPY Right 11/12/2024    CYSTOSCOPY RETROGRADE PYELOGRAM Stent placement performed by Conchis Mendez MD at  discomfort from the Lizarraga inserted by nursing. [RT]   1607 Shared decision making with patient acute cystitis hematuria secondary to Lizarraga catheter.  Lizarraga catheter 20 Belarusian placed by nursing.  Patient tolerated well.  Increase fluid intake.  Urology follow-up.  PCP follow-up.  Medications as ordered.  Patient verbalized understanding and agrees to plan of care. [RT]   1802 Patient called stating she wants her medications sent to [facility].  She was unsure of the address.  I looked up pharmacy close to patient address, medication sent to [facility] and phone number provided to patient.  Patient verbalized understanding [RT]      ED Course User Index  [RT] Shawanda Blanc, APRN - CNP       Disposition Considerations (Tests not done, Shared Decision Making, Pt Expectation of Test or Tx.): As above     FINAL IMPRESSION     1. Urinary tract infection associated with indwelling urethral catheter, initial encounter    2. Neurogenic bladder    3. Displacement of Lizarraga catheter, initial encounter          DISPOSITION/PLAN   DISPOSITION Decision To Discharge 04/11/2025 04:06:57 PM   DISPOSITION CONDITION Stable       Discharge Note: The patient is stable for discharge home. The signs, symptoms, diagnosis, and discharge instructions have been discussed, understanding conveyed, and agreed upon. The patient is to follow up as recommended or return to ER should their symptoms worsen.      PATIENT REFERRED TO:  Dione Luong MD  [address]  [address]  [place] [address]  [phone]    In 3 days  If symptoms worsen    [facility]  [address]  [address]  [place] [address]  [phone]  In 3 days  [facility]  [address]  [place] [address]  [phone]  In 1 week  If symptoms worsen       DISCHARGE MEDICATIONS:     Medication List        START taking these medications      phenazopyridine 100 MG tablet  Commonly known as:

## 2025-04-12 NOTE — PLAN OF CARE
04/08/25 1100   Activity/Group Therapy Checklist   Group Educational   Attendance Attended   Follows Direction Followed directions   Group Interactions/Observations Interacted appropriately   Affect/Mood Range Normal range   Affect/Mood Display Appropriate   Goal Progression Progressing

## 2025-04-14 ENCOUNTER — HOSPITAL ENCOUNTER (OUTPATIENT)
Dept: PSYCHIATRY | Facility: HOSPITAL | Age: 35
Discharge: HOME | End: 2025-04-14
Attending: STUDENT IN AN ORGANIZED HEALTH CARE EDUCATION/TRAINING PROGRAM
Payer: COMMERCIAL

## 2025-04-14 DIAGNOSIS — F41.1 GAD (GENERALIZED ANXIETY DISORDER): ICD-10-CM

## 2025-04-14 DIAGNOSIS — F33.2 SEVERE EPISODE OF RECURRENT MAJOR DEPRESSIVE DISORDER, WITHOUT PSYCHOTIC FEATURES: Primary | ICD-10-CM

## 2025-04-14 PROCEDURE — 90853 GROUP PSYCHOTHERAPY: CPT

## 2025-04-14 PROCEDURE — 90853 GROUP PSYCHOTHERAPY: CPT | Mod: ,,,

## 2025-04-14 NOTE — PROGRESS NOTES
Group Psychotherapy (PhD/LCSW)    Site: [facility]    Clinical status of patient: Intensive Outpatient Program (IOP)     Date: 4/14/2025    Group Focus: Sleep 101    Length of service: 31552 - 45-50 minutes    Number of patients in attendance: 12    Referred by: [facility]    Target symptoms: Difficulty sleeping    Patient's response to treatment: Active Listening, Self-Disclosure    Progress toward goals: Progressing adequately    Interval History: Session focus with Sleep: SLEEP BASICS and SLEEP DIARY Session focus was on psychoeducation on basic concepts about sleep including stages of sleep, reasons for sleep, and changes in sleep as we age. Session included discussion of sleep drive, the uses of sleep medication, and the efficacy of CBTi. Clinician introduced group to daily sleep diary.      Diagnosis:     ICD-10-CM ICD-9-CM   1. Severe episode of recurrent major depressive disorder, without psychotic features  F33.2 296.33   2. JESUS (generalized anxiety disorder)  F41.1 300.02       Plan: Continue in [facility]

## 2025-04-14 NOTE — PROGRESS NOTES
Group Psychotherapy (PhD/LCSW)    Site: [facility]    Clinical status of patient: Intensive Outpatient Program (IOP)     Date: 4/14/2025    Group Focus: Distress Tolerance    Length of service: 54360 - 45-50 minutes    Number of patients in attendance: 12    Referred by: [facility]    Target symptoms: Depression and Anxiety    Patient's response to treatment: Active Listening, Self-Disclosure    Progress toward goals: Progressing adequately    Interval History: Session focus with Distress Tolerance: Distract with ACCEPTS.  Patients were encouraged to use activities, contributing, comparisons, different emotions, pushing away, other thoughts, and sensations to reduce intensity of distress.       Diagnosis:     ICD-10-CM ICD-9-CM   1. Severe episode of recurrent major depressive disorder, without psychotic features  F33.2 296.33   2. JESUS (generalized anxiety disorder)  F41.1 300.02       Plan: Continue in [facility]

## 2025-04-14 NOTE — PLAN OF CARE
04/14/25 1507   Activity/Group Therapy Checklist   Group Other (Comments)  (Processing)   Attendance Attended   Follows Direction Followed directions   Group Interactions/Observations Interacted appropriately;Sharing;Supportive;Alert   Affect/Mood Range Normal range   Affect/Mood Display Appropriate   Goal Progression Progressing

## 2025-04-15 ENCOUNTER — HOSPITAL ENCOUNTER (OUTPATIENT)
Dept: PSYCHIATRY | Facility: HOSPITAL | Age: 35
Discharge: HOME | End: 2025-04-15
Attending: STUDENT IN AN ORGANIZED HEALTH CARE EDUCATION/TRAINING PROGRAM
Payer: COMMERCIAL

## 2025-04-15 DIAGNOSIS — F41.1 GAD (GENERALIZED ANXIETY DISORDER): ICD-10-CM

## 2025-04-15 DIAGNOSIS — F33.2 SEVERE EPISODE OF RECURRENT MAJOR DEPRESSIVE DISORDER, WITHOUT PSYCHOTIC FEATURES: Primary | ICD-10-CM

## 2025-04-15 PROCEDURE — 90853 GROUP PSYCHOTHERAPY: CPT

## 2025-04-15 PROCEDURE — 90853 GROUP PSYCHOTHERAPY: CPT | Mod: ,,,

## 2025-04-15 PROCEDURE — 90853 GROUP PSYCHOTHERAPY: CPT | Mod: ,,, | Performed by: PSYCHOLOGIST
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
## 2025-04-15 NOTE — PROGRESS NOTES
Group Psychotherapy (PhD/LCSW)    Site: [facility]    Clinical status of patient: Intensive Outpatient Program (IOP)     Date: 4/15/2025    Group Focus: Interpersonal Effectiveness    Length of service:  65217 - 45-50 minutes    Number of patients in attendance: 11    Referred by: [facility] ([facility])    Target symptoms: Depression and Anxiety    Patient's response to treatment: Active Listening, Self-Disclosure    Progress toward goals: Progressing adequately    Interval History: Session focus was Interpersonal Effectiveness: GIVE and FAST.  Patient introduced to skills to promote active listening, self-respect, and attendance to values. Patient was provided with opportunities to collaborate with others and role-play in session.    Diagnosis:     ICD-10-CM ICD-9-CM   1. Severe episode of recurrent major depressive disorder, without psychotic features  F33.2 296.33   2. JESUS (generalized anxiety disorder)  F41.1 300.02       Plan: Continue in [facility]

## 2025-04-15 NOTE — PROGRESS NOTES
Group Psychotherapy (PhD/LCSW)     Site: [facility]     Clinical status of patient: Intensive Outpatient Program (IOP)     Date: 4/15/2025     Group Focus: Psychodynamic Group Psychotherapy     Length of service: 13210 - 45-50 minutes     Number of patients in attendance: 4     Referred by: [facility]     Target symptoms: Depression and Anxiety     Patient's response to treatment: Active Listening, Self-disclosure, and Feedback given to another patient     Progress toward goals: Progressing well     Interval History:   Group members welcomed a new patient to the program. The group was focused on the following: small steps to apply skills learned in the program, challenging ruminating thoughts, transitioning to a new job, and setting boundaries with family members.        Diagnosis:     ICD-10-CM ICD-9-CM   1. Severe episode of recurrent major depressive disorder, without psychotic features  F33.2 296.33   2. JESUS (generalized anxiety disorder)  F41.1 300.02        Plan: Continue in [facility] program

## 2025-04-16 ENCOUNTER — HOSPITAL ENCOUNTER (OUTPATIENT)
Dept: PSYCHIATRY | Facility: HOSPITAL | Age: 35
Discharge: HOME | End: 2025-04-16
Attending: STUDENT IN AN ORGANIZED HEALTH CARE EDUCATION/TRAINING PROGRAM
Payer: COMMERCIAL

## 2025-04-16 DIAGNOSIS — F33.2 SEVERE EPISODE OF RECURRENT MAJOR DEPRESSIVE DISORDER, WITHOUT PSYCHOTIC FEATURES: Primary | ICD-10-CM

## 2025-04-16 DIAGNOSIS — G43.701 CHRONIC MIGRAINE W/O AURA, NOT INTRACTABLE, W STAT MIGR: ICD-10-CM

## 2025-04-16 DIAGNOSIS — F41.1 GENERALIZED ANXIETY DISORDER: ICD-10-CM

## 2025-04-16 PROCEDURE — 90853 GROUP PSYCHOTHERAPY: CPT | Mod: ,,, | Performed by: PSYCHOLOGIST

## 2025-04-16 PROCEDURE — 90853 GROUP PSYCHOTHERAPY: CPT | Mod: ,,,

## 2025-04-16 NOTE — PLAN OF CARE
"   04/16/25 1050   Activity/Group Therapy Checklist   Group Other (Comments)  (Looking Back/Looking Forward)   Attendance Attended   Follows Direction Followed directions   Group Interactions/Observations Interacted appropriately;Sharing;Supportive;Alert   Affect/Mood Range Normal range   Affect/Mood Display Appropriate   Goal Progression Progressing      facilitated group session. SW discussed activity, "Looking Back, Looking Forward" and discussed the importance of acknowledging personal accomplishments and looking towards future goals.  "

## 2025-04-16 NOTE — PROGRESS NOTES
Group Psychotherapy (PhD/LCSW)    Site: [facility]    Clinical status of patient: Intensive Outpatient Program (IOP)     Date: 4/16/2025    Group Focus: Mindfulness    Length of service: 41934 - 45-50 minutes    Number of patients in attendance: 13    Referred by: [facility]    Target symptoms: Depression and Anxiety    Patient's response to treatment: Active Listening and Self-disclosure    Progress toward goals: Progressing adequately    Interval History: Session focus was Mindfulness: Geary Kindness Meditation. Patient was introduced to the Geary Kindness meditation skill. Patient discussed the uses of the meditation, how to practice the meditation, and participated in a loving kindness meditation during session.    Diagnosis:     ICD-10-CM ICD-9-CM   1. Severe episode of recurrent major depressive disorder, without psychotic features  F33.2 296.33   2. Generalized anxiety disorder  F41.1 300.02       Plan: Continue in [facility]

## 2025-04-16 NOTE — PROGRESS NOTES
Group Psychotherapy (PhD/LCSW)     Site: [facility]     Clinical status of patient: Intensive Outpatient Program (IOP)      Date: 4/16/2025     Group Focus: Assertive Communication     Length of service: 05764 - 45-50 minutes     Number of patients in attendance: 7     Referred by: [facility]     Target symptoms: Anxiety and Depression    Patient's response to treatment: Active Listening, Self-Disclosure     Progress toward goals: Progressing adequately     Interval History: Participants reviewed Personal Bill of Rights and reflected on rights with which they identified or that felt difficult to believe. Participants reviewed distinctions among passive, aggressive, passive aggressive, and assertive communication. Participants discussed characteristics of assertive communication and practiced creating assertive messages.      Diagnosis:     ICD-10-CM ICD-9-CM   1. Severe episode of recurrent major depressive disorder, without psychotic features  F33.2 296.33   2. Generalized anxiety disorder  F41.1 300.02      Plan: Continue in [facility]

## 2025-04-17 ENCOUNTER — HOSPITAL ENCOUNTER (OUTPATIENT)
Dept: PSYCHIATRY | Facility: HOSPITAL | Age: 35
Discharge: HOME | End: 2025-04-17
Attending: STUDENT IN AN ORGANIZED HEALTH CARE EDUCATION/TRAINING PROGRAM
Payer: COMMERCIAL

## 2025-04-17 DIAGNOSIS — F41.1 GENERALIZED ANXIETY DISORDER: ICD-10-CM

## 2025-04-17 DIAGNOSIS — F33.2 SEVERE EPISODE OF RECURRENT MAJOR DEPRESSIVE DISORDER, WITHOUT PSYCHOTIC FEATURES: Primary | ICD-10-CM

## 2025-04-17 PROCEDURE — 90853 GROUP PSYCHOTHERAPY: CPT

## 2025-04-17 RX ORDER — IBUPROFEN 800 MG/1
800 TABLET ORAL 2 TIMES DAILY WITH MEALS
Qty: 60 TABLET | Refills: 1 | OUTPATIENT
Start: 2025-04-17

## 2025-04-17 NOTE — PLAN OF CARE
"   04/17/25 1421   Activity/Group Therapy Checklist   Group Other (Comments)  (Creative Therapy)   Attendance Attended   Follows Direction Followed directions   Group Interactions/Observations Interacted appropriately;Alert;Sharing;Supportive   Affect/Mood Range Normal range   Affect/Mood Display Appropriate   Goal Progression Progressing      facilitated self care creative session and discussed with patients how creative therapy can be therapeutic to resolving anxiety and other stress-related issues. SW discussed activity: " Flow of your Life". Patient's were able to draw and/ or color and describe, non-verbally what their current flow of life looks like.   "

## 2025-04-17 NOTE — PROGRESS NOTES
Group Psychotherapy (PhD/LCSW)    Site: [facility]    Clinical status of patient: Intensive Outpatient Program (IOP)     Date: 4/17/2025    Group Focus: Emotion Regulation    Length of service: 54824 - 45-50 minutes    Number of patients in attendance: 15    Referred by: [facility]    Target symptoms: Depression and Anxiety    Patient's response to treatment: Active Listening, Self-Disclosure    Progress toward goals: Progressing adequately    Interval History: Session focus was Emotion Regulation: Check the Facts.  Patients were encouraged to understand what their emotions do for them (motivate them to action, communicate to themselves and others).  They were encouraged to check the facts to ensure their emotion intensity fits the situation.      Diagnosis:     ICD-10-CM ICD-9-CM   1. Severe episode of recurrent major depressive disorder, without psychotic features  F33.2 296.33   2. Generalized anxiety disorder  F41.1 300.02       Plan: Continue in [facility]

## 2025-04-21 ENCOUNTER — OFFICE VISIT (OUTPATIENT)
Dept: PSYCHIATRY | Facility: CLINIC | Age: 35
End: 2025-04-21
Payer: COMMERCIAL

## 2025-04-21 DIAGNOSIS — Z00.8 ENCOUNTER FOR PSYCHOLOGICAL EVALUATION: Primary | ICD-10-CM

## 2025-04-21 DIAGNOSIS — F41.1 GAD (GENERALIZED ANXIETY DISORDER): ICD-10-CM

## 2025-04-21 DIAGNOSIS — F44.7 FUNCTIONAL NEUROLOGICAL SYMPTOM DISORDER WITH MIXED SYMPTOMS: ICD-10-CM

## 2025-04-21 DIAGNOSIS — F33.1 MODERATE EPISODE OF RECURRENT MAJOR DEPRESSIVE DISORDER: ICD-10-CM

## 2025-04-22 ENCOUNTER — HOSPITAL ENCOUNTER (OUTPATIENT)
Dept: PSYCHIATRY | Facility: HOSPITAL | Age: 35
Discharge: HOME | End: 2025-04-22
Attending: STUDENT IN AN ORGANIZED HEALTH CARE EDUCATION/TRAINING PROGRAM
Payer: COMMERCIAL

## 2025-04-22 DIAGNOSIS — F41.1 GENERALIZED ANXIETY DISORDER: ICD-10-CM

## 2025-04-22 DIAGNOSIS — F40.10 SOCIAL ANXIETY DISORDER: ICD-10-CM

## 2025-04-22 DIAGNOSIS — F33.2 SEVERE EPISODE OF RECURRENT MAJOR DEPRESSIVE DISORDER, WITHOUT PSYCHOTIC FEATURES: Primary | ICD-10-CM

## 2025-04-22 PROCEDURE — 90853 GROUP PSYCHOTHERAPY: CPT | Mod: ,,,

## 2025-04-22 PROCEDURE — 90853 GROUP PSYCHOTHERAPY: CPT

## 2025-04-22 PROCEDURE — 90853 GROUP PSYCHOTHERAPY: CPT | Mod: ,,, | Performed by: PSYCHOLOGIST

## 2025-04-22 NOTE — PLAN OF CARE
04/22/25 1204   Activity/Group Therapy Checklist   Group Other (Comments)  (Healthy Boundaries)   Attendance Attended   Follows Direction Followed directions   Group Interactions/Observations Interacted appropriately;Sharing;Supportive;Alert   Affect/Mood Range Normal range   Affect/Mood Display Appropriate   Goal Progression Progressing      facilitated group therapy session and discussed personal boundaries. Defined boundaries and discussed the importance of establishing healthy boundaries. Discussed common traits of rigid, porous and healthy boundaries and asked pts to identify where they fell on the spectrum. Patients completed a boundary exploration activity. Pts were to identify someone or a group of people whom they struggle to set a boundary with and identify what type of boundaries were in place currently with each category based on the person/group selected. Pts reflected on questions: What specific actions could be taken to improve current boundaries? How do they think the other person/ group would respond to the changes? And how do they think life would be different once the healthier boundaries were established.

## 2025-04-22 NOTE — PROGRESS NOTES
Group Psychotherapy (PhD/LCSW)     Site: [facility]     Clinical status of patient: Intensive Outpatient Program (IOP)     Date: 4/22/2025     Group Focus: Psychodynamic Group Psychotherapy     Length of service: 63876 - 45-50 minutes     Number of patients in attendance: 5     Referred by: [facility]     Target symptoms: Depression and Anxiety     Patient's response to treatment: Active Listening, Self-disclosure, and Feedback given to another patient     Progress toward goals: Progressing well     Interval History:  The group was focused on differences in communication approaches based on context and person. Patients also dicussed challenges interacting with others who are engaging in problem behavior, but choose not to acknowledge or seek help for their challenges.  Patients gave multiple examples of how they are working to set healthy boundaries.     Diagnosis:     ICD-10-CM ICD-9-CM   1. Severe episode of recurrent major depressive disorder, without psychotic features  F33.2 296.33   2. Generalized anxiety disorder  F41.1 300.02   3. Social anxiety disorder  F40.10 300.23          Plan: Continue in [facility]

## 2025-04-23 ENCOUNTER — HOSPITAL ENCOUNTER (OUTPATIENT)
Dept: PSYCHIATRY | Facility: HOSPITAL | Age: 35
Discharge: HOME | End: 2025-04-23
Attending: STUDENT IN AN ORGANIZED HEALTH CARE EDUCATION/TRAINING PROGRAM
Payer: COMMERCIAL

## 2025-04-23 VITALS
RESPIRATION RATE: 18 BRPM | DIASTOLIC BLOOD PRESSURE: 68 MMHG | TEMPERATURE: 98 F | SYSTOLIC BLOOD PRESSURE: 114 MMHG | HEART RATE: 82 BPM

## 2025-04-23 DIAGNOSIS — F41.1 GENERALIZED ANXIETY DISORDER: ICD-10-CM

## 2025-04-23 DIAGNOSIS — F33.2 SEVERE EPISODE OF RECURRENT MAJOR DEPRESSIVE DISORDER, WITHOUT PSYCHOTIC FEATURES: Primary | ICD-10-CM

## 2025-04-23 PROCEDURE — 90853 GROUP PSYCHOTHERAPY: CPT | Mod: ,,, | Performed by: PSYCHOLOGIST
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
## 2025-04-23 NOTE — PROGRESS NOTES
Group Psychotherapy (PhD/LCSW)    Site: [facility]    Clinical status of patient: Intensive Outpatient Program (IOP)     Date: 4/23/2025    Group Focus: Mindfulness    Length of service: 74119 - 45-50 minutes    Number of patients in attendance: 11    Referred by: [facility]    Target symptoms: Depression and Anxiety    Patient's response to treatment: Active Listening and Self-disclosure    Progress toward goals: Progressing adequately    Interval History: Session focus was Mindfulness: Mindfulness of Current Thoughts. Patient introduced to the Mindfulness of current thought skills of observe your thoughts, adopt a curious mind, remember: you are not your thoughts, and don't block or suppress your thoughts. Patient identified the value of each skill, how to use each skill, and practiced the use of each skill in session.       Diagnosis:     ICD-10-CM ICD-9-CM   1. Severe episode of recurrent major depressive disorder, without psychotic features  F33.2 296.33   2. Generalized anxiety disorder  F41.1 300.02         Plan: Continue in [facility]

## 2025-04-23 NOTE — PROGRESS NOTES
Group Psychotherapy (PhD/LCSW)     Site: [facility]     Clinical status of patient: Intensive Outpatient Program (IOP)      Date: 4/23/2025     Group Focus: Addressing Shame     Length of service: 22483 - 45-50 minutes     Number of patients in attendance: 6     Referred by: [facility]     Target symptoms: Anxiety and Depression     Patient's response to treatment: Active Listening and Self-Disclosure      Progress toward goals: Progressing adequately     Interval History: Participants completed a Shame Questionnaire, noting experiences of shame with which they identified. Participants reviewed cycle of shame and discussed common sources of shame. Participants reviewed distinctions between healthy guilt and unhealthy shame. Participants discussed strategies for addressing shame.      Diagnosis:     ICD-10-CM ICD-9-CM   1. Severe episode of recurrent major depressive disorder, without psychotic features  F33.2 296.33   2. Generalized anxiety disorder  F41.1 300.02      Plan: Continue in [facility]

## 2025-04-23 NOTE — PROGRESS NOTES
Group Psychotherapy (PhD/LCSW)    Site: [facility]    Clinical status of patient: Intensive Outpatient Program (IOP)     Date: 4/22/2025    Group Focus: Interpersonal Effectiveness    Length of service:  11354 - 45-50 minutes    Number of patients in attendance: 12    Referred by: [facility] ([facility])    Target symptoms: Depression and Anxiety    Patient's response to treatment: Active Listening, Self-Disclosure    Progress toward goals: Progressing adequately    Interval History: Session focus was Interpersonal Effectiveness: Validation.  Patient encouraged to focus on validation of others by enhancing their ability to hear, understand, and respect others. Patient practiced validation of others through role-play and examples.       Diagnosis:     ICD-10-CM ICD-9-CM   1. Severe episode of recurrent major depressive disorder, without psychotic features  F33.2 296.33   2. Generalized anxiety disorder  F41.1 300.02   3. Social anxiety disorder  F40.10 300.23       Plan: Continue in [facility]

## 2025-04-28 DIAGNOSIS — F41.1 GENERALIZED ANXIETY DISORDER: Primary | ICD-10-CM

## 2025-04-29 ENCOUNTER — PATIENT MESSAGE (OUTPATIENT)
Dept: PSYCHIATRY | Facility: CLINIC | Age: 35
End: 2025-04-29
Payer: COMMERCIAL

## 2025-04-29 ENCOUNTER — OFFICE VISIT (OUTPATIENT)
Dept: PSYCHIATRY | Facility: CLINIC | Age: 35
End: 2025-04-29
Payer: COMMERCIAL

## 2025-04-29 DIAGNOSIS — F60.0 PARANOID PERSONALITY DISORDER: ICD-10-CM

## 2025-04-29 DIAGNOSIS — F60.7 DEPENDENT PERSONALITY DISORDER: ICD-10-CM

## 2025-04-29 DIAGNOSIS — F40.10 SOCIAL ANXIETY DISORDER: ICD-10-CM

## 2025-04-29 DIAGNOSIS — Z00.8 ENCOUNTER FOR PSYCHOLOGICAL EVALUATION: Primary | ICD-10-CM

## 2025-04-29 DIAGNOSIS — F44.7 FUNCTIONAL NEUROLOGICAL SYMPTOM DISORDER WITH MIXED SYMPTOMS: ICD-10-CM

## 2025-04-29 DIAGNOSIS — F60.6 AVOIDANT PERSONALITY DISORDER: ICD-10-CM

## 2025-04-29 DIAGNOSIS — F33.1 MODERATE EPISODE OF RECURRENT MAJOR DEPRESSIVE DISORDER: ICD-10-CM
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
## 2025-04-29 NOTE — PROGRESS NOTES
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 the moment. Another example is that she is more prone to perceive things through mistrust as her baseline due to history of mistreatment and trauma, rather than based on reactivity to the moment.   Ms. Saavedra was informed that the presence of personality pathology can make it more difficult for typical treatments to be successful. A patient with Social Anxiety Disorder that formed in the past few years can typically succeed with CBT in 10 to 12 sessions. A patient with longstanding Social Anxiety Disorder since childhood who has now developed Avoidant Personality Disorder may require double the treatment and practice. However, once a patient improves symptoms and changes core beliefs, it is very possible for the personality disorer to be removed.  Ms. Saavedra was also informed that the presence of personality pathology, along with inadequately treated clinical symptoms, can lead to somatic symptoms or Functional Neurological Symptom Disorder. If a patient has difficulty coping or improving symptoms over long periods of time, their internalized distress can manifest in the presentation of neurological or physical symptoms.   The recommendations from the MCMI-IV profile were provided to Ms. Saavedra. I encouraged her to work closely  with her supportive therapist to reduce acute distress and motivate her for change. Then she is encouraged to do more structured treatment (CBT or DBT) and to consider family or group therapy. Once she has made progress, it will be helpful for her to consider exposure strategies to improve activation and habituation. She was understanding and agreeable with these recommendations.      DIAGNOSTIC IMPRESSIONS:    ICD-10-CM ICD-9-CM   1. Encounter for psychological evaluation  Z00.8 V72.85   2. Social anxiety disorder  F40.10 300.23   3. Moderate episode of recurrent major depressive disorder  F33.1 296.32   4. Functional neurological symptom disorder with mixed symptoms  F44.7 300.11   5. 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 Rosalina Saavedra was referred for psychological evaluation by her psychiatrist Dr. Stella Worley for diagnostic clarity and treatment planning. Test results from the MCMI-IV confirmed diagnoses including Major Depressive Disorder, Social Anxiety Disorder, and Functional Neurological Symptom Disorder. There are also symptoms related to trauma, but I do not believe she meets full criteria for PTSD. While a trauma-informed approach will help her in treatment, it does not seem like a trauma-focused protocol (like CPT or PE) would be the best approach. Test results from the MCMI-IV and SCID-5-PD led to diagnoses of Avoidant, Dependent, and Paranoid Personality Disorders. Ms. Saavedra has experienced such prominent and longstanding struggles with clinical symptoms that it appears she has developed personality characteristics and traits consistent with her history of mistreatment, trauma, depression, and anxiety. These traits can make it more difficult for her to engage in treatment, and likely led to the development of Functional Neurological Symptom  Disorder.     Ms. Saavedra was provided with the following recommendations based on this evaluation:  Continue in pharmacotherapy, but it is unlikely that medication alone will resolve clinical symptoms and personality pathology. This understanding can help you create reasonable expectations for psychotropic medications.  Continue in supportive psychotherapy. It will be helpful to process these results with your therapist and work on maintaining reductions in acute distress. I am hopfeul this diagnostic clarity can provide you hope that improvements have been challenging for a reason. Once acute distress is well-stabilized, then it will be helpful to use motivational interviewing strategies to help you feel motivated for change - and more importantly, to believe change is possible.  Seek structured psychotherapy. When you are at an adequate readiness level, it will be helpful for you to seek more structured psychotherapy (using CBT or DBT). You will benefit from cognitive restructuring to resolve trauma-based negative core beliefs, to build esteem, and to promote more realistic expectations. It may be helpful for you to consider treatment in a group or family modality at some point.  Exposure. Strategies using exposure are very challenging for patients, but are extremely helpful for patients with social anxiety, trauma-based avoidance, and discomfort with physical sensations. Once you have done adequate work to modify personality proclivities and thoughts and behaviors related to depression and anxiety, then it will be helpful for you to consider the use of exposure strategies.        Report and interpretation and coding were completed on 05/02/2025.          Mandie Kuhn, PhD  Clinical Psychologist

## 2025-04-30 ENCOUNTER — OFFICE VISIT (OUTPATIENT)
Dept: PSYCHIATRY | Facility: CLINIC | Age: 35
End: 2025-04-30
Payer: COMMERCIAL

## 2025-04-30 DIAGNOSIS — F44.5 PSYCHOGENIC NONEPILEPTIC SEIZURE: ICD-10-CM

## 2025-04-30 DIAGNOSIS — F44.7 FUNCTIONAL NEUROLOGICAL SYMPTOM DISORDER WITH MIXED SYMPTOMS: ICD-10-CM

## 2025-04-30 DIAGNOSIS — F33.1 MODERATE EPISODE OF RECURRENT MAJOR DEPRESSIVE DISORDER: Primary | ICD-10-CM

## 2025-04-30 DIAGNOSIS — F99 INSOMNIA DUE TO OTHER MENTAL DISORDER: ICD-10-CM

## 2025-04-30 DIAGNOSIS — F41.1 GENERALIZED ANXIETY DISORDER: ICD-10-CM

## 2025-04-30 DIAGNOSIS — F51.05 INSOMNIA DUE TO OTHER MENTAL DISORDER: ICD-10-CM

## 2025-04-30 PROCEDURE — 1160F RVW MEDS BY RX/DR IN RCRD: CPT | Mod: CPTII,95,, | Performed by: INTERNAL MEDICINE

## 2025-04-30 PROCEDURE — 98006 SYNCH AUDIO-VIDEO EST MOD 30: CPT | Mod: 95,,, | Performed by: INTERNAL MEDICINE

## 2025-04-30 PROCEDURE — 1159F MED LIST DOCD IN RCRD: CPT | Mod: CPTII,95,, | Performed by: INTERNAL MEDICINE

## 2025-04-30 NOTE — PROGRESS NOTES
OUTPATIENT PSYCHIATRY RETURN VISIT    ENCOUNTER DATE:  4/30/2025  SITE:  [facility], [facility]  LENGTH OF SESSION:  14 minutes    The patient location is:  [place], not in a healthcare facility  Visit type:  audiovisual    Face to Face time with patient:  14 minutes  20 minutes of total time spent on the encounter, which includes face to face time and non-face to face time preparing to see the patient (eg, review of tests), Obtaining and/or reviewing separately obtained history, Documenting clinical information in the electronic or other health record, Independently interpreting results (not separately reported) and communicating results to the patient/family/caregiver, or Care coordination (not separately reported).     Each patient to whom he or she provides medical services by telemedicine is:  (1) informed of the relationship between the physician and patient and the respective role of any other health care provider with respect to management of the patient; and (2) notified that he or she may decline to receive medical services by telemedicine and may withdraw from such care at any time.      CHIEF COMPLAINT:  Mood      HISTORY OF PRESENTING ILLNESS:  Rosalina Saavedra is a 35 y.o. female with history of MDD, JESUS, and Social Anxiety Disorder who presents for follow up appointment.      Plan at last appointment on 4/4/2025:  Increase Abilify to 10mg daily since this has been most helpful for her depression in the past.  She is aware this is not FDA approved for anxiety but hopes it will help these symptoms as well.    Continue Effexor 225mg every morning for depression and anxiety.  Continue Buspar 30mg BID and Xanax 0.5mg/1mg for anxiety.  Continue Doxepin 20mg qHS for insomnia.  Discussed with patient informed consent, risks versus benefits, alternative treatments, side effect profile and the inherent unpredictability of individual responses to these treatments.  The patient expresses  "understanding of the above and displays the capacity to agree with this current plan.  Continue individual psychotherapy with Guerita Stevens LCSW.     History as told by patient:  Program was pretty good.  Better than not going at all - does feel like it was better than the hospital.  Did a lot of mindfulness which helped to apply this to her worrying.  If something happens trying not to make stories about what could happen.  She does think depression and anxiety are a little better.  Still having some suicidal thoughts but they are not overwhelming her like they were.  They may pop up every now and then - practicing saying thoughts out loud to give them less power, or "a thought is just a thought."  In the middle of the program they called her and they didn't give her the days requested.  Would start next Friday.  Planning to start stress group tomorrow x 4 weeks.  Has tried to work on dissertation.  Training Daron.  Going to movies to distract herself.  Trying to staying out of the house.      Medication side effects:  Denies  Medication compliance:  Yes    PSYCHIATRIC REVIEW OF SYSTEMS:  Trouble with sleep:  Stable  Appetite changes:  Not discussed  Weight changes:  Gain slowly over time  Lack of energy:  Sometimes  Anhedonia:  Sometimes  Somatic symptoms:  Not discussed  Libido:  Not discussed  Anxiety/panic:  Sometimes  Guilty/hopeless:  Sometimes  Self-injurious behavior/risky behavior:  Denies  Any drugs:  Denies  Alcohol:  Denies     MEDICAL REVIEW OF SYSTEMS:  Complete review of systems performed covering Constitutional, Musculoskeletal, Neurologic.  All systems negative except for that covered in HPI.    PAST PSYCHIATRIC, MEDICAL, AND SOCIAL HISTORY REVIEWED  The patient's past medical, family and social history have been reviewed and updated as appropriate within the electronic medical record - see encounter notes.    MEDICATIONS:    Current Outpatient Medications:     ALPRAZolam (XANAX) 0.5 MG tablet, Take " 1 tablet (0.5mg) by mouth in the morning and 2 tablets (1mg) before bed., Disp: 90 tablet, Rfl: 5    ARIPiprazole (ABILIFY) 10 MG Tab, Take 1 tablet (10 mg total) by mouth once daily., Disp: 30 tablet, Rfl: 11    atogepant (QULIPTA) 60 mg Tab, Take 1 tablet (60 mg total) by mouth once daily., Disp: 30 tablet, Rfl: 6    busPIRone (BUSPAR) 30 MG Tab, Take 1 tablet (30 mg total) by mouth 2 (two) times daily., Disp: 180 tablet, Rfl: 11    doxepin (SINEQUAN) 10 MG capsule, Take 2 capsules (20 mg total) by mouth every evening., Disp: 180 capsule, Rfl: 3    etodolac (LODINE) 500 MG tablet, TAKE 1 TABLET (500 MG TOTAL) BY MOUTH 2 (TWO) TIMES DAILY AS NEEDED (MIGRAINE)., Disp: 20 tablet, Rfl: 12    ibuprofen (ADVIL,MOTRIN) 800 MG tablet, Take 1 tablet (800 mg total) by mouth 2 (two) times daily with meals., Disp: 60 tablet, Rfl: 1    promethazine (PHENERGAN) 25 MG tablet, Take 1 tablet (25 mg total) by mouth every 6 (six) hours as needed for Nausea., Disp: 40 tablet, Rfl: 0    topiramate (TOPAMAX) 100 MG tablet, Take 1 tablet (100 mg total) by mouth every evening. (Take 1 tablet nightly for 2 weeks. In 14 days, if tolerating, take with 50mg tablet to equal 150mg nightly.), Disp: 90 tablet, Rfl: 3    venlafaxine (EFFEXOR-XR) 75 MG 24 hr capsule, Take 1 capsule (75 mg total) by mouth once daily. Take with 150mg capsule to equal 225mg daily., Disp: 30 capsule, Rfl: 11    ZOLMitriptan (ZOMIG-ZMT) 5 MG disintegrating tablet, TAKE 1 TABLET BY MOUTH AS NEEDED FOR MIGRAINE., Disp: 9 tablet, Rfl: 12    ZOLMitriptan (ZOMIG-ZMT) 5 MG disintegrating tablet, DISSOLVE 1 TABLET BY MOUTH AS NEEDED FOR MIGRAINE., Disp: 6 tablet, Rfl: 12    ALLERGIES:  Review of patient's allergies indicates:   Allergen Reactions    Codeine Itching and Other (See Comments)    Morphine Itching and Other (See Comments)       PSYCHIATRIC EXAM:  There were no vitals filed for this visit.    Appearance:  Well groomed, appearing healthy and of stated age  Behavior:  " Cooperative, +psychomotor retardation  Speech:  Slow, soft  Mood:  "Ok"  Affect:  Euthymic  Thought Process:  Linear, logical, goal directed  Thought Content:  +Suicidal ideation.  Negative for homicidal ideation, delusions or hallucinations.  Associations:  Intact  Memory:  Grossly Intact  Level of Consciousness/Orientation:  Grossly intact  Fund of Knowledge:  Good  Attention:  Good  Language:  Fluent, able to name abstract and concrete objects  Insight:  Fair  Judgment:  Impaired due to severe depression  Psychomotor signs:  No involuntary movements or tremor of face  Gait:  Unable to assess via virtual visit      RELEVANT LABS/STUDIES:    Lab Results   Component Value Date    WBC 6.86 10/09/2023    HGB 11.6 (L) 10/09/2023    HCT 38.7 10/09/2023    MCV 92 10/09/2023     10/09/2023     BMP  Lab Results   Component Value Date     10/09/2023    K 4.2 10/09/2023     10/09/2023    CO2 21 (L) 10/09/2023    BUN 14 10/09/2023    CREATININE 0.8 10/09/2023    CALCIUM 9.8 10/09/2023    ANIONGAP 11 10/09/2023    ESTGFRAFRICA >60.0 07/28/2022    EGFRNONAA >60.0 07/28/2022     Lab Results   Component Value Date    ALT 18 10/09/2023    AST 14 10/09/2023    ALKPHOS 93 10/09/2023    BILITOT 0.2 10/09/2023     Lab Results   Component Value Date    TSH 0.539 10/09/2023     Lab Results   Component Value Date    HGBA1C 5.8 (H) 10/09/2023       IMPRESSION:    Rosalina Saavedra is a 35 y.o. female with history of MDD, JESUS, and Social Anxiety Disorder who presents for follow up appointment.    Status/Progress:  Based on the examination today, the patient's problem(s) is/are  improving but still inadequately controlled .  New problems have not been presented today.    Risk Parameters:  Patient reports no suicidal ideation  Patient reports no homicidal ideation  Patient reports no self-injurious behavior  Patient reports no violent behavior      DIAGNOSES:    ICD-10-CM ICD-9-CM   1. Moderate episode of recurrent major " depressive disorder  F33.1 296.32   2. Generalized anxiety disorder  F41.1 300.02   3. Functional neurological symptom disorder with mixed symptoms  F44.7 300.11   4. Psychogenic nonepileptic seizure  F44.5 300.11   5. Insomnia due to other mental disorder  F51.05 300.9    F99 327.02       PLAN:  Discussed restarting activities that are helpful for her mood and feeling like herself again.  No medication changes today.  Awaiting psychiatric testing results.    Continue Effexor 225mg every morning for depression and anxiety.  Continue Abilify 10mg daily as adjunct for depression.    Continue Buspar 30mg BID and Xanax 0.5mg/1mg for anxiety.  Continue Doxepin 20mg qHS for insomnia.  Discussed with patient informed consent, risks versus benefits, alternative treatments, side effect profile and the inherent unpredictability of individual responses to these treatments.  The patient expresses understanding of the above and displays the capacity to agree with this current plan.  Continue individual psychotherapy with Guerita Stevens LCSW.  She is starting stress management group tomorrow.    RETURN TO CLINIC:  Follow up in about 2 weeks (around 5/14/2025).

## 2025-05-01 ENCOUNTER — CLINICAL SUPPORT (OUTPATIENT)
Dept: PSYCHIATRY | Facility: CLINIC | Age: 35
End: 2025-05-01
Payer: COMMERCIAL

## 2025-05-01 DIAGNOSIS — Z71.9 ENCOUNTER FOR EDUCATION: Primary | ICD-10-CM

## 2025-05-01 NOTE — PROGRESS NOTES
Stress 101 Psychoeducational Course (Clinical Psychology Resident)     Session 1     Site: Telemedicine     The patient location is: Telemedicine  The chief complaint leading to consultation is: Stress 101 Psychoeducational Course     Visit type: audiovisual     Face to Face time with patient: 60  75 minutes of total time spent on the encounter, which includes face to face time and non-face to face time preparing to see the patient (eg, review of tests), Obtaining and/or reviewing separately obtained history, Documenting clinical information in the electronic or other health record, Independently interpreting results (not separately reported) and communicating results to the patient/family/caregiver, or Care coordination (not separately reported).      Each patient to whom he or she provides medical services by telemedicine is:  (1) informed of the relationship between the physician and patient and the respective role of any other health care provider with respect to management of the patient; and (2) notified that he or she may decline to receive medical services by telemedicine and may withdraw from such care at any time.     Date: 5/1/2025     Course Focus: Stress 101     Length of service: 60 minutes     Number of participants in attendance: 8     Referred by: Self-referred      Target symptoms: Stress     Participant response: Active Listening, Self-Disclosure     Progress toward goals: Progressing adequately     Interval History: Discussion of session #1: Distress and Relaxation. Orientation of group structure and rules, Introduction to Stress and Distress, SUDS Scale, Stress and Relaxation Response, and TIP Skills. Participants were given the homework of tracking high/low distress levels each day and practice one TIP skill each day.      Diagnosis:       ICD-10-CM ICD-9-CM   1. Encounter for education  Z71.9 V65.40         Plan: Continue Stress 101 Psychoeducational Course. Next meeting on 5/8/2025.

## 2025-05-02 ENCOUNTER — PATIENT MESSAGE (OUTPATIENT)
Dept: PSYCHIATRY | Facility: CLINIC | Age: 35
End: 2025-05-02
Payer: COMMERCIAL

## 2025-05-06 ENCOUNTER — OFFICE VISIT (OUTPATIENT)
Dept: PSYCHIATRY | Facility: CLINIC | Age: 35
End: 2025-05-06
Payer: COMMERCIAL

## 2025-05-06 DIAGNOSIS — F51.05 INSOMNIA DUE TO OTHER MENTAL DISORDER: ICD-10-CM

## 2025-05-06 DIAGNOSIS — F40.10 SOCIAL ANXIETY DISORDER: ICD-10-CM

## 2025-05-06 DIAGNOSIS — F60.6 AVOIDANT PERSONALITY DISORDER: ICD-10-CM

## 2025-05-06 DIAGNOSIS — F99 INSOMNIA DUE TO OTHER MENTAL DISORDER: ICD-10-CM

## 2025-05-06 DIAGNOSIS — F60.7 DEPENDENT PERSONALITY DISORDER: ICD-10-CM

## 2025-05-06 DIAGNOSIS — F44.7 FUNCTIONAL NEUROLOGICAL SYMPTOM DISORDER WITH MIXED SYMPTOMS: ICD-10-CM

## 2025-05-06 DIAGNOSIS — F33.0 MILD EPISODE OF RECURRENT MAJOR DEPRESSIVE DISORDER: ICD-10-CM

## 2025-05-06 DIAGNOSIS — F41.1 GENERALIZED ANXIETY DISORDER: Primary | ICD-10-CM

## 2025-05-06 PROCEDURE — 98006 SYNCH AUDIO-VIDEO EST MOD 30: CPT | Mod: 95,,, | Performed by: INTERNAL MEDICINE

## 2025-05-06 PROCEDURE — G2211 COMPLEX E/M VISIT ADD ON: HCPCS | Mod: 95,,, | Performed by: INTERNAL MEDICINE

## 2025-05-06 RX ORDER — ARIPIPRAZOLE 2 MG/1
2 TABLET ORAL DAILY
Qty: 30 TABLET | Refills: 11 | Status: SHIPPED | OUTPATIENT
Start: 2025-05-06 | End: 2026-05-06

## 2025-05-06 RX ORDER — ARIPIPRAZOLE 5 MG/1
5 TABLET ORAL DAILY
Qty: 30 TABLET | Refills: 11 | Status: SHIPPED | OUTPATIENT
Start: 2025-05-06

## 2025-05-06 NOTE — PROGRESS NOTES
OUTPATIENT PSYCHIATRY RETURN VISIT    ENCOUNTER DATE:  5/12/2025  SITE:  [facility], [facility]  LENGTH OF SESSION:  20 minutes    The patient location is:  [place], not in a healthcare facility  Visit type:  audiovisual    Face to Face time with patient:  20 minutes  25 minutes of total time spent on the encounter, which includes face to face time and non-face to face time preparing to see the patient (eg, review of tests), Obtaining and/or reviewing separately obtained history, Documenting clinical information in the electronic or other health record, Independently interpreting results (not separately reported) and communicating results to the patient/family/caregiver, or Care coordination (not separately reported).     Each patient to whom he or she provides medical services by telemedicine is:  (1) informed of the relationship between the physician and patient and the respective role of any other health care provider with respect to management of the patient; and (2) notified that he or she may decline to receive medical services by telemedicine and may withdraw from such care at any time.      CHIEF COMPLAINT:  Mood      HISTORY OF PRESENTING ILLNESS:  Rosalina Saavedra is a 35 y.o. female with history of MDD, JESUS, and Social Anxiety Disorder who presents for follow up appointment.      Plan at last appointment on 4/30/2025:  Discussed restarting activities that are helpful for her mood and feeling like herself again.  No medication changes today.  Awaiting psychiatric testing results.    Continue Effexor 225mg every morning for depression and anxiety.  Continue Abilify 10mg daily as adjunct for depression.    Continue Buspar 30mg BID and Xanax 0.5mg/1mg for anxiety.  Continue Doxepin 20mg qHS for insomnia.  Discussed with patient informed consent, risks versus benefits, alternative treatments, side effect profile and the inherent unpredictability of individual responses to these treatments.   The patient expresses understanding of the above and displays the capacity to agree with this current plan.  Continue individual psychotherapy with Guerita Stevens LCSW.  She is starting stress management group tomorrow.    History as told by patient:  Reviewed results of psychological testing together today.  Says she is doing pretty good.  Anxiety has been up - eating a lot.  A lot of fidgeting.  Not much energy.  Doesn't feel increase to 10mg of Abilify has been much more helpful.  Does not feel as overwhelmed by dissertation - working on it slowly.  Trying to get out of the house as much as she can.  Yesterday went with parents to doctor and contactor for grandparents house.  Going to movies to get out of the house.  Trying to get ready to go to back to [employer] - wants to get into better shape before going back.  Walking [place] more.  Paperwork approved.  Sees Guerita once a week.      Psychological testing results by Dr. Kuhn 4/29/25:  Ms. Rosalina Saavedra was referred for psychological evaluation by her psychiatrist Dr. Stella Worley for diagnostic clarity and treatment planning. Test results from the [facility] confirmed diagnoses including Major Depressive Disorder, Social Anxiety Disorder, and Functional Neurological Symptom Disorder. There are also symptoms related to trauma, but I do not believe she meets full criteria for PTSD. While a trauma-informed approach will help her in treatment, it does not seem like a trauma-focused protocol (like CPT or PE) would be the best approach. Test results from the [facility]-IV and SCID-5-PD led to diagnoses of Avoidant, Dependent, and Paranoid Personality Disorders. Ms. Saavedra has experienced such prominent and longstanding struggles with clinical symptoms that it appears she has developed personality characteristics and traits consistent with her history of mistreatment, trauma, depression, and anxiety. These traits can make it more difficult for her to engage in  treatment, and likely led to the development of Functional Neurological Symptom Disorder.      Ms. Saavedra was provided with the following recommendations based on this evaluation:  Continue in pharmacotherapy, but it is unlikely that medication alone will resolve clinical symptoms and personality pathology. This understanding can help you create reasonable expectations for psychotropic medications.  Continue in supportive psychotherapy. It will be helpful to process these results with your therapist and work on maintaining reductions in acute distress. I am hopfeul this diagnostic clarity can provide you hope that improvements have been challenging for a reason. Once acute distress is well-stabilized, then it will be helpful to use motivational interviewing strategies to help you feel motivated for change - and more importantly, to believe change is possible.  Seek structured psychotherapy. When you are at an adequate readiness level, it will be helpful for you to seek more structured psychotherapy (using CBT or DBT). You will benefit from cognitive restructuring to resolve trauma-based negative core beliefs, to build esteem, and to promote more realistic expectations. It may be helpful for you to consider treatment in a group or family modality at some point.  Exposure. Strategies using exposure are very challenging for patients, but are extremely helpful for patients with social anxiety, trauma-based avoidance, and discomfort with physical sensations. Once you have done adequate work to modify personality proclivities and thoughts and behaviors related to depression and anxiety, then it will be helpful for you to consider the use of exposure strategies.    Medication side effects:  Increased appetite  Medication compliance:  Yes    PSYCHIATRIC REVIEW OF SYSTEMS:  Trouble with sleep:  Stable  Appetite changes:  Not discussed  Weight changes:  Gain slowly over time  Lack of energy:  Sometimes  Anhedonia:   Sometimes  Somatic symptoms:  Not discussed  Libido:  Not discussed  Anxiety/panic:  Sometimes  Guilty/hopeless:  Sometimes  Self-injurious behavior/risky behavior:  Denies  Any drugs:  Denies  Alcohol:  Denies     MEDICAL REVIEW OF SYSTEMS:  Complete review of systems performed covering Constitutional, Musculoskeletal, Neurologic.  All systems negative except for that covered in HPI.    PAST PSYCHIATRIC, MEDICAL, AND SOCIAL HISTORY REVIEWED  The patient's past medical, family and social history have been reviewed and updated as appropriate within the electronic medical record - see encounter notes.    MEDICATIONS:    Current Outpatient Medications:     ALPRAZolam (XANAX) 0.5 MG tablet, Take 1 tablet (0.5mg) by mouth in the morning and 2 tablets (1mg) before bed., Disp: 90 tablet, Rfl: 5    ARIPiprazole (ABILIFY) 2 MG Tab, Take 1 tablet (2 mg total) by mouth once daily. Take with 5mg tablet to equal 7mg daily., Disp: 30 tablet, Rfl: 11    ARIPiprazole (ABILIFY) 5 MG Tab, Take 1 tablet (5 mg total) by mouth once daily. Take with 2mg tablet to equal 7mg daily., Disp: 30 tablet, Rfl: 11    atogepant (QULIPTA) 60 mg Tab, Take 1 tablet (60 mg total) by mouth once daily., Disp: 30 tablet, Rfl: 6    busPIRone (BUSPAR) 30 MG Tab, Take 1 tablet (30 mg total) by mouth 2 (two) times daily., Disp: 180 tablet, Rfl: 11    doxepin (SINEQUAN) 10 MG capsule, Take 2 capsules (20 mg total) by mouth every evening., Disp: 180 capsule, Rfl: 3    etodolac (LODINE) 500 MG tablet, TAKE 1 TABLET (500 MG TOTAL) BY MOUTH 2 (TWO) TIMES DAILY AS NEEDED (MIGRAINE)., Disp: 20 tablet, Rfl: 12    ibuprofen (ADVIL,MOTRIN) 800 MG tablet, Take 1 tablet (800 mg total) by mouth 2 (two) times daily with meals., Disp: 60 tablet, Rfl: 1    promethazine (PHENERGAN) 25 MG tablet, Take 1 tablet (25 mg total) by mouth every 6 (six) hours as needed for Nausea., Disp: 40 tablet, Rfl: 0    topiramate (TOPAMAX) 100 MG tablet, Take 1 tablet (100 mg total) by mouth  "every evening. (Take 1 tablet nightly for 2 weeks. In 14 days, if tolerating, take with 50mg tablet to equal 150mg nightly.), Disp: 90 tablet, Rfl: 3    venlafaxine (EFFEXOR-XR) 75 MG 24 hr capsule, Take 1 capsule (75 mg total) by mouth once daily. Take with 150mg capsule to equal 225mg daily., Disp: 30 capsule, Rfl: 11    ZOLMitriptan (ZOMIG-ZMT) 5 MG disintegrating tablet, TAKE 1 TABLET BY MOUTH AS NEEDED FOR MIGRAINE., Disp: 9 tablet, Rfl: 12    ZOLMitriptan (ZOMIG-ZMT) 5 MG disintegrating tablet, DISSOLVE 1 TABLET BY MOUTH AS NEEDED FOR MIGRAINE., Disp: 6 tablet, Rfl: 12    ALLERGIES:  Review of patient's allergies indicates:   Allergen Reactions    Codeine Itching and Other (See Comments)    Morphine Itching and Other (See Comments)       PSYCHIATRIC EXAM:  There were no vitals filed for this visit.    Appearance:  Well groomed, appearing healthy and of stated age  Behavior:  Cooperative, +mild psychomotor retardation  Speech:  Normal rate, volume, and prosody  Mood:  "Pretty good"  Affect:  Euthymic  Thought Process:  Linear, logical, goal directed  Thought Content:  Negative for homicidal ideation, delusions or hallucinations.  Associations:  Intact  Memory:  Grossly Intact  Level of Consciousness/Orientation:  Grossly intact  Fund of Knowledge:  Good  Attention:  Good  Language:  Fluent, able to name abstract and concrete objects  Insight:  Good  Judgment:  Intact  Psychomotor signs:  No involuntary movements or tremor of face  Gait:  Unable to assess via virtual visit      RELEVANT LABS/STUDIES:    Lab Results   Component Value Date    WBC 6.86 10/09/2023    HGB 11.6 (L) 10/09/2023    HCT 38.7 10/09/2023    MCV 92 10/09/2023     10/09/2023     BMP  Lab Results   Component Value Date     10/09/2023    K 4.2 10/09/2023     10/09/2023    CO2 21 (L) 10/09/2023    BUN 14 10/09/2023    CREATININE 0.8 10/09/2023    CALCIUM 9.8 10/09/2023    ANIONGAP 11 10/09/2023    ESTGFRAFRICA >60.0 07/28/2022 "    EGFRNONAA >60.0 07/28/2022     Lab Results   Component Value Date    ALT 18 10/09/2023    AST 14 10/09/2023    ALKPHOS 93 10/09/2023    BILITOT 0.2 10/09/2023     Lab Results   Component Value Date    TSH 0.539 10/09/2023     Lab Results   Component Value Date    HGBA1C 5.8 (H) 10/09/2023       IMPRESSION:    Rosalina Saavedra is a 35 y.o. female with history of MDD, JESUS, and Social Anxiety Disorder who presents for follow up appointment.    Status/Progress:  Based on the examination today, the patient's problem(s) is/are improving but still inadequately controlled.  New problems have not been presented today.    Risk Parameters:  Patient reports no suicidal ideation  Patient reports no homicidal ideation  Patient reports no self-injurious behavior  Patient reports no violent behavior      DIAGNOSES:    ICD-10-CM ICD-9-CM   1. Generalized anxiety disorder  F41.1 300.02   2. Functional neurological symptom disorder with mixed symptoms  F44.7 300.11   3. 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 Avoidant personality disorder  F60.6 301.82   6. Social anxiety disorder  F40.10 300.23   7. Insomnia due to other mental disorder  F51.05 300.9    F99 327.02       PLAN:  Discussed recommendation for DBT skills group.  Referral sent today.  Reduce Abilify back down to 7mg daily since 10mg was not more helpful and may be increasing appetite and restlessness.    Continue Effexor 225mg every morning for depression and anxiety.  Continue Buspar 30mg BID and Xanax 0.5mg/1mg for anxiety.  Continue Doxepin 20mg qHS for insomnia.  Discussed with patient informed consent, risks versus benefits, alternative treatments, side effect profile and the inherent unpredictability of individual responses to these treatments.  The patient expresses understanding of the above and displays the capacity to agree with this current plan.  Continue individual psychotherapy  with Guerita Stevens LCSW.     RETURN TO CLINIC:  Follow up in about 4 weeks (around 6/3/2025).

## 2025-05-12 PROBLEM — F60.6 AVOIDANT PERSONALITY DISORDER: Status: ACTIVE | Noted: 2025-05-12

## 2025-05-12 PROBLEM — F60.7 DEPENDENT PERSONALITY DISORDER: Status: RESOLVED | Noted: 2025-05-12 | Resolved: 2025-05-12

## 2025-05-12 PROBLEM — F60.7 DEPENDENT PERSONALITY DISORDER: Status: ACTIVE | Noted: 2025-05-12

## 2025-05-12 PROBLEM — F60.6 AVOIDANT PERSONALITY DISORDER: Status: RESOLVED | Noted: 2025-05-12 | Resolved: 2025-05-12

## 2025-05-14 ENCOUNTER — PATIENT MESSAGE (OUTPATIENT)
Dept: PSYCHIATRY | Facility: CLINIC | Age: 35
End: 2025-05-14
Payer: COMMERCIAL

## 2025-05-19 ENCOUNTER — CLINICAL SUPPORT (OUTPATIENT)
Dept: PSYCHIATRY | Facility: CLINIC | Age: 35
End: 2025-05-19
Payer: COMMERCIAL

## 2025-05-19 ENCOUNTER — PATIENT MESSAGE (OUTPATIENT)
Dept: PSYCHIATRY | Facility: CLINIC | Age: 35
End: 2025-05-19
Payer: COMMERCIAL

## 2025-05-19 DIAGNOSIS — F33.0 MILD EPISODE OF RECURRENT MAJOR DEPRESSIVE DISORDER: Primary | ICD-10-CM

## 2025-05-19 DIAGNOSIS — F40.10 SOCIAL ANXIETY DISORDER: ICD-10-CM

## 2025-05-19 DIAGNOSIS — F41.1 GENERALIZED ANXIETY DISORDER: ICD-10-CM

## 2025-05-19 PROCEDURE — 99499 UNLISTED E&M SERVICE: CPT | Mod: S$GLB,,,

## 2025-05-19 PROCEDURE — 90853 GROUP PSYCHOTHERAPY: CPT | Mod: S$GLB,,,

## 2025-05-21 ENCOUNTER — OFFICE VISIT (OUTPATIENT)
Dept: PSYCHIATRY | Facility: CLINIC | Age: 35
End: 2025-05-21
Payer: COMMERCIAL

## 2025-05-21 DIAGNOSIS — F44.5 PSYCHOGENIC NONEPILEPTIC SEIZURE: ICD-10-CM

## 2025-05-21 DIAGNOSIS — F44.7 FUNCTIONAL NEUROLOGICAL SYMPTOM DISORDER WITH MIXED SYMPTOMS: ICD-10-CM

## 2025-05-21 DIAGNOSIS — F51.05 INSOMNIA DUE TO OTHER MENTAL DISORDER: Primary | ICD-10-CM

## 2025-05-21 DIAGNOSIS — F33.1 MODERATE EPISODE OF RECURRENT MAJOR DEPRESSIVE DISORDER: ICD-10-CM

## 2025-05-21 DIAGNOSIS — F41.1 GAD (GENERALIZED ANXIETY DISORDER): ICD-10-CM

## 2025-05-21 DIAGNOSIS — F99 INSOMNIA DUE TO OTHER MENTAL DISORDER: Primary | ICD-10-CM

## 2025-05-21 DIAGNOSIS — F40.10 SOCIAL ANXIETY DISORDER: ICD-10-CM

## 2025-05-21 PROCEDURE — 1160F RVW MEDS BY RX/DR IN RCRD: CPT | Mod: CPTII,95,, | Performed by: INTERNAL MEDICINE

## 2025-05-21 PROCEDURE — 1159F MED LIST DOCD IN RCRD: CPT | Mod: CPTII,95,, | Performed by: INTERNAL MEDICINE

## 2025-05-21 PROCEDURE — G2211 COMPLEX E/M VISIT ADD ON: HCPCS | Mod: 95,,, | Performed by: INTERNAL MEDICINE

## 2025-05-21 PROCEDURE — 98006 SYNCH AUDIO-VIDEO EST MOD 30: CPT | Mod: 95,,, | Performed by: INTERNAL MEDICINE

## 2025-05-21 RX ORDER — ALPRAZOLAM 0.5 MG/1
0.5 TABLET ORAL 2 TIMES DAILY
Qty: 60 TABLET | Refills: 5 | Status: SHIPPED | OUTPATIENT
Start: 2025-05-21

## 2025-05-21 RX ORDER — VENLAFAXINE HYDROCHLORIDE 150 MG/1
150 CAPSULE, EXTENDED RELEASE ORAL DAILY
Qty: 30 CAPSULE | Refills: 11 | Status: SHIPPED | OUTPATIENT
Start: 2025-05-21 | End: 2026-05-21

## 2025-05-21 RX ORDER — ZOLPIDEM TARTRATE 5 MG/1
5 TABLET ORAL NIGHTLY
Qty: 30 TABLET | Refills: 0 | Status: SHIPPED | OUTPATIENT
Start: 2025-05-21 | End: 2025-11-19

## 2025-05-21 RX ORDER — VENLAFAXINE HYDROCHLORIDE 75 MG/1
75 CAPSULE, EXTENDED RELEASE ORAL DAILY
Qty: 30 CAPSULE | Refills: 11 | Status: SHIPPED | OUTPATIENT
Start: 2025-05-21 | End: 2026-05-21

## 2025-05-21 NOTE — PROGRESS NOTES
OUTPATIENT PSYCHIATRY RETURN VISIT    ENCOUNTER DATE:  5/28/2025  SITE:  [facility], [facility]  LENGTH OF SESSION:  18 minutes    The patient location is:  [place], not in a healthcare facility  Visit type:  audiovisual    Face to Face time with patient:  18 minutes  24 minutes of total time spent on the encounter, which includes face to face time and non-face to face time preparing to see the patient (eg, review of tests), Obtaining and/or reviewing separately obtained history, Documenting clinical information in the electronic or other health record, Independently interpreting results (not separately reported) and communicating results to the patient/family/caregiver, or Care coordination (not separately reported).     Each patient to whom he or she provides medical services by telemedicine is:  (1) informed of the relationship between the physician and patient and the respective role of any other health care provider with respect to management of the patient; and (2) notified that he or she may decline to receive medical services by telemedicine and may withdraw from such care at any time.      CHIEF COMPLAINT:  Insomnia      HISTORY OF PRESENTING ILLNESS:  Rosalina Saavedra is a 35 y.o. female with history of MDD, JESUS, and Social Anxiety Disorder who presents for follow up appointment.      Plan at last appointment on 5/6/2025:  Discussed recommendation for DBT skills group.  Referral sent today.  Reduce Abilify back down to 7mg daily since 10mg was not more helpful and may be increasing appetite and restlessness.    Continue Effexor 225mg every morning for depression and anxiety.  Continue Buspar 30mg BID and Xanax 0.5mg/1mg for anxiety.  Continue Doxepin 20mg qHS for insomnia.  Discussed with patient informed consent, risks versus benefits, alternative treatments, side effect profile and the inherent unpredictability of individual responses to these treatments.  The patient expresses  "understanding of the above and displays the capacity to agree with this current plan.  Continue individual psychotherapy with Guerita Stevens LCSW.     History as told by patient:  Says she is doing "ok."  Not feeling great - difficulty falling asleep.  But once she falls asleep she sleeps for a long time.  Gets in bed 12-1am.  Takes 1-2 hours to fall asleep, if she doesn't have anything to do can sleep until 2-3pm.  Her anxiety is better than it was a few weeks ago.  Depression she is not sure because she has been binging a lot.  This happens every day, never purges.  Does not feel sick but more full than she should be.  Previously going to sleep 10-11pm.  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  Has some stuff in the summer.  [employer].  Kids skipping 8th and going into 9th and some high school.      Medication side effects:  Increased appetite  Medication compliance:  Yes    PSYCHIATRIC REVIEW OF SYSTEMS:  Trouble with sleep:  Yes as above  Appetite changes:  Binging as above  Weight changes:  Gain slowly over time  Lack of energy:  Sometimes  Anhedonia:  Sometimes  Somatic symptoms:  Not discussed  Libido:  Not discussed  Anxiety/panic:  Sometimes  Guilty/hopeless:  Denies  Self-injurious behavior/risky behavior:  Denies  Any drugs:  Denies  Alcohol:  Denies     MEDICAL REVIEW OF SYSTEMS:  Complete review of systems performed covering Constitutional, Musculoskeletal, Neurologic.  All systems negative except for that covered in HPI.    PAST PSYCHIATRIC, MEDICAL, AND SOCIAL HISTORY REVIEWED  The patient's past medical, family and social history have been reviewed and updated as appropriate within the electronic medical record - see encounter notes.    MEDICATIONS:    Current Outpatient Medications:     ALPRAZolam (XANAX) 0.5 MG tablet, Take 1 tablet (0.5 mg total) by mouth 2 (two) times a day., Disp: 60 tablet, Rfl: 5    ARIPiprazole (ABILIFY) 2 MG Tab, Take 1 tablet (2 mg total) by " mouth once daily. Take with 5mg tablet to equal 7mg daily., Disp: 30 tablet, Rfl: 11    ARIPiprazole (ABILIFY) 5 MG Tab, Take 1 tablet (5 mg total) by mouth once daily. Take with 2mg tablet to equal 7mg daily., Disp: 30 tablet, Rfl: 11    atogepant (QULIPTA) 60 mg Tab, Take 1 tablet (60 mg total) by mouth once daily., Disp: 30 tablet, Rfl: 6    busPIRone (BUSPAR) 30 MG Tab, Take 1 tablet (30 mg total) by mouth 2 (two) times daily., Disp: 180 tablet, Rfl: 11    etodolac (LODINE) 500 MG tablet, TAKE 1 TABLET (500 MG TOTAL) BY MOUTH 2 (TWO) TIMES DAILY AS NEEDED (MIGRAINE)., Disp: 20 tablet, Rfl: 12    ibuprofen (ADVIL,MOTRIN) 800 MG tablet, Take 1 tablet (800 mg total) by mouth 2 (two) times daily with meals., Disp: 60 tablet, Rfl: 1    promethazine (PHENERGAN) 25 MG tablet, Take 1 tablet (25 mg total) by mouth every 6 (six) hours as needed for Nausea., Disp: 40 tablet, Rfl: 0    topiramate (TOPAMAX) 100 MG tablet, Take 1 tablet (100 mg total) by mouth every evening. (Take 1 tablet nightly for 2 weeks. In 14 days, if tolerating, take with 50mg tablet to equal 150mg nightly.), Disp: 90 tablet, Rfl: 3    venlafaxine (EFFEXOR-XR) 150 MG Cp24, Take 1 capsule (150 mg total) by mouth once daily. Take with 75mg capsule to equal 225mg daily., Disp: 30 capsule, Rfl: 11    venlafaxine (EFFEXOR-XR) 75 MG 24 hr capsule, Take 1 capsule (75 mg total) by mouth once daily. Take with 150mg capsule to equal 225mg daily., Disp: 30 capsule, Rfl: 11    ZOLMitriptan (ZOMIG-ZMT) 5 MG disintegrating tablet, TAKE 1 TABLET BY MOUTH AS NEEDED FOR MIGRAINE., Disp: 9 tablet, Rfl: 12    ZOLMitriptan (ZOMIG-ZMT) 5 MG disintegrating tablet, DISSOLVE 1 TABLET BY MOUTH AS NEEDED FOR MIGRAINE., Disp: 6 tablet, Rfl: 12    zolpidem (AMBIEN) 5 MG Tab, Take 1 tablet (5 mg total) by mouth every evening., Disp: 30 tablet, Rfl: 0    ALLERGIES:  Review of patient's allergies indicates:   Allergen Reactions    Codeine Itching and Other (See Comments)     "Morphine Itching and Other (See Comments)       PSYCHIATRIC EXAM:  There were no vitals filed for this visit.    Appearance:  Well groomed, appearing healthy and of stated age  Behavior:  Cooperative, +mild psychomotor retardation  Speech:  Normal rate, volume, and prosody  Mood:  "Ok"  Affect:  Euthymic  Thought Process:  Linear, logical, goal directed  Thought Content:  Negative for homicidal ideation, delusions or hallucinations.  Associations:  Intact  Memory:  Grossly Intact  Level of Consciousness/Orientation:  Grossly intact  Fund of Knowledge:  Good  Attention:  Good  Language:  Fluent, able to name abstract and concrete objects  Insight:  Good  Judgment:  Intact  Psychomotor signs:  No involuntary movements or tremor of face  Gait:  Unable to assess via virtual visit      RELEVANT LABS/STUDIES:    Lab Results   Component Value Date    WBC 6.86 10/09/2023    HGB 11.6 (L) 10/09/2023    HCT 38.7 10/09/2023    MCV 92 10/09/2023     10/09/2023     BMP  Lab Results   Component Value Date     10/09/2023    K 4.2 10/09/2023     10/09/2023    CO2 21 (L) 10/09/2023    BUN 14 10/09/2023    CREATININE 0.8 10/09/2023    CALCIUM 9.8 10/09/2023    ANIONGAP 11 10/09/2023    ESTGFRAFRICA >60.0 07/28/2022    EGFRNONAA >60.0 07/28/2022     Lab Results   Component Value Date    ALT 18 10/09/2023    AST 14 10/09/2023    ALKPHOS 93 10/09/2023    BILITOT 0.2 10/09/2023     Lab Results   Component Value Date    TSH 0.539 10/09/2023     Lab Results   Component Value Date    HGBA1C 5.8 (H) 10/09/2023       IMPRESSION:    Rosalina Saavedra is a 35 y.o. female with history of MDD, JESUS, and Social Anxiety Disorder who presents for follow up appointment.    Status/Progress:  Based on the examination today, the patient's problem(s) is/are improving but still inadequately controlled.  New problems have not been presented today.    Risk Parameters:  Patient reports no suicidal ideation  Patient reports no homicidal " ideation  Patient reports no self-injurious behavior  Patient reports no violent behavior      DIAGNOSES:    ICD-10-CM ICD-9-CM   1. Insomnia due to other mental disorder  F51.05 300.9    F99 327.02   2. JESUS (generalized anxiety disorder)  F41.1 300.02   3. Social anxiety disorder  F40.10 300.23   4. Moderate episode of recurrent major depressive disorder  F33.1 296.32   5. Functional neurological symptom disorder with mixed symptoms  F44.7 300.11   6. Psychogenic nonepileptic seizure  F44.5 300.11         PLAN:  Stop Doxepin since she is not sleeping well on this and there is concern for grogginess the next day.  Instead will try Ambien 5mg qHS since this has quick onset of action and short-half life.    Reduce Xanax down to 0.5mg BID.    Continue Effexor 225mg every morning for depression and anxiety.  Continue Buspar 30mg BID for anxiety.  Continue Abilify 7mg daily as augmentation for depression.    Discussed with patient informed consent, risks versus benefits, alternative treatments, side effect profile and the inherent unpredictability of individual responses to these treatments.  The patient expresses understanding of the above and displays the capacity to agree with this current plan.  Continue individual psychotherapy with Guerita Stevens LCSW.     RETURN TO CLINIC:  Follow up in about 2 weeks (around 6/4/2025).

## 2025-05-21 NOTE — LETTER
May 21, 2025      [facility] [address]  [address]  [facility] [address]  Phone: [phone]  Fax: [phone]       Patient: Rosalina Saavedra   YOB: 1990  Date of Visit: 05/21/2025    To Whom It May Concern:    Marcelo Saavedra  was at [facility] on 05/21/2025.  The patient may return to work on 06/02/2025 with no restrictions.  If you have any questions or concerns, or if I can be of further assistance, please do not hesitate to contact me.    Sincerely,      Stella Worley MD

## 2025-05-26 ENCOUNTER — CLINICAL SUPPORT (OUTPATIENT)
Dept: PSYCHIATRY | Facility: CLINIC | Age: 35
End: 2025-05-26
Payer: COMMERCIAL

## 2025-05-26 DIAGNOSIS — F99 INSOMNIA DUE TO OTHER MENTAL DISORDER: ICD-10-CM

## 2025-05-26 DIAGNOSIS — F51.05 INSOMNIA DUE TO OTHER MENTAL DISORDER: ICD-10-CM

## 2025-05-26 DIAGNOSIS — F41.1 GAD (GENERALIZED ANXIETY DISORDER): Primary | ICD-10-CM

## 2025-05-26 DIAGNOSIS — F40.10 SOCIAL ANXIETY DISORDER: ICD-10-CM

## 2025-05-26 PROCEDURE — 90853 GROUP PSYCHOTHERAPY: CPT | Mod: GT,,,

## 2025-05-26 PROCEDURE — 99499 UNLISTED E&M SERVICE: CPT | Mod: GT,,,

## 2025-05-26 NOTE — PROGRESS NOTES
[facility] Aftercare Group Therapy  The patient location is: [place]  The chief complaint leading to consultation is: Emotion Dysregulation    Visit type: audiovisual    Face to Face time with patient: 45 min  50 minutes of total time spent on the encounter, which includes face to face time and non-face to face time preparing to see the patient (eg, review of tests), Obtaining and/or reviewing separately obtained history, Documenting clinical information in the electronic or other health record, Independently interpreting results (not separately reported) and communicating results to the patient/family/caregiver, or Care coordination (not separately reported).     Each patient to whom he or she provides medical services by telemedicine is:  (1) informed of the relationship between the physician and patient and the respective role of any other health care provider with respect to management of the patient; and (2) notified that he or she may decline to receive medical services by telemedicine and may withdraw from such care at any time.    Notes: N/A  ---  Date: 5/26/2025  Type/Length of service: 93386 (Group Psychotherapy; 60 minutes)  Clinical status of patient: Outpatient  Referred by: [facility]     Number of patients in attendance: 2    Target symptoms: depression, anxiety         Interval History: Patient completed a mindfulness exercise and check-ins. Session focus was Distress Tolerance: TIP skill.  Patients were encouraged to use temperature, intense exercise, paced breathing, or paired muscle relaxation to reduce intensity of distress.      Response:   Patient reported feeling tired today. Patient rated current distress (0-10 scale) as a 4.  Homework was reviewed.   Patient responded to the intervention by engaging in Active Listening, Self Disclosure.  Patient is exhibiting good progress towards goals and other mental status changes.     Diagnosis:     ICD-10-CM ICD-9-CM   1. JESUS  (generalized anxiety disorder)  F41.1 300.02   2. Social anxiety disorder  F40.10 300.23   3. Insomnia due to other mental disorder  F51.05 300.9    F99 327.02           Plan: Continue in group psychotherapy. Complete homework assignment.      Return to clinic: 1 week

## 2025-06-06 ENCOUNTER — OFFICE VISIT (OUTPATIENT)
Dept: PSYCHIATRY | Facility: CLINIC | Age: 35
End: 2025-06-06
Payer: COMMERCIAL

## 2025-06-06 ENCOUNTER — PATIENT MESSAGE (OUTPATIENT)
Dept: PSYCHIATRY | Facility: CLINIC | Age: 35
End: 2025-06-06
Payer: COMMERCIAL

## 2025-06-06 DIAGNOSIS — F33.0 MILD EPISODE OF RECURRENT MAJOR DEPRESSIVE DISORDER: ICD-10-CM

## 2025-06-06 DIAGNOSIS — F41.1 GENERALIZED ANXIETY DISORDER: ICD-10-CM

## 2025-06-06 DIAGNOSIS — F51.04 PSYCHOPHYSIOLOGICAL INSOMNIA: Primary | ICD-10-CM

## 2025-06-06 DIAGNOSIS — F44.7 FUNCTIONAL NEUROLOGICAL SYMPTOM DISORDER WITH MIXED SYMPTOMS: ICD-10-CM

## 2025-06-06 PROCEDURE — 1160F RVW MEDS BY RX/DR IN RCRD: CPT | Mod: CPTII,95,, | Performed by: INTERNAL MEDICINE

## 2025-06-06 PROCEDURE — 98006 SYNCH AUDIO-VIDEO EST MOD 30: CPT | Mod: 95,,, | Performed by: INTERNAL MEDICINE

## 2025-06-06 PROCEDURE — 90833 PSYTX W PT W E/M 30 MIN: CPT | Mod: 95,,, | Performed by: INTERNAL MEDICINE

## 2025-06-06 PROCEDURE — 1159F MED LIST DOCD IN RCRD: CPT | Mod: CPTII,95,, | Performed by: INTERNAL MEDICINE

## 2025-06-06 RX ORDER — ARIPIPRAZOLE 5 MG/1
5 TABLET ORAL DAILY
Qty: 30 TABLET | Refills: 11 | Status: SHIPPED | OUTPATIENT
Start: 2025-06-06

## 2025-06-06 NOTE — PROGRESS NOTES
OUTPATIENT PSYCHIATRY RETURN VISIT    ENCOUNTER DATE:  6/13/2025  SITE:  [facility], [facility]  LENGTH OF SESSION:  28 minutes    The patient location is:  [place], not in a healthcare facility  Visit type:  audiovisual    Face to Face time with patient:  28 minutes  35  minutes of total time spent on the encounter, which includes face to face time and non-face to face time preparing to see the patient (eg, review of tests), Obtaining and/or reviewing separately obtained history, Documenting clinical information in the electronic or other health record, Independently interpreting results (not separately reported) and communicating results to the patient/family/caregiver, or Care coordination (not separately reported).     Each patient to whom he or she provides medical services by telemedicine is:  (1) informed of the relationship between the physician and patient and the respective role of any other health care provider with respect to management of the patient; and (2) notified that he or she may decline to receive medical services by telemedicine and may withdraw from such care at any time.      CHIEF COMPLAINT:  Mood and Insomnia      HISTORY OF PRESENTING ILLNESS:  Rosalina Saavedra is a 35 y.o. female with history of MDD, JESUS, and Social Anxiety Disorder who presents for follow up appointment.      Plan at last appointment on 5/21/2025:  Stop Doxepin since she is not sleeping well on this and there is concern for grogginess the next day.  Instead will try Ambien 5mg qHS since this has quick onset of action and short-half life.    Reduce Xanax down to 0.5mg BID.    Continue Effexor 225mg every morning for depression and anxiety.  Continue Buspar 30mg BID for anxiety.  Continue Abilify 7mg daily as augmentation for depression.    Discussed with patient informed consent, risks versus benefits, alternative treatments, side effect profile and the inherent unpredictability of individual responses  to these treatments.  The patient expresses understanding of the above and displays the capacity to agree with this current plan.  Continue individual psychotherapy with Guerita Stevens LCSW.     History as told by patient:  Still having trouble sleeping - mostly getting to sleep.  Once she is asleep she is ok but will lay awake for hours.  Taking Ambien 5mg and Xanax 0.5mg 30 minutes before sleep (midnight or 1am) then won't go to sleep until 3-4am.  Sleeps until about noon or 1pm.  Getting 9 hours of sleep on average.  Anxiety is a little up about dissertation.  Depression is ok.  Sometimes her legs shake.  Sometimes binge eats - most days.  Doesn't eat until sick.  Planning to contact [facility] this week for alfie bryant.    6/6-6/8: Take medication at 2am, Sleep 2:30am - 11:30am   6/9-6/11: Take medication at 1:30am, Sleep 2am - 11am  6/12-6/14: Take medication at 1am, Sleep 1:30am - 10:30am  6/15-6/17: Take medication at 12:30am, Sleep 1am - 10am  6/18-6/20: Take medication at midnight, Sleep 12:30am - 9:30am  Starting 6/21: Take medication at 11:30pm, Sleep midnight - 9am.  *Must get up at the ending sleep time even if you didn't fall asleep at the sleep start time.     Psychotherapy:  Target symptoms: anxiety , insomnia  Why chosen therapy is appropriate versus another modality: relevant to diagnosis, patient responds to this modality  Outcome monitoring methods: self-report, observation  Therapeutic intervention type: supportive psychotherapy  Topics discussed/themes: changing sleeping cycle, work stress, building skills sets for symptom management, symptom recognition  The patient's response to the intervention is accepting. The patient's progress toward treatment goals is good.   Duration of intervention: 18 minutes.    Medication side effects:  Increased appetite  Medication compliance:  Yes    PSYCHIATRIC REVIEW OF SYSTEMS:  Trouble with sleep:  Yes as above  Appetite changes:  Binging as above  Weight changes:   Gain slowly over time  Lack of energy:  Sometimes  Anhedonia:  Sometimes  Somatic symptoms:  Not discussed  Libido:  Not discussed  Anxiety/panic:  Sometimes  Guilty/hopeless:  Denies  Self-injurious behavior/risky behavior:  Denies  Any drugs:  Denies  Alcohol:  Denies     MEDICAL REVIEW OF SYSTEMS:  Complete review of systems performed covering Constitutional, Musculoskeletal, Neurologic.  All systems negative except for that covered in HPI.    PAST PSYCHIATRIC, MEDICAL, AND SOCIAL HISTORY REVIEWED  The patient's past medical, family and social history have been reviewed and updated as appropriate within the electronic medical record - see encounter notes.    MEDICATIONS:    Current Outpatient Medications:     ALPRAZolam (XANAX) 0.5 MG tablet, Take 1 tablet (0.5 mg total) by mouth 2 (two) times a day., Disp: 60 tablet, Rfl: 5    ARIPiprazole (ABILIFY) 5 MG Tab, Take 1 tablet (5 mg total) by mouth once daily., Disp: 30 tablet, Rfl: 11    atogepant (QULIPTA) 60 mg Tab, Take 1 tablet (60 mg total) by mouth once daily., Disp: 30 tablet, Rfl: 6    busPIRone (BUSPAR) 30 MG Tab, Take 1 tablet (30 mg total) by mouth 2 (two) times daily., Disp: 180 tablet, Rfl: 11    etodolac (LODINE) 500 MG tablet, TAKE 1 TABLET (500 MG TOTAL) BY MOUTH 2 (TWO) TIMES DAILY AS NEEDED (MIGRAINE)., Disp: 20 tablet, Rfl: 12    ibuprofen (ADVIL,MOTRIN) 800 MG tablet, Take 1 tablet (800 mg total) by mouth 2 (two) times daily with meals., Disp: 60 tablet, Rfl: 3    promethazine (PHENERGAN) 25 MG tablet, Take 1 tablet (25 mg total) by mouth every 6 (six) hours as needed for Nausea., Disp: 40 tablet, Rfl: 0    topiramate (TOPAMAX) 100 MG tablet, Take 1 tablet (100 mg total) by mouth every evening. (Take 1 tablet nightly for 2 weeks. In 14 days, if tolerating, take with 50mg tablet to equal 150mg nightly.) (Patient not taking: Reported on 6/13/2025), Disp: 90 tablet, Rfl: 3    venlafaxine (EFFEXOR-XR) 150 MG Cp24, Take 1 capsule (150 mg total) by  "mouth once daily. Take with 75mg capsule to equal 225mg daily., Disp: 30 capsule, Rfl: 11    venlafaxine (EFFEXOR-XR) 75 MG 24 hr capsule, Take 1 capsule (75 mg total) by mouth once daily. Take with 150mg capsule to equal 225mg daily., Disp: 30 capsule, Rfl: 11    ZOLMitriptan (ZOMIG-ZMT) 5 MG disintegrating tablet, TAKE 1 TABLET BY MOUTH AS NEEDED FOR MIGRAINE., Disp: 9 tablet, Rfl: 12    ZOLMitriptan (ZOMIG-ZMT) 5 MG disintegrating tablet, DISSOLVE 1 TABLET BY MOUTH AS NEEDED FOR MIGRAINE., Disp: 6 tablet, Rfl: 12    zolpidem (AMBIEN) 5 MG Tab, Take 1 tablet (5 mg total) by mouth every evening., Disp: 30 tablet, Rfl: 0    ALLERGIES:  Review of patient's allergies indicates:   Allergen Reactions    Codeine Itching and Other (See Comments)    Morphine Itching and Other (See Comments)       PSYCHIATRIC EXAM:  There were no vitals filed for this visit.    Appearance:  Well groomed, appearing healthy and of stated age  Behavior:  Cooperative, +mild psychomotor retardation  Speech:  Normal rate, volume, and prosody  Mood:  "Ok"  Affect:  Euthymic  Thought Process:  Linear, logical, goal directed  Thought Content:  Negative for homicidal ideation, delusions or hallucinations.  Associations:  Intact  Memory:  Grossly Intact  Level of Consciousness/Orientation:  Grossly intact  Fund of Knowledge:  Good  Attention:  Good  Language:  Fluent, able to name abstract and concrete objects  Insight:  Good  Judgment:  Intact  Psychomotor signs:  No involuntary movements or tremor of face  Gait:  Unable to assess via virtual visit      RELEVANT LABS/STUDIES:    Lab Results   Component Value Date    WBC 6.86 10/09/2023    HGB 11.6 (L) 10/09/2023    HCT 38.7 10/09/2023    MCV 92 10/09/2023     10/09/2023     BMP  Lab Results   Component Value Date     10/09/2023    K 4.2 10/09/2023     10/09/2023    CO2 21 (L) 10/09/2023    BUN 14 10/09/2023    CREATININE 0.8 10/09/2023    CALCIUM 9.8 10/09/2023    ANIONGAP 11 " 10/09/2023    ESTGFRAFRICA >60.0 07/28/2022    EGFRNONAA >60.0 07/28/2022     Lab Results   Component Value Date    ALT 18 10/09/2023    AST 14 10/09/2023    ALKPHOS 93 10/09/2023    BILITOT 0.2 10/09/2023     Lab Results   Component Value Date    TSH 0.539 10/09/2023     Lab Results   Component Value Date    HGBA1C 5.8 (H) 10/09/2023       IMPRESSION:    Rosalina Saavedra is a 35 y.o. female with history of MDD, JESUS, and Social Anxiety Disorder who presents for follow up appointment.    Status/Progress:  Based on the examination today, the patient's problem(s) is/are improving but still inadequately controlled.  New problems have not been presented today.    Risk Parameters:  Patient reports no suicidal ideation  Patient reports no homicidal ideation  Patient reports no self-injurious behavior  Patient reports no violent behavior      DIAGNOSES:    ICD-10-CM ICD-9-CM   1. Psychophysiological insomnia  F51.04 307.42   2. Mild episode of recurrent major depressive disorder  F33.0 296.31   3. Generalized anxiety disorder  F41.1 300.02   4. Functional neurological symptom disorder with mixed symptoms  F44.7 300.11       PLAN:  Discussed plan for slowly shifting patient's sleep schedule to earlier in the night.  Sent to her portal.    Continue Effexor 225mg every morning for depression and anxiety.  Continue Buspar 30mg BID and Xanax 0.5mg BID for anxiety.  Decrease Abilify to 5mg daily for depression since higher dose has increased appetite but not been significantly more beneficial.     Continue Ambien 5mg qHS for insomnia.    Discussed with patient informed consent, risks versus benefits, alternative treatments, side effect profile and the inherent unpredictability of individual responses to these treatments.  The patient expresses understanding of the above and displays the capacity to agree with this current plan.  Continue individual psychotherapy with Guerita Stevens LCSW.     RETURN TO CLINIC:  Follow up in 24  days (on 6/30/2025).

## 2025-06-09 ENCOUNTER — CLINICAL SUPPORT (OUTPATIENT)
Dept: PSYCHIATRY | Facility: CLINIC | Age: 35
End: 2025-06-09
Payer: COMMERCIAL

## 2025-06-09 DIAGNOSIS — F41.1 GENERALIZED ANXIETY DISORDER: Primary | ICD-10-CM

## 2025-06-09 DIAGNOSIS — F33.0 MILD EPISODE OF RECURRENT MAJOR DEPRESSIVE DISORDER: ICD-10-CM

## 2025-06-09 PROCEDURE — 90853 GROUP PSYCHOTHERAPY: CPT | Mod: GT,S$GLB,,

## 2025-06-09 PROCEDURE — 99499 UNLISTED E&M SERVICE: CPT | Mod: S$GLB,,,

## 2025-06-09 NOTE — PROGRESS NOTES
"[facility] Aftercare Group Therapy  The patient location is: [place]  The chief complaint leading to consultation is: Emotion Dysregulation    Visit type: audiovisual    Face to Face time with patient: 48 min  50 minutes of total time spent on the encounter, which includes face to face time and non-face to face time preparing to see the patient (eg, review of tests), Obtaining and/or reviewing separately obtained history, Documenting clinical information in the electronic or other health record, Independently interpreting results (not separately reported) and communicating results to the patient/family/caregiver, or Care coordination (not separately reported).     Each patient to whom he or she provides medical services by telemedicine is:  (1) informed of the relationship between the physician and patient and the respective role of any other health care provider with respect to management of the patient; and (2) notified that he or she may decline to receive medical services by telemedicine and may withdraw from such care at any time.    Notes: N/A  ---  Date: 6/9/2025  Type/Length of service: 63012 (Group Psychotherapy; 60 minutes)  Clinical status of patient: Outpatient  Referred by: [facility]     Number of patients in attendance: 4    Target symptoms: depression, anxiety         Interval History: Patient completed a mindfulness exercise and check-ins. Session focus was Distress Tolerance: IMPROVE.  Patients were encouraged to use imagery, find meaning, use prayer, relax, focus on one thing in the moment, take a brief vacation, and use self-encouragement.     Response:   Patient reported feeling "tired, stressed, overwhelmed, but optimistic" today. Patient rated current distress (0-10 scale) as an 5-6.  Homework was reviewed.   Patient responded to the intervention by engaging in Active Listening, Self Disclosure.  Patient is exhibiting good progress towards goals and other mental status changes. "     Diagnosis:     ICD-10-CM ICD-9-CM   1. Generalized anxiety disorder  F41.1 300.02   2. Mild episode of recurrent major depressive disorder  F33.0 296.31           Plan: Continue in group psychotherapy. Complete homework assignment.      Return to clinic: 1 week

## 2025-06-10 ENCOUNTER — PATIENT MESSAGE (OUTPATIENT)
Dept: PSYCHIATRY | Facility: CLINIC | Age: 35
End: 2025-06-10
Payer: COMMERCIAL

## 2025-06-12 ENCOUNTER — PROCEDURE VISIT (OUTPATIENT)
Dept: NEUROLOGY | Facility: CLINIC | Age: 35
End: 2025-06-12
Payer: COMMERCIAL

## 2025-06-12 VITALS — BODY MASS INDEX: 33.87 KG/M2 | HEIGHT: 66 IN | WEIGHT: 210.75 LBS

## 2025-06-12 DIAGNOSIS — G43.001 MIGRAINE WITHOUT AURA AND WITH STATUS MIGRAINOSUS, NOT INTRACTABLE: Primary | ICD-10-CM

## 2025-06-12 NOTE — PROGRESS NOTES
"[facility] Aftercare Group Therapy  The patient location is: [place]  The chief complaint leading to consultation is: Emotion Dysregulation    Visit type: audiovisual    Face to Face time with patient: 57  60 minutes of total time spent on the encounter, which includes face to face time and non-face to face time preparing to see the patient (eg, review of tests), Obtaining and/or reviewing separately obtained history, Documenting clinical information in the electronic or other health record, Independently interpreting results (not separately reported) and communicating results to the patient/family/caregiver, or Care coordination (not separately reported).     Each patient to whom he or she provides medical services by telemedicine is:  (1) informed of the relationship between the physician and patient and the respective role of any other health care provider with respect to management of the patient; and (2) notified that he or she may decline to receive medical services by telemedicine and may withdraw from such care at any time.    Notes: N/A  ---  Date: 5/19/2025  Type/Length of service: 71666 (Group Psychotherapy; 60 minutes)  Clinical status of patient: Outpatient  Referred by: [facility]     Number of patients in attendance: 3    Target symptoms: depression, anxiety         Interval History: Patient completed a mindfulness exercise and check-ins. Session focus was Distress Tolerance: STOP.  Patients were encouraged to use crisis survival skills to reduce intensity of distress.  They were taught to STOP in the moment to reduce unhelpful action urges.,  Pros & Cons.  Patients were encouraged to use crisis survival skills to reduce intensity of distress.  They were taught to use Pros & Cons to reinforce desired behaviors.     Response:   Patient reported feeling "stressed, isolated" today. Patient rated current distress (0-10 scale) as a 4.   Homework was reviewed.   Patient responded to the " Body Location Override (Optional - Billing Will Still Be Based On Selected Body Map Location If Applicable): right cheek intervention by engaging in Active Listening, Self Disclosure.  Patient is exhibiting good progress towards goals and other mental status changes.     Diagnosis:     ICD-10-CM ICD-9-CM   1. Mild episode of recurrent major depressive disorder  F33.0 296.31   2. Generalized anxiety disorder  F41.1 300.02   3. Social anxiety disorder  F40.10 300.23         Plan: Continue in group psychotherapy. Complete homework assignment.      Return to clinic: 1 week     Detail Level: Detailed Add 05548 Cpt? (Important Note: In 2017 The Use Of 26954 Is Being Tracked By Cms To Determine Future Global Period Reimbursement For Global Periods): yes

## 2025-06-13 ENCOUNTER — OFFICE VISIT (OUTPATIENT)
Dept: FAMILY MEDICINE | Facility: CLINIC | Age: 35
End: 2025-06-13
Payer: COMMERCIAL

## 2025-06-13 VITALS
HEIGHT: 66 IN | OXYGEN SATURATION: 98 % | DIASTOLIC BLOOD PRESSURE: 60 MMHG | BODY MASS INDEX: 34.29 KG/M2 | SYSTOLIC BLOOD PRESSURE: 122 MMHG | HEART RATE: 86 BPM | WEIGHT: 213.38 LBS

## 2025-06-13 DIAGNOSIS — F33.1 MODERATE EPISODE OF RECURRENT MAJOR DEPRESSIVE DISORDER: ICD-10-CM

## 2025-06-13 DIAGNOSIS — F41.1 GAD (GENERALIZED ANXIETY DISORDER): ICD-10-CM

## 2025-06-13 DIAGNOSIS — G43.701 CHRONIC MIGRAINE W/O AURA, NOT INTRACTABLE, W STAT MIGR: ICD-10-CM

## 2025-06-13 DIAGNOSIS — F44.7 FUNCTIONAL NEUROLOGICAL SYMPTOM DISORDER WITH MIXED SYMPTOMS: ICD-10-CM

## 2025-06-13 DIAGNOSIS — E78.2 MODERATE MIXED HYPERLIPIDEMIA NOT REQUIRING STATIN THERAPY: ICD-10-CM

## 2025-06-13 DIAGNOSIS — R73.03 PREDIABETES: ICD-10-CM

## 2025-06-13 DIAGNOSIS — E66.811 CLASS 1 OBESITY DUE TO EXCESS CALORIES WITH SERIOUS COMORBIDITY AND BODY MASS INDEX (BMI) OF 34.0 TO 34.9 IN ADULT: ICD-10-CM

## 2025-06-13 DIAGNOSIS — E66.09 CLASS 1 OBESITY DUE TO EXCESS CALORIES WITH SERIOUS COMORBIDITY AND BODY MASS INDEX (BMI) OF 34.0 TO 34.9 IN ADULT: ICD-10-CM

## 2025-06-13 DIAGNOSIS — Z00.01 ENCOUNTER FOR GENERAL ADULT MEDICAL EXAMINATION WITH ABNORMAL FINDINGS: Primary | ICD-10-CM

## 2025-06-13 PROBLEM — R56.9 SEIZURES: Status: RESOLVED | Noted: 2024-02-28 | Resolved: 2025-06-13

## 2025-06-13 PROCEDURE — 3078F DIAST BP <80 MM HG: CPT | Mod: CPTII,S$GLB,, | Performed by: FAMILY MEDICINE

## 2025-06-13 PROCEDURE — 99395 PREV VISIT EST AGE 18-39: CPT | Mod: S$GLB,,, | Performed by: FAMILY MEDICINE

## 2025-06-13 PROCEDURE — 99999 PR PBB SHADOW E&M-EST. PATIENT-LVL V: CPT | Mod: PBBFAC,,, | Performed by: FAMILY MEDICINE

## 2025-06-13 PROCEDURE — 3044F HG A1C LEVEL LT 7.0%: CPT | Mod: CPTII,S$GLB,, | Performed by: FAMILY MEDICINE

## 2025-06-13 PROCEDURE — 3008F BODY MASS INDEX DOCD: CPT | Mod: CPTII,S$GLB,, | Performed by: FAMILY MEDICINE

## 2025-06-13 PROCEDURE — 3074F SYST BP LT 130 MM HG: CPT | Mod: CPTII,S$GLB,, | Performed by: FAMILY MEDICINE

## 2025-06-13 RX ORDER — IBUPROFEN 800 MG/1
800 TABLET, FILM COATED ORAL 2 TIMES DAILY WITH MEALS
Qty: 60 TABLET | Refills: 3 | Status: SHIPPED | OUTPATIENT
Start: 2025-06-13

## 2025-06-13 NOTE — PROCEDURES
Follow up:   Overall doing well     Prior note:   2 weeks of daily HA related to stress and school     Prior note:   Overall doing good  HA initially improved for a few weeks after the amitriptyline.  Now the frequency has returned to chronic migraine.    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     Headache history:   Age of onset -  5   Location - occipital, temples   Nature of pain -  Stabbing, aching   Prodrome - no   Aura -  No   Duration of headache - > 4 hrs   Time to peak intensity - n/a   Pain scale - range of intensity -  9-10/10  Frequency -  15-20/month    Status Migrainosus history - yes   Time of day of most headaches- anytime      Associated symptoms with the headache:   Meningeal symptoms - photophobia, phonophobia, exercise intolerance +   Nausea/vomitting +   Nasal drainage   Visual blurriness   Pallor/flushing  Dizziness   Vertigo  Confusion  Impaired concentration +   Pain worsened with physical activity  +   Neck pain +      Cluster headache symptoms: none   Symptoms of increased intracranial pressure: none  Basilar migraine symptoms: none      Headache Triggers:  Stress, anxiety, emotional overload   H/o SI      Treatment history:  Resolution of headache with sleep - yes   Meds tried:     Failed:   prozac 40   effexor 150   Gabapentin 300 mg   mobic 15   namenda   nurtec   trokemdi   Diamox   imitrex shots  lamictal   BOTOX#1 6/26/23 - helped    BOTOX#2 9/22/23 - helped   BOTOX#3 12/18/23 - helped   BOTOX#4 3/28/24 - helped   BOTOX#5 6/16/24 - helped   BOTOX#6 11/7/24 - helped   BOTOX#7 3/20/25 - helped      10/21/21  Care Timeline     0000    EKG 12-LEAD   1228    Arrived   1308    Comprehensive metabolic panel        CBC auto differential    1321    POCT glucose   1340    CT Head Without Contrast   1341    POCT urine pregnancy   1400    EKG 12-lead   1422    metoclopramide HCl 10 mg   1424    ketorolac tromethamine 30 mg       diphenhydramine HCl 25 mg   1425    0.9 % sodium chloride 1000 mL   1546    haloperidol lactate 2.5 mg   1553    magnesium sulfate in water 2 g   1826    prochlorperazine edisylate 5 mg   1827    diphenhydramine HCl 25 mg   2027    Discharged         Headache risk factors:   H/o TBI  - concussion at age 16   H/o Meningitis  - no   F/h Aneurysms  - no      Review of Systems   Constitutional: Negative.    HENT: Negative.    Eyes: Negative.    Respiratory: Negative.    Cardiovascular: Negative.    Gastrointestinal: Negative.    Endocrine: Negative.    Genitourinary: Negative.    Musculoskeletal: Negative.    Integumentary:  Negative.   Allergic/Immunologic: Negative.    Neurological: Positive for headaches.   Hematological: Negative.    Psychiatric/Behavioral: Positive for dysphoric mood and sleep disturbance.          Objective:   Physical Exam  Unchanged          Assessment:     Chr Migraine     Impression:   No major branch stenosis/occlusion at the [place].  No aneurysm.   No evidence of venous sinus thrombosis or venous sinus stenosis..     Electronically signed by: Armani Lopez  Date:                                            11/24/2021  Time:                                           16:24     Impression:   No major branch stenosis/occlusion at the [place].  No aneurysm.   No evidence of venous sinus thrombosis or venous sinus stenosis..     Electronically signed by: Armani Lopez  Date:                                            11/24/2021  Time:                                           16:24     Impression:   Normal pre and postcontrast MR imaging appearance of the  brain.     Electronically signed by: Armani Lopez  Date:                                            11/24/2021  Time:                                           16:51  Plan:       VYEPTI 300 mg IV (sept 2022 -)   Cont Qulipta 60 mg daily, discussed side effect   Breakthrough headache - etodolac, zomig   Dexamethasone 4 mg BID x 3 days PRN   Multiple alternative treatment options were offered to the patient  cont with psychiatrist   Cont Elavil   Continue Effexor 225mg daily, Buspar 15mg TID, and Klonopin 0.25-0.5mg daily PRN anxiety.      BOTOX treatment response:   Prior to initiating BOTOX, the patient had  18 migraine days per month on average. This meets criteria for chronic migraine.   After starting BOTOX, the patient experienced a reduction in  7 days per month   After starting BOTOX, the patient experienced a reduction in > 100 hours of migraine symptoms per month   After starting BOTOX, the patient experienced a 50% reduction in burden of migraine days per month   Based on this information, BOTOX is medically necessary for the management of the patient's chronic migraine.     BOTOX Injection intervals:   Patient reports a 'wearing off effect' prior to the subsequent BOTOX injections at 12 weeks. This occurs at week 10  Symptoms of this a 'wearing off effect' include - worsening of migraine headache frequency and intensity   Medications used during the 'wearing off effect' period include   Based on this information, it is medically necessary for the patient to receive BOTOX therapy at an interval of every 10 weeks for the management of chronic migraine.    BOTOX was performed as an indicated therapy for intractable chronic migraine headaches given that the patient failed > 3 prophylactic medications    Botulinum Toxin Injection Procedure   Pre-operative diagnosis: Chronic migraine   Post-operative diagnosis: Same   Procedure: Chemical neurolysis   After risks and benefits were explained including bleeding,  infection, worsening of pain, damage to the areas being injected, weakness of muscles, loss of muscle control, dysphagia if injecting the head or neck, facial droop if injecting the facial area, painful injection, allergic or other reaction to the medications being injected, and the failure of the procedure to help the problem, a signed consent was obtained.   The patient was placed in a comfortable area and the sites to be treated were identified.The area to be treated was prepped three times with alcohol and the alcohol allowed to dry. Next, a 30 gauge needle was used to inject the medication in the area to be treated.   Area(s) injected:   Total Botox use: 155 Units   Botox wastage: 45 Units   Injection sites:    muscle bilaterally ( a total of 10 units divided into 2 sites)   Procerus muscle (5 units)   Frontalis muscle bilaterally (a total of 20 units divided into 4 sites)   Temporalis muscle bilaterally (a total of 40 units divided into 8 sites)   Occipitalis muscle bilaterally (a total of 30 units divided into 6 sites)  Cervical paraspinal muscles (a total of 20 units divided into 4 sites)   Trapezius muscle bilaterally (a total of 30 units divided into 6 sites)     Complications: none   RTC for the next Botox injection: 10 weeks   Procedures

## 2025-06-13 NOTE — Clinical Note
She still is preDM based on A1C and FBG cutoff, but very close.  I would at minimum start her of Metformin, but a weight loss medication would also be most helpful. I would also encourage her to chat with her psychiatrist, her antipsychotic medications may also be contributing.   I'll message her psychiatrist and cc you to see what a suitable plan would be.

## 2025-06-13 NOTE — PROGRESS NOTES
Subjective:         Patient ID: Rosalina Saavedra is a 35 y.o. female.    Chief Complaint: Annual Exam    Patient Active Problem List   Diagnosis    Chronic migraine with aura    Menstrual cramps    JESUS (generalized anxiety disorder)    Social anxiety disorder    Impaired mobility and activities of daily living    Fatigue    Medication overuse headache    Impaired range of motion of cervical spine    Poor posture    Weakness of both upper extremities    Migraine without aura and with status migrainosus, not intractable    Moderate episode of recurrent major depressive disorder    Psychogenic nonepileptic seizure    Functional neurological symptom disorder with mixed symptoms    Insomnia    Chronic migraine w/o aura, not intractable, w stat migr    Prediabetes    Moderate mixed hyperlipidemia not requiring statin therapy    Class 1 obesity due to excess calories with serious comorbidity and body mass index (BMI) of 34.0 to 34.9 in adult    Carpal tunnel syndrome of right wrist    Impaired strength of upper extremity    Sleep stage dysfunction    KARIE (obstructive sleep apnea)    Neck pain    Decreased motor strength      ATIYA Romano is a 35 y.o. female    History of Present Illness    CHIEF COMPLAINT:  Rosalina presents today for annual exam and concerns about diabetes    WEIGHT MANAGEMENT:  She reports weight gain which she attributes to emotional eating and medication side effects. She denies symptoms of polydipsia or polyuria, though notes intentionally increasing water intake.    SLEEP:  She reports difficulty sleeping. Currently taking Ambien with poor response and is working with psychiatry for sleep management.    MEDICATIONS:  She takes Abilify.  ibuprofen for menstrual cramps with good effect. She takes Ambien for sleep but finds it ineffective.    GYN HISTORY:  Last Pap smear with HPV testing was performed in 2021 by Dr. Browning.       Objective:     Vitals:    06/13/25 1422   BP: 122/60   BP Location: Left  "arm   Patient Position: Sitting   Pulse: 86   SpO2: 98%   Weight: 96.8 kg (213 lb 6.5 oz)   Height: 5' 6" (1.676 m)         Physical Exam  Vitals and nursing note reviewed.   Constitutional:       General: She is not in acute distress.     Appearance: Normal appearance. She is obese. She is not ill-appearing, toxic-appearing or diaphoretic.   HENT:      Head: Normocephalic and atraumatic.   Eyes:      General: No scleral icterus.     Conjunctiva/sclera: Conjunctivae normal.   Cardiovascular:      Rate and Rhythm: Normal rate.      Heart sounds: Normal heart sounds. No murmur heard.  Pulmonary:      Effort: Pulmonary effort is normal. No respiratory distress.   Skin:     Coloration: Skin is not pale.   Neurological:      Mental Status: She is alert. Mental status is at baseline.   Psychiatric:         Attention and Perception: Attention and perception normal.         Mood and Affect: Mood and affect normal.         Speech: Speech normal.         Behavior: Behavior normal.         Cognition and Memory: Cognition and memory normal.         Judgment: Judgment normal.       Assessment:       1. Encounter for general adult medical examination with abnormal findings    2. Chronic migraine w/o aura, not intractable, w stat migr    3. Moderate episode of recurrent major depressive disorder    4. JESUS (generalized anxiety disorder)    5. Functional neurological symptom disorder with mixed symptoms    6. Moderate mixed hyperlipidemia not requiring statin therapy    7. Prediabetes    8. Class 1 obesity due to excess calories with serious comorbidity and body mass index (BMI) of 34.0 to 34.9 in adult          Plan:   Recent relevant labs results reviewed with patient.         Assessment & Plan    Assessed concerns about potential diabetes due to weight gain.  Pre-diabetes history   Considered weight loss medication options, pending lab results and review of current medications.  BP is good, allowing consideration of different " weight loss medicines.  Abilify, while helpful for mood, may contribute to weight gain. Discuss meds with psychiatry if noting weight gain with ongoing use. Discussed usually initial gain but with long term use, stabilizes and levels out.   Reviewed sleep issues and current use of Ambien.  Reviewed previous Pap smear results and determined next screening in 2026.    LAB TESTS AND FOLLOW-UP:  - Ordered fasting labs including lipid panel, blood sugar (diabetes screening), and cortisol level to be drawn at 8:00 AM tomorrow (Saturday).  - Advised patient to wake up early and drink plenty of water before the lab appointment.  - Will schedule follow-up after results are available to discuss potential weight loss medication options.         1. Encounter for general adult medical examination with abnormal findings  -     ibuprofen (ADVIL,MOTRIN) 800 MG tablet; Take 1 tablet (800 mg total) by mouth 2 (two) times daily with meals.  Dispense: 60 tablet; Refill: 3  -     Comprehensive Metabolic Panel; Future; Expected date: 06/13/2025  -     CBC Auto Differential; Future; Expected date: 06/13/2025  -     Lipid Panel; Future; Expected date: 06/13/2025  -     Hemoglobin A1C; Future; Expected date: 06/13/2025  -     TSH; Future; Expected date: 06/13/2025  - Risk and age appropriate anticipatory guidance.  reviewed and updated. Recommendations discussed with patient as appropriate.     2. Chronic migraine w/o aura, not intractable, w stat migr  -     ibuprofen (ADVIL,MOTRIN) 800 MG tablet; Take 1 tablet (800 mg total) by mouth 2 (two) times daily with meals.  Dispense: 60 tablet; Refill: 3    3. Moderate episode of recurrent major depressive disorder  4. JESUS (generalized anxiety disorder)  5. Functional neurological symptom disorder with mixed symptoms  Per psychiatry     6. Moderate mixed hyperlipidemia not requiring statin therapy  Reviewed labs.     7. Prediabetes  -     Hemoglobin A1C; Future; Expected date: 06/13/2025  -      Cortisol, 8AM; Future; Expected date: 06/13/2025  -     Insulin, Random; Future; Expected date: 06/13/2025    8. Class 1 obesity due to excess calories with serious comorbidity and body mass index (BMI) of 34.0 to 34.9 in adult  -     Cortisol, 8AM; Future; Expected date: 06/13/2025  -     Insulin, Random; Future; Expected date: 06/13/2025    Patient's questions answered. Plan reviewed with patient at the end of visit. Relevant precautions to chief complaint and reasons to seek further medical care or to contact the office sooner reviewed with patient.     Follow up in about 2 weeks (around 6/27/2025) for f/u weight medication options, Results Review, w/ AVA Villalpando.        Part of this note was dictated using voice recognition software. Please excuse any typographical errors.     This note was generated with the assistance of ambient listening technology. Verbal consent was obtained by the patient and accompanying visitor(s) for the recording of patient appointment to facilitate this note. I attest to having reviewed and edited the generated note for accuracy, though some syntax or spelling errors may persist. Please contact the author of this note for any clarification.

## 2025-06-14 ENCOUNTER — LAB VISIT (OUTPATIENT)
Dept: LAB | Facility: HOSPITAL | Age: 35
End: 2025-06-14
Attending: FAMILY MEDICINE
Payer: COMMERCIAL

## 2025-06-14 DIAGNOSIS — E66.811 CLASS 1 OBESITY DUE TO EXCESS CALORIES WITH SERIOUS COMORBIDITY AND BODY MASS INDEX (BMI) OF 34.0 TO 34.9 IN ADULT: ICD-10-CM

## 2025-06-14 DIAGNOSIS — R73.03 PREDIABETES: ICD-10-CM

## 2025-06-14 DIAGNOSIS — E66.09 CLASS 1 OBESITY DUE TO EXCESS CALORIES WITH SERIOUS COMORBIDITY AND BODY MASS INDEX (BMI) OF 34.0 TO 34.9 IN ADULT: ICD-10-CM

## 2025-06-14 DIAGNOSIS — Z00.01 ENCOUNTER FOR GENERAL ADULT MEDICAL EXAMINATION WITH ABNORMAL FINDINGS: ICD-10-CM

## 2025-06-14 LAB
ABSOLUTE EOSINOPHIL (OHS): 0.11 K/UL
ABSOLUTE MONOCYTE (OHS): 0.71 K/UL (ref 0.3–1)
ABSOLUTE NEUTROPHIL COUNT (OHS): 1.48 K/UL (ref 1.8–7.7)
ALBUMIN SERPL BCP-MCNC: 3.9 G/DL (ref 3.5–5.2)
ALP SERPL-CCNC: 82 UNIT/L (ref 40–150)
ALT SERPL W/O P-5'-P-CCNC: 9 UNIT/L (ref 10–44)
ANION GAP (OHS): 9 MMOL/L (ref 8–16)
AST SERPL-CCNC: 15 UNIT/L (ref 11–45)
BASOPHILS # BLD AUTO: 0.04 K/UL
BASOPHILS NFR BLD AUTO: 1 %
BILIRUB SERPL-MCNC: 0.3 MG/DL (ref 0.1–1)
BUN SERPL-MCNC: 6 MG/DL (ref 6–20)
CALCIUM SERPL-MCNC: 9.1 MG/DL (ref 8.7–10.5)
CHLORIDE SERPL-SCNC: 106 MMOL/L (ref 95–110)
CHOLEST SERPL-MCNC: 218 MG/DL (ref 120–199)
CHOLEST/HDLC SERPL: 8.1 {RATIO} (ref 2–5)
CO2 SERPL-SCNC: 24 MMOL/L (ref 23–29)
CORTIS SERPL-MCNC: 7.4 UG/DL (ref 4.3–22.4)
CREAT SERPL-MCNC: 0.9 MG/DL (ref 0.5–1.4)
EAG (OHS): 137 MG/DL (ref 68–131)
ERYTHROCYTE [DISTWIDTH] IN BLOOD BY AUTOMATED COUNT: 13.6 % (ref 11.5–14.5)
GFR SERPLBLD CREATININE-BSD FMLA CKD-EPI: >60 ML/MIN/1.73/M2
GLUCOSE SERPL-MCNC: 123 MG/DL (ref 70–110)
HBA1C MFR BLD: 6.4 % (ref 4–5.6)
HCT VFR BLD AUTO: 37.6 % (ref 37–48.5)
HDLC SERPL-MCNC: 27 MG/DL (ref 40–75)
HDLC SERPL: 12.4 % (ref 20–50)
HGB BLD-MCNC: 11.7 GM/DL (ref 12–16)
IMM GRANULOCYTES # BLD AUTO: 0.01 K/UL (ref 0–0.04)
IMM GRANULOCYTES NFR BLD AUTO: 0.3 % (ref 0–0.5)
LDLC SERPL CALC-MCNC: 157.4 MG/DL (ref 63–159)
LYMPHOCYTES # BLD AUTO: 1.52 K/UL (ref 1–4.8)
MCH RBC QN AUTO: 27.7 PG (ref 27–31)
MCHC RBC AUTO-ENTMCNC: 31.1 G/DL (ref 32–36)
MCV RBC AUTO: 89 FL (ref 82–98)
NONHDLC SERPL-MCNC: 191 MG/DL
NUCLEATED RBC (/100WBC) (OHS): 0 /100 WBC
PLATELET # BLD AUTO: 346 K/UL (ref 150–450)
PMV BLD AUTO: 9.3 FL (ref 9.2–12.9)
POTASSIUM SERPL-SCNC: 4.1 MMOL/L (ref 3.5–5.1)
PROT SERPL-MCNC: 7.5 GM/DL (ref 6–8.4)
RBC # BLD AUTO: 4.23 M/UL (ref 4–5.4)
RELATIVE EOSINOPHIL (OHS): 2.8 %
RELATIVE LYMPHOCYTE (OHS): 39.3 % (ref 18–48)
RELATIVE MONOCYTE (OHS): 18.3 % (ref 4–15)
RELATIVE NEUTROPHIL (OHS): 38.3 % (ref 38–73)
SODIUM SERPL-SCNC: 139 MMOL/L (ref 136–145)
TRIGL SERPL-MCNC: 168 MG/DL (ref 30–150)
TSH SERPL-ACNC: 1.35 UIU/ML (ref 0.4–4)
WBC # BLD AUTO: 3.87 K/UL (ref 3.9–12.7)

## 2025-06-14 PROCEDURE — 80053 COMPREHEN METABOLIC PANEL: CPT

## 2025-06-14 PROCEDURE — 83525 ASSAY OF INSULIN: CPT

## 2025-06-14 PROCEDURE — 80061 LIPID PANEL: CPT

## 2025-06-14 PROCEDURE — 83036 HEMOGLOBIN GLYCOSYLATED A1C: CPT

## 2025-06-14 PROCEDURE — 84443 ASSAY THYROID STIM HORMONE: CPT

## 2025-06-14 PROCEDURE — 36415 COLL VENOUS BLD VENIPUNCTURE: CPT | Mod: PO

## 2025-06-14 PROCEDURE — 82533 TOTAL CORTISOL: CPT

## 2025-06-14 PROCEDURE — 85025 COMPLETE CBC W/AUTO DIFF WBC: CPT

## 2025-06-16 ENCOUNTER — RESULTS FOLLOW-UP (OUTPATIENT)
Dept: FAMILY MEDICINE | Facility: CLINIC | Age: 35
End: 2025-06-16

## 2025-06-16 LAB — INSULIN SERPL-ACNC: 15.8 UU/ML

## 2025-06-17 ENCOUNTER — OFFICE VISIT (OUTPATIENT)
Dept: FAMILY MEDICINE | Facility: CLINIC | Age: 35
End: 2025-06-17
Payer: COMMERCIAL

## 2025-06-17 VITALS
OXYGEN SATURATION: 98 % | DIASTOLIC BLOOD PRESSURE: 62 MMHG | HEIGHT: 66 IN | BODY MASS INDEX: 34.29 KG/M2 | SYSTOLIC BLOOD PRESSURE: 122 MMHG | HEART RATE: 79 BPM | WEIGHT: 213.38 LBS

## 2025-06-17 DIAGNOSIS — E66.811 CLASS 1 OBESITY DUE TO EXCESS CALORIES WITH SERIOUS COMORBIDITY AND BODY MASS INDEX (BMI) OF 34.0 TO 34.9 IN ADULT: ICD-10-CM

## 2025-06-17 DIAGNOSIS — F51.05 INSOMNIA DUE TO OTHER MENTAL DISORDER: ICD-10-CM

## 2025-06-17 DIAGNOSIS — F99 INSOMNIA DUE TO OTHER MENTAL DISORDER: ICD-10-CM

## 2025-06-17 DIAGNOSIS — R73.03 PREDIABETES: Primary | ICD-10-CM

## 2025-06-17 DIAGNOSIS — E78.2 MODERATE MIXED HYPERLIPIDEMIA NOT REQUIRING STATIN THERAPY: ICD-10-CM

## 2025-06-17 DIAGNOSIS — E66.09 CLASS 1 OBESITY DUE TO EXCESS CALORIES WITH SERIOUS COMORBIDITY AND BODY MASS INDEX (BMI) OF 34.0 TO 34.9 IN ADULT: ICD-10-CM

## 2025-06-17 PROBLEM — G47.33 OSA (OBSTRUCTIVE SLEEP APNEA): Status: RESOLVED | Noted: 2024-06-27 | Resolved: 2025-06-17

## 2025-06-17 PROCEDURE — 3074F SYST BP LT 130 MM HG: CPT | Mod: CPTII,S$GLB,, | Performed by: FAMILY MEDICINE

## 2025-06-17 PROCEDURE — G2211 COMPLEX E/M VISIT ADD ON: HCPCS | Mod: S$GLB,,, | Performed by: FAMILY MEDICINE

## 2025-06-17 PROCEDURE — 3078F DIAST BP <80 MM HG: CPT | Mod: CPTII,S$GLB,, | Performed by: FAMILY MEDICINE

## 2025-06-17 PROCEDURE — 3044F HG A1C LEVEL LT 7.0%: CPT | Mod: CPTII,S$GLB,, | Performed by: FAMILY MEDICINE

## 2025-06-17 PROCEDURE — 3008F BODY MASS INDEX DOCD: CPT | Mod: CPTII,S$GLB,, | Performed by: FAMILY MEDICINE

## 2025-06-17 PROCEDURE — 99214 OFFICE O/P EST MOD 30 MIN: CPT | Mod: S$GLB,,, | Performed by: FAMILY MEDICINE

## 2025-06-17 PROCEDURE — 99999 PR PBB SHADOW E&M-EST. PATIENT-LVL V: CPT | Mod: PBBFAC,,, | Performed by: FAMILY MEDICINE

## 2025-06-17 PROCEDURE — 1159F MED LIST DOCD IN RCRD: CPT | Mod: CPTII,S$GLB,, | Performed by: FAMILY MEDICINE

## 2025-06-17 PROCEDURE — 1160F RVW MEDS BY RX/DR IN RCRD: CPT | Mod: CPTII,S$GLB,, | Performed by: FAMILY MEDICINE

## 2025-06-17 RX ORDER — PHENTERMINE HYDROCHLORIDE 37.5 MG/1
37.5 TABLET ORAL
Qty: 30 TABLET | Refills: 0 | Status: SHIPPED | OUTPATIENT
Start: 2025-06-17

## 2025-06-17 RX ORDER — ZOLPIDEM TARTRATE 5 MG/1
5 TABLET ORAL NIGHTLY
Qty: 30 TABLET | Refills: 2 | Status: SHIPPED | OUTPATIENT
Start: 2025-06-17 | End: 2025-12-16

## 2025-06-17 RX ORDER — TIRZEPATIDE 2.5 MG/.5ML
2.5 INJECTION, SOLUTION SUBCUTANEOUS
Qty: 4 PEN | Refills: 0 | Status: SHIPPED | OUTPATIENT
Start: 2025-06-17

## 2025-06-17 RX ORDER — SEMAGLUTIDE 0.25 MG/.5ML
0.25 INJECTION, SOLUTION SUBCUTANEOUS WEEKLY
Qty: 2 ML | Refills: 0 | Status: SHIPPED | OUTPATIENT
Start: 2025-06-17 | End: 2025-07-15

## 2025-06-17 NOTE — PROGRESS NOTES
"Subjective:         Patient ID: Rosalina Saavedra is a 35 y.o. female.    Chief Complaint: Results    Patient Active Problem List   Diagnosis    Chronic migraine with aura    Menstrual cramps    JESUS (generalized anxiety disorder)    Social anxiety disorder    Impaired mobility and activities of daily living    Fatigue    Medication overuse headache    Impaired range of motion of cervical spine    Poor posture    Weakness of both upper extremities    Migraine without aura and with status migrainosus, not intractable    Moderate episode of recurrent major depressive disorder    Psychogenic nonepileptic seizure    Functional neurological symptom disorder with mixed symptoms    Insomnia    Chronic migraine w/o aura, not intractable, w stat migr    Prediabetes    Moderate mixed hyperlipidemia not requiring statin therapy    Class 1 obesity due to excess calories with serious comorbidity and body mass index (BMI) of 34.0 to 34.9 in adult    Carpal tunnel syndrome of right wrist    Impaired strength of upper extremity    Sleep stage dysfunction    Neck pain    Decreased motor strength      ATIYA Romano is a 35 y.o. female    History of Present Illness    CHIEF COMPLAINT:  Rosalina presents today for follow up of weight concerns and lab results    PRE-DIABETES:  A1C is 6.4 and fasting blood sugar is 123 mg/dL, both approaching but not meeting diagnostic criteria for diabetes.    ENDOCRINE:  Thyroid and cortisol levels are within normal limits.    SLEEP:  Sleep study in 2025 showed no evidence of sleep apnea.    MENTAL HEALTH:  She is currently under psychiatric care with Dr. Stella Worley        Objective:     Vitals:    06/17/25 1511   BP: 122/62   BP Location: Left arm   Patient Position: Sitting   Pulse: 79   SpO2: 98%   Weight: 96.8 kg (213 lb 6.5 oz)   Height: 5' 6" (1.676 m)         Physical Exam  Vitals and nursing note reviewed.   Constitutional:       General: She is not in acute distress.     Appearance: Normal " appearance. She is obese. She is not ill-appearing, toxic-appearing or diaphoretic.   HENT:      Head: Normocephalic and atraumatic.   Eyes:      General: No scleral icterus.     Conjunctiva/sclera: Conjunctivae normal.   Cardiovascular:      Rate and Rhythm: Normal rate.   Pulmonary:      Effort: Pulmonary effort is normal. No respiratory distress.   Skin:     Coloration: Skin is not pale.   Neurological:      Mental Status: She is alert. Mental status is at baseline.   Psychiatric:         Attention and Perception: Attention and perception normal.         Mood and Affect: Mood and affect normal.         Speech: Speech normal.         Behavior: Behavior normal.         Cognition and Memory: Cognition and memory normal.         Judgment: Judgment normal.         Assessment:       1. Prediabetes    2. Moderate mixed hyperlipidemia not requiring statin therapy    3. Class 1 obesity due to excess calories with serious comorbidity and body mass index (BMI) of 34.0 to 34.9 in adult          Plan:   Recent relevant labs results reviewed with patient.         Assessment & Plan    BP is good, but A1C (6.4) and fasting glucose (123) are borderline diabetic.  Thyroid and cortisol levels are normal  Considered weight loss medication options: injectable (Zepbound, Wegovy) preferred if covered by insurance; otherwise, phentermine (stimulant) as alternative.    Consulted with patient's psychiatrist, Dr. Stella Worley, regarding medication history and potential benefits of stimulants for depression.    Focused on weight loss to address pre-diabetic state and high cholesterol, rather than starting cholesterol medication due to patient's young age.  Sleep apnea ruled out based on previous study in 2025.  All potential diagnoses that may qualify her for GLP1a coverage linked.           1. Prediabetes  -     semaglutide, weight loss, (WEGOVY) 0.25 mg/0.5 mL PnIj; Inject 0.25 mg into the skin once a week.  Dispense: 2 mL; Refill:  0  -     tirzepatide, weight loss, (ZEPBOUND) 2.5 mg/0.5 mL PnIj; Inject 2.5 mg into the skin every 7 days.  Dispense: 4 Pen; Refill: 0  Worsened    2. Moderate mixed hyperlipidemia not requiring statin therapy  -     semaglutide, weight loss, (WEGOVY) 0.25 mg/0.5 mL PnIj; Inject 0.25 mg into the skin once a week.  Dispense: 2 mL; Refill: 0  -     tirzepatide, weight loss, (ZEPBOUND) 2.5 mg/0.5 mL PnIj; Inject 2.5 mg into the skin every 7 days.  Dispense: 4 Pen; Refill: 0  Will monitor with weight loss    3. Class 1 obesity due to excess calories with serious comorbidity and body mass index (BMI) of 34.0 to 34.9 in adult  -     semaglutide, weight loss, (WEGOVY) 0.25 mg/0.5 mL PnIj; Inject 0.25 mg into the skin once a week.  Dispense: 2 mL; Refill: 0  -     tirzepatide, weight loss, (ZEPBOUND) 2.5 mg/0.5 mL PnIj; Inject 2.5 mg into the skin every 7 days.  Dispense: 4 Pen; Refill: 0  -     phentermine (ADIPEX-P) 37.5 mg tablet; Take 1 tablet (37.5 mg total) by mouth before breakfast.  Dispense: 30 tablet; Refill: 0  Ongoing weight gain    Patient's questions answered. Plan reviewed with patient at the end of visit. Relevant precautions to chief complaint and reasons to seek further medical care or to contact the office sooner reviewed with patient.     Follow up in about 4 weeks (around 7/15/2025) for Weight Follow-up and Adipex f/u.        Part of this note was dictated using voice recognition software. Please excuse any typographical errors.     This note was generated with the assistance of ambient listening technology. Verbal consent was obtained by the patient and accompanying visitor(s) for the recording of patient appointment to facilitate this note. I attest to having reviewed and edited the generated note for accuracy, though some syntax or spelling errors may persist. Please contact the author of this note for any clarification.

## 2025-06-23 ENCOUNTER — CLINICAL SUPPORT (OUTPATIENT)
Dept: PSYCHIATRY | Facility: CLINIC | Age: 35
End: 2025-06-23
Payer: COMMERCIAL

## 2025-06-23 DIAGNOSIS — F99 INSOMNIA DUE TO OTHER MENTAL DISORDER: ICD-10-CM

## 2025-06-23 DIAGNOSIS — F33.0 MILD EPISODE OF RECURRENT MAJOR DEPRESSIVE DISORDER: Primary | ICD-10-CM

## 2025-06-23 DIAGNOSIS — F41.1 GENERALIZED ANXIETY DISORDER: ICD-10-CM

## 2025-06-23 DIAGNOSIS — F51.05 INSOMNIA DUE TO OTHER MENTAL DISORDER: ICD-10-CM

## 2025-06-23 PROCEDURE — 90853 GROUP PSYCHOTHERAPY: CPT | Mod: GT,S$GLB,,

## 2025-06-23 PROCEDURE — 99499 UNLISTED E&M SERVICE: CPT | Mod: S$GLB,,,

## 2025-06-23 NOTE — PROGRESS NOTES
[facility] Therapy  The patient location is: [place]  The chief complaint leading to consultation is: Emotion Dysregulation    Visit type: audiovisual    Face to Face time with patient: 45 min  50 minutes of total time spent on the encounter, which includes face to face time and non-face to face time preparing to see the patient (eg, review of tests), Obtaining and/or reviewing separately obtained history, Documenting clinical information in the electronic or other health record, Independently interpreting results (not separately reported) and communicating results to the patient/family/caregiver, or Care coordination (not separately reported).     Each patient to whom he or she provides medical services by telemedicine is:  (1) informed of the relationship between the physician and patient and the respective role of any other health care provider with respect to management of the patient; and (2) notified that he or she may decline to receive medical services by telemedicine and may withdraw from such care at any time.    Notes: N/A  ---  Date: 6/23/2025  Type/Length of service: 90110 (Group Psychotherapy; 60 minutes)  Clinical status of patient: Outpatient  Referred by: [facility]     Number of patients in attendance: 3    Target symptoms: depression, anxiety         Interval History: Patient completed a mindfulness exercise and check-ins. Session focus was Distress Tolerance: Radical Acceptance. Patients were encouraged to practice accepting with the Whole Self (mind, body and spirit), consider the causes of the reality that needs to be accepted, practice opposite action, cope ahead, attend to body sensations and emotions, practice pros and cons, and acknowledge that life can be worth living even when there is pain.     Response:   Patient reported feeling tired, overwhelmed today. Patient rated current distress (0-10 scale) as a 4.  Homework was reviewed.   Patient responded to  the intervention by engaging in Active Listening, Self Disclosure.  Patient is exhibiting good progress towards goals and other mental status changes.     Diagnosis:     ICD-10-CM ICD-9-CM   1. Mild episode of recurrent major depressive disorder  F33.0 296.31   2. Generalized anxiety disorder  F41.1 300.02   3. Insomnia due to other mental disorder  F51.05 300.9    F99 327.02             Plan: Continue in group psychotherapy. Complete homework assignment.      Return to clinic: 1 week

## 2025-06-25 ENCOUNTER — TELEPHONE (OUTPATIENT)
Facility: CLINIC | Age: 35
End: 2025-06-25
Payer: COMMERCIAL

## 2025-06-25 DIAGNOSIS — G43.001 MIGRAINE WITHOUT AURA AND WITH STATUS MIGRAINOSUS, NOT INTRACTABLE: Primary | ICD-10-CM

## 2025-06-25 DIAGNOSIS — G43.E09 CHRONIC MIGRAINE WITH AURA WITHOUT STATUS MIGRAINOSUS, NOT INTRACTABLE: ICD-10-CM

## 2025-06-25 DIAGNOSIS — G43.701 CHRONIC MIGRAINE W/O AURA, NOT INTRACTABLE, W STAT MIGR: ICD-10-CM

## 2025-06-30 ENCOUNTER — OFFICE VISIT (OUTPATIENT)
Dept: PSYCHIATRY | Facility: CLINIC | Age: 35
End: 2025-06-30
Payer: COMMERCIAL

## 2025-06-30 ENCOUNTER — CLINICAL SUPPORT (OUTPATIENT)
Dept: PSYCHIATRY | Facility: CLINIC | Age: 35
End: 2025-06-30
Payer: COMMERCIAL

## 2025-06-30 DIAGNOSIS — F41.1 GAD (GENERALIZED ANXIETY DISORDER): ICD-10-CM

## 2025-06-30 DIAGNOSIS — F41.1 GAD (GENERALIZED ANXIETY DISORDER): Primary | ICD-10-CM

## 2025-06-30 DIAGNOSIS — F99 INSOMNIA DUE TO OTHER MENTAL DISORDER: Primary | ICD-10-CM

## 2025-06-30 DIAGNOSIS — F51.05 INSOMNIA DUE TO OTHER MENTAL DISORDER: Primary | ICD-10-CM

## 2025-06-30 DIAGNOSIS — F44.7 FUNCTIONAL NEUROLOGICAL SYMPTOM DISORDER WITH MIXED SYMPTOMS: ICD-10-CM

## 2025-06-30 DIAGNOSIS — F33.1 MODERATE EPISODE OF RECURRENT MAJOR DEPRESSIVE DISORDER: ICD-10-CM

## 2025-06-30 DIAGNOSIS — F33.41 RECURRENT MAJOR DEPRESSIVE DISORDER, IN PARTIAL REMISSION: ICD-10-CM

## 2025-06-30 PROCEDURE — 99999 PR PBB SHADOW E&M-EST. PATIENT-LVL I: CPT | Mod: PBBFAC,,,

## 2025-06-30 PROCEDURE — 3044F HG A1C LEVEL LT 7.0%: CPT | Mod: CPTII,95,, | Performed by: INTERNAL MEDICINE

## 2025-06-30 PROCEDURE — 99499 UNLISTED E&M SERVICE: CPT | Mod: S$GLB,,,

## 2025-06-30 PROCEDURE — 98006 SYNCH AUDIO-VIDEO EST MOD 30: CPT | Mod: 95,,, | Performed by: INTERNAL MEDICINE

## 2025-06-30 PROCEDURE — 1160F RVW MEDS BY RX/DR IN RCRD: CPT | Mod: CPTII,95,, | Performed by: INTERNAL MEDICINE

## 2025-06-30 PROCEDURE — 1159F MED LIST DOCD IN RCRD: CPT | Mod: CPTII,95,, | Performed by: INTERNAL MEDICINE

## 2025-06-30 PROCEDURE — 90853 GROUP PSYCHOTHERAPY: CPT | Mod: GT,S$GLB,,

## 2025-06-30 PROCEDURE — 90833 PSYTX W PT W E/M 30 MIN: CPT | Mod: 95,,, | Performed by: INTERNAL MEDICINE

## 2025-06-30 RX ORDER — ZOLPIDEM TARTRATE 10 MG/1
10 TABLET ORAL NIGHTLY
Qty: 30 TABLET | Refills: 2 | Status: SHIPPED | OUTPATIENT
Start: 2025-06-30 | End: 2025-12-29

## 2025-06-30 NOTE — PROGRESS NOTES
[facility]  The patient location is: [place]  The chief complaint leading to consultation is: Emotion Dysregulation    Visit type: audiovisual    Face to Face time with patient: 45 min  50 minutes of total time spent on the encounter, which includes face to face time and non-face to face time preparing to see the patient (eg, review of tests), Obtaining and/or reviewing separately obtained history, Documenting clinical information in the electronic or other health record, Independently interpreting results (not separately reported) and communicating results to the patient/family/caregiver, or Care coordination (not separately reported).     Each patient to whom he or she provides medical services by telemedicine is:  (1) informed of the relationship between the physician and patient and the respective role of any other health care provider with respect to management of the patient; and (2) notified that he or she may decline to receive medical services by telemedicine and may withdraw from such care at any time.    Notes: N/A  ---  Date: 6/30/2025  Type/Length of service: 14674 (Group Psychotherapy; 60 minutes)  Clinical status of patient: Outpatient  Referred by: [facility]     Number of patients in attendance: 3    Target symptoms: depression, anxiety         Interval History: Patient completed a mindfulness exercise and check-ins. Session focus was Mindfulness: Introduction to Mindfulness and States of Mind. Patient was introduced to the practice of mindfulness. Patient discussed the two types of mindfulness practice and the efficacy of mindfulness as a skill to be used in conjunction with other DBT skills. Patient practiced identifying uses of reasonable mind, emotion mind, and synthesizing both to achieve a state of Wise mind. , Patient was oriented to DBT Skills training, including the structure and rules of group, as well as the expectations for attendance,  participation, and homework practice. Goals of Skills Training, Options for Solving Any Problem, and Skills Training Assumptions were all reviewed.     Response:   Patient reported feeling tired, overwhelmed, anxious today. Patient rated current distress (0-10 scale) as a 4.  Homework was reviewed.   Patient responded to the intervention by engaging in Active Listening, Self Disclosure.  Patient is exhibiting good progress towards goals and other mental status changes.     Diagnosis:     ICD-10-CM ICD-9-CM   1. JESUS (generalized anxiety disorder)  F41.1 300.02   2. Moderate episode of recurrent major depressive disorder  F33.1 296.32       Plan: Continue in group psychotherapy. Complete homework assignment.      Return to clinic: 1 week

## 2025-06-30 NOTE — PROGRESS NOTES
OUTPATIENT PSYCHIATRY RETURN VISIT    ENCOUNTER DATE:  7/9/2025  SITE:  [facility], [facility]  LENGTH OF SESSION:  26 minutes    The patient location is:  [place], not in a healthcare facility  Visit type:  audiovisual    Face to Face time with patient:  26 minutes  32 minutes of total time spent on the encounter, which includes face to face time and non-face to face time preparing to see the patient (eg, review of tests), Obtaining and/or reviewing separately obtained history, Documenting clinical information in the electronic or other health record, Independently interpreting results (not separately reported) and communicating results to the patient/family/caregiver, or Care coordination (not separately reported).     Each patient to whom he or she provides medical services by telemedicine is:  (1) informed of the relationship between the physician and patient and the respective role of any other health care provider with respect to management of the patient; and (2) notified that he or she may decline to receive medical services by telemedicine and may withdraw from such care at any time.      CHIEF COMPLAINT:  Insomnia      HISTORY OF PRESENTING ILLNESS:  Rosalina Saavedra is a 35 y.o. female with history of MDD, JESUS, and Social Anxiety Disorder who presents for follow up appointment.      Plan at last appointment on 6/6/2025:  Discussed plan for slowly shifting patient's sleep schedule to earlier in the night.  Sent to her portal.    Continue Effexor 225mg every morning for depression and anxiety.  Continue Buspar 30mg BID and Xanax 0.5mg BID for anxiety.  Decrease Abilify to 5mg daily for depression since higher dose has increased appetite but not been significantly more beneficial.     Continue Ambien 5mg qHS for insomnia.    Discussed with patient informed consent, risks versus benefits, alternative treatments, side effect profile and the inherent unpredictability of individual responses to  these treatments.  The patient expresses understanding of the above and displays the capacity to agree with this current plan.  Continue individual psychotherapy with Guerita Stevens LCSW.     History as told by patient:  Still stressed - so much to do.  Going tomorrow for something at school.  Went once to [facility] - everyone was excited to see her, definitely helps her.  Started on Phentermine for weight loss - appetite has been decreased, does not have improved energy though.  Has been very tired.  Hard to go to sleep but once she is asleep its hard to wake up.  No matter how much sleep she gets she is still tired.      Psychotherapy:  Target symptoms: depression, anxiety , work stress, insomnia  Why chosen therapy is appropriate versus another modality: relevant to diagnosis, patient responds to this modality  Outcome monitoring methods: self-report, observation  Therapeutic intervention type: supportive psychotherapy  Topics discussed/themes: building skills sets for symptom management, symptom recognition  The patient's response to the intervention is accepting. The patient's progress toward treatment goals is good.   Duration of intervention: 16 minutes.    Medication side effects:  Increased appetite  Medication compliance:  Yes    PSYCHIATRIC REVIEW OF SYSTEMS:  Trouble with sleep:  Yes as above  Appetite changes:  Some decrease on Phentermine  Weight changes:  Gain slowly over time  Lack of energy:  Yes  Anhedonia:  Sometimes  Somatic symptoms:  Not discussed  Libido:  Not discussed  Anxiety/panic:  Sometimes  Guilty/hopeless:  Denies  Self-injurious behavior/risky behavior:  Denies  Any drugs:  Denies  Alcohol:  Denies     MEDICAL REVIEW OF SYSTEMS:  Complete review of systems performed covering Constitutional, Musculoskeletal, Neurologic.  All systems negative except for that covered in HPI.    PAST PSYCHIATRIC, MEDICAL, AND SOCIAL HISTORY REVIEWED  The patient's past medical, family and social  history have been reviewed and updated as appropriate within the electronic medical record - see encounter notes.    MEDICATIONS:    Current Outpatient Medications:     ALPRAZolam (XANAX) 0.5 MG tablet, TAKE 1 TABLET (0.5MG) BY MOUTH IN THE MORNING AND 2 TABLETS (1MG) BEFORE BED., Disp: 90 tablet, Rfl: 0    ARIPiprazole (ABILIFY) 5 MG Tab, Take 1 tablet (5 mg total) by mouth once daily., Disp: 30 tablet, Rfl: 11    atogepant (QULIPTA) 60 mg Tab, Take 1 tablet (60 mg total) by mouth once daily., Disp: 30 tablet, Rfl: 6    busPIRone (BUSPAR) 30 MG Tab, Take 1 tablet (30 mg total) by mouth 2 (two) times daily., Disp: 180 tablet, Rfl: 11    etodolac (LODINE) 500 MG tablet, TAKE 1 TABLET (500 MG TOTAL) BY MOUTH 2 (TWO) TIMES DAILY AS NEEDED (MIGRAINE)., Disp: 20 tablet, Rfl: 12    ibuprofen (ADVIL,MOTRIN) 800 MG tablet, Take 1 tablet (800 mg total) by mouth 2 (two) times daily with meals., Disp: 60 tablet, Rfl: 3    phentermine (ADIPEX-P) 37.5 mg tablet, Take 1 tablet (37.5 mg total) by mouth before breakfast., Disp: 90 tablet, Rfl: 0    promethazine (PHENERGAN) 25 MG tablet, Take 1 tablet (25 mg total) by mouth every 6 (six) hours as needed for Nausea., Disp: 40 tablet, Rfl: 0    topiramate (TOPAMAX) 100 MG tablet, Take 1 tablet (100 mg total) by mouth every evening. (Take 1 tablet nightly for 2 weeks. In 14 days, if tolerating, take with 50mg tablet to equal 150mg nightly.) (Patient not taking: Reported on 6/13/2025), Disp: 90 tablet, Rfl: 3    venlafaxine (EFFEXOR-XR) 150 MG Cp24, Take 1 capsule (150 mg total) by mouth once daily. Take with 75mg capsule to equal 225mg daily., Disp: 30 capsule, Rfl: 11    venlafaxine (EFFEXOR-XR) 75 MG 24 hr capsule, Take 1 capsule (75 mg total) by mouth once daily. Take with 150mg capsule to equal 225mg daily., Disp: 30 capsule, Rfl: 11    ZOLMitriptan (ZOMIG-ZMT) 5 MG disintegrating tablet, TAKE 1 TABLET BY MOUTH AS NEEDED FOR MIGRAINE., Disp: 9 tablet, Rfl: 12    ZOLMitriptan  "(ZOMIG-ZMT) 5 MG disintegrating tablet, DISSOLVE 1 TABLET BY MOUTH AS NEEDED FOR MIGRAINE., Disp: 6 tablet, Rfl: 12    zolpidem (AMBIEN) 10 mg Tab, Take 1 tablet (10 mg total) by mouth every evening., Disp: 30 tablet, Rfl: 2    ALLERGIES:  Review of patient's allergies indicates:   Allergen Reactions    Codeine Itching and Other (See Comments)    Morphine Itching and Other (See Comments)       PSYCHIATRIC EXAM:  There were no vitals filed for this visit.    Appearance:  Well groomed, appearing healthy and of stated age  Behavior:  Cooperative, +mild psychomotor retardation  Speech:  Normal rate, volume, and prosody  Mood:  "Ok"  Affect:  Euthymic  Thought Process:  Linear, logical, goal directed  Thought Content:  Negative for homicidal ideation, delusions or hallucinations.  Associations:  Intact  Memory:  Grossly Intact  Level of Consciousness/Orientation:  Grossly intact  Fund of Knowledge:  Good  Attention:  Good  Language:  Fluent, able to name abstract and concrete objects  Insight:  Good  Judgment:  Intact  Psychomotor signs:  No involuntary movements or tremor of face  Gait:  Unable to assess via virtual visit      RELEVANT LABS/STUDIES:    Lab Results   Component Value Date    WBC 3.87 (L) 06/14/2025    HGB 11.7 (L) 06/14/2025    HCT 37.6 06/14/2025    MCV 89 06/14/2025     06/14/2025     BMP  Lab Results   Component Value Date     06/14/2025    K 4.1 06/14/2025     06/14/2025    CO2 24 06/14/2025    BUN 6 06/14/2025    CREATININE 0.9 06/14/2025    CALCIUM 9.1 06/14/2025    ANIONGAP 9 06/14/2025    ESTGFRAFRICA >60.0 07/28/2022    EGFRNONAA >60.0 07/28/2022     Lab Results   Component Value Date    ALT 9 (L) 06/14/2025    AST 15 06/14/2025    ALKPHOS 82 06/14/2025    BILITOT 0.3 06/14/2025     Lab Results   Component Value Date    TSH 1.351 06/14/2025     Lab Results   Component Value Date    HGBA1C 6.4 (H) 06/14/2025       IMPRESSION:    Rosalina Saavedra is a 35 y.o. female with history " of MDD, JESUS, and Social Anxiety Disorder who presents for follow up appointment.    Status/Progress:  Based on the examination today, the patient's problem(s) is/are improving but still inadequately controlled.  New problems have not been presented today.    Risk Parameters:  Patient reports no suicidal ideation  Patient reports no homicidal ideation  Patient reports no self-injurious behavior  Patient reports no violent behavior      DIAGNOSES:    ICD-10-CM ICD-9-CM   1. Insomnia due to other mental disorder  F51.05 300.9    F99 327.02   2. Recurrent major depressive disorder, in partial remission  F33.41 296.35   3. JESUS (generalized anxiety disorder)  F41.1 300.02   4. Functional neurological symptom disorder with mixed symptoms  F44.7 300.11       PLAN:  Increase Ambien to 10mg qHS temporarily with hope to get sleep schedule back on track.  Continue Effexor 225mg every morning for depression and anxiety.  Continue Buspar 30mg BID and Xanax 0.5mg BID for anxiety.  Continue Abilify 5mg daily as adjust for depression.     Discussed with patient informed consent, risks versus benefits, alternative treatments, side effect profile and the inherent unpredictability of individual responses to these treatments.  The patient expresses understanding of the above and displays the capacity to agree with this current plan.  Continue individual psychotherapy with Guerita Stevens LCSW.     RETURN TO CLINIC:  Follow up in about 3 weeks (around 7/21/2025).

## 2025-07-01 ENCOUNTER — PATIENT MESSAGE (OUTPATIENT)
Dept: PSYCHIATRY | Facility: CLINIC | Age: 35
End: 2025-07-01
Payer: COMMERCIAL

## 2025-07-04 DIAGNOSIS — F40.10 SOCIAL ANXIETY DISORDER: ICD-10-CM

## 2025-07-04 DIAGNOSIS — F99 INSOMNIA DUE TO OTHER MENTAL DISORDER: ICD-10-CM

## 2025-07-04 DIAGNOSIS — F41.1 GAD (GENERALIZED ANXIETY DISORDER): ICD-10-CM

## 2025-07-04 DIAGNOSIS — F51.05 INSOMNIA DUE TO OTHER MENTAL DISORDER: ICD-10-CM

## 2025-07-07 RX ORDER — ALPRAZOLAM 0.5 MG/1
TABLET ORAL
Qty: 90 TABLET | Refills: 0 | Status: SHIPPED | OUTPATIENT
Start: 2025-07-07

## 2025-07-08 ENCOUNTER — OFFICE VISIT (OUTPATIENT)
Dept: FAMILY MEDICINE | Facility: CLINIC | Age: 35
End: 2025-07-08
Payer: COMMERCIAL

## 2025-07-08 VITALS
WEIGHT: 207 LBS | OXYGEN SATURATION: 98 % | HEART RATE: 93 BPM | SYSTOLIC BLOOD PRESSURE: 106 MMHG | DIASTOLIC BLOOD PRESSURE: 80 MMHG | BODY MASS INDEX: 33.41 KG/M2

## 2025-07-08 DIAGNOSIS — E66.811 CLASS 1 OBESITY DUE TO EXCESS CALORIES WITH SERIOUS COMORBIDITY AND BODY MASS INDEX (BMI) OF 33.0 TO 33.9 IN ADULT: Primary | ICD-10-CM

## 2025-07-08 DIAGNOSIS — F33.1 MODERATE EPISODE OF RECURRENT MAJOR DEPRESSIVE DISORDER: ICD-10-CM

## 2025-07-08 DIAGNOSIS — E66.09 CLASS 1 OBESITY DUE TO EXCESS CALORIES WITH SERIOUS COMORBIDITY AND BODY MASS INDEX (BMI) OF 33.0 TO 33.9 IN ADULT: Primary | ICD-10-CM

## 2025-07-08 DIAGNOSIS — F41.1 GAD (GENERALIZED ANXIETY DISORDER): ICD-10-CM

## 2025-07-08 PROCEDURE — 99999 PR PBB SHADOW E&M-EST. PATIENT-LVL IV: CPT | Mod: PBBFAC,,, | Performed by: FAMILY MEDICINE

## 2025-07-08 RX ORDER — PHENTERMINE HYDROCHLORIDE 37.5 MG/1
37.5 TABLET ORAL
Qty: 90 TABLET | Refills: 0 | Status: SHIPPED | OUTPATIENT
Start: 2025-07-08

## 2025-07-08 NOTE — PROGRESS NOTES
Subjective:         Patient ID: Rosalina Saavedra is a 35 y.o. female.    Chief Complaint: Weight follow up and Med Change Follow Up    Patient Active Problem List   Diagnosis    Chronic migraine with aura    Menstrual cramps    JESUS (generalized anxiety disorder)    Social anxiety disorder    Impaired mobility and activities of daily living    Fatigue    Medication overuse headache    Impaired range of motion of cervical spine    Poor posture    Weakness of both upper extremities    Migraine without aura and with status migrainosus, not intractable    Moderate episode of recurrent major depressive disorder    Psychogenic nonepileptic seizure    Functional neurological symptom disorder with mixed symptoms    Insomnia    Chronic migraine w/o aura, not intractable, w stat migr    Prediabetes    Moderate mixed hyperlipidemia not requiring statin therapy    Class 1 obesity due to excess calories with serious comorbidity and body mass index (BMI) of 33.0 to 33.9 in adult    Carpal tunnel syndrome of right wrist    Impaired strength of upper extremity    Sleep stage dysfunction    Neck pain    Decreased motor strength      ATIYA Romano is a 35 y.o. female    History of Present Illness    CHIEF COMPLAINT:  Rosalina presents today for follow up of weight management medication.    WEIGHT MANAGEMENT:  She has lost approximately 6-7 lbs within the first month of Adipex 14 mg. She reports effective appetite suppression without rebound at end of day. She denies anxiety, sleep disturbance, or other side effects. She notes no increase in energy or alertness with the medication. Her A1C is 6.4.       Objective:     Vitals:    07/08/25 1405   BP: 106/80   BP Location: Left arm   Patient Position: Sitting   Pulse: 93   SpO2: 98%   Weight: 93.9 kg (207 lb 0.2 oz)         Physical Exam  Vitals and nursing note reviewed.   Constitutional:       General: She is not in acute distress.     Appearance: Normal appearance. She is not  ill-appearing, toxic-appearing or diaphoretic.   HENT:      Head: Normocephalic and atraumatic.   Eyes:      General: No scleral icterus.     Conjunctiva/sclera: Conjunctivae normal.   Cardiovascular:      Rate and Rhythm: Normal rate.   Pulmonary:      Effort: Pulmonary effort is normal. No respiratory distress.   Skin:     Coloration: Skin is not pale.   Neurological:      Mental Status: She is alert. Mental status is at baseline.   Psychiatric:         Attention and Perception: Attention and perception normal.         Mood and Affect: Mood and affect normal.         Speech: Speech normal.         Behavior: Behavior normal.         Cognition and Memory: Cognition and memory normal.         Judgment: Judgment normal.       Assessment:       1. Class 1 obesity due to excess calories with serious comorbidity and body mass index (BMI) of 33.0 to 33.9 in adult    2. JESUS (generalized anxiety disorder)    3. Moderate episode of recurrent major depressive disorder          Plan:   Recent relevant labs results reviewed with patient.         Assessment & Plan    Weight loss of 6-7 lbs and BMI reduction from 34 to 33 within a month, indicating treatment efficacy.  Maintained current dose of Adipex for 90 days before reassessment.  A1C of 6.4, below insurance coverage threshold for GLP1a weight loss medications.    - Continued Adipex (phentermine) for weight loss with 90-day supply, to be started after finishing current 30-day supply, as patient reports good appetite suppression without side effects or BP issues.  - Explained potential side effects to monitor, including increased headaches, BP changes, sleep disturbances    1. Class 1 obesity due to excess calories with serious comorbidity and body mass index (BMI) of 33.0 to 33.9 in adult  -     phentermine (ADIPEX-P) 37.5 mg tablet; Take 1 tablet (37.5 mg total) by mouth before breakfast.  Dispense: 90 tablet; Refill: 0    2. JESUS (generalized anxiety disorder)  3. Moderate  episode of recurrent major depressive disorder  Continue per psychiatry.   Stimulant may be helpful with depressive symptoms. Will continue at current dose for now.     Patient's questions answered. Plan reviewed with patient at the end of visit. Relevant precautions to chief complaint and reasons to seek further medical care or to contact the office sooner reviewed with patient.     Follow up in about 3 months (around 10/8/2025) for Weight Follow-up.        Part of this note was dictated using voice recognition software. Please excuse any typographical errors.     This note was generated with the assistance of ambient listening technology. Verbal consent was obtained by the patient and accompanying visitor(s) for the recording of patient appointment to facilitate this note. I attest to having reviewed and edited the generated note for accuracy, though some syntax or spelling errors may persist. Please contact the author of this note for any clarification.

## 2025-07-18 ENCOUNTER — OFFICE VISIT (OUTPATIENT)
Dept: PSYCHIATRY | Facility: CLINIC | Age: 35
End: 2025-07-18
Payer: COMMERCIAL

## 2025-07-18 DIAGNOSIS — F41.1 GAD (GENERALIZED ANXIETY DISORDER): ICD-10-CM

## 2025-07-18 DIAGNOSIS — F51.04 PSYCHOPHYSIOLOGICAL INSOMNIA: ICD-10-CM

## 2025-07-18 DIAGNOSIS — R53.83 FATIGUE, UNSPECIFIED TYPE: Primary | ICD-10-CM

## 2025-07-18 DIAGNOSIS — F44.7 FUNCTIONAL NEUROLOGICAL SYMPTOM DISORDER WITH MIXED SYMPTOMS: ICD-10-CM

## 2025-07-18 DIAGNOSIS — F33.41 RECURRENT MAJOR DEPRESSIVE DISORDER, IN PARTIAL REMISSION: ICD-10-CM

## 2025-07-18 PROCEDURE — G2211 COMPLEX E/M VISIT ADD ON: HCPCS | Mod: 95,,, | Performed by: INTERNAL MEDICINE

## 2025-07-18 PROCEDURE — 98006 SYNCH AUDIO-VIDEO EST MOD 30: CPT | Mod: 95,,, | Performed by: INTERNAL MEDICINE

## 2025-07-18 PROCEDURE — 3044F HG A1C LEVEL LT 7.0%: CPT | Mod: CPTII,95,, | Performed by: INTERNAL MEDICINE

## 2025-07-18 PROCEDURE — 1159F MED LIST DOCD IN RCRD: CPT | Mod: CPTII,95,, | Performed by: INTERNAL MEDICINE

## 2025-07-18 PROCEDURE — 1160F RVW MEDS BY RX/DR IN RCRD: CPT | Mod: CPTII,95,, | Performed by: INTERNAL MEDICINE

## 2025-07-18 NOTE — PROGRESS NOTES
OUTPATIENT PSYCHIATRY RETURN VISIT    ENCOUNTER DATE:  7/29/2025  SITE:  [facility], [facility]  LENGTH OF SESSION:  22 minutes    The patient location is:  [place], not in a healthcare facility  Visit type:  audiovisual    Face to Face time with patient:  22 minutes  30 minutes of total time spent on the encounter, which includes face to face time and non-face to face time preparing to see the patient (eg, review of tests), Obtaining and/or reviewing separately obtained history, Documenting clinical information in the electronic or other health record, Independently interpreting results (not separately reported) and communicating results to the patient/family/caregiver, or Care coordination (not separately reported).     Each patient to whom he or she provides medical services by telemedicine is:  (1) informed of the relationship between the physician and patient and the respective role of any other health care provider with respect to management of the patient; and (2) notified that he or she may decline to receive medical services by telemedicine and may withdraw from such care at any time.      CHIEF COMPLAINT:  Anxiety and Fatigue      HISTORY OF PRESENTING ILLNESS:  Rosalina Saavedra is a 35 y.o. female with history of MDD, JESUS, and Social Anxiety Disorder who presents for follow up appointment.      Plan at last appointment on 6/30/2025:  Increase Ambien to 10mg qHS temporarily with hope to get sleep schedule back on track.  Continue Effexor 225mg every morning for depression and anxiety.  Continue Buspar 30mg BID and Xanax 0.5mg BID for anxiety.  Continue Abilify 5mg daily as adjust for depression.     Discussed with patient informed consent, risks versus benefits, alternative treatments, side effect profile and the inherent unpredictability of individual responses to these treatments.  The patient expresses understanding of the above and displays the capacity to agree with this current  plan.  Continue individual psychotherapy with Guerita Stevens LCSW.     History as told by patient:  Ambien does help her fall asleep at 10mg but she feels so tired during the day.  Before she was staying up and not doing anything.  Takes around 10pm and goes to sleep at 12 or 2am.  It does not make her very tired.  Still can sleep a long time but feels exhausted.  School starts August 4th.  Her main complaint is significant daytime fatigue.  Sleeps about 12 hours per day.  Anxiety mostly about school starting and work.  Looking forward to martial arts and really enjoys being there.  Going twice a week.  Denies feeling depressed.  Has lost 7 pounds since Adipex.      Medication side effects:  Increased appetite  Medication compliance:  Yes    PSYCHIATRIC REVIEW OF SYSTEMS:  Trouble with sleep:  Improvement initiating sleep on Ambien  Appetite changes:  Some decrease on Phentermine  Weight changes:  Loss as above  Lack of energy:  Yes  Anhedonia:  Sometimes  Somatic symptoms:  Not discussed  Libido:  Not discussed  Anxiety/panic:  Mostly about work and dissertation  Guilty/hopeless:  Denies  Self-injurious behavior/risky behavior:  Denies  Any drugs:  Denies  Alcohol:  Denies     MEDICAL REVIEW OF SYSTEMS:  Complete review of systems performed covering Constitutional, Musculoskeletal, Neurologic.  All systems negative except for that covered in HPI.    PAST PSYCHIATRIC, MEDICAL, AND SOCIAL HISTORY REVIEWED  The patient's past medical, family and social history have been reviewed and updated as appropriate within the electronic medical record - see encounter notes.    MEDICATIONS:    Current Outpatient Medications:     ALPRAZolam (XANAX) 0.5 MG tablet, TAKE 1 TABLET (0.5MG) BY MOUTH IN THE MORNING AND 2 TABLETS (1MG) BEFORE BED., Disp: 90 tablet, Rfl: 0    ARIPiprazole (ABILIFY) 5 MG Tab, Take 1 tablet (5 mg total) by mouth once daily., Disp: 30 tablet, Rfl: 11    atogepant (QULIPTA) 60 mg Tab, Take 1 tablet (60 mg  total) by mouth once daily., Disp: 30 tablet, Rfl: 6    busPIRone (BUSPAR) 30 MG Tab, Take 1 tablet (30 mg total) by mouth 2 (two) times daily., Disp: 180 tablet, Rfl: 11    etodolac (LODINE) 500 MG tablet, TAKE 1 TABLET (500 MG TOTAL) BY MOUTH 2 (TWO) TIMES DAILY AS NEEDED (MIGRAINE)., Disp: 20 tablet, Rfl: 12    ibuprofen (ADVIL,MOTRIN) 800 MG tablet, Take 1 tablet (800 mg total) by mouth 2 (two) times daily with meals., Disp: 60 tablet, Rfl: 3    phentermine (ADIPEX-P) 37.5 mg tablet, Take 1 tablet (37.5 mg total) by mouth before breakfast., Disp: 90 tablet, Rfl: 0    promethazine (PHENERGAN) 25 MG tablet, Take 1 tablet (25 mg total) by mouth every 6 (six) hours as needed for Nausea., Disp: 40 tablet, Rfl: 0    topiramate (TOPAMAX) 100 MG tablet, Take 1 tablet (100 mg total) by mouth every evening. (Take 1 tablet nightly for 2 weeks. In 14 days, if tolerating, take with 50mg tablet to equal 150mg nightly.) (Patient not taking: Reported on 6/13/2025), Disp: 90 tablet, Rfl: 3    venlafaxine (EFFEXOR-XR) 150 MG Cp24, Take 1 capsule (150 mg total) by mouth once daily. Take with 75mg capsule to equal 225mg daily., Disp: 30 capsule, Rfl: 11    venlafaxine (EFFEXOR-XR) 75 MG 24 hr capsule, Take 1 capsule (75 mg total) by mouth once daily. Take with 150mg capsule to equal 225mg daily., Disp: 30 capsule, Rfl: 11    ZOLMitriptan (ZOMIG-ZMT) 5 MG disintegrating tablet, TAKE 1 TABLET BY MOUTH AS NEEDED FOR MIGRAINE., Disp: 9 tablet, Rfl: 12    ZOLMitriptan (ZOMIG-ZMT) 5 MG disintegrating tablet, DISSOLVE 1 TABLET BY MOUTH AS NEEDED FOR MIGRAINE., Disp: 6 tablet, Rfl: 12    zolpidem (AMBIEN) 10 mg Tab, Take 1 tablet (10 mg total) by mouth every evening., Disp: 30 tablet, Rfl: 2    ALLERGIES:  Review of patient's allergies indicates:   Allergen Reactions    Codeine Itching and Other (See Comments)    Morphine Itching and Other (See Comments)       PSYCHIATRIC EXAM:  There were no vitals filed for this visit.  Appearance:  Well  "groomed, appearing healthy and of stated age  Behavior:  Cooperative, +mild psychomotor retardation  Speech:  Normal rate, volume, and prosody  Mood:  "Ok"  Affect:  Euthymic  Thought Process:  Linear, logical, goal directed  Thought Content:  Negative for homicidal ideation, delusions or hallucinations.  Associations:  Intact  Memory:  Grossly Intact  Level of Consciousness/Orientation:  Grossly intact  Fund of Knowledge:  Good  Attention:  Good  Language:  Fluent, able to name abstract and concrete objects  Insight:  Good  Judgment:  Intact  Psychomotor signs:  No involuntary movements or tremor of face  Gait:  Unable to assess via virtual visit      RELEVANT LABS/STUDIES:    Lab Results   Component Value Date    WBC 3.87 (L) 06/14/2025    HGB 11.7 (L) 06/14/2025    HCT 37.6 06/14/2025    MCV 89 06/14/2025     06/14/2025     BMP  Lab Results   Component Value Date     06/14/2025    K 4.1 06/14/2025     06/14/2025    CO2 24 06/14/2025    BUN 6 06/14/2025    CREATININE 0.9 06/14/2025    CALCIUM 9.1 06/14/2025    ANIONGAP 9 06/14/2025    ESTGFRAFRICA >60.0 07/28/2022    EGFRNONAA >60.0 07/28/2022     Lab Results   Component Value Date    ALT 9 (L) 06/14/2025    AST 15 06/14/2025    ALKPHOS 82 06/14/2025    BILITOT 0.3 06/14/2025     Lab Results   Component Value Date    TSH 1.351 06/14/2025     Lab Results   Component Value Date    HGBA1C 6.4 (H) 06/14/2025       IMPRESSION:    Rosalina Saavedra is a 35 y.o. female with history of MDD, JESUS, and Social Anxiety Disorder who presents for follow up appointment.    Status/Progress:  Based on the examination today, the patient's problem(s) is/are improving but still inadequately controlled.  New problems have not been presented today.    Risk Parameters:  Patient reports no suicidal ideation  Patient reports no homicidal ideation  Patient reports no self-injurious behavior  Patient reports no violent behavior      DIAGNOSES:    ICD-10-CM ICD-9-CM   1. " Fatigue, unspecified type  R53.83 780.79   2. JESUS (generalized anxiety disorder)  F41.1 300.02   3. Psychophysiological insomnia  F51.04 307.42   4. Recurrent major depressive disorder, in partial remission  F33.41 296.35   5. Functional neurological symptom disorder with mixed symptoms  F44.7 300.11         PLAN:  No medication changes today.  I will send message to Dr. Hyman and Dr. Basilio regarding persistent daytime fatigue (first documented in chart in 2019).   Continue Effexor 225mg every morning for depression and anxiety.  Continue Buspar 30mg BID and Xanax 0.5mg BID for anxiety.  Continue Abilify 5mg daily as adjunct for depression.     Continue Ambien 10mg qHS for now since it is helping her initiate sleep.    Discussed with patient informed consent, risks versus benefits, alternative treatments, side effect profile and the inherent unpredictability of individual responses to these treatments.  The patient expresses understanding of the above and displays the capacity to agree with this current plan.  Continue individual psychotherapy with Guerita Stevens LCSW.     RETURN TO CLINIC:  Follow up in about 4 weeks (around 8/15/2025).

## 2025-07-25 ENCOUNTER — PATIENT MESSAGE (OUTPATIENT)
Dept: PSYCHIATRY | Facility: CLINIC | Age: 35
End: 2025-07-25
Payer: COMMERCIAL

## 2025-08-10 DIAGNOSIS — F41.1 GAD (GENERALIZED ANXIETY DISORDER): ICD-10-CM

## 2025-08-12 RX ORDER — BUSPIRONE HYDROCHLORIDE 30 MG/1
30 TABLET ORAL 2 TIMES DAILY
Qty: 180 TABLET | Refills: 11 | Status: SHIPPED | OUTPATIENT
Start: 2025-08-12

## 2025-08-15 ENCOUNTER — PATIENT MESSAGE (OUTPATIENT)
Dept: PSYCHIATRY | Facility: CLINIC | Age: 35
End: 2025-08-15
Payer: COMMERCIAL

## 2025-08-28 ENCOUNTER — PROCEDURE VISIT (OUTPATIENT)
Dept: NEUROLOGY | Facility: CLINIC | Age: 35
End: 2025-08-28
Payer: COMMERCIAL

## 2025-08-28 DIAGNOSIS — G43.701 CHRONIC MIGRAINE W/O AURA, NOT INTRACTABLE, W STAT MIGR: Primary | ICD-10-CM

## 2025-09-05 ENCOUNTER — OFFICE VISIT (OUTPATIENT)
Facility: CLINIC | Age: 35
End: 2025-09-05
Payer: COMMERCIAL

## 2025-09-05 VITALS
OXYGEN SATURATION: 95 % | DIASTOLIC BLOOD PRESSURE: 90 MMHG | HEART RATE: 109 BPM | WEIGHT: 206.81 LBS | SYSTOLIC BLOOD PRESSURE: 132 MMHG | BODY MASS INDEX: 33.38 KG/M2

## 2025-09-05 DIAGNOSIS — G47.10 HYPERSOMNIA: Primary | ICD-10-CM

## 2025-09-05 DIAGNOSIS — G47.30 SLEEP APNEA, UNSPECIFIED TYPE: ICD-10-CM

## 2025-09-05 DIAGNOSIS — G47.00 INSOMNIA, UNSPECIFIED TYPE: ICD-10-CM

## 2025-09-05 PROCEDURE — 99999 PR PBB SHADOW E&M-EST. PATIENT-LVL IV: CPT | Mod: PBBFAC,,,
